# Patient Record
Sex: FEMALE | Race: OTHER | NOT HISPANIC OR LATINO
[De-identification: names, ages, dates, MRNs, and addresses within clinical notes are randomized per-mention and may not be internally consistent; named-entity substitution may affect disease eponyms.]

---

## 2017-03-06 ENCOUNTER — APPOINTMENT (OUTPATIENT)
Dept: FAMILY MEDICINE | Facility: CLINIC | Age: 57
End: 2017-03-06

## 2017-03-06 VITALS
HEIGHT: 62 IN | TEMPERATURE: 98 F | BODY MASS INDEX: 27.42 KG/M2 | RESPIRATION RATE: 17 BRPM | HEART RATE: 63 BPM | WEIGHT: 149 LBS | DIASTOLIC BLOOD PRESSURE: 70 MMHG | OXYGEN SATURATION: 99 % | SYSTOLIC BLOOD PRESSURE: 118 MMHG

## 2017-03-08 LAB
24R-OH-CALCIDIOL SERPL-MCNC: 47.9 PG/ML
ALBUMIN SERPL ELPH-MCNC: 4.4 G/DL
ALP BLD-CCNC: 84 U/L
ALT SERPL-CCNC: 18 U/L
ANION GAP SERPL CALC-SCNC: 15 MMOL/L
APPEARANCE: CLEAR
AST SERPL-CCNC: 18 U/L
BACTERIA: NEGATIVE
BASOPHILS # BLD AUTO: 0.04 K/UL
BASOPHILS NFR BLD AUTO: 0.4 %
BILIRUB SERPL-MCNC: 0.4 MG/DL
BILIRUBIN URINE: NEGATIVE
BLOOD URINE: ABNORMAL
BUN SERPL-MCNC: 13 MG/DL
CALCIUM SERPL-MCNC: 9.9 MG/DL
CHLORIDE SERPL-SCNC: 103 MMOL/L
CHOLEST SERPL-MCNC: 197 MG/DL
CHOLEST/HDLC SERPL: 3.6 RATIO
CO2 SERPL-SCNC: 22 MMOL/L
COLOR: YELLOW
CREAT SERPL-MCNC: 0.64 MG/DL
CREAT SPEC-SCNC: 67 MG/DL
EOSINOPHIL # BLD AUTO: 0.18 K/UL
EOSINOPHIL NFR BLD AUTO: 1.8 %
GLUCOSE QUALITATIVE U: NORMAL MG/DL
GLUCOSE SERPL-MCNC: 91 MG/DL
HBA1C MFR BLD HPLC: 5.4 %
HCT VFR BLD CALC: 42.8 %
HDLC SERPL-MCNC: 54 MG/DL
HGB BLD-MCNC: 14.4 G/DL
HYALINE CASTS: 2 /LPF
IMM GRANULOCYTES NFR BLD AUTO: 0.2 %
KETONES URINE: NEGATIVE
LDLC SERPL CALC-MCNC: 114 MG/DL
LEUKOCYTE ESTERASE URINE: NEGATIVE
LYMPHOCYTES # BLD AUTO: 3.88 K/UL
LYMPHOCYTES NFR BLD AUTO: 37.9 %
MAN DIFF?: NORMAL
MCHC RBC-ENTMCNC: 32.7 PG
MCHC RBC-ENTMCNC: 33.6 GM/DL
MCV RBC AUTO: 97.3 FL
MICROALBUMIN 24H UR DL<=1MG/L-MCNC: <0.3 MG/DL
MICROALBUMIN/CREAT 24H UR-RTO: NORMAL UG/MG
MICROSCOPIC-UA: NORMAL
MONOCYTES # BLD AUTO: 0.67 K/UL
MONOCYTES NFR BLD AUTO: 6.5 %
NEUTROPHILS # BLD AUTO: 5.44 K/UL
NEUTROPHILS NFR BLD AUTO: 53.2 %
NITRITE URINE: NEGATIVE
PH URINE: 6.5
PLATELET # BLD AUTO: 351 K/UL
POTASSIUM SERPL-SCNC: 4.3 MMOL/L
PROT SERPL-MCNC: 7.1 G/DL
PROTEIN URINE: NEGATIVE MG/DL
RBC # BLD: 4.4 M/UL
RBC # FLD: 13.3 %
RED BLOOD CELLS URINE: 6 /HPF
SODIUM SERPL-SCNC: 140 MMOL/L
SPECIFIC GRAVITY URINE: 1.02
SQUAMOUS EPITHELIAL CELLS: 1 /HPF
T4 FREE SERPL-MCNC: 1.2 NG/DL
TRIGL SERPL-MCNC: 144 MG/DL
TSH SERPL-ACNC: 3.43 UIU/ML
UROBILINOGEN URINE: NORMAL MG/DL
WBC # FLD AUTO: 10.23 K/UL
WHITE BLOOD CELLS URINE: 0 /HPF

## 2017-11-01 ENCOUNTER — APPOINTMENT (OUTPATIENT)
Dept: FAMILY MEDICINE | Facility: CLINIC | Age: 57
End: 2017-11-01

## 2017-11-02 ENCOUNTER — APPOINTMENT (OUTPATIENT)
Dept: FAMILY MEDICINE | Facility: CLINIC | Age: 57
End: 2017-11-02
Payer: MEDICARE

## 2017-11-02 VITALS
RESPIRATION RATE: 16 BRPM | TEMPERATURE: 98.2 F | DIASTOLIC BLOOD PRESSURE: 80 MMHG | SYSTOLIC BLOOD PRESSURE: 120 MMHG | HEART RATE: 65 BPM | OXYGEN SATURATION: 98 %

## 2017-11-02 PROCEDURE — 99214 OFFICE O/P EST MOD 30 MIN: CPT

## 2017-11-08 LAB
25(OH)D3 SERPL-MCNC: 20.4 NG/ML
ALBUMIN SERPL ELPH-MCNC: 4.4 G/DL
ALP BLD-CCNC: 93 U/L
ALT SERPL-CCNC: 15 U/L
AMYLASE/CREAT SERPL: 68 U/L
ANION GAP SERPL CALC-SCNC: 12 MMOL/L
APPEARANCE: CLEAR
AST SERPL-CCNC: 13 U/L
BASOPHILS # BLD AUTO: 0.04 K/UL
BASOPHILS NFR BLD AUTO: 0.5 %
BILIRUB DIRECT SERPL-MCNC: 0.2 MG/DL
BILIRUB INDIRECT SERPL-MCNC: 0.4 MG/DL
BILIRUB SERPL-MCNC: 0.6 MG/DL
BILIRUBIN URINE: NEGATIVE
BLOOD URINE: ABNORMAL
BUN SERPL-MCNC: 15 MG/DL
CALCIUM SERPL-MCNC: 9.4 MG/DL
CHLORIDE SERPL-SCNC: 104 MMOL/L
CHOLEST SERPL-MCNC: 185 MG/DL
CHOLEST/HDLC SERPL: 3.7 RATIO
CO2 SERPL-SCNC: 24 MMOL/L
COLOR: ABNORMAL
CREAT SERPL-MCNC: 0.72 MG/DL
CRP SERPL-MCNC: 0.4 MG/DL
EOSINOPHIL # BLD AUTO: 0.15 K/UL
EOSINOPHIL NFR BLD AUTO: 1.8 %
ERYTHROCYTE [SEDIMENTATION RATE] IN BLOOD BY WESTERGREN METHOD: 21 MM/HR
GLUCOSE QUALITATIVE U: NEGATIVE MG/DL
GLUCOSE SERPL-MCNC: 85 MG/DL
HAV IGM SER QL: NONREACTIVE
HBA1C MFR BLD HPLC: 5.5 %
HBV CORE IGM SER QL: NONREACTIVE
HBV SURFACE AG SER QL: NONREACTIVE
HCT VFR BLD CALC: 41.6 %
HCV AB SER QL: NONREACTIVE
HCV S/CO RATIO: 0.1 S/CO
HDLC SERPL-MCNC: 50 MG/DL
HGB BLD-MCNC: 13.8 G/DL
HIV1+2 AB SPEC QL IA.RAPID: NONREACTIVE
IMM GRANULOCYTES NFR BLD AUTO: 0.2 %
KETONES URINE: ABNORMAL
LDLC SERPL CALC-MCNC: 122 MG/DL
LEUKOCYTE ESTERASE URINE: NEGATIVE
LYMPHOCYTES # BLD AUTO: 3.32 K/UL
LYMPHOCYTES NFR BLD AUTO: 39.8 %
MAN DIFF?: NORMAL
MCHC RBC-ENTMCNC: 31.9 PG
MCHC RBC-ENTMCNC: 33.2 GM/DL
MCV RBC AUTO: 96.1 FL
MONOCYTES # BLD AUTO: 0.63 K/UL
MONOCYTES NFR BLD AUTO: 7.6 %
NEUTROPHILS # BLD AUTO: 4.18 K/UL
NEUTROPHILS NFR BLD AUTO: 50.1 %
NITRITE URINE: NEGATIVE
PH URINE: 5
PLATELET # BLD AUTO: 377 K/UL
POTASSIUM SERPL-SCNC: 3.9 MMOL/L
PROT SERPL-MCNC: 7.5 G/DL
PROTEIN URINE: NEGATIVE MG/DL
RBC # BLD: 4.33 M/UL
RBC # FLD: 13.1 %
SODIUM SERPL-SCNC: 140 MMOL/L
SPECIFIC GRAVITY URINE: 1.03
TRIGL SERPL-MCNC: 64 MG/DL
TSH SERPL-ACNC: 3.44 UIU/ML
UROBILINOGEN URINE: 1 MG/DL
WBC # FLD AUTO: 8.34 K/UL

## 2017-11-15 LAB — HEMOCCULT STL QL IA: NEGATIVE

## 2017-11-29 ENCOUNTER — APPOINTMENT (OUTPATIENT)
Dept: RADIOLOGY | Facility: HOSPITAL | Age: 57
End: 2017-11-29
Payer: OTHER MISCELLANEOUS

## 2017-11-29 ENCOUNTER — OUTPATIENT (OUTPATIENT)
Dept: OUTPATIENT SERVICES | Facility: HOSPITAL | Age: 57
LOS: 1 days | End: 2017-11-29
Payer: COMMERCIAL

## 2017-11-29 DIAGNOSIS — S79.911A UNSPECIFIED INJURY OF RIGHT HIP, INITIAL ENCOUNTER: ICD-10-CM

## 2017-11-29 DIAGNOSIS — Z00.8 ENCOUNTER FOR OTHER GENERAL EXAMINATION: ICD-10-CM

## 2017-11-29 PROCEDURE — 73502 X-RAY EXAM HIP UNI 2-3 VIEWS: CPT | Mod: 26,RT

## 2017-11-29 PROCEDURE — 73502 X-RAY EXAM HIP UNI 2-3 VIEWS: CPT

## 2017-12-05 ENCOUNTER — FORM ENCOUNTER (OUTPATIENT)
Age: 57
End: 2017-12-05

## 2017-12-06 ENCOUNTER — APPOINTMENT (OUTPATIENT)
Dept: ULTRASOUND IMAGING | Facility: HOSPITAL | Age: 57
End: 2017-12-06
Payer: COMMERCIAL

## 2017-12-06 ENCOUNTER — OUTPATIENT (OUTPATIENT)
Dept: OUTPATIENT SERVICES | Facility: HOSPITAL | Age: 57
LOS: 1 days | End: 2017-12-06

## 2017-12-06 ENCOUNTER — APPOINTMENT (OUTPATIENT)
Dept: MRI IMAGING | Facility: HOSPITAL | Age: 57
End: 2017-12-06
Payer: COMMERCIAL

## 2017-12-06 DIAGNOSIS — Z00.8 ENCOUNTER FOR OTHER GENERAL EXAMINATION: ICD-10-CM

## 2017-12-06 DIAGNOSIS — K76.89 OTHER SPECIFIED DISEASES OF LIVER: ICD-10-CM

## 2017-12-06 DIAGNOSIS — R51 HEADACHE: ICD-10-CM

## 2017-12-06 PROCEDURE — 76700 US EXAM ABDOM COMPLETE: CPT | Mod: 26

## 2017-12-06 PROCEDURE — 70551 MRI BRAIN STEM W/O DYE: CPT | Mod: 26

## 2017-12-06 PROCEDURE — 70551 MRI BRAIN STEM W/O DYE: CPT

## 2017-12-06 PROCEDURE — 76700 US EXAM ABDOM COMPLETE: CPT

## 2017-12-13 ENCOUNTER — APPOINTMENT (OUTPATIENT)
Dept: FAMILY MEDICINE | Facility: CLINIC | Age: 57
End: 2017-12-13
Payer: MEDICARE

## 2017-12-13 VITALS — RESPIRATION RATE: 16 BRPM

## 2017-12-13 VITALS
HEART RATE: 62 BPM | OXYGEN SATURATION: 96 % | BODY MASS INDEX: 27.6 KG/M2 | SYSTOLIC BLOOD PRESSURE: 118 MMHG | HEIGHT: 62 IN | WEIGHT: 150 LBS | DIASTOLIC BLOOD PRESSURE: 80 MMHG

## 2017-12-13 DIAGNOSIS — R31.29 OTHER MICROSCOPIC HEMATURIA: ICD-10-CM

## 2017-12-13 DIAGNOSIS — H60.502 UNSPECIFIED ACUTE NONINFECTIVE OTITIS EXTERNA, LEFT EAR: ICD-10-CM

## 2017-12-13 DIAGNOSIS — R05 COUGH: ICD-10-CM

## 2017-12-13 PROCEDURE — 99406 BEHAV CHNG SMOKING 3-10 MIN: CPT

## 2017-12-13 PROCEDURE — 99214 OFFICE O/P EST MOD 30 MIN: CPT | Mod: 25

## 2018-01-02 ENCOUNTER — OUTPATIENT (OUTPATIENT)
Dept: OUTPATIENT SERVICES | Facility: HOSPITAL | Age: 58
LOS: 1 days | End: 2018-01-02
Payer: COMMERCIAL

## 2018-01-02 ENCOUNTER — APPOINTMENT (OUTPATIENT)
Dept: CT IMAGING | Facility: HOSPITAL | Age: 58
End: 2018-01-02
Payer: MEDICARE

## 2018-01-02 DIAGNOSIS — Z00.8 ENCOUNTER FOR OTHER GENERAL EXAMINATION: ICD-10-CM

## 2018-01-02 DIAGNOSIS — R31.21 ASYMPTOMATIC MICROSCOPIC HEMATURIA: ICD-10-CM

## 2018-01-02 DIAGNOSIS — R10.9 UNSPECIFIED ABDOMINAL PAIN: ICD-10-CM

## 2018-01-02 PROCEDURE — 74178 CT ABD&PLV WO CNTR FLWD CNTR: CPT

## 2018-01-02 PROCEDURE — 74178 CT ABD&PLV WO CNTR FLWD CNTR: CPT | Mod: 26

## 2018-01-24 ENCOUNTER — RX RENEWAL (OUTPATIENT)
Age: 58
End: 2018-01-24

## 2018-07-24 ENCOUNTER — EMERGENCY (EMERGENCY)
Facility: HOSPITAL | Age: 58
LOS: 1 days | Discharge: HOME | End: 2018-07-24
Attending: EMERGENCY MEDICINE | Admitting: EMERGENCY MEDICINE
Payer: COMMERCIAL

## 2018-07-24 VITALS
RESPIRATION RATE: 18 BRPM | HEIGHT: 62 IN | DIASTOLIC BLOOD PRESSURE: 81 MMHG | TEMPERATURE: 97 F | SYSTOLIC BLOOD PRESSURE: 123 MMHG | HEART RATE: 70 BPM | OXYGEN SATURATION: 97 % | WEIGHT: 149.91 LBS

## 2018-07-24 PROCEDURE — 99283 EMERGENCY DEPT VISIT LOW MDM: CPT | Mod: 25

## 2018-07-24 PROCEDURE — 16000 INITIAL TREATMENT OF BURN(S): CPT

## 2018-07-24 PROCEDURE — 90471 IMMUNIZATION ADMIN: CPT

## 2018-07-24 PROCEDURE — 99053 MED SERV 10PM-8AM 24 HR FAC: CPT

## 2018-07-24 PROCEDURE — 90714 TD VACC NO PRESV 7 YRS+ IM: CPT

## 2018-07-24 RX ORDER — TETANUS AND DIPHTHERIA TOXOIDS ADSORBED 2; 2 [LF]/.5ML; [LF]/.5ML
0.5 INJECTION INTRAMUSCULAR ONCE
Qty: 0 | Refills: 0 | Status: COMPLETED | OUTPATIENT
Start: 2018-07-24 | End: 2018-07-24

## 2018-07-24 RX ORDER — TETANUS AND DIPHTHERIA TOXOIDS ADSORBED 2; 2 [LF]/.5ML; [LF]/.5ML
0.5 INJECTION INTRAMUSCULAR ONCE
Qty: 0 | Refills: 0 | Status: DISCONTINUED | OUTPATIENT
Start: 2018-07-24 | End: 2018-07-24

## 2018-07-24 RX ORDER — TETANUS IMMUNE GLOBULIN 250 UNIT
1 SYRINGE (EA) INTRAMUSCULAR ONCE
Qty: 0 | Refills: 0 | Status: DISCONTINUED | OUTPATIENT
Start: 2018-07-24 | End: 2018-07-24

## 2018-07-24 RX ADMIN — Medication 500 MILLIGRAM(S): at 08:47

## 2018-07-24 RX ADMIN — Medication 1 APPLICATION(S): at 08:55

## 2018-07-24 RX ADMIN — Medication 30 MILLILITER(S): at 08:47

## 2018-07-24 RX ADMIN — TETANUS AND DIPHTHERIA TOXOIDS ADSORBED 0.5 MILLILITER(S): 2; 2 INJECTION INTRAMUSCULAR at 08:54

## 2018-07-31 PROBLEM — K29.50 UNSPECIFIED CHRONIC GASTRITIS WITHOUT BLEEDING: Chronic | Status: ACTIVE | Noted: 2018-07-24

## 2018-08-01 ENCOUNTER — APPOINTMENT (OUTPATIENT)
Dept: FAMILY MEDICINE | Facility: CLINIC | Age: 58
End: 2018-08-01
Payer: OTHER MISCELLANEOUS

## 2018-08-01 VITALS
RESPIRATION RATE: 16 BRPM | TEMPERATURE: 98 F | OXYGEN SATURATION: 96 % | DIASTOLIC BLOOD PRESSURE: 70 MMHG | HEART RATE: 66 BPM | SYSTOLIC BLOOD PRESSURE: 120 MMHG

## 2018-08-01 DIAGNOSIS — Z02.6 ENCOUNTER FOR EXAMINATION FOR INSURANCE PURPOSES: ICD-10-CM

## 2018-08-01 PROCEDURE — 99213 OFFICE O/P EST LOW 20 MIN: CPT

## 2018-08-01 NOTE — HISTORY OF PRESENT ILLNESS
[FreeTextEntry8] : Patient is a 58-year-old female, who presents today for Worker's Compensation status post burn of left hand. It was an accidental burn which occurred at work when the patient attempted to make to coffee pot work again. The accident occurred one week ago at work. Patient was seen in the emergency room presently her left hand is healing well. There is no sign of infection and the skin is healing very well.

## 2018-08-01 NOTE — PHYSICAL EXAM

## 2018-08-01 NOTE — ASSESSMENT
[FreeTextEntry1] : Continue Silvadene. Patient may return to work in fact, the patient return to work 2 days after the accident.No change in present medical management

## 2018-08-27 ENCOUNTER — FORM ENCOUNTER (OUTPATIENT)
Age: 58
End: 2018-08-27

## 2018-08-28 ENCOUNTER — FORM ENCOUNTER (OUTPATIENT)
Age: 58
End: 2018-08-28

## 2018-08-28 ENCOUNTER — APPOINTMENT (OUTPATIENT)
Dept: MAMMOGRAPHY | Facility: HOSPITAL | Age: 58
End: 2018-08-28
Payer: MEDICARE

## 2018-08-28 ENCOUNTER — OUTPATIENT (OUTPATIENT)
Dept: OUTPATIENT SERVICES | Facility: HOSPITAL | Age: 58
LOS: 1 days | End: 2018-08-28
Payer: COMMERCIAL

## 2018-08-28 DIAGNOSIS — Z00.8 ENCOUNTER FOR OTHER GENERAL EXAMINATION: ICD-10-CM

## 2018-08-28 PROCEDURE — 77063 BREAST TOMOSYNTHESIS BI: CPT | Mod: 26

## 2018-08-28 PROCEDURE — 77067 SCR MAMMO BI INCL CAD: CPT | Mod: 26

## 2018-08-28 PROCEDURE — 77067 SCR MAMMO BI INCL CAD: CPT

## 2018-08-28 PROCEDURE — 77063 BREAST TOMOSYNTHESIS BI: CPT

## 2018-08-29 ENCOUNTER — OTHER (OUTPATIENT)
Age: 58
End: 2018-08-29

## 2018-08-29 ENCOUNTER — APPOINTMENT (OUTPATIENT)
Dept: ULTRASOUND IMAGING | Facility: HOSPITAL | Age: 58
End: 2018-08-29
Payer: MEDICARE

## 2018-08-29 ENCOUNTER — APPOINTMENT (OUTPATIENT)
Dept: MAMMOGRAPHY | Facility: HOSPITAL | Age: 58
End: 2018-08-29
Payer: MEDICARE

## 2018-08-29 ENCOUNTER — OUTPATIENT (OUTPATIENT)
Dept: OUTPATIENT SERVICES | Facility: HOSPITAL | Age: 58
LOS: 1 days | End: 2018-08-29
Payer: COMMERCIAL

## 2018-08-29 DIAGNOSIS — Z00.8 ENCOUNTER FOR OTHER GENERAL EXAMINATION: ICD-10-CM

## 2018-08-29 PROCEDURE — 77065 DX MAMMO INCL CAD UNI: CPT

## 2018-08-29 PROCEDURE — G0279: CPT | Mod: 26

## 2018-08-29 PROCEDURE — G0279: CPT

## 2018-08-29 PROCEDURE — 77065 DX MAMMO INCL CAD UNI: CPT | Mod: 26,RT

## 2018-08-29 PROCEDURE — 76641 ULTRASOUND BREAST COMPLETE: CPT

## 2018-08-29 PROCEDURE — 76641 ULTRASOUND BREAST COMPLETE: CPT | Mod: 26

## 2018-09-07 ENCOUNTER — APPOINTMENT (OUTPATIENT)
Dept: FAMILY MEDICINE | Facility: CLINIC | Age: 58
End: 2018-09-07
Payer: MEDICARE

## 2018-09-07 VITALS
WEIGHT: 150 LBS | HEART RATE: 60 BPM | HEIGHT: 62 IN | DIASTOLIC BLOOD PRESSURE: 82 MMHG | TEMPERATURE: 98.1 F | BODY MASS INDEX: 27.6 KG/M2 | SYSTOLIC BLOOD PRESSURE: 126 MMHG | OXYGEN SATURATION: 98 %

## 2018-09-07 PROCEDURE — 99396 PREV VISIT EST AGE 40-64: CPT | Mod: 25

## 2018-09-07 PROCEDURE — 93000 ELECTROCARDIOGRAM COMPLETE: CPT | Mod: 59

## 2018-09-07 PROCEDURE — 36415 COLL VENOUS BLD VENIPUNCTURE: CPT

## 2018-09-07 PROCEDURE — 99406 BEHAV CHNG SMOKING 3-10 MIN: CPT

## 2018-09-07 NOTE — COUNSELING
[Smoking cessation counseling provided] : smoking cessation [Behavioral health counseling provided] : behavioral health  [Quit Smoking] : Quit smoking [Sleep ___ hours/day] : Sleep [unfilled] hours/day [Engage in a relaxing activity] : Engage in a relaxing activity [Plan in advance] : Plan in advance

## 2018-09-07 NOTE — REVIEW OF SYSTEMS
[Joint Pain] : joint pain [Negative] : Heme/Lymph [Joint Stiffness] : no joint stiffness [Joint Swelling] : no joint swelling [Muscle Weakness] : no muscle weakness [Muscle Pain] : no muscle pain [Back Pain] : no back pain

## 2018-09-07 NOTE — HEALTH RISK ASSESSMENT
[Very Good] : ~his/her~  mood as very good [No falls in past year] : Patient reported no falls in the past year [0] : 2) Feeling down, depressed, or hopeless: Not at all (0) [None] : None [With Family] : lives with family [Employed] : employed [Less Than High School] : less than high school [Single] : single [Sexually Active] : sexually active [Feels Safe at Home] : Feels safe at home [Fully functional (bathing, dressing, toileting, transferring, walking, feeding)] : Fully functional (bathing, dressing, toileting, transferring, walking, feeding) [Fully functional (using the telephone, shopping, preparing meals, housekeeping, doing laundry, using] : Fully functional and needs no help or supervision to perform IADLs (using the telephone, shopping, preparing meals, housekeeping, doing laundry, using transportation, managing medications and managing finances) [Discussed at today's visit] : Advance Directives Discussed at today's visit [Patient's preferences updated] : Patient's preferences updated  [Designated Healthcare Proxy] : Designated healthcare proxy [Name: ___] : Health Care Proxy's Name: [unfilled]  [Relationship: ___] : Relationship: [unfilled] [Aggressive treatment] : aggressive treatment [] : No [[id number]] : 0 [Change in mental status noted] : No change in mental status noted [Language] : denies difficulty with language [Behavior] : denies difficulty with behavior [Learning/Retaining New Information] : denies difficulty learning/retaining new information [Handling Complex Tasks] : denies difficulty handling complex tasks [Reasoning] : denies difficulty with reasoning [Spatial Ability and Orientation] : denies difficulty with spatial ability and orientation [High Risk Behavior] : no high risk behavior

## 2018-09-07 NOTE — ASSESSMENT
[FreeTextEntry1] : Assessment and plan:\par \par Comprehensive physical exam was recently unremarkable without acute findings. Electrocardiogram shows no acute ST-T wave changes compared to previous comprehensive blood work obtained. Patient is scheduled for breast biopsy.\par \par Discussed with patient. Tobacco cessation 6 minutes spent on topic

## 2018-09-07 NOTE — PHYSICAL EXAM
[No Acute Distress] : no acute distress [Well Nourished] : well nourished [Well Developed] : well developed [Well-Appearing] : well-appearing [Normal Sclera/Conjunctiva] : normal sclera/conjunctiva [PERRL] : pupils equal round and reactive to light [EOMI] : extraocular movements intact [Normal Outer Ear/Nose] : the outer ears and nose were normal in appearance [Normal Oropharynx] : the oropharynx was normal [No JVD] : no jugular venous distention [Supple] : supple [No Lymphadenopathy] : no lymphadenopathy [Thyroid Normal, No Nodules] : the thyroid was normal and there were no nodules present [No Respiratory Distress] : no respiratory distress  [Clear to Auscultation] : lungs were clear to auscultation bilaterally [No Accessory Muscle Use] : no accessory muscle use [Normal Rate] : normal rate  [Regular Rhythm] : with a regular rhythm [Normal S1, S2] : normal S1 and S2 [No Murmur] : no murmur heard [No Carotid Bruits] : no carotid bruits [No Abdominal Bruit] : a ~M bruit was not heard ~T in the abdomen [No Varicosities] : no varicosities [Pedal Pulses Present] : the pedal pulses are present [No Edema] : there was no peripheral edema [No Extremity Clubbing/Cyanosis] : no extremity clubbing/cyanosis [No Palpable Aorta] : no palpable aorta [Soft] : abdomen soft [Non Tender] : non-tender [Non-distended] : non-distended [No Masses] : no abdominal mass palpated [No HSM] : no HSM [Normal Bowel Sounds] : normal bowel sounds [Normal Posterior Cervical Nodes] : no posterior cervical lymphadenopathy [Normal Anterior Cervical Nodes] : no anterior cervical lymphadenopathy [No CVA Tenderness] : no CVA  tenderness [No Spinal Tenderness] : no spinal tenderness [No Joint Swelling] : no joint swelling [Grossly Normal Strength/Tone] : grossly normal strength/tone [No Rash] : no rash [Normal Gait] : normal gait [Coordination Grossly Intact] : coordination grossly intact [No Focal Deficits] : no focal deficits [Deep Tendon Reflexes (DTR)] : deep tendon reflexes were 2+ and symmetric [Normal Affect] : the affect was normal [Normal Insight/Judgement] : insight and judgment were intact [No Hernias] : no hernias [Normal Supraclavicular Nodes] : no supraclavicular lymphadenopathy [Normal Axillary Nodes] : no axillary lymphadenopathy [Normal Femoral Nodes] : no femoral lymphadenopathy [Normal Inguinal Nodes] : no inguinal lymphadenopathy [Speech Grossly Normal] : speech grossly normal [Memory Grossly Normal] : memory grossly normal [Alert and Oriented x3] : oriented to person, place, and time [Normal Mood] : the mood was normal [Comprehensive Foot Exam Normal] : Right and left foot were examined and both feet are normal. No ulcers in either foot. Toes are normal and with full ROM.  Normal tactile sensation with monofilament testing throughout both feet [de-identified] : GYN [FreeTextEntry1] : GI [de-identified] : GYN [de-identified] : Burn of the dorsum, left hand is healing very well. There is no sign of infection granulation tissue is excellent. There are no blisters just some hyperpigmentation doubt that the patient will have much in scar tissue.

## 2018-09-07 NOTE — HISTORY OF PRESENT ILLNESS
[Discussed Risks and Advised to Quit Smoking] : Discussed risks and advised to quit smoking [Discussed Cessation Medication] : cessation medication was discussed [Discussed Cessation Strategies] : cessation strategies were discussed [Smoking Cessation Counseling Given (more than 3 min less than10 min) and Resources Provided] : Smoking cessation counseling given (more than 3 min less than 10 min) and resources provided [Not Ready] : Patient is not ready for cessation intervention [Pre-contemplation] : Pre-contemplation: patient is not planning to quit within the next 6 months [Withdrawal symptoms] : withdrawal symptoms [Patient refused treatment] : Patient refused treatment [FreeTextEntry1] : Please see history of present illness [de-identified] : Patient is 58-year-old female, who presents today for comprehensive health maintenance physical exam. Patient states that she is in her usual state of health recently had colonoscopy, mammogram, which showed some abnormal findings. Ultrasound was done. Patient is scheduled for biopsy. Patient is awake, alert, and oriented x3, presently in no acute distress at this visit. Comprehensive exam. Will be done. Comprehensive blood work and electrocardiogram.\par \par Patient presently denies any chest pain, shortness of breath, nausea, no vomiting. She does admit to mild to moderate joint discomfort. Which is improved with rest and worse since with physical activity.

## 2018-09-08 LAB
ALBUMIN SERPL ELPH-MCNC: 4.4 G/DL
ALP BLD-CCNC: 106 U/L
ALT SERPL-CCNC: 10 U/L
ANION GAP SERPL CALC-SCNC: 12 MMOL/L
APPEARANCE: CLEAR
AST SERPL-CCNC: 18 U/L
BACTERIA: NEGATIVE
BASOPHILS # BLD AUTO: 0.04 K/UL
BASOPHILS NFR BLD AUTO: 0.5 %
BILIRUB SERPL-MCNC: 0.2 MG/DL
BILIRUBIN URINE: NEGATIVE
BLOOD URINE: ABNORMAL
BUN SERPL-MCNC: 12 MG/DL
CALCIUM SERPL-MCNC: 9.4 MG/DL
CHLORIDE SERPL-SCNC: 104 MMOL/L
CHOLEST SERPL-MCNC: 177 MG/DL
CHOLEST/HDLC SERPL: 3.8 RATIO
CO2 SERPL-SCNC: 24 MMOL/L
COLOR: YELLOW
CREAT SERPL-MCNC: 0.78 MG/DL
EOSINOPHIL # BLD AUTO: 0.14 K/UL
EOSINOPHIL NFR BLD AUTO: 1.6 %
GLUCOSE QUALITATIVE U: NEGATIVE MG/DL
GLUCOSE SERPL-MCNC: 87 MG/DL
HBA1C MFR BLD HPLC: 5.3 %
HCT VFR BLD CALC: 39.9 %
HDLC SERPL-MCNC: 47 MG/DL
HGB BLD-MCNC: 13.4 G/DL
IMM GRANULOCYTES NFR BLD AUTO: 0.2 %
KETONES URINE: NEGATIVE
LDLC SERPL CALC-MCNC: 98 MG/DL
LEUKOCYTE ESTERASE URINE: NEGATIVE
LYMPHOCYTES # BLD AUTO: 4.04 K/UL
LYMPHOCYTES NFR BLD AUTO: 45.5 %
MAN DIFF?: NORMAL
MCHC RBC-ENTMCNC: 32.5 PG
MCHC RBC-ENTMCNC: 33.6 GM/DL
MCV RBC AUTO: 96.8 FL
MICROSCOPIC-UA: NORMAL
MONOCYTES # BLD AUTO: 0.49 K/UL
MONOCYTES NFR BLD AUTO: 5.5 %
NEUTROPHILS # BLD AUTO: 4.14 K/UL
NEUTROPHILS NFR BLD AUTO: 46.7 %
NITRITE URINE: NEGATIVE
PH URINE: 7
PLATELET # BLD AUTO: 358 K/UL
POTASSIUM SERPL-SCNC: 4.2 MMOL/L
PROT SERPL-MCNC: 7.1 G/DL
PROTEIN URINE: NEGATIVE MG/DL
RBC # BLD: 4.12 M/UL
RBC # FLD: 13.7 %
RED BLOOD CELLS URINE: 9 /HPF
SODIUM SERPL-SCNC: 140 MMOL/L
SPECIFIC GRAVITY URINE: 1.02
SQUAMOUS EPITHELIAL CELLS: 1 /HPF
T4 FREE SERPL-MCNC: 1.1 NG/DL
TRIGL SERPL-MCNC: 158 MG/DL
TSH SERPL-ACNC: 3.65 UIU/ML
UROBILINOGEN URINE: NEGATIVE MG/DL
WBC # FLD AUTO: 8.87 K/UL
WHITE BLOOD CELLS URINE: 0 /HPF

## 2018-09-11 ENCOUNTER — FORM ENCOUNTER (OUTPATIENT)
Age: 58
End: 2018-09-11

## 2018-09-12 ENCOUNTER — RESULT REVIEW (OUTPATIENT)
Age: 58
End: 2018-09-12

## 2018-09-12 ENCOUNTER — OUTPATIENT (OUTPATIENT)
Dept: OUTPATIENT SERVICES | Facility: HOSPITAL | Age: 58
LOS: 1 days | End: 2018-09-12
Payer: COMMERCIAL

## 2018-09-12 DIAGNOSIS — N63.0 UNSPECIFIED LUMP IN UNSPECIFIED BREAST: ICD-10-CM

## 2018-09-12 DIAGNOSIS — R92.8 OTHER ABNORMAL AND INCONCLUSIVE FINDINGS ON DIAGNOSTIC IMAGING OF BREAST: ICD-10-CM

## 2018-09-12 PROCEDURE — 88342 IMHCHEM/IMCYTCHM 1ST ANTB: CPT | Mod: 26,59

## 2018-09-12 PROCEDURE — 88341 IMHCHEM/IMCYTCHM EA ADD ANTB: CPT | Mod: 26,59

## 2018-09-12 PROCEDURE — 88305 TISSUE EXAM BY PATHOLOGIST: CPT

## 2018-09-12 PROCEDURE — 88341 IMHCHEM/IMCYTCHM EA ADD ANTB: CPT

## 2018-09-12 PROCEDURE — 19083 BX BREAST 1ST LESION US IMAG: CPT

## 2018-09-12 PROCEDURE — 19083 BX BREAST 1ST LESION US IMAG: CPT | Mod: RT

## 2018-09-12 PROCEDURE — 88360 TUMOR IMMUNOHISTOCHEM/MANUAL: CPT | Mod: 26,59

## 2018-09-12 PROCEDURE — 88342 IMHCHEM/IMCYTCHM 1ST ANTB: CPT

## 2018-09-12 PROCEDURE — 88360 TUMOR IMMUNOHISTOCHEM/MANUAL: CPT

## 2018-09-12 PROCEDURE — A4648: CPT

## 2018-09-12 PROCEDURE — 88305 TISSUE EXAM BY PATHOLOGIST: CPT | Mod: 26

## 2018-09-14 LAB — SURGICAL PATHOLOGY STUDY: SIGNIFICANT CHANGE UP

## 2018-10-09 ENCOUNTER — APPOINTMENT (OUTPATIENT)
Dept: SURGICAL ONCOLOGY | Facility: CLINIC | Age: 58
End: 2018-10-09
Payer: MEDICARE

## 2018-10-09 DIAGNOSIS — Z80.3 FAMILY HISTORY OF MALIGNANT NEOPLASM OF BREAST: ICD-10-CM

## 2018-10-09 PROCEDURE — 99205 OFFICE O/P NEW HI 60 MIN: CPT

## 2018-10-12 ENCOUNTER — RESULT REVIEW (OUTPATIENT)
Age: 58
End: 2018-10-12

## 2018-10-16 ENCOUNTER — APPOINTMENT (OUTPATIENT)
Dept: MRI IMAGING | Facility: IMAGING CENTER | Age: 58
End: 2018-10-16
Payer: MEDICARE

## 2018-10-16 ENCOUNTER — OUTPATIENT (OUTPATIENT)
Dept: OUTPATIENT SERVICES | Facility: HOSPITAL | Age: 58
LOS: 1 days | End: 2018-10-16
Payer: COMMERCIAL

## 2018-10-16 DIAGNOSIS — C50.911 MALIGNANT NEOPLASM OF UNSPECIFIED SITE OF RIGHT FEMALE BREAST: ICD-10-CM

## 2018-10-16 PROCEDURE — C8908: CPT

## 2018-10-16 PROCEDURE — C8937: CPT

## 2018-10-16 PROCEDURE — A9585: CPT

## 2018-10-16 PROCEDURE — 82565 ASSAY OF CREATININE: CPT

## 2018-10-16 PROCEDURE — 77059 MRI BREAST BILATERAL: CPT | Mod: 26

## 2018-10-16 PROCEDURE — 0159T: CPT | Mod: 26

## 2018-10-23 ENCOUNTER — OUTPATIENT (OUTPATIENT)
Dept: OUTPATIENT SERVICES | Facility: HOSPITAL | Age: 58
LOS: 1 days | Discharge: HOME | End: 2018-10-23

## 2018-10-23 DIAGNOSIS — C50.919 MALIGNANT NEOPLASM OF UNSPECIFIED SITE OF UNSPECIFIED FEMALE BREAST: ICD-10-CM

## 2018-10-24 ENCOUNTER — RESULT REVIEW (OUTPATIENT)
Age: 58
End: 2018-10-24

## 2018-10-24 ENCOUNTER — APPOINTMENT (OUTPATIENT)
Dept: HEMATOLOGY ONCOLOGY | Facility: CLINIC | Age: 58
End: 2018-10-24
Payer: MEDICARE

## 2018-10-24 ENCOUNTER — LABORATORY RESULT (OUTPATIENT)
Age: 58
End: 2018-10-24

## 2018-10-24 VITALS
BODY MASS INDEX: 28.32 KG/M2 | RESPIRATION RATE: 17 BRPM | DIASTOLIC BLOOD PRESSURE: 83 MMHG | WEIGHT: 153.88 LBS | OXYGEN SATURATION: 98 % | HEART RATE: 70 BPM | HEIGHT: 61.97 IN | TEMPERATURE: 98 F | SYSTOLIC BLOOD PRESSURE: 128 MMHG

## 2018-10-24 LAB
HCT VFR BLD CALC: 43.5 % — SIGNIFICANT CHANGE UP (ref 34.5–45)
HGB BLD-MCNC: 14.8 G/DL — SIGNIFICANT CHANGE UP (ref 11.5–15.5)
MCHC RBC-ENTMCNC: 32.3 PG — SIGNIFICANT CHANGE UP (ref 27–34)
MCHC RBC-ENTMCNC: 33.9 G/DL — SIGNIFICANT CHANGE UP (ref 32–36)
MCV RBC AUTO: 95.2 FL — SIGNIFICANT CHANGE UP (ref 80–100)
PLATELET # BLD AUTO: 368 K/UL — SIGNIFICANT CHANGE UP (ref 150–400)
RBC # BLD: 4.57 M/UL — SIGNIFICANT CHANGE UP (ref 3.8–5.2)
RBC # FLD: 11.5 % — SIGNIFICANT CHANGE UP (ref 10.3–14.5)
WBC # BLD: 9.4 K/UL — SIGNIFICANT CHANGE UP (ref 3.8–10.5)
WBC # FLD AUTO: 9.4 K/UL — SIGNIFICANT CHANGE UP (ref 3.8–10.5)

## 2018-10-24 PROCEDURE — 99205 OFFICE O/P NEW HI 60 MIN: CPT

## 2018-10-25 ENCOUNTER — FORM ENCOUNTER (OUTPATIENT)
Age: 58
End: 2018-10-25

## 2018-10-25 LAB
ALBUMIN SERPL ELPH-MCNC: 4.6 G/DL
ALP BLD-CCNC: 109 U/L
ALT SERPL-CCNC: 16 U/L
ANION GAP SERPL CALC-SCNC: 11 MMOL/L
APTT BLD: 33.5 SEC
AST SERPL-CCNC: 16 U/L
BILIRUB SERPL-MCNC: 0.2 MG/DL
BUN SERPL-MCNC: 14 MG/DL
CALCIUM SERPL-MCNC: 9.8 MG/DL
CHLORIDE SERPL-SCNC: 102 MMOL/L
CO2 SERPL-SCNC: 27 MMOL/L
CREAT SERPL-MCNC: 0.61 MG/DL
GLUCOSE SERPL-MCNC: 115 MG/DL
HBV CORE IGG+IGM SER QL: NONREACTIVE
HBV CORE IGM SER QL: NONREACTIVE
HBV SURFACE AB SER QL: NONREACTIVE
HBV SURFACE AG SER QL: NONREACTIVE
HCV AB SER QL: NONREACTIVE
HCV S/CO RATIO: 0.13 S/CO
HEPATITIS A IGG ANTIBODY: REACTIVE
INR PPP: 0.94 RATIO
POTASSIUM SERPL-SCNC: 4.7 MMOL/L
PROT SERPL-MCNC: 7.4 G/DL
PT BLD: 10.6 SEC
SODIUM SERPL-SCNC: 140 MMOL/L

## 2018-10-26 ENCOUNTER — APPOINTMENT (OUTPATIENT)
Dept: CT IMAGING | Facility: CLINIC | Age: 58
End: 2018-10-26

## 2018-10-26 ENCOUNTER — APPOINTMENT (OUTPATIENT)
Dept: NUCLEAR MEDICINE | Facility: CLINIC | Age: 58
End: 2018-10-26

## 2018-10-26 ENCOUNTER — OUTPATIENT (OUTPATIENT)
Dept: OUTPATIENT SERVICES | Facility: HOSPITAL | Age: 58
LOS: 1 days | End: 2018-10-26
Payer: COMMERCIAL

## 2018-10-26 DIAGNOSIS — Z00.8 ENCOUNTER FOR OTHER GENERAL EXAMINATION: ICD-10-CM

## 2018-10-26 PROCEDURE — 74177 CT ABD & PELVIS W/CONTRAST: CPT | Mod: 26

## 2018-10-26 PROCEDURE — 74177 CT ABD & PELVIS W/CONTRAST: CPT

## 2018-10-26 PROCEDURE — 78815 PET IMAGE W/CT SKULL-THIGH: CPT | Mod: 26,PI

## 2018-10-26 PROCEDURE — 71260 CT THORAX DX C+: CPT

## 2018-10-26 PROCEDURE — 71260 CT THORAX DX C+: CPT | Mod: 26

## 2018-10-26 PROCEDURE — A9552: CPT

## 2018-10-26 PROCEDURE — 78815 PET IMAGE W/CT SKULL-THIGH: CPT

## 2018-11-06 ENCOUNTER — OTHER (OUTPATIENT)
Age: 58
End: 2018-11-06

## 2018-11-15 ENCOUNTER — OUTPATIENT (OUTPATIENT)
Dept: OUTPATIENT SERVICES | Facility: HOSPITAL | Age: 58
LOS: 1 days | End: 2018-11-15
Payer: COMMERCIAL

## 2018-11-15 VITALS
TEMPERATURE: 98 F | OXYGEN SATURATION: 99 % | WEIGHT: 153 LBS | HEART RATE: 60 BPM | SYSTOLIC BLOOD PRESSURE: 134 MMHG | DIASTOLIC BLOOD PRESSURE: 80 MMHG | RESPIRATION RATE: 14 BRPM | HEIGHT: 62 IN

## 2018-11-15 DIAGNOSIS — Z78.9 OTHER SPECIFIED HEALTH STATUS: ICD-10-CM

## 2018-11-15 DIAGNOSIS — F17.200 NICOTINE DEPENDENCE, UNSPECIFIED, UNCOMPLICATED: ICD-10-CM

## 2018-11-15 DIAGNOSIS — C50.411 MALIGNANT NEOPLASM OF UPPER-OUTER QUADRANT OF RIGHT FEMALE BREAST: ICD-10-CM

## 2018-11-15 DIAGNOSIS — Z29.9 ENCOUNTER FOR PROPHYLACTIC MEASURES, UNSPECIFIED: ICD-10-CM

## 2018-11-15 DIAGNOSIS — Z01.818 ENCOUNTER FOR OTHER PREPROCEDURAL EXAMINATION: ICD-10-CM

## 2018-11-15 DIAGNOSIS — Z42.1 ENCOUNTER FOR BREAST RECONSTRUCTION FOLLOWING MASTECTOMY: ICD-10-CM

## 2018-11-15 DIAGNOSIS — K29.50 UNSPECIFIED CHRONIC GASTRITIS WITHOUT BLEEDING: ICD-10-CM

## 2018-11-15 DIAGNOSIS — Z90.13 ACQUIRED ABSENCE OF BILATERAL BREASTS AND NIPPLES: ICD-10-CM

## 2018-11-15 DIAGNOSIS — M95.4 ACQUIRED DEFORMITY OF CHEST AND RIB: ICD-10-CM

## 2018-11-15 PROCEDURE — G0463: CPT

## 2018-11-15 RX ORDER — CEFAZOLIN SODIUM 1 G
2000 VIAL (EA) INJECTION ONCE
Qty: 0 | Refills: 0 | Status: DISCONTINUED | OUTPATIENT
Start: 2018-11-21 | End: 2018-11-22

## 2018-11-15 NOTE — H&P PST ADULT - HISTORY OF PRESENT ILLNESS
Ms. Page is a 58 year old woman with PMH GERD, anxiety, lower back pain and everyday smoker (quit 9/2018) who went for her annual mammogram which revealed a 1cm mass in upper medial right breast, bx revealing invasive carcinoma with neuroendocrine features (Er-Pr-Her2) now scheduled for bilateral mastectomies, bilateral axillary sentinel lymph node biopsy, possible bilateral axillary dissection.

## 2018-11-15 NOTE — H&P PST ADULT - NEGATIVE NEUROLOGICAL SYMPTOMS
no generalized seizures/no focal seizures/no tremors/no syncope/no difficulty walking/no headache/no paresthesias/no weakness

## 2018-11-15 NOTE — H&P PST ADULT - PROBLEM SELECTOR PLAN 1
Scheduled for bilateral mastectomies, bilateral axillary sentinel lymph node biopsy, possible bilateral axillary dissection.  Preop instructions given  Medical evaluation on 11/19/18 at 230pm

## 2018-11-15 NOTE — H&P PST ADULT - PMH
Anxiety about mastectomy    Chronic bilateral low back pain, with sciatica presence unspecified    Language barrier  Thai speaking  Other chronic gastritis    Smoker

## 2018-11-15 NOTE — H&P PST ADULT - RS GEN PE MLT RESP DETAILS PC
breath sounds equal/no wheezes/airway patent/respirations non-labored/good air movement/clear to auscultation bilaterally

## 2018-11-15 NOTE — H&P PST ADULT - ASSESSMENT
CAPRINI SCORE [CLOT]    AGE RELATED RISK FACTORS                                                       MOBILITY RELATED FACTORS  [ x] Age 41-60 years                                            (1 Point)                  [ ] Bed rest                                                        (1 Point)  [ ] Age: 61-74 years                                           (2 Points)                 [ ] Plaster cast                                                   (2 Points)  [ ] Age= 75 years                                              (3 Points)                 [ ] Bed bound for more than 72 hours                 (2 Points)    DISEASE RELATED RISK FACTORS                                               GENDER SPECIFIC FACTORS  [ ] Edema in the lower extremities                       (1 Point)                  [ ] Pregnancy                                                     (1 Point)  [ ] Varicose veins                                               (1 Point)                  [ ] Post-partum < 6 weeks                                   (1 Point)             [x ] BMI > 25 Kg/m2                                            (1 Point)                  [ ] Hormonal therapy  or oral contraception          (1 Point)                 [ ] Sepsis (in the previous month)                        (1 Point)                  [ ] History of pregnancy complications                 (1 point)  [ ] Pneumonia or serious lung disease                                               [ ] Unexplained or recurrent                     (1 Point)           (in the previous month)                               (1 Point)  [ ] Abnormal pulmonary function test                     (1 Point)                 SURGERY RELATED RISK FACTORS  [ ] Acute myocardial infarction                              (1 Point)                 [ ]  Section                                             (1 Point)  [ ] Congestive heart failure (in the previous month)  (1 Point)               [ ] Minor surgery                                                  (1 Point)   [ ] Inflammatory bowel disease                             (1 Point)                 [ ] Arthroscopic surgery                                        (2 Points)  [ ] Central venous access                                      (2 Points)                [x ] General surgery lasting more than 45 minutes   (2 Points)       [ ] Stroke (in the previous month)                          (5 Points)               [ ] Elective arthroplasty                                         (5 Points)                                                                                                                                               HEMATOLOGY RELATED FACTORS                                                 TRAUMA RELATED RISK FACTORS  [ ] Prior episodes of VTE                                     (3 Points)                 [ ] Fracture of the hip, pelvis, or leg                       (5 Points)  [ ] Positive family history for VTE                         (3 Points)                 [ ] Acute spinal cord injury (in the previous month)  (5 Points)  [ ] Prothrombin 11533 A                                     (3 Points)                 [ ] Paralysis  (less than 1 month)                             (5 Points)  [ ] Factor V Leiden                                             (3 Points)                  [ ] Multiple Trauma within 1 month                        (5 Points)  [ ] Lupus anticoagulants                                     (3 Points)                                                           [ ] Anticardiolipin antibodies                               (3 Points)                                                       [ ] High homocysteine in the blood                      (3 Points)                                             [ ] Other congenital or acquired thrombophilia      (3 Points)                                                [ ] Heparin induced thrombocytopenia                  (3 Points)                                          Total Score [     4     ]

## 2018-11-16 PROBLEM — Z78.9 OTHER SPECIFIED HEALTH STATUS: Chronic | Status: ACTIVE | Noted: 2018-11-15

## 2018-11-19 ENCOUNTER — APPOINTMENT (OUTPATIENT)
Dept: FAMILY MEDICINE | Facility: CLINIC | Age: 58
End: 2018-11-19
Payer: MEDICARE

## 2018-11-19 VITALS
BODY MASS INDEX: 27.97 KG/M2 | OXYGEN SATURATION: 98 % | SYSTOLIC BLOOD PRESSURE: 126 MMHG | RESPIRATION RATE: 16 BRPM | TEMPERATURE: 98.1 F | HEART RATE: 77 BPM | WEIGHT: 152 LBS | DIASTOLIC BLOOD PRESSURE: 78 MMHG | HEIGHT: 62 IN

## 2018-11-19 DIAGNOSIS — Z80.6 FAMILY HISTORY OF LEUKEMIA: ICD-10-CM

## 2018-11-19 PROCEDURE — 99215 OFFICE O/P EST HI 40 MIN: CPT

## 2018-11-19 NOTE — HISTORY OF PRESENT ILLNESS
[No Pertinent Cardiac History] : no history of aortic stenosis, atrial fibrillation, coronary artery disease, recent myocardial infarction, or implantable device/pacemaker [No Pertinent Pulmonary History] : no history of asthma, COPD, sleep apnea, or smoking [No Adverse Anesthesia Reaction] : no adverse anesthesia reaction in self or family member [(Patient denies any chest pain, claudication, dyspnea on exertion, orthopnea, palpitations or syncope)] : Patient denies any chest pain, claudication, dyspnea on exertion, orthopnea, palpitations or syncope [Excellent (>10 METs)] : Excellent (>10 METs) [FreeTextEntry1] : Bilateral mastectomy  [FreeTextEntry2] : 11/21/18 [FreeTextEntry3] : Dr. Zackary Rollins  [FreeTextEntry4] : [place]  # [id number]  [place]  \par Patient denies CP,SOB,Abd pain, no N,V,C,D.

## 2018-11-19 NOTE — RESULTS/DATA
[] : results reviewed [de-identified] : 10/24/18 [de-identified] : 10/24/18  [de-identified] : 10/24/18 [de-identified] : 09/07/2018

## 2018-11-19 NOTE — PHYSICAL EXAM
[No Acute Distress] : no acute distress [Normal Outer Ear/Nose] : the outer ears and nose were normal in appearance [No Respiratory Distress] : no respiratory distress  [Clear to Auscultation] : lungs were clear to auscultation bilaterally [No Accessory Muscle Use] : no accessory muscle use [Normal Rate] : normal rate  [Regular Rhythm] : with a regular rhythm [Normal S1, S2] : normal S1 and S2 [No Murmur] : no murmur heard [No Varicosities] : no varicosities [No Edema] : there was no peripheral edema [Soft] : abdomen soft [Non Tender] : non-tender [Non-distended] : non-distended [No Masses] : no abdominal mass palpated [No HSM] : no HSM [Normal Bowel Sounds] : normal bowel sounds [Normal Supraclavicular Nodes] : no supraclavicular lymphadenopathy [Normal Posterior Cervical Nodes] : no posterior cervical lymphadenopathy [Normal Anterior Cervical Nodes] : no anterior cervical lymphadenopathy [No Joint Swelling] : no joint swelling [Grossly Normal Strength/Tone] : grossly normal strength/tone [No Rash] : no rash [Normal Gait] : normal gait [Deep Tendon Reflexes (DTR)] : deep tendon reflexes were 2+ and symmetric [Alert and Oriented x3] : oriented to person, place, and time

## 2018-11-19 NOTE — ASSESSMENT
[High Risk Surgery - Intraperitoneal, Intrathoracic or Supringuinal Vascular Procedures] : High Risk Surgery - Intraperitoneal, Intrathoracic or Supringuinal Vascular Procedures - No (0) [Ischemic Heart Disease] : Ischemic Heart Disease - No (0) [Congestive Heart Failure] : Congestive Heart Failure - No (0) [Prior Cerebrovascular Accident or TIA] : Prior Cerebrovascular Accident or TIA - No (0) [Creatinine >= 2mg/dL (1 Point)] : Creatinine >= 2mg/dL - No (0) [0] : 0 , RCRI Class: I, Risk of Post-Op Cardiac Complications: 0.4%, Procedure Risk: Low-Risk [Patient Optimized for Surgery] : Patient optimized for surgery [Continue medications as is] : Continue current medications [As per surgery] : as per surgery

## 2018-11-20 ENCOUNTER — TRANSCRIPTION ENCOUNTER (OUTPATIENT)
Age: 58
End: 2018-11-20

## 2018-11-21 ENCOUNTER — INPATIENT (INPATIENT)
Facility: HOSPITAL | Age: 58
LOS: 1 days | Discharge: HOME | DRG: 581 | End: 2018-11-23
Attending: SURGERY | Admitting: SURGERY
Payer: COMMERCIAL

## 2018-11-21 ENCOUNTER — RESULT REVIEW (OUTPATIENT)
Age: 58
End: 2018-11-21

## 2018-11-21 ENCOUNTER — APPOINTMENT (OUTPATIENT)
Dept: SURGICAL ONCOLOGY | Facility: HOSPITAL | Age: 58
End: 2018-11-21

## 2018-11-21 VITALS
HEART RATE: 59 BPM | OXYGEN SATURATION: 100 % | SYSTOLIC BLOOD PRESSURE: 121 MMHG | HEIGHT: 62 IN | WEIGHT: 153 LBS | TEMPERATURE: 98 F | DIASTOLIC BLOOD PRESSURE: 78 MMHG | RESPIRATION RATE: 18 BRPM

## 2018-11-21 DIAGNOSIS — M95.4 ACQUIRED DEFORMITY OF CHEST AND RIB: ICD-10-CM

## 2018-11-21 DIAGNOSIS — Z42.1 ENCOUNTER FOR BREAST RECONSTRUCTION FOLLOWING MASTECTOMY: ICD-10-CM

## 2018-11-21 PROCEDURE — 88341 IMHCHEM/IMCYTCHM EA ADD ANTB: CPT | Mod: 26,59

## 2018-11-21 PROCEDURE — 19303 MAST SIMPLE COMPLETE: CPT | Mod: 50

## 2018-11-21 PROCEDURE — 88360 TUMOR IMMUNOHISTOCHEM/MANUAL: CPT | Mod: 26

## 2018-11-21 PROCEDURE — 38740 REMOVE ARMPIT LYMPH NODES: CPT | Mod: 50,59

## 2018-11-21 PROCEDURE — 88309 TISSUE EXAM BY PATHOLOGIST: CPT | Mod: 26

## 2018-11-21 PROCEDURE — 88342 IMHCHEM/IMCYTCHM 1ST ANTB: CPT | Mod: 26,59

## 2018-11-21 PROCEDURE — 88331 PATH CONSLTJ SURG 1 BLK 1SPC: CPT | Mod: 26

## 2018-11-21 PROCEDURE — 88307 TISSUE EXAM BY PATHOLOGIST: CPT | Mod: 26

## 2018-11-21 PROCEDURE — 38790 INJECT FOR LYMPHATIC X-RAY: CPT | Mod: 50

## 2018-11-21 RX ORDER — LIDOCAINE HCL 20 MG/ML
0.2 VIAL (ML) INJECTION ONCE
Qty: 0 | Refills: 0 | Status: DISCONTINUED | OUTPATIENT
Start: 2018-11-21 | End: 2018-11-21

## 2018-11-21 RX ORDER — OXYCODONE HYDROCHLORIDE 5 MG/1
10 TABLET ORAL EVERY 4 HOURS
Qty: 0 | Refills: 0 | Status: DISCONTINUED | OUTPATIENT
Start: 2018-11-21 | End: 2018-11-23

## 2018-11-21 RX ORDER — PANTOPRAZOLE SODIUM 20 MG/1
40 TABLET, DELAYED RELEASE ORAL
Qty: 0 | Refills: 0 | Status: DISCONTINUED | OUTPATIENT
Start: 2018-11-21 | End: 2018-11-23

## 2018-11-21 RX ORDER — ACETAMINOPHEN 500 MG
325 TABLET ORAL EVERY 4 HOURS
Qty: 0 | Refills: 0 | Status: DISCONTINUED | OUTPATIENT
Start: 2018-11-21 | End: 2018-11-23

## 2018-11-21 RX ORDER — SODIUM CHLORIDE 9 MG/ML
3 INJECTION INTRAMUSCULAR; INTRAVENOUS; SUBCUTANEOUS EVERY 8 HOURS
Qty: 0 | Refills: 0 | Status: DISCONTINUED | OUTPATIENT
Start: 2018-11-21 | End: 2018-11-21

## 2018-11-21 RX ORDER — FENTANYL CITRATE 50 UG/ML
25 INJECTION INTRAVENOUS
Qty: 0 | Refills: 0 | Status: DISCONTINUED | OUTPATIENT
Start: 2018-11-21 | End: 2018-11-21

## 2018-11-21 RX ORDER — SODIUM CHLORIDE 9 MG/ML
1000 INJECTION, SOLUTION INTRAVENOUS
Qty: 0 | Refills: 0 | Status: DISCONTINUED | OUTPATIENT
Start: 2018-11-21 | End: 2018-11-21

## 2018-11-21 RX ORDER — OXYCODONE HYDROCHLORIDE 5 MG/1
5 TABLET ORAL EVERY 4 HOURS
Qty: 0 | Refills: 0 | Status: DISCONTINUED | OUTPATIENT
Start: 2018-11-21 | End: 2018-11-23

## 2018-11-21 RX ORDER — ONDANSETRON 8 MG/1
4 TABLET, FILM COATED ORAL EVERY 6 HOURS
Qty: 0 | Refills: 0 | Status: DISCONTINUED | OUTPATIENT
Start: 2018-11-21 | End: 2018-11-21

## 2018-11-21 RX ORDER — SODIUM CHLORIDE 9 MG/ML
1000 INJECTION, SOLUTION INTRAVENOUS
Qty: 0 | Refills: 0 | Status: DISCONTINUED | OUTPATIENT
Start: 2018-11-21 | End: 2018-11-22

## 2018-11-21 RX ORDER — OXYBUTYNIN CHLORIDE 5 MG
10 TABLET ORAL DAILY
Qty: 0 | Refills: 0 | Status: DISCONTINUED | OUTPATIENT
Start: 2018-11-21 | End: 2018-11-23

## 2018-11-21 RX ORDER — ENOXAPARIN SODIUM 100 MG/ML
40 INJECTION SUBCUTANEOUS DAILY
Qty: 0 | Refills: 0 | Status: DISCONTINUED | OUTPATIENT
Start: 2018-11-21 | End: 2018-11-23

## 2018-11-21 RX ADMIN — Medication 325 MILLIGRAM(S): at 19:15

## 2018-11-21 RX ADMIN — OXYCODONE HYDROCHLORIDE 10 MILLIGRAM(S): 5 TABLET ORAL at 15:18

## 2018-11-21 RX ADMIN — SODIUM CHLORIDE 30 MILLILITER(S): 9 INJECTION, SOLUTION INTRAVENOUS at 14:32

## 2018-11-21 RX ADMIN — Medication 325 MILLIGRAM(S): at 18:47

## 2018-11-21 RX ADMIN — OXYCODONE HYDROCHLORIDE 10 MILLIGRAM(S): 5 TABLET ORAL at 15:48

## 2018-11-21 NOTE — BRIEF OPERATIVE NOTE - PROCEDURE
<<-----Click on this checkbox to enter Procedure [place] lymph node biopsy  11/21/2018    Active  SP[facility]  Bilateral mastectomy  11/21/2018    [facility]

## 2018-11-21 NOTE — PRE-ANESTHESIA EVALUATION ADULT - NSANTHOSAYNRD_GEN_A_CORE
No. ELIAZBETH screening performed.  STOP BANG Legend: 0-2 = LOW Risk; 3-4 = INTERMEDIATE Risk; 5-8 = HIGH Risk

## 2018-11-21 NOTE — BRIEF OPERATIVE NOTE - PRE-OP DX
Malignant neoplasm of upper-outer quadrant of right breast in female, estrogen receptor negative  11/21/2018    Active  [facility]

## 2018-11-21 NOTE — CHART NOTE - NSCHARTNOTEFT_GEN_A_CORE
Post-operative Check    SUBJECTIVE: No acute events in the immediate post-operative period. Pain well controlled.   denies n/v, cp, sob  not ambulating yet    OBJECTIVE:  T(C): 36.8 (11-21-18 @ 21:20), Max: 37.1 (11-21-18 @ 20:10)  HR: 68 (11-21-18 @ 21:20) (59 - 88)  BP: 109/72 (11-21-18 @ 21:20) (108/73 - 173/79)  RR: 18 (11-21-18 @ 21:20) (15 - 18)  SpO2: 97% (11-21-18 @ 21:20) (96% - 100%)      11-21-18 @ 07:01  -  11-21-18 @ 23:07  --------------------------------------------------------  IN: 300 mL / OUT: 515 mL / NET: -215 mL        Physical Exam:   - Constitutional: AOx3, NAD  - HEENT: ncat, eomi  - Neck: supple  - Chest: b/l BELGICA drains w/ ss fl  - Respiratory: nonlabored  - Abdomen: soft, nontender, nondistended, c/d/i incsions  - Extremities: pacheco, wwp      ASSESSMENT:   MABLE QUICK is a 58y Female POD#0 from b/l mastectomy and b/l [place] lymph node bx    PLAN:  - Pain management  - Follow UOP  - diet as appropriate  - dvt ppx  - dispo planning. Post-operative Check    SUBJECTIVE: No acute events in the immediate post-operative period. Pain well controlled.   denies n/v, cp, sob  not ambulating yet    OBJECTIVE:  T(C): 36.8 , Max: 37.1  HR: 68  (59 - 88)  BP: 109/72 (108/73 - 173/79)  RR: 18 (15 - 18)  SpO2: 97%  (96% - 100%)      IN/OUT  --------------------------------------------------------  IN: 300 mL / OUT: 515 mL / NET: -215 mL        Physical Exam:   - Constitutional: AOx3, NAD  - HEENT: ncat, eomi  - Neck: supple  - Chest: b/l BELGICA drains w/ ss fl  - Respiratory: nonlabored  - Abdomen: soft, nontender, nondistended, c/d/i incsions  - Extremities: pacheco, garyp      ASSESSMENT:   MABLE QUICK is a 58y Female POD#0 from b/l mastectomy and b/l [place] lymph node bx    PLAN:  - Pain management  - Follow UOP  - diet as appropriate  - dvt ppx  - dispo planning. Post-operative Check    SUBJECTIVE: No acute events in the immediate post-operative period. Pain well controlled.   denies n/v, cp, sob  not ambulating yet    OBJECTIVE:  T(C): 36.8 , Max: 37.1  HR: 68  (59 - 88)  BP: 109/72 (108/73 - 173/79)  RR: 18 (15 - 18)  SpO2: 97%  (96% - 100%)      IN/OUT  --------------------------------------------------------  IN: 300 mL / OUT: 515 mL / NET: -215 mL        Physical Exam:   - Constitutional: AOx3, NAD  - HEENT: ncat, eomi  - Neck: supple  - Chest: b/l BELGICA drains w/ sanguinous appearing drainage  - Respiratory: nonlabored  - Abdomen: soft, nontender, nondistended, c/d/i incsions  - Extremities: pachecoarslan rasmussen      ASSESSMENT:   MABLE QUICK is a 58y Female POD#0 from b/l mastectomy and b/l [place] lymph node bx    PLAN:  - Pain management  - Follow UOP  - diet as appropriate  - dvt ppx  - dispo planning.

## 2018-11-22 ENCOUNTER — TRANSCRIPTION ENCOUNTER (OUTPATIENT)
Age: 58
End: 2018-11-22

## 2018-11-22 LAB
ANION GAP SERPL CALC-SCNC: 12 MMOL/L — SIGNIFICANT CHANGE UP (ref 5–17)
BUN SERPL-MCNC: 10 MG/DL — SIGNIFICANT CHANGE UP (ref 7–23)
CALCIUM SERPL-MCNC: 8.9 MG/DL — SIGNIFICANT CHANGE UP (ref 8.4–10.5)
CHLORIDE SERPL-SCNC: 105 MMOL/L — SIGNIFICANT CHANGE UP (ref 96–108)
CO2 SERPL-SCNC: 23 MMOL/L — SIGNIFICANT CHANGE UP (ref 22–31)
CREAT SERPL-MCNC: 0.58 MG/DL — SIGNIFICANT CHANGE UP (ref 0.5–1.3)
GLUCOSE SERPL-MCNC: 102 MG/DL — HIGH (ref 70–99)
HCT VFR BLD CALC: 37.3 % — SIGNIFICANT CHANGE UP (ref 34.5–45)
HGB BLD-MCNC: 12.3 G/DL — SIGNIFICANT CHANGE UP (ref 11.5–15.5)
MAGNESIUM SERPL-MCNC: 1.9 MG/DL — SIGNIFICANT CHANGE UP (ref 1.6–2.6)
MCHC RBC-ENTMCNC: 32.1 PG — SIGNIFICANT CHANGE UP (ref 27–34)
MCHC RBC-ENTMCNC: 33 GM/DL — SIGNIFICANT CHANGE UP (ref 32–36)
MCV RBC AUTO: 97.4 FL — SIGNIFICANT CHANGE UP (ref 80–100)
PHOSPHATE SERPL-MCNC: 3.1 MG/DL — SIGNIFICANT CHANGE UP (ref 2.5–4.5)
PLATELET # BLD AUTO: 345 K/UL — SIGNIFICANT CHANGE UP (ref 150–400)
POTASSIUM SERPL-MCNC: 4.2 MMOL/L — SIGNIFICANT CHANGE UP (ref 3.5–5.3)
POTASSIUM SERPL-SCNC: 4.2 MMOL/L — SIGNIFICANT CHANGE UP (ref 3.5–5.3)
RBC # BLD: 3.83 M/UL — SIGNIFICANT CHANGE UP (ref 3.8–5.2)
RBC # FLD: 13 % — SIGNIFICANT CHANGE UP (ref 10.3–14.5)
SODIUM SERPL-SCNC: 140 MMOL/L — SIGNIFICANT CHANGE UP (ref 135–145)
WBC # BLD: 13.28 K/UL — HIGH (ref 3.8–10.5)
WBC # FLD AUTO: 13.28 K/UL — HIGH (ref 3.8–10.5)

## 2018-11-22 RX ORDER — OXYCODONE HYDROCHLORIDE 5 MG/1
1 TABLET ORAL
Qty: 0 | Refills: 0 | COMMUNITY
Start: 2018-11-22

## 2018-11-22 RX ORDER — MAGNESIUM SULFATE 500 MG/ML
1 VIAL (ML) INJECTION ONCE
Qty: 0 | Refills: 0 | Status: COMPLETED | OUTPATIENT
Start: 2018-11-22 | End: 2018-11-22

## 2018-11-22 RX ADMIN — SODIUM CHLORIDE 30 MILLILITER(S): 9 INJECTION, SOLUTION INTRAVENOUS at 05:20

## 2018-11-22 RX ADMIN — Medication 100 GRAM(S): at 11:56

## 2018-11-22 RX ADMIN — Medication 10 MILLIGRAM(S): at 15:20

## 2018-11-22 RX ADMIN — Medication 325 MILLIGRAM(S): at 05:19

## 2018-11-22 RX ADMIN — PANTOPRAZOLE SODIUM 40 MILLIGRAM(S): 20 TABLET, DELAYED RELEASE ORAL at 05:20

## 2018-11-22 RX ADMIN — ENOXAPARIN SODIUM 40 MILLIGRAM(S): 100 INJECTION SUBCUTANEOUS at 11:55

## 2018-11-22 RX ADMIN — Medication 325 MILLIGRAM(S): at 05:50

## 2018-11-22 NOTE — PROGRESS NOTE ADULT - SUBJECTIVE AND OBJECTIVE BOX
[facility] Progress Note     Subjective/24hour Events: No acute events overnight. Pain controlled. Tolerating diet. No N/V. No CP or SOB.    Vital Signs:  Vital Signs Last 24 Hrs  T(C): 36.6 (22 Nov 2018 05:17), Max: 37.1 (21 Nov 2018 20:10)  T(F): 97.9 (22 Nov 2018 05:17), Max: 98.7 (21 Nov 2018 20:10)  HR: 58 (22 Nov 2018 05:17) (58 - 88)  BP: 99/64 (22 Nov 2018 05:17) (99/64 - 173/79)  BP(mean): 109 (21 Nov 2018 15:45) (106 - 109)  RR: 18 (22 Nov 2018 05:17) (15 - 18)  SpO2: 97% (22 Nov 2018 05:17) (95% - 100%)    CAPILLARY BLOOD GLUCOSE          I&O's Detail    21 Nov 2018 07:01  -  22 Nov 2018 07:00  --------------------------------------------------------  IN:    lactated ringers.: 60 mL    Oral Fluid: 240 mL  Total IN: 300 mL    OUT:    Bulb: 165 mL    Bulb: 145 mL    Voided: 1350 mL  Total OUT: 1660 mL    Total NET: -1360 mL            MEDICATIONS  (STANDING):  ceFAZolin   IVPB 2000 milliGRAM(s) IV Intermittent once  enoxaparin Injectable 40 milliGRAM(s) SubCutaneous daily  magnesium sulfate  IVPB 1 Gram(s) IV Intermittent once  oxybutynin 10 milliGRAM(s) Oral daily  pantoprazole    Tablet 40 milliGRAM(s) Oral before breakfast    MEDICATIONS  (PRN):  acetaminophen   Tablet .. 325 milliGRAM(s) Oral every 4 hours PRN Mild Pain (1 - 3)  oxyCODONE    IR 5 milliGRAM(s) Oral every 4 hours PRN Moderate Pain (4 - 6)  oxyCODONE    IR 10 milliGRAM(s) Oral every 4 hours PRN Severe Pain (7 - 10)        Physical Exam:  Gen: NAD  LS: nml respiratory effort  Card: pulse regularly present  Chest: b/l chest dressing c/d/i, b/l BELGICA drains, appropriately tender   GI: abd soft, nontender  Ext: warm      Labs:    11-22    140  |  105  |  10  ----------------------------<  102<H>  4.2   |  23  |  0.58    Ca    8.9      22 Nov 2018 07:26  Phos  3.1     11-22  Mg     1.9     11-22                              12.3   13.28 )-----------( 345      ( 22 Nov 2018 08:24 )             37.3

## 2018-11-22 NOTE — DISCHARGE NOTE ADULT - PATIENT PORTAL LINK FT
You can access the [facility], offered by [facility], by registering with the following website: [email or web address]

## 2018-11-22 NOTE — DISCHARGE NOTE ADULT - PLAN OF CARE
Recover from bilateral mastectomy.  Record BELGICA drain outputs daily.  Call to make an appointment to follow up with Dr. Raymundo in 2 weeks. Recovery from surgery

## 2018-11-22 NOTE — DISCHARGE NOTE ADULT - CARE PROVIDER_API CALL
Ayo Raymundo), Surgery  [address] [place] [id number]  Phone: [phone]  Fax: [phone]

## 2018-11-22 NOTE — DISCHARGE NOTE ADULT - HOSPITAL COURSE
11/21: Presented for and underwent planned b/l mastectomy and SLNB. Placed on clear liquid diet post operatively.    11/22: Tolerated clear liquid diet and advanced to regular diet. Passing gas and having BMs. Pain controlled.    11/23: Continued to do well. Bilateral dressings changed. Deemed stable and readied for discharge.

## 2018-11-22 NOTE — DISCHARGE NOTE ADULT - ADDITIONAL INSTRUCTIONS
Record BELGICA drain outputs daily.  Call to make an appointment to follow up with Dr. Raymundo in 2 weeks. You will be discharged with BELGICA drains. You will need to empty them and record outputs accurately. This will be taught to you by the nursing staff. Please do not remove the BELGICA drains. They will be removed in the office. Please bring to the office accurate records of output.   Call to make an appointment to follow up with Dr. Raymundo in 2 weeks.

## 2018-11-22 NOTE — PROGRESS NOTE ADULT - ASSESSMENT
57yo F w/ PMH of GERD, anxiety, lower back pain and everyday smoker (quit 9/2018). Annual mammogram showed a 1cm mass in upper medial right breast, bx revealing invasive carcinoma w/ neuroendocrine features (Er-Pr-Her2). Presented 11/21 and underwent planned b/l mastectomy and SLNB.    Plan  - Pain control: Tylenol 325mg PO q4h PRN, oxy IR 10mg q4h PRN  - Regular diet  - IV lock  - Monitor BELGICA outputs  - BELGICA teaching  - Dressing cahnge 11/23  - DVT ppx: Lovenox  - D/c planning (possibly 11/23)

## 2018-11-22 NOTE — DISCHARGE NOTE ADULT - CARE PLAN
Principal Discharge DX:	Malignant neoplasm of upper-outer quadrant of right breast in female, estrogen receptor negative  Assessment and plan of treatment:	Recover from bilateral mastectomy.  Record BELGICA drain outputs daily.  Call to make an appointment to follow up with Dr. Raymundo in 2 weeks. Principal Discharge DX:	Malignant neoplasm of upper-outer quadrant of right breast in female, estrogen receptor negative  Goal:	Recovery from surgery  Assessment and plan of treatment:	Recover from bilateral mastectomy.  Record BELGICA drain outputs daily.  Call to make an appointment to follow up with Dr. Raymundo in 2 weeks.

## 2018-11-22 NOTE — DISCHARGE NOTE ADULT - MEDICATION SUMMARY - MEDICATIONS TO TAKE
I will START or STAY ON the medications listed below when I get home from the hospital:    oxyCODONE 5 mg oral tablet  -- 1 tab(s) by mouth every 4 hours, As needed, Moderate Pain (4 - 6)  -- Indication: For For severe pain    pantoprazole 40 mg oral delayed release tablet  -- 1 tab(s) by mouth once a day  -- Indication: For for acid reflux    oxybutynin 10 mg/24 hr oral tablet, extended release  -- 1 tab(s) by mouth once a day  -- Indication: For for urinary health I will START or STAY ON the medications listed below when I get home from the hospital:    oxyCODONE 5 mg oral tablet  -- 1 tab(s) by mouth every 4 hours, As needed, Moderate Pain (4 - 6) MDD:6  -- Indication: For pain    pantoprazole 40 mg oral delayed release tablet  -- 1 tab(s) by mouth once a day  -- Indication: For for acid reflux    oxybutynin 10 mg/24 hr oral tablet, extended release  -- 1 tab(s) by mouth once a day  -- Indication: For for urinary health

## 2018-11-23 VITALS
TEMPERATURE: 98 F | OXYGEN SATURATION: 99 % | DIASTOLIC BLOOD PRESSURE: 67 MMHG | HEART RATE: 60 BPM | SYSTOLIC BLOOD PRESSURE: 100 MMHG | RESPIRATION RATE: 18 BRPM

## 2018-11-23 LAB
ANION GAP SERPL CALC-SCNC: 11 MMOL/L — SIGNIFICANT CHANGE UP (ref 5–17)
BUN SERPL-MCNC: 15 MG/DL — SIGNIFICANT CHANGE UP (ref 7–23)
CALCIUM SERPL-MCNC: 9 MG/DL — SIGNIFICANT CHANGE UP (ref 8.4–10.5)
CHLORIDE SERPL-SCNC: 104 MMOL/L — SIGNIFICANT CHANGE UP (ref 96–108)
CO2 SERPL-SCNC: 25 MMOL/L — SIGNIFICANT CHANGE UP (ref 22–31)
CREAT SERPL-MCNC: 0.62 MG/DL — SIGNIFICANT CHANGE UP (ref 0.5–1.3)
GLUCOSE SERPL-MCNC: 108 MG/DL — HIGH (ref 70–99)
HCT VFR BLD CALC: 34.4 % — LOW (ref 34.5–45)
HGB BLD-MCNC: 11.2 G/DL — LOW (ref 11.5–15.5)
MAGNESIUM SERPL-MCNC: 1.8 MG/DL — SIGNIFICANT CHANGE UP (ref 1.6–2.6)
MCHC RBC-ENTMCNC: 31.8 PG — SIGNIFICANT CHANGE UP (ref 27–34)
MCHC RBC-ENTMCNC: 32.6 GM/DL — SIGNIFICANT CHANGE UP (ref 32–36)
MCV RBC AUTO: 97.7 FL — SIGNIFICANT CHANGE UP (ref 80–100)
PHOSPHATE SERPL-MCNC: 3.1 MG/DL — SIGNIFICANT CHANGE UP (ref 2.5–4.5)
PLATELET # BLD AUTO: 299 K/UL — SIGNIFICANT CHANGE UP (ref 150–400)
POTASSIUM SERPL-MCNC: 4.4 MMOL/L — SIGNIFICANT CHANGE UP (ref 3.5–5.3)
POTASSIUM SERPL-SCNC: 4.4 MMOL/L — SIGNIFICANT CHANGE UP (ref 3.5–5.3)
RBC # BLD: 3.52 M/UL — LOW (ref 3.8–5.2)
RBC # FLD: 13.2 % — SIGNIFICANT CHANGE UP (ref 10.3–14.5)
SODIUM SERPL-SCNC: 140 MMOL/L — SIGNIFICANT CHANGE UP (ref 135–145)
WBC # BLD: 11.26 K/UL — HIGH (ref 3.8–10.5)
WBC # FLD AUTO: 11.26 K/UL — HIGH (ref 3.8–10.5)

## 2018-11-23 PROCEDURE — 88331 PATH CONSLTJ SURG 1 BLK 1SPC: CPT

## 2018-11-23 PROCEDURE — 84100 ASSAY OF PHOSPHORUS: CPT

## 2018-11-23 PROCEDURE — 80048 BASIC METABOLIC PNL TOTAL CA: CPT

## 2018-11-23 PROCEDURE — 88341 IMHCHEM/IMCYTCHM EA ADD ANTB: CPT

## 2018-11-23 PROCEDURE — 88342 IMHCHEM/IMCYTCHM 1ST ANTB: CPT

## 2018-11-23 PROCEDURE — 88309 TISSUE EXAM BY PATHOLOGIST: CPT

## 2018-11-23 PROCEDURE — 88360 TUMOR IMMUNOHISTOCHEM/MANUAL: CPT

## 2018-11-23 PROCEDURE — 83735 ASSAY OF MAGNESIUM: CPT

## 2018-11-23 PROCEDURE — 85027 COMPLETE CBC AUTOMATED: CPT

## 2018-11-23 PROCEDURE — 88307 TISSUE EXAM BY PATHOLOGIST: CPT

## 2018-11-23 RX ORDER — OXYCODONE HYDROCHLORIDE 5 MG/1
1 TABLET ORAL
Qty: 20 | Refills: 0
Start: 2018-11-23 | End: 2018-11-27

## 2018-11-23 RX ADMIN — PANTOPRAZOLE SODIUM 40 MILLIGRAM(S): 20 TABLET, DELAYED RELEASE ORAL at 05:03

## 2018-11-23 RX ADMIN — Medication 325 MILLIGRAM(S): at 01:44

## 2018-11-23 RX ADMIN — Medication 325 MILLIGRAM(S): at 01:07

## 2018-11-23 RX ADMIN — Medication 10 MILLIGRAM(S): at 11:20

## 2018-11-23 RX ADMIN — ENOXAPARIN SODIUM 40 MILLIGRAM(S): 100 INJECTION SUBCUTANEOUS at 11:19

## 2018-11-23 NOTE — PROGRESS NOTE ADULT - SUBJECTIVE AND OBJECTIVE BOX
GENERAL SURGERY DAILY PROGRESS NOTE:       Subjective:  feels ok.  pain controlled.  tolerating diet, denies N/V      Objective:  NAD  incisions C/D/I with steri-strips  no erythema or induration  BELGICA serosanginous x 2      MEDICATIONS  (STANDING):  enoxaparin Injectable 40 milliGRAM(s) SubCutaneous daily  oxybutynin 10 milliGRAM(s) Oral daily  pantoprazole    Tablet 40 milliGRAM(s) Oral before breakfast    MEDICATIONS  (PRN):  acetaminophen   Tablet .. 325 milliGRAM(s) Oral every 4 hours PRN Mild Pain (1 - 3)  oxyCODONE    IR 5 milliGRAM(s) Oral every 4 hours PRN Moderate Pain (4 - 6)  oxyCODONE    IR 10 milliGRAM(s) Oral every 4 hours PRN Severe Pain (7 - 10)      Vital Signs Last 24 Hrs  T(C): 37.1 (23 Nov 2018 05:55), Max: 37.2 (22 Nov 2018 17:12)  T(F): 98.7 (23 Nov 2018 05:55), Max: 98.9 (22 Nov 2018 17:12)  HR: 64 (23 Nov 2018 05:55) (64 - 85)  BP: 104/71 (23 Nov 2018 05:55) (98/64 - 118/63)  BP(mean): --  RR: 18 (23 Nov 2018 05:55) (18 - 18)  SpO2: 97% (23 Nov 2018 05:55) (96% - 100%)    I&O's Detail    22 Nov 2018 07:01  -  23 Nov 2018 07:00  --------------------------------------------------------  IN:    Oral Fluid: 800 mL  Total IN: 800 mL    OUT:    Bulb: 85 mL    Bulb: 90 mL    Voided: 1350 mL  Total OUT: 1525 mL    Total NET: -725 mL          Daily     Daily     LABS:                        12.3   13.28 )-----------( 345      ( 22 Nov 2018 08:24 )             37.3     11-22    140  |  105  |  10  ----------------------------<  102<H>  4.2   |  23  |  0.58    Ca    8.9      22 Nov 2018 07:26  Phos  3.1     11-22  Mg     1.9     11-22

## 2018-11-23 NOTE — PROGRESS NOTE ADULT - ASSESSMENT
A/P  58F POD #2 s/p bilateral mastectomy    -cont Reg diet  -BELGICA teaching  -d/c home today    SEJAL Collado PA-C , pager # [phone] A/P  58F POD #2 s/p bilateral mastectomy    -cont Reg diet  -BELGICA teaching  -d/c home today    SEJAL Collado PA-C , pager # [phone]        1:15 PM Addendum  Pt seen and examined with Dr. Bonilla  d/c home today

## 2018-11-27 ENCOUNTER — OTHER (OUTPATIENT)
Age: 58
End: 2018-11-27

## 2018-11-27 PROBLEM — F17.200 NICOTINE DEPENDENCE, UNSPECIFIED, UNCOMPLICATED: Chronic | Status: ACTIVE | Noted: 2018-11-15

## 2018-11-27 PROBLEM — M54.5 LOW BACK PAIN: Chronic | Status: ACTIVE | Noted: 2018-11-15

## 2018-11-28 LAB — SURGICAL PATHOLOGY STUDY: SIGNIFICANT CHANGE UP

## 2018-12-06 ENCOUNTER — OUTPATIENT (OUTPATIENT)
Dept: OUTPATIENT SERVICES | Facility: HOSPITAL | Age: 58
LOS: 1 days | Discharge: HOME | End: 2018-12-06

## 2018-12-06 DIAGNOSIS — C50.919 MALIGNANT NEOPLASM OF UNSPECIFIED SITE OF UNSPECIFIED FEMALE BREAST: ICD-10-CM

## 2018-12-11 ENCOUNTER — APPOINTMENT (OUTPATIENT)
Dept: HEMATOLOGY ONCOLOGY | Facility: CLINIC | Age: 58
End: 2018-12-11
Payer: COMMERCIAL

## 2018-12-11 ENCOUNTER — APPOINTMENT (OUTPATIENT)
Dept: SURGICAL ONCOLOGY | Facility: CLINIC | Age: 58
End: 2018-12-11
Payer: MEDICARE

## 2018-12-11 VITALS
OXYGEN SATURATION: 100 % | TEMPERATURE: 97.5 F | SYSTOLIC BLOOD PRESSURE: 107 MMHG | RESPIRATION RATE: 16 BRPM | WEIGHT: 156.97 LBS | DIASTOLIC BLOOD PRESSURE: 75 MMHG | BODY MASS INDEX: 28.71 KG/M2 | HEART RATE: 59 BPM

## 2018-12-11 VITALS
WEIGHT: 154 LBS | OXYGEN SATURATION: 99 % | BODY MASS INDEX: 28.34 KG/M2 | HEART RATE: 61 BPM | DIASTOLIC BLOOD PRESSURE: 80 MMHG | HEIGHT: 62 IN | SYSTOLIC BLOOD PRESSURE: 126 MMHG

## 2018-12-11 PROCEDURE — 99024 POSTOP FOLLOW-UP VISIT: CPT

## 2018-12-11 PROCEDURE — 99215 OFFICE O/P EST HI 40 MIN: CPT

## 2018-12-12 NOTE — PHYSICAL EXAM
[Fully active, able to carry on all pre-disease performance without restriction] : Status 0 - Fully active, able to carry on all pre-disease performance without restriction [Normal] : affect appropriate [de-identified] : b/l mastectomy with wide scabs noted, bandages over b/l drain sites from prior. no gross masses or adenopathy palpable b/l.

## 2018-12-12 NOTE — HISTORY OF PRESENT ILLNESS
[de-identified] : 58F presenting for medical oncology consultation.\par \par Last mammogram ~4 years ago.\par Screening mammo 18: new 1cm mass in the upper medial Right breast. \par 18 diagnostic mammo and US: R breast 12:00 3-4cm FN 1cm slightly bi-lobed hypoechoic lesion corresponding with mammographic findings. 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 IHC stains for synaptophysin and chromogranin demonstrate nonspecific extremely scant, barely perceptible staining. Definitive characterization of the lesion is best deferred to the resection specimen. ER 0% FL 0% HER2 IHC 0 negative\par \par 10/17/18 Addendum:\par IHC negative ALPESH-3, TTF-1, CDX-2\par Although in this limited sample the tumor demonstrated morphologic features similar to small cell neuroendocrine carcinoma, the overall findings are nonspecific regarding definitive histologic subtype and site of origin. Clinical correlation is necessary.\par \par 10/19/18 pathology review  [facility]: Poorly differentiated carcinoma, at least 6mm, LVI not seen. Immunostains done for ER/FL/HER2/GATA3/CDX2/TTF-1 are negative in tumor cells (done at [place] lab). Tumor histology shows small cell features (molding, apoptotic bodies, and extensive mitotic activity). Cannot determine histologic subtype and/or site of origin. Clinicopathologic and radiologic correlation strongly recommended.\par \par She saw Dr. Carter (plastic surgery) and then Dr. Raymundo (breast surgery) on 10/9 and noted her desire for breast conserving surgery. She was referred for breast MRI and genetic testing given strong family history. 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 At 12-1:00 axis retroareolar there is 0.4cm focus of enhancement, 12:00 axis posterior third linear non mass enhancement. L breast no suspicious enhancement. Axilla unremarkable. BIRADS4 If breast conservation is a consideration, 2-site MRI guided biopsy of non-mass enhancement within the anterior and posterior upper breast is recommended.\par \par She saw me initially 10/24 - diagnostic CT C/A/P and PETCT performed - no masses other than small breast mass with clip; PET with SUV 8 avidity in sigmoid colon (patient noted eating dairy products that week and some loose BMs in retrospect). Dr. Horn her gastroenterologist performed Colonoscopy  with random biopsies which were benign.\par \par  she underwent b/l mastectomy with Dr. Raymundo.\par  post-operative follow up with drains removed.\par Pertinent Medical History:\par She lives in [place] with her 3 children. She works as a  in a restaurant full time.\par 2018 hand burn at work\par GERD: GI Dr. Gilbert Horn. Normal C-scope with internal hemorrhoids 18. Abd US and CT 17 fatty liver, 7mm R hepatic focus calcifications, unchanged.\par Headaches: MRI without acute findings years ago\par Microscopic hematuria: Cystoscopy 17 without significant findings (Dr. Nadya Hu), takes oxybutinin for overactive bladder\par Seasonal Allergies\par Family history of breast cancer in her maternal aunts x 3 (ages unknown).\par Menarche age 11, menopause age 48. , no prior OCPs or hormonal therapies\par Active Smoker for many years, quit ~1 month ago with this diagnosis\par PMD: Dr. Dom Coello\par Dr. Cali Gynecologist [de-identified] : [employer]  #[id number] used. Patient is here with her daughter Rebecca. She notes she is healing well from mastectomy and is glad drains were removed today, minimal pain. Scabs present still at surgical sites. She has no other complaints at this time outside of anxiety regarding moving forward with treatment. Her daughter tells me she has been having some anxiety/trouble sleeping that she attributes to her quitting smoking abruptly recently with this diagnosis as well.

## 2018-12-12 NOTE — ASSESSMENT
[FreeTextEntry1] : 59yo woman with GERD, microscopic hematuria, significant smoking history (quit recently), arthritis/osteoporosis presenting for medical oncology follow up after b/l mastectomy for R breast cancer; final pathology findings with 7mm poorly differentiated carcinoma with neuroendocrine differentiation/features, ER-ME-HER2- Ki 67 >90%.\par \par We reviewed her pathology from her surgery revealed a poorly differentiated, aggressive featured carcinoma with neuroendocrine differentiation. On my review verbally with pathology and at presentation at our multidisciplinary tumor board next week, given imaging and surgical findings it appears this tumor is of breast primary. We discussed the natural history and progression of invasive breast cancer, as well as the significance of hormone receptor positivity, HER2 status, prognosis, and treatment options. When cancer is confined to the breast or axillary lymph nodes it is potentially curable.  When cancer is detected in distant organs or bone, it is treatable but not curable. I explained that even when a carcinoma has been completely removed by surgery, microscopic cells can escape into the circulation and lymphatic system, seed, and grow into clinically significant metastases that are then incurable. The role of adjuvant therapies are to reduce this risk. I reviewed that while her anatomic stage is early given small tumor size 7mm and sentinel lymph nodes negative, no gross evidence of distant metastatic disease on imaging/exam, the biology of the tumor is very aggressive and warrants adjuvant chemotherapy in order to decrease the risk of recurrence and development of distant incurable metastatic disease. I reviewed the options for chemotherapy and my current recommendation for "TC" chemotherapy based on multidisciplinary tumor board consensus and discussion with radiology and pathology presence.\par \par We discussed my current recommendation for “TC” chemotherapy (Taxotere 75mg/m2 with cytoxan 600mg/m2 every 3 weeks x 4 cycles) with Neulasta (growth factor) support. Potential side-effects including, but not limited to, alopecia - potentially permanent, myelosuppression (potentially resulting in infection/sepsis, septic shock, or even death) and need for growth factor support or transfusion, fatigue, fluid retention, anaphylaxis, body aches, nausea, vomiting, mucositis, diarrhea, and neuropathy (including potential long term effect) were reviewed in detail. We also discussed long term potential of developing leukemia or other blood cancers related to this treatment. I reviewed the use of premedications for prevention of allergic reactions and nausea, as well as antiemetics for home. Neulasta is administered either via on-body injector placed on the day of chemotherapy, or subcutaneously injection given 24 hours after each chemotherapy is dosed. We discussed the use of scalp cooling to prevent chemotherapy-induced alopecia; she will review this option further with our nurse today. We reviewed the use of steroids with dexamethasone 8mg PO BID on the day prior, day of, and day after each chemotherapy administration, in order to reduce fluid retention.  Prior to treatment on day 1 of each cycle she will have an office visit for lab work, exam, and review of toxicities/symptoms to monitor for. \par \par We reviewed the options for potential preservation of scalp hair with scalp hypothermia protocols. We have freezers available to assist with freezing the “cold caps” for Penguin and Arctic scalp cooling systems. Manual caps have the disadvantage of needing to be refrozen (as well as being stored in a refrigerator in between freezing sessions), and the need for an additional person to change the cap every 30 minutes during and after chemotherapy (treatment room nursing is unable to assist or provide this support). Occasionally patients will need to purchase a cooler and dry ice for transportation of caps before and after treatment. We have the option for the Dignitana scalp cooling system as well, though there is limited availability for this. There is additionally cost associated with this intervention and devices - although there are philanthropic organizations who may provide financial assistance. The patient was provided with written information so that she may investigate these options further. She will let us know ASAP whether she would like to proceed with this therapy during her treatment. Side effects of scalp hypothermia are generally mild and include patient discomfort from feeling cold, headache, nausea, dry skin, scalp thermal/cold injury with manual caps, and claustrophobia. There is also a theoretical but not significantly reported risk of development of scalp metastases. Success of the therapy is variable: reports ~60% success in keeping at least 50% of hair in taxane-based chemotherapy regimens (like TC) for breast cancer.\par \par All of the patient’s and her daughter's questions were answered and she expressed understanding of the risks and benefits of therapy. She elects to proceed with treatment and she has provided written consent for this today. She was given written information regarding the treatment plan and potential side effects for review.\par \par I discussed that given the relative rarity of this subtype of breast cancer, that I would like to send her pathology for expert review to Dr. Carla Napier. She is in agreement with this plan and provided written consent for release of her slides today. I reviewed that my final chemotherapy recommendations are pending this second opinion pathology review. She will follow up with me next week in clinic to review final pathology confirmation and final recommendations. She would like to begin treatment 12/28 after visiting her ailing father in [place] for the holidays, and after further post-operative healing. I also made a referral for patient to social work given her concern for delays in disability paperwork receipt/filing, as well as for information regarding potential financial assistance surrounding treatment (including potentially for scalp cooling therapies).

## 2018-12-20 ENCOUNTER — OTHER (OUTPATIENT)
Age: 58
End: 2018-12-20

## 2018-12-21 ENCOUNTER — OTHER (OUTPATIENT)
Age: 58
End: 2018-12-21

## 2018-12-21 ENCOUNTER — APPOINTMENT (OUTPATIENT)
Dept: HEMATOLOGY ONCOLOGY | Facility: CLINIC | Age: 58
End: 2018-12-21
Payer: OTHER MISCELLANEOUS

## 2018-12-21 VITALS
TEMPERATURE: 98 F | DIASTOLIC BLOOD PRESSURE: 70 MMHG | SYSTOLIC BLOOD PRESSURE: 120 MMHG | RESPIRATION RATE: 16 BRPM | WEIGHT: 154.32 LBS | OXYGEN SATURATION: 98 % | BODY MASS INDEX: 28.23 KG/M2 | HEART RATE: 66 BPM

## 2018-12-21 PROCEDURE — 99215 OFFICE O/P EST HI 40 MIN: CPT

## 2018-12-21 NOTE — HISTORY OF PRESENT ILLNESS
[de-identified] : 58F presenting for medical oncology consultation.\par \par Last mammogram ~4 years ago.\par Screening mammo 18: new 1cm mass in the upper medial Right breast. \par 18 diagnostic mammo and US: R breast 12:00 3-4cm FN 1cm slightly bi-lobed hypoechoic lesion corresponding with mammographic findings. BIRADS 4\par \par US guided biopsy 12:00 3-4FN 18: revealed a final pathology of invasive carcinoma with neuroendocrine features (Er-Pr-Her2-), [place] grade 3, 8mm in specimen.\par \par Pathology Report (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 IHC stains for synaptophysin and chromogranin demonstrate nonspecific extremely scant, barely perceptible staining. Definitive characterization of the lesion is best deferred to the resection specimen. 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 Clinical correlation is necessary.\par \par 10/19/18 pathology review  [facility]: Poorly differentiated carcinoma, at least 6mm, LVI not seen. Immunostains done for ER/IL/HER2/GATA3/CDX2/TTF-1 are negative in tumor cells (done at [place] lab). Tumor histology shows small cell features (molding, apoptotic bodies, and extensive mitotic activity). Cannot determine histologic subtype and/or site of origin. Clinicopathologic and radiologic correlation strongly recommended.\par \par She saw Dr. Carter (plastic surgery) and then Dr. Raymundo (breast surgery) on 10/9 and noted her desire for breast conserving surgery. She was referred for breast MRI and genetic testing given strong family history. 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 At 12-1:00 axis retroareolar there is 0.4cm focus of enhancement, 12:00 axis posterior third linear non mass enhancement. L breast no suspicious enhancement. Axilla unremarkable. BIRADS4 If breast conservation is a consideration, 2-site MRI guided biopsy of non-mass enhancement within the anterior and posterior upper breast is recommended.\par \par She saw me initially 10/24 - diagnostic CT C/A/P and PETCT performed - no masses other than small breast mass with clip; PET with SUV 8 avidity in sigmoid colon (patient noted eating dairy products that week and some loose BMs in retrospect). Dr. Horn her gastroenterologist performed Colonoscopy  with random biopsies which were benign.\par \par  she underwent b/l mastectomy with Dr. Raymundo. 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 She works as a  in a restaurant full time.\par 2018 hand burn at work\par GERD: GI Dr. Gilbert Horn. Normal C-scope with internal hemorrhoids 18. Abd US and CT 17 fatty liver, 7mm R hepatic focus calcifications, unchanged.\par Headaches: MRI without acute findings years ago\par Microscopic hematuria: Cystoscopy 17 without significant findings (Dr. Nadya Hu), takes oxybutinin for overactive bladder\par Seasonal Allergies\par Family history of breast cancer in her maternal aunts x 3 (ages unknown).\par Menarche age 11, menopause age 48. , no prior OCPs or hormonal therapies\par Active Smoker for many years, quit ~1 month ago with this diagnosis\par PMD: Dr. Dom Coello\par Dr. Cali Gynecologist [de-identified] : [employer]  used. Patient is here with her future daughter in law. She notes she is healing well from mastectomy - she does note asymmetry R>L where scabs are healing. She has no other complaints at this time.

## 2018-12-21 NOTE — ASSESSMENT
[FreeTextEntry1] : 59yo woman with GERD, microscopic hematuria, significant smoking history (quit recently), arthritis/osteoporosis presenting for medical oncology follow up after b/l mastectomy for R breast cancer; final pathology findings with 7mm poorly differentiated carcinoma with neuroendocrine differentiation/features, ER-CO-HER2- Ki 67 >90%.\par \par We again discussed the natural history and progression of invasive breast cancer. When cancer is confined to the breast or axillary lymph nodes it is potentially curable.  When cancer is detected in distant organs or bone, it is treatable but not curable. I explained that even when a carcinoma has been completely removed by surgery, microscopic cells can escape into the circulation and lymphatic system, seed, and grow into clinically significant metastases that are then incurable. The role of adjuvant therapies are to reduce this risk. I reviewed that while her anatomic stage is early given small tumor size 7mm and sentinel lymph nodes negative, no gross evidence of distant metastatic disease on imaging/exam, the biology of the tumor is very aggressive and warrants adjuvant chemotherapy in order to decrease the risk of recurrence and development of distant incurable metastatic disease. \par We reviewed her pathology from her surgery revealed a poorly differentiated, aggressive featured carcinoma with neuroendocrine differentiation. I discussed with her that Dr. Napier on 2nd opinion review of her pathology has described that the tumor and biopsy are consistent with a small cell carcinoma of the breast, with neuroendocrine features, and necrosis. I discussed with her that given this new information, I am recommending a chemotherapy regimen that is shown to be effective in small cell carcinomas of other parts of the body (including in small cell carcinoma of the lung) based on histology and on review of the available medical literature and cases. I reviewed again that this represents a very rare subtype of breast cancer, and there are no specific guidelines to dictate treatment in these cases. We briefly discussed platinum/etoposide based adjuvant treatment (Day 1, 2, 3 of a 21 day cycle x 4 cycles). She is in agreement with this plan. I also discussed this plan with patient's daughter by phone. We will obtain PET/CT in the coming days to confirm that there is no evidence of distant sites of disease or primary source. They will return to clinic for visit and to discuss details of treatment together, after the patient returns from her trip this week to visit her ailing father. I have tentatively scheduled cycle 1 of treatment as soon as possible thereafter. [facility] paperwork completed and faxed back to patient's daughter as requested.\par

## 2018-12-22 ENCOUNTER — FORM ENCOUNTER (OUTPATIENT)
Age: 58
End: 2018-12-22

## 2018-12-23 ENCOUNTER — APPOINTMENT (OUTPATIENT)
Dept: NUCLEAR MEDICINE | Facility: CLINIC | Age: 58
End: 2018-12-23
Payer: COMMERCIAL

## 2018-12-23 ENCOUNTER — OUTPATIENT (OUTPATIENT)
Dept: OUTPATIENT SERVICES | Facility: HOSPITAL | Age: 58
LOS: 1 days | End: 2018-12-23
Payer: COMMERCIAL

## 2018-12-23 DIAGNOSIS — C50.911 MALIGNANT NEOPLASM OF UNSPECIFIED SITE OF RIGHT FEMALE BREAST: ICD-10-CM

## 2018-12-23 PROCEDURE — 78815 PET IMAGE W/CT SKULL-THIGH: CPT | Mod: 26,PS

## 2018-12-23 PROCEDURE — 78815 PET IMAGE W/CT SKULL-THIGH: CPT

## 2018-12-23 PROCEDURE — A9552: CPT

## 2018-12-24 ENCOUNTER — MOBILE ON CALL (OUTPATIENT)
Age: 58
End: 2018-12-24

## 2018-12-26 ENCOUNTER — MOBILE ON CALL (OUTPATIENT)
Age: 58
End: 2018-12-26

## 2018-12-28 ENCOUNTER — APPOINTMENT (OUTPATIENT)
Dept: INFUSION THERAPY | Facility: HOSPITAL | Age: 58
End: 2018-12-28

## 2018-12-31 ENCOUNTER — APPOINTMENT (OUTPATIENT)
Dept: HEMATOLOGY ONCOLOGY | Facility: CLINIC | Age: 58
End: 2018-12-31
Payer: COMMERCIAL

## 2018-12-31 VITALS
HEART RATE: 76 BPM | OXYGEN SATURATION: 100 % | RESPIRATION RATE: 17 BRPM | BODY MASS INDEX: 28.43 KG/M2 | SYSTOLIC BLOOD PRESSURE: 121 MMHG | TEMPERATURE: 97.6 F | WEIGHT: 155.43 LBS | DIASTOLIC BLOOD PRESSURE: 74 MMHG

## 2018-12-31 PROCEDURE — 99215 OFFICE O/P EST HI 40 MIN: CPT

## 2018-12-31 RX ORDER — OXYCODONE 5 MG/1
5 TABLET ORAL
Qty: 20 | Refills: 0 | Status: DISCONTINUED | COMMUNITY
Start: 2018-11-23

## 2018-12-31 RX ORDER — SODIUM SULFATE, POTASSIUM SULFATE, MAGNESIUM SULFATE 17.5; 3.13; 1.6 G/ML; G/ML; G/ML
17.5-3.13-1.6 SOLUTION, CONCENTRATE ORAL
Qty: 354 | Refills: 0 | Status: DISCONTINUED | COMMUNITY
Start: 2018-10-31

## 2018-12-31 NOTE — PHYSICAL EXAM
[Fully active, able to carry on all pre-disease performance without restriction] : Status 0 - Fully active, able to carry on all pre-disease performance without restriction [Normal] : affect appropriate [de-identified] : b/l mastectomy with wide scabs noted - mostly healed, some outpouching at lateral surgical scars b/l, no gross masses or adenopathy palpable b/l. Some TTP left axilla and UOQ

## 2018-12-31 NOTE — HISTORY OF PRESENT ILLNESS
[de-identified] : 58F presenting for medical oncology consultation.\par \par Last mammogram ~4 years ago.\par Screening mammo 18: new 1cm mass in the upper medial Right breast. \par 18 diagnostic mammo and US: R breast 12:00 3-4cm FN 1cm slightly bi-lobed hypoechoic lesion corresponding with mammographic findings. 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 IHC stains for synaptophysin and chromogranin demonstrate nonspecific extremely scant, barely perceptible staining. Definitive characterization of the lesion is best deferred to the resection specimen. 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 Clinical correlation is necessary.\par \par 10/19/18 pathology review  [facility]: Poorly differentiated carcinoma, at least 6mm, LVI not seen. Immunostains done for ER/IL/HER2/GATA3/CDX2/TTF-1 are negative in tumor cells (done at [place] lab). Tumor histology shows small cell features (molding, apoptotic bodies, and extensive mitotic activity). Cannot determine histologic subtype and/or site of origin. Clinicopathologic and radiologic correlation strongly recommended.\par \par She saw Dr. Carter (plastic surgery) and then Dr. Raymundo (breast surgery) on 10/9 and noted her desire for breast conserving surgery. She was referred for breast MRI and genetic testing given strong family history. 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 At 12-1:00 axis retroareolar there is 0.4cm focus of enhancement, 12:00 axis posterior third linear non mass enhancement. L breast no suspicious enhancement. Axilla unremarkable. BIRADS4 If breast conservation is a consideration, 2-site MRI guided biopsy of non-mass enhancement within the anterior and posterior upper breast is recommended.\par \par She saw me initially 10/24 - diagnostic CT C/A/P and PETCT performed - no masses other than small breast mass with clip; PET with SUV 8 avidity in sigmoid colon (patient noted eating dairy products that week and some loose BMs in retrospect). Dr. Horn her gastroenterologist performed Colonoscopy  with random biopsies which were benign.\par \par  she underwent b/l mastectomy with Dr. Raymundo. 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 Hypermetabolic subcutaneous nodule right upper arm lateral aspect (not included on prior PET/CT) - SUV 2.5 (patient notes recent flu shot with hematoma). Interval resolution of rectosigmoid FDG avidity.\par \par Pertinent Medical History:\par She lives in [place] with her 3 children. She works as a  in a restaurant full time.\par 2018 hand burn at work\par GERD: GI Dr. Gilbert Horn. Normal C-scope with internal hemorrhoids 18. Abd US and CT 17 fatty liver, 7mm R hepatic focus calcifications, unchanged.\par Headaches: MRI without acute findings years ago\par Microscopic hematuria: Cystoscopy 17 without significant findings (Dr. Nadya Hu), takes oxybutinin for overactive bladder\par Seasonal Allergies\par Family history of breast cancer in her maternal aunts x 3 (ages unknown).\par Menarche age 11, menopause age 48. , no prior OCPs or hormonal therapies\par Active Smoker for many years, quit ~1 month ago with this diagnosis\par PMD: Dr. Dom Coello\par Dr. Cali Gynecologist [de-identified] : [employer]  used. Patient is here with her daughter. She notes asymmetry L>R with pain and "thickness" in R mastectomy bed and R axilla, particularly with moving her R arm. She feels moving her arms b/l are "tight" with lifting. She has no other complaints at this time, she has been more active and has more energy.

## 2018-12-31 NOTE — ASSESSMENT
[FreeTextEntry1] : 57yo woman with GERD, microscopic hematuria, significant smoking history (quit recently), arthritis/osteoporosis presenting for medical oncology follow up after b/l mastectomy for R breast cancer; final pathology findings on second opinion pathology review (Dr. Carla Napier at 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 Interval PET/CT with no other evidence of disease.\par \par We again discussed the natural history and progression of invasive cancer. When cancer is confined to the breast or axillary lymph nodes it is potentially curable.  When cancer is detected in distant organs or bone, it is treatable but not curable. I explained that even when a carcinoma has been completely removed by surgery, microscopic cells can escape into the circulation and lymphatic system, seed, and grow into clinically significant metastases that are then incurable. The role of adjuvant therapies are to reduce this risk. I reviewed that while her anatomic stage is early given small tumor size 7mm and sentinel lymph nodes negative, no gross evidence of distant metastatic disease on imaging/exam, the biology of the tumor is very aggressive and warrants adjuvant chemotherapy in order to decrease the risk of recurrence and development of distant incurable metastatic disease. 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 I discussed with her that given this new information, I am recommending a chemotherapy regimen that is shown to be effective in small cell carcinomas of other parts of the body (including in small cell carcinoma of the lung) based on histology and on review of the available medical literature and cases. I reviewed again that this represents a very rare subtype of breast cancer, and there are no specific guidelines to dictate treatment in these cases. Based on this, I reviewed my recommendation for adjuvant chemotherapy with cisplatin and etoposide (Day 1-3) via IV every 21 days for 4 cycles. We discussed potential side-effects including nausea, vomiting, alopecia, fatigue, body aches, myelosuppression potentially necessitating transfusion support (for which Neulasta is used to reduce the period of neutropenia and therefore the risk of infection), increased risk of infection (potentially resulting in sepsis, septic shock or even death), mucositis, anaphylaxis, electrolyte abnormalities, liver damage or failure, kidney damage or failure, and potential long term and serious effects of neuropathy, hearing loss, and leukemia/treatment related cancers, were reviewed in detail among others. We reviewed the use of anti-emetics and premedication to treat symptoms, as well as Neulasta OnPro support (on day 3). I reviewed the option for mediport placement, patient prefers trial of peripheral IV administration. Prior to treatment on day 1 of each cycle she will have an office visit for lab work, exam, and review of toxicities/symptoms to monitor for. She will also have a visit mid-cycle to review toxicities and labwork. She was encouraged to call anytime to speak with me or the physician on call 24 hours a day/7 days a week should any new or concerning side effects develop. She was encouraged to monitor her temperature and to present to the ER immediately if she develops a fever 100.4 or greater or any signs of infection. All of the patient’s and her daughter's questions were answered and she expressed understanding of the risks and benefits of therapy. She elects to proceed with treatment and she has provided written consent for this today. She was given written information regarding the treatment plan and potential side effects for review, including Slovak language documentation, and a calendar of tentative MD visits and treatments. [employer]  utilized for entirety of discussion. Plan to begin treatment in the next week - insurance authorization still pending at the time of patient visit. I advised that she follow up with Dr. Raymundo this week for exam and review of post-operative pain/concerns.\par \par \par

## 2019-01-02 ENCOUNTER — APPOINTMENT (OUTPATIENT)
Dept: INFUSION THERAPY | Facility: HOSPITAL | Age: 59
End: 2019-01-02

## 2019-01-03 ENCOUNTER — OUTPATIENT (OUTPATIENT)
Dept: OUTPATIENT SERVICES | Facility: HOSPITAL | Age: 59
LOS: 1 days | Discharge: HOME | End: 2019-01-03

## 2019-01-03 DIAGNOSIS — C50.919 MALIGNANT NEOPLASM OF UNSPECIFIED SITE OF UNSPECIFIED FEMALE BREAST: ICD-10-CM

## 2019-01-07 ENCOUNTER — APPOINTMENT (OUTPATIENT)
Dept: HEMATOLOGY ONCOLOGY | Facility: CLINIC | Age: 59
End: 2019-01-07

## 2019-01-07 ENCOUNTER — APPOINTMENT (OUTPATIENT)
Dept: SURGICAL ONCOLOGY | Facility: CLINIC | Age: 59
End: 2019-01-07
Payer: COMMERCIAL

## 2019-01-07 ENCOUNTER — RESULT REVIEW (OUTPATIENT)
Age: 59
End: 2019-01-07

## 2019-01-07 VITALS
HEIGHT: 62 IN | DIASTOLIC BLOOD PRESSURE: 88 MMHG | BODY MASS INDEX: 28.52 KG/M2 | OXYGEN SATURATION: 99 % | WEIGHT: 155 LBS | HEART RATE: 59 BPM | SYSTOLIC BLOOD PRESSURE: 129 MMHG

## 2019-01-07 LAB
BASOPHILS # BLD AUTO: 0.1 K/UL — SIGNIFICANT CHANGE UP (ref 0–0.2)
BASOPHILS NFR BLD AUTO: 1.3 % — SIGNIFICANT CHANGE UP (ref 0–2)
EOSINOPHIL # BLD AUTO: 0.2 K/UL — SIGNIFICANT CHANGE UP (ref 0–0.5)
EOSINOPHIL NFR BLD AUTO: 2 % — SIGNIFICANT CHANGE UP (ref 0–6)
HCT VFR BLD CALC: 40.8 % — SIGNIFICANT CHANGE UP (ref 34.5–45)
HGB BLD-MCNC: 13.4 G/DL — SIGNIFICANT CHANGE UP (ref 11.5–15.5)
LYMPHOCYTES # BLD AUTO: 4.7 K/UL — HIGH (ref 1–3.3)
LYMPHOCYTES # BLD AUTO: 50.1 % — HIGH (ref 13–44)
MCHC RBC-ENTMCNC: 30.7 PG — SIGNIFICANT CHANGE UP (ref 27–34)
MCHC RBC-ENTMCNC: 32.8 G/DL — SIGNIFICANT CHANGE UP (ref 32–36)
MCV RBC AUTO: 93.4 FL — SIGNIFICANT CHANGE UP (ref 80–100)
MONOCYTES # BLD AUTO: 0.5 K/UL — SIGNIFICANT CHANGE UP (ref 0–0.9)
MONOCYTES NFR BLD AUTO: 5.8 % — SIGNIFICANT CHANGE UP (ref 2–14)
NEUTROPHILS # BLD AUTO: 3.8 K/UL — SIGNIFICANT CHANGE UP (ref 1.8–7.4)
NEUTROPHILS NFR BLD AUTO: 40.7 % — LOW (ref 43–77)
PLATELET # BLD AUTO: 420 K/UL — HIGH (ref 150–400)
RBC # BLD: 4.37 M/UL — SIGNIFICANT CHANGE UP (ref 3.8–5.2)
RBC # FLD: 11.4 % — SIGNIFICANT CHANGE UP (ref 10.3–14.5)
WBC # BLD: 9.3 K/UL — SIGNIFICANT CHANGE UP (ref 3.8–10.5)
WBC # FLD AUTO: 9.3 K/UL — SIGNIFICANT CHANGE UP (ref 3.8–10.5)

## 2019-01-07 PROCEDURE — 99024 POSTOP FOLLOW-UP VISIT: CPT

## 2019-01-07 NOTE — REASON FOR VISIT
[Follow-Up Visit] : a follow-up visit for [Breast Cancer] : breast cancer [Source: ______] : History obtained from [unfilled]

## 2019-01-07 NOTE — HISTORY OF PRESENT ILLNESS
[de-identified] : 57 y/o female presents s/p bilateral mastectomy 11/21/18.\par Pathology 11/29/18: Right axillary sentinel lymph node, excision: One negative lymph node. 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 Right axillary sentinel lymph node 2, excision: One negative lymph node. Left axillary sentinel lymph node, excision: One negative lymph node. Left breast, mastectomy: Fibrocystic changes minimal.\par \par Today 1/7/19 she presents with a complaint of swelling in her Left axilla as well as pulsating pain to Left outer chest wall.  Denies  palpable chest wall nodules or skin changes.\par \par

## 2019-01-07 NOTE — ASSESSMENT
[FreeTextEntry1] : imp:\par s/p bilateral mastectomy Nov 2018.  Incisions well healed.\par Small seroma Left outer chest wall\par \par plan:\par Referral to Dr. Iris Contreras for PT\par Take hot showers and use Ibuprophen PRN\par RTO if symptoms do not improve in 2-4 week

## 2019-01-07 NOTE — PHYSICAL EXAM
[Normal] : well developed, well nourished, in no acute distress [FreeTextEntry1] : RC present for exam.  [de-identified] : Incisions healing well without signs of infection.  Small seroma noted Left outer chest wall without need of drainage.  No erythema noted.

## 2019-01-08 LAB
ALBUMIN SERPL ELPH-MCNC: 4.5 G/DL
ALP BLD-CCNC: 99 U/L
ALT SERPL-CCNC: 18 U/L
ANION GAP SERPL CALC-SCNC: 9 MMOL/L
AST SERPL-CCNC: 19 U/L
BILIRUB SERPL-MCNC: 0.3 MG/DL
BUN SERPL-MCNC: 8 MG/DL
CALCIUM SERPL-MCNC: 9.8 MG/DL
CHLORIDE SERPL-SCNC: 105 MMOL/L
CO2 SERPL-SCNC: 27 MMOL/L
CREAT SERPL-MCNC: 0.71 MG/DL
GLUCOSE SERPL-MCNC: 88 MG/DL
MAGNESIUM SERPL-MCNC: 1.9 MG/DL
PHOSPHATE SERPL-MCNC: 3.4 MG/DL
POTASSIUM SERPL-SCNC: 4 MMOL/L
PROT SERPL-MCNC: 7.2 G/DL
SODIUM SERPL-SCNC: 141 MMOL/L

## 2019-01-09 ENCOUNTER — RESULT REVIEW (OUTPATIENT)
Age: 59
End: 2019-01-09

## 2019-01-09 ENCOUNTER — APPOINTMENT (OUTPATIENT)
Dept: INFUSION THERAPY | Facility: HOSPITAL | Age: 59
End: 2019-01-09

## 2019-01-09 ENCOUNTER — APPOINTMENT (OUTPATIENT)
Dept: HEMATOLOGY ONCOLOGY | Facility: CLINIC | Age: 59
End: 2019-01-09
Payer: COMMERCIAL

## 2019-01-09 LAB
BASOPHILS # BLD AUTO: 0.1 K/UL — SIGNIFICANT CHANGE UP (ref 0–0.2)
BASOPHILS NFR BLD AUTO: 0.9 % — SIGNIFICANT CHANGE UP (ref 0–2)
EOSINOPHIL # BLD AUTO: 0.4 K/UL — SIGNIFICANT CHANGE UP (ref 0–0.5)
EOSINOPHIL NFR BLD AUTO: 4.6 % — SIGNIFICANT CHANGE UP (ref 0–6)
HCT VFR BLD CALC: 41.6 % — SIGNIFICANT CHANGE UP (ref 34.5–45)
HGB BLD-MCNC: 14 G/DL — SIGNIFICANT CHANGE UP (ref 11.5–15.5)
LYMPHOCYTES # BLD AUTO: 3.3 K/UL — SIGNIFICANT CHANGE UP (ref 1–3.3)
LYMPHOCYTES # BLD AUTO: 39.7 % — SIGNIFICANT CHANGE UP (ref 13–44)
MCHC RBC-ENTMCNC: 31.3 PG — SIGNIFICANT CHANGE UP (ref 27–34)
MCHC RBC-ENTMCNC: 33.7 G/DL — SIGNIFICANT CHANGE UP (ref 32–36)
MCV RBC AUTO: 93.1 FL — SIGNIFICANT CHANGE UP (ref 80–100)
MONOCYTES # BLD AUTO: 0.7 K/UL — SIGNIFICANT CHANGE UP (ref 0–0.9)
MONOCYTES NFR BLD AUTO: 8.7 % — SIGNIFICANT CHANGE UP (ref 2–14)
NEUTROPHILS # BLD AUTO: 3.8 K/UL — SIGNIFICANT CHANGE UP (ref 1.8–7.4)
NEUTROPHILS NFR BLD AUTO: 46.1 % — SIGNIFICANT CHANGE UP (ref 43–77)
PLATELET # BLD AUTO: 393 K/UL — SIGNIFICANT CHANGE UP (ref 150–400)
RBC # BLD: 4.47 M/UL — SIGNIFICANT CHANGE UP (ref 3.8–5.2)
RBC # FLD: 11.4 % — SIGNIFICANT CHANGE UP (ref 10.3–14.5)
WBC # BLD: 8.4 K/UL — SIGNIFICANT CHANGE UP (ref 3.8–10.5)
WBC # FLD AUTO: 8.4 K/UL — SIGNIFICANT CHANGE UP (ref 3.8–10.5)

## 2019-01-09 PROCEDURE — 99214 OFFICE O/P EST MOD 30 MIN: CPT

## 2019-01-10 ENCOUNTER — LABORATORY RESULT (OUTPATIENT)
Age: 59
End: 2019-01-10

## 2019-01-10 ENCOUNTER — APPOINTMENT (OUTPATIENT)
Dept: INFUSION THERAPY | Facility: HOSPITAL | Age: 59
End: 2019-01-10

## 2019-01-10 DIAGNOSIS — R11.2 NAUSEA WITH VOMITING, UNSPECIFIED: ICD-10-CM

## 2019-01-10 DIAGNOSIS — E86.0 DEHYDRATION: ICD-10-CM

## 2019-01-10 DIAGNOSIS — Z51.11 ENCOUNTER FOR ANTINEOPLASTIC CHEMOTHERAPY: ICD-10-CM

## 2019-01-11 ENCOUNTER — APPOINTMENT (OUTPATIENT)
Dept: INFUSION THERAPY | Facility: HOSPITAL | Age: 59
End: 2019-01-11

## 2019-01-11 NOTE — HISTORY OF PRESENT ILLNESS
[de-identified] : 58F presenting for medical oncology consultation.\par \par Last mammogram ~4 years ago.\par Screening mammo 18: new 1cm mass in the upper medial Right breast. \par 18 diagnostic mammo and US: R breast 12:00 3-4cm FN 1cm slightly bi-lobed hypoechoic lesion corresponding with mammographic findings. BIRADS 4\par \par US guided biopsy 12:00 3-4FN 18: revealed a final pathology of invasive carcinoma with neuroendocrine features (Er-Pr-Her2-), [place] grade 3, 8mm in specimen.\par \par Pathology Report (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 IHC stains for synaptophysin and chromogranin demonstrate nonspecific extremely scant, barely perceptible staining. Definitive characterization of the lesion is best deferred to the resection specimen. 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 Clinical correlation is necessary.\par \par 10/19/18 pathology review  [facility]: Poorly differentiated carcinoma, at least 6mm, LVI not seen. Immunostains done for ER/MN/HER2/GATA3/CDX2/TTF-1 are negative in tumor cells (done at [place] lab). Tumor histology shows small cell features (molding, apoptotic bodies, and extensive mitotic activity). Cannot determine histologic subtype and/or site of origin. Clinicopathologic and radiologic correlation strongly recommended.\par \par She saw Dr. Carter (plastic surgery) and then Dr. Raymundo (breast surgery) on 10/9 and noted her desire for breast conserving surgery. She was referred for breast MRI and genetic testing given strong family history. 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 At 12-1:00 axis retroareolar there is 0.4cm focus of enhancement, 12:00 axis posterior third linear non mass enhancement. L breast no suspicious enhancement. Axilla unremarkable. BIRADS4 If breast conservation is a consideration, 2-site MRI guided biopsy of non-mass enhancement within the anterior and posterior upper breast is recommended.\par \par She saw me initially 10/24 - diagnostic CT C/A/P and PETCT performed - no masses other than small breast mass with clip; PET with SUV 8 avidity in sigmoid colon (patient noted eating dairy products that week and some loose BMs in retrospect). Dr. Horn her gastroenterologist performed Colonoscopy  with random biopsies which were benign.\par \par  she underwent b/l mastectomy with Dr. Raymundo. 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 Hypermetabolic subcutaneous nodule right upper arm lateral aspect (not included on prior PET/CT) - SUV 2.5 (patient notes recent flu shot with hematoma). Interval resolution of rectosigmoid FDG avidity.\par \par Pertinent Medical History:\par She lives in [place] with her 3 children. She works as a  in a restaurant full time.\par 2018 hand burn at work\par GERD: GI Dr. Gilbert Horn. Normal C-scope with internal hemorrhoids 18. Abd US and CT 17 fatty liver, 7mm R hepatic focus calcifications, unchanged.\par Headaches: MRI without acute findings years ago\par Microscopic hematuria: Cystoscopy 17 without significant findings (Dr. Nadya Hu), takes oxybutinin for overactive bladder\par Seasonal Allergies\par Family history of breast cancer in her maternal aunts x 3 (ages unknown).\par Menarche age 11, menopause age 48. , no prior OCPs or hormonal therapies\par Active Smoker for many years, quit with this diagnosis\par PMD: Dr. Dom Coello\par Dr. Cali Gynecologist [FreeTextEntry1] : Cisplatin 75mg/m2 on day 1, Etoposide 100mg/m2 on day 1-3 of a 21 day cycle. Neulasta OnPro day 3\par 500cc normal saline over 1 hour before cisplatin. 1L 1/2NS (with magnesium sulfate 1g, potassium chloride 20meq) over 2 hours after cisplatin. UOP monitoring 100cc/h while receiving cisplatin\par \par Antiemetics: \par Emend 80mg PO on day 2, day 3\par Dexamethasone 4mg PO BID day 2-5\par Reglan 10mg PO q6-8h PRN for nausea [de-identified] : Patient is here with her daughter to begin adjuvant chemotherapy. She notes pain at mastectomy site L>R improved with PRN ibuprofen daily as recommended by Dr. Raymundo last week at follow up visit. She has no other complaints at this time, she has been active with good energy, eating well.

## 2019-01-11 NOTE — PHYSICAL EXAM
[Fully active, able to carry on all pre-disease performance without restriction] : Status 0 - Fully active, able to carry on all pre-disease performance without restriction [Normal] : affect appropriate [de-identified] : b/l mastectomy well healed, some outpouching at lateral surgical scars b/l L>R, no gross masses or adenopathy palpable b/l, nontender exam

## 2019-01-11 NOTE — ASSESSMENT
[FreeTextEntry1] : 57yo woman with GERD, microscopic hematuria, significant smoking history (quit recently), arthritis/osteoporosis presenting for medical oncology follow up after b/l mastectomy for R breast cancer; final pathology findings on second opinion pathology review (Dr. 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 Interval PET/CT with no other evidence of disease. She presents today for initial cycle of adjuvant chemotherapy, planned for 4 cycles:\par \par Cisplatin 75mg/m2 on day 1, Etoposide 100mg/m2 on day 1-3 of a 21 day cycle. Neulasta OnPro day 3\par 500cc normal saline over 1 hour before cisplatin. 1L 1/2NS (with magnesium sulfate 1g, potassium chloride 20meq) over 2 hours after cisplatin. UOP monitoring 100cc/h while receiving cisplatin\par \par Antiemetics: \par Fosaprepitant 150mg IV Day 1, Emend 80mg PO on day 2, day 3\par Dexamethasone 12mg IV Day 1, Dexamethasone 4mg PO BID day 2-5\par Aloxi 0.25mg IV Day 1\par Reglan 10mg PO q6-8h PRN for nausea\par \par -Proceed with treatment today\par -UOP monitoring 100cc/h while receiving cisplatin planned\par -Advised patient discontinued ibuprofen/NSAIDs for now given renal, GI concerns, possible thrombocytopenia and bleeding risks with therapy, she agreed\par -Sparing use of Tylenol no more than 3 grams total daily PRN discussed\par -Lab checks, visit next week for follow up\par -Bowel movements monitoring goal 1-2 normal formed bowel movements q1-2 days reviewed\par -Advised to drink 2L of water/fluids daily; given water bottle for monitoring of volume of fluid intake today\par -patient again provided with written information regarding therapy, SW support, calendar with treatment visits and plan, antiemetic instructions

## 2019-01-14 DIAGNOSIS — Z51.89 ENCOUNTER FOR OTHER SPECIFIED AFTERCARE: ICD-10-CM

## 2019-01-16 ENCOUNTER — RESULT REVIEW (OUTPATIENT)
Age: 59
End: 2019-01-16

## 2019-01-16 ENCOUNTER — APPOINTMENT (OUTPATIENT)
Dept: HEMATOLOGY ONCOLOGY | Facility: CLINIC | Age: 59
End: 2019-01-16
Payer: COMMERCIAL

## 2019-01-16 VITALS
BODY MASS INDEX: 27.94 KG/M2 | TEMPERATURE: 98.1 F | RESPIRATION RATE: 16 BRPM | DIASTOLIC BLOOD PRESSURE: 75 MMHG | SYSTOLIC BLOOD PRESSURE: 117 MMHG | WEIGHT: 152.78 LBS | OXYGEN SATURATION: 100 % | HEART RATE: 67 BPM

## 2019-01-16 LAB
BASOPHILS # BLD AUTO: 0.1 K/UL — SIGNIFICANT CHANGE UP (ref 0–0.2)
BASOPHILS NFR BLD AUTO: 0.8 % — SIGNIFICANT CHANGE UP (ref 0–2)
EOSINOPHIL # BLD AUTO: 0.1 K/UL — SIGNIFICANT CHANGE UP (ref 0–0.5)
EOSINOPHIL NFR BLD AUTO: 0.7 % — SIGNIFICANT CHANGE UP (ref 0–6)
HCT VFR BLD CALC: 41.2 % — SIGNIFICANT CHANGE UP (ref 34.5–45)
HGB BLD-MCNC: 13.8 G/DL — SIGNIFICANT CHANGE UP (ref 11.5–15.5)
LYMPHOCYTES # BLD AUTO: 25.5 % — SIGNIFICANT CHANGE UP (ref 13–44)
LYMPHOCYTES # BLD AUTO: 4.5 K/UL — HIGH (ref 1–3.3)
MCHC RBC-ENTMCNC: 31.3 PG — SIGNIFICANT CHANGE UP (ref 27–34)
MCHC RBC-ENTMCNC: 33.4 G/DL — SIGNIFICANT CHANGE UP (ref 32–36)
MCV RBC AUTO: 93.6 FL — SIGNIFICANT CHANGE UP (ref 80–100)
MONOCYTES # BLD AUTO: 0.7 K/UL — SIGNIFICANT CHANGE UP (ref 0–0.9)
MONOCYTES NFR BLD AUTO: 3.8 % — SIGNIFICANT CHANGE UP (ref 2–14)
NEUTROPHILS # BLD AUTO: 12.2 K/UL — HIGH (ref 1.8–7.4)
NEUTROPHILS NFR BLD AUTO: 69.1 % — SIGNIFICANT CHANGE UP (ref 43–77)
PLATELET # BLD AUTO: 235 K/UL — SIGNIFICANT CHANGE UP (ref 150–400)
RBC # BLD: 4.4 M/UL — SIGNIFICANT CHANGE UP (ref 3.8–5.2)
RBC # FLD: 11.2 % — SIGNIFICANT CHANGE UP (ref 10.3–14.5)
WBC # BLD: 17.7 K/UL — HIGH (ref 3.8–10.5)
WBC # FLD AUTO: 17.7 K/UL — HIGH (ref 3.8–10.5)

## 2019-01-16 PROCEDURE — 99214 OFFICE O/P EST MOD 30 MIN: CPT

## 2019-01-16 NOTE — HISTORY OF PRESENT ILLNESS
[de-identified] : 58F presenting for medical oncology consultation.\par \par Last mammogram ~4 years ago.\par Screening mammo 18: new 1cm mass in the upper medial Right breast. \par 18 diagnostic mammo and US: R breast 12:00 3-4cm FN 1cm slightly bi-lobed hypoechoic lesion corresponding with mammographic findings. 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 IHC stains for synaptophysin and chromogranin demonstrate nonspecific extremely scant, barely perceptible staining. Definitive characterization of the lesion is best deferred to the resection specimen. 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 Clinical correlation is necessary.\par \par 10/19/18 pathology review  [facility]: Poorly differentiated carcinoma, at least 6mm, LVI not seen. Immunostains done for ER/LA/HER2/GATA3/CDX2/TTF-1 are negative in tumor cells (done at [place] lab). Tumor histology shows small cell features (molding, apoptotic bodies, and extensive mitotic activity). Cannot determine histologic subtype and/or site of origin. Clinicopathologic and radiologic correlation strongly recommended.\par \par She saw Dr. Carter (plastic surgery) and then Dr. Raymundo (breast surgery) on 10/9 and noted her desire for breast conserving surgery. She was referred for breast MRI and genetic testing given strong family history. 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 At 12-1:00 axis retroareolar there is 0.4cm focus of enhancement, 12:00 axis posterior third linear non mass enhancement. L breast no suspicious enhancement. Axilla unremarkable. BIRADS4 If breast conservation is a consideration, 2-site MRI guided biopsy of non-mass enhancement within the anterior and posterior upper breast is recommended.\par \par She saw me initially 10/24 - diagnostic CT C/A/P and PETCT performed - no masses other than small breast mass with clip; PET with SUV 8 avidity in sigmoid colon (patient noted eating dairy products that week and some loose BMs in retrospect). Dr. Horn her gastroenterologist performed Colonoscopy  with random biopsies which were benign.\par \par  she underwent b/l mastectomy with Dr. Raymundo. 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 Hypermetabolic subcutaneous nodule right upper arm lateral aspect (not included on prior PET/CT) - SUV 2.5 (patient notes recent flu shot with hematoma). Interval resolution of rectosigmoid FDG avidity.\par \par She started adjuvant chemotherapy (cisplatin/etoposide x 4 cyles) on 19.\par \par Pertinent Medical History:\par She lives in [place] with her 3 children. She works as a  in a restaurant full time.\par 2018 hand burn at work\par GERD: GI Dr. Gilbert Horn. Normal C-scope with internal hemorrhoids 18. Abd US and CT 17 fatty liver, 7mm R hepatic focus calcifications, unchanged.\par Headaches: MRI without acute findings years ago\par Microscopic hematuria: Cystoscopy 17 without significant findings (Dr. Nadya Hu), takes oxybutinin for overactive bladder\par Seasonal Allergies\par Family history of breast cancer in her maternal aunts x 3 (ages unknown).\par Menarche age 11, menopause age 48. , no prior OCPs or hormonal therapies\par Active Smoker for many years, quit with this diagnosis\par PMD: Dr. Dom Coello\par Dr. Cali Gynecologist [FreeTextEntry1] : Cisplatin 75mg/m2 on day 1, Etoposide 100mg/m2 on day 1-3 of a 21 day cycle. Neulasta OnPro day 3\par 500cc normal saline over 1 hour before cisplatin. 1L 1/2NS (with magnesium sulfate 1g, potassium chloride 20meq) over 2 hours after cisplatin. UOP monitoring 100cc/h while receiving cisplatin\par \par Antiemetics: \par Emend 80mg PO on day 2, day 3\par Dexamethasone 4mg PO BID day 2-5\par Reglan 10mg PO q6-8h PRN for nausea [de-identified] : Patient is here today for follow up after first cycle of adjuvant chemotherapy last week. She is here with her son's [facility] - [employer] used for duration of visit. She has some fatigue, and had a headache with treatment last week relieved with Tylenol PRN. Energy overall is good and she notes she has been able to take walks daily, help with household chores like laundry. She took her antiemetics as instructed and has not required any PRN use. She notes feeling poor appetite but trying to eat toast, apple juice, etc. - angelic tea helps. She has had some difficulty sleeping through the night in the past week, and she notes L flank pain over her ribs that is dull and intermittent, worse with movement, that she attributes potentially to this poor sleep.  She has had loose but somewhat formed/soft BMs ~3x per day for the last few days. She has no other complaints at this time, she has been hydrating adequately instructed. She denies f/c/s, HA, dizziness, hearing or vision issues, n/v, CP, SOB, abd pain, diarrhea, edema, rashes, new neuro symptoms, weight loss or gain, urinary issues, bleeding or bruising.

## 2019-01-16 NOTE — ASSESSMENT
[FreeTextEntry1] : 57yo woman with GERD, microscopic hematuria, significant smoking history (quit recently), arthritis/osteoporosis presenting for medical oncology follow up after b/l mastectomy for R breast cancer; final pathology findings on second opinion pathology review (Dr. 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 Interval PET/CT with no other evidence of disease. She presents today after initial cycle of adjuvant chemotherapy on 1/9, planned for 4 cycles. She is doing well after treatment with minimal complaints.\par \par Cisplatin 75mg/m2 on day 1, Etoposide 100mg/m2 on day 1-3 of a 21 day cycle. Neulasta OnPro day 3\par 500cc normal saline over 1 hour before cisplatin. 1L 1/2NS (with magnesium sulfate 1g, potassium chloride 20meq) over 2 hours after cisplatin. UOP monitoring 100cc/h while receiving cisplatin\par *UOP monitoring 100cc/h while receiving cisplatin\par \par Antiemetics: \par Fosaprepitant 150mg IV Day 1, Emend 80mg PO on day 2, day 3\par Dexamethasone 12mg IV Day 1, Dexamethasone 4mg PO BID day 2-5\par Aloxi 0.25mg IV Day 1\par Reglan 10mg PO q6-8h PRN for nausea\par \par Breast Cancer, small cell carcinoma: tolerating treatment reasonably well. some insomnia likely related to steroid use for antiemesis.\par -Lab check today\par -Bowel movements monitoring, monitor for diarrhea\par -Continue hydration, small frequent meals\par -angelic and lemon tea OK, advised to check label and avoid any herbal teas that can interfere with medications\par -discussed potential neutropenic/myelosuppresed period, hand hygiene, avoiding sick contacts, present to ER ASAP if any signs of infection temp 100.4 or greater\par -follow up 2 weeks with next treatment 1/30, labs 1/28 prior, sooner visit if issues arise, reviewed with patient today\par \par Rib Pain: suspect muscle strain, mild TTP, occasional, no concerning s/s currently\par -Advised patient to continue to hold ibuprofen/NSAIDs for now given renal, GI concerns, possible thrombocytopenia and bleeding risks with therapy\par -Sparing use of Tylenol no more than 3 grams total daily PRN discussed, including for MSK pains\par -continue to monitor\par

## 2019-01-16 NOTE — PHYSICAL EXAM
[Fully active, able to carry on all pre-disease performance without restriction] : Status 0 - Fully active, able to carry on all pre-disease performance without restriction [Normal] : affect appropriate [de-identified] : b/l mastectomy well healed, some outpouching at lateral surgical scars b/l L>R, no gross masses or adenopathy palpable b/l, nontender exam [de-identified] : mild TTP over the L ribs laterally, no deformity palpated or point TTP

## 2019-01-17 LAB
ALBUMIN SERPL ELPH-MCNC: 4.4 G/DL
ALP BLD-CCNC: 148 U/L
ALT SERPL-CCNC: 12 U/L
ANION GAP SERPL CALC-SCNC: 13 MMOL/L
APTT BLD: 28.7 SEC
AST SERPL-CCNC: 12 U/L
BILIRUB SERPL-MCNC: 0.3 MG/DL
BUN SERPL-MCNC: 13 MG/DL
CALCIUM SERPL-MCNC: 9 MG/DL
CHLORIDE SERPL-SCNC: 96 MMOL/L
CO2 SERPL-SCNC: 26 MMOL/L
CREAT SERPL-MCNC: 0.62 MG/DL
GLUCOSE SERPL-MCNC: 87 MG/DL
MAGNESIUM SERPL-MCNC: 2 MG/DL
PHOSPHATE SERPL-MCNC: 3.9 MG/DL
POTASSIUM SERPL-SCNC: 4.7 MMOL/L
PROT SERPL-MCNC: 7 G/DL
SODIUM SERPL-SCNC: 135 MMOL/L

## 2019-01-18 ENCOUNTER — APPOINTMENT (OUTPATIENT)
Dept: INFUSION THERAPY | Facility: HOSPITAL | Age: 59
End: 2019-01-18

## 2019-01-24 ENCOUNTER — TRANSCRIPTION ENCOUNTER (OUTPATIENT)
Age: 59
End: 2019-01-24

## 2019-01-28 ENCOUNTER — APPOINTMENT (OUTPATIENT)
Dept: HEMATOLOGY ONCOLOGY | Facility: CLINIC | Age: 59
End: 2019-01-28

## 2019-01-28 ENCOUNTER — RESULT REVIEW (OUTPATIENT)
Age: 59
End: 2019-01-28

## 2019-01-28 LAB
ALBUMIN SERPL ELPH-MCNC: 4.2 G/DL
ALP BLD-CCNC: 100 U/L
ALT SERPL-CCNC: 13 U/L
ANION GAP SERPL CALC-SCNC: 9 MMOL/L
AST SERPL-CCNC: 17 U/L
BASOPHILS # BLD AUTO: 0 K/UL — SIGNIFICANT CHANGE UP (ref 0–0.2)
BASOPHILS NFR BLD AUTO: 0.6 % — SIGNIFICANT CHANGE UP (ref 0–2)
BILIRUB SERPL-MCNC: <0.2 MG/DL
BUN SERPL-MCNC: 11 MG/DL
CALCIUM SERPL-MCNC: 9.8 MG/DL
CHLORIDE SERPL-SCNC: 103 MMOL/L
CO2 SERPL-SCNC: 29 MMOL/L
CREAT SERPL-MCNC: 0.7 MG/DL
EOSINOPHIL # BLD AUTO: 0 K/UL — SIGNIFICANT CHANGE UP (ref 0–0.5)
EOSINOPHIL NFR BLD AUTO: 0.6 % — SIGNIFICANT CHANGE UP (ref 0–6)
GLUCOSE SERPL-MCNC: 79 MG/DL
HCT VFR BLD CALC: 39.5 % — SIGNIFICANT CHANGE UP (ref 34.5–45)
HGB BLD-MCNC: 13.4 G/DL — SIGNIFICANT CHANGE UP (ref 11.5–15.5)
LYMPHOCYTES # BLD AUTO: 2.8 K/UL — SIGNIFICANT CHANGE UP (ref 1–3.3)
LYMPHOCYTES # BLD AUTO: 42.9 % — SIGNIFICANT CHANGE UP (ref 13–44)
MAGNESIUM SERPL-MCNC: 1.9 MG/DL
MCHC RBC-ENTMCNC: 31.9 PG — SIGNIFICANT CHANGE UP (ref 27–34)
MCHC RBC-ENTMCNC: 34 G/DL — SIGNIFICANT CHANGE UP (ref 32–36)
MCV RBC AUTO: 93.9 FL — SIGNIFICANT CHANGE UP (ref 80–100)
MONOCYTES # BLD AUTO: 0.7 K/UL — SIGNIFICANT CHANGE UP (ref 0–0.9)
MONOCYTES NFR BLD AUTO: 11.5 % — SIGNIFICANT CHANGE UP (ref 2–14)
NEUTROPHILS # BLD AUTO: 2.8 K/UL — SIGNIFICANT CHANGE UP (ref 1.8–7.4)
NEUTROPHILS NFR BLD AUTO: 44.3 % — SIGNIFICANT CHANGE UP (ref 43–77)
PHOSPHATE SERPL-MCNC: 3.8 MG/DL
PLATELET # BLD AUTO: 523 K/UL — HIGH (ref 150–400)
POTASSIUM SERPL-SCNC: 5.5 MMOL/L
PROT SERPL-MCNC: 6.4 G/DL
RBC # BLD: 4.2 M/UL — SIGNIFICANT CHANGE UP (ref 3.8–5.2)
RBC # FLD: 12 % — SIGNIFICANT CHANGE UP (ref 10.3–14.5)
SODIUM SERPL-SCNC: 141 MMOL/L
WBC # BLD: 6.4 K/UL — SIGNIFICANT CHANGE UP (ref 3.8–10.5)
WBC # FLD AUTO: 6.4 K/UL — SIGNIFICANT CHANGE UP (ref 3.8–10.5)

## 2019-01-30 ENCOUNTER — APPOINTMENT (OUTPATIENT)
Dept: HEMATOLOGY ONCOLOGY | Facility: CLINIC | Age: 59
End: 2019-01-30
Payer: COMMERCIAL

## 2019-01-30 ENCOUNTER — RESULT REVIEW (OUTPATIENT)
Age: 59
End: 2019-01-30

## 2019-01-30 ENCOUNTER — APPOINTMENT (OUTPATIENT)
Dept: INFUSION THERAPY | Facility: HOSPITAL | Age: 59
End: 2019-01-30

## 2019-01-30 VITALS
RESPIRATION RATE: 16 BRPM | SYSTOLIC BLOOD PRESSURE: 135 MMHG | WEIGHT: 152.12 LBS | BODY MASS INDEX: 27.82 KG/M2 | DIASTOLIC BLOOD PRESSURE: 76 MMHG | OXYGEN SATURATION: 99 % | TEMPERATURE: 97.9 F | HEART RATE: 56 BPM

## 2019-01-30 LAB
BASOPHILS # BLD AUTO: 0.1 K/UL — SIGNIFICANT CHANGE UP (ref 0–0.2)
BASOPHILS NFR BLD AUTO: 0.8 % — SIGNIFICANT CHANGE UP (ref 0–2)
EOSINOPHIL # BLD AUTO: 0.1 K/UL — SIGNIFICANT CHANGE UP (ref 0–0.5)
EOSINOPHIL NFR BLD AUTO: 1 % — SIGNIFICANT CHANGE UP (ref 0–6)
HCT VFR BLD CALC: 37.4 % — SIGNIFICANT CHANGE UP (ref 34.5–45)
HGB BLD-MCNC: 12.8 G/DL — SIGNIFICANT CHANGE UP (ref 11.5–15.5)
LYMPHOCYTES # BLD AUTO: 3.8 K/UL — HIGH (ref 1–3.3)
LYMPHOCYTES # BLD AUTO: 43.6 % — SIGNIFICANT CHANGE UP (ref 13–44)
MCHC RBC-ENTMCNC: 32 PG — SIGNIFICANT CHANGE UP (ref 27–34)
MCHC RBC-ENTMCNC: 34.3 G/DL — SIGNIFICANT CHANGE UP (ref 32–36)
MCV RBC AUTO: 93.5 FL — SIGNIFICANT CHANGE UP (ref 80–100)
MONOCYTES # BLD AUTO: 0.8 K/UL — SIGNIFICANT CHANGE UP (ref 0–0.9)
MONOCYTES NFR BLD AUTO: 8.7 % — SIGNIFICANT CHANGE UP (ref 2–14)
NEUTROPHILS # BLD AUTO: 4 K/UL — SIGNIFICANT CHANGE UP (ref 1.8–7.4)
NEUTROPHILS NFR BLD AUTO: 45.8 % — SIGNIFICANT CHANGE UP (ref 43–77)
PLATELET # BLD AUTO: 567 K/UL — HIGH (ref 150–400)
RBC # BLD: 4.01 M/UL — SIGNIFICANT CHANGE UP (ref 3.8–5.2)
RBC # FLD: 12.3 % — SIGNIFICANT CHANGE UP (ref 10.3–14.5)
WBC # BLD: 8.7 K/UL — SIGNIFICANT CHANGE UP (ref 3.8–10.5)
WBC # FLD AUTO: 8.7 K/UL — SIGNIFICANT CHANGE UP (ref 3.8–10.5)

## 2019-01-30 PROCEDURE — 99214 OFFICE O/P EST MOD 30 MIN: CPT

## 2019-01-31 ENCOUNTER — APPOINTMENT (OUTPATIENT)
Dept: INFUSION THERAPY | Facility: HOSPITAL | Age: 59
End: 2019-01-31

## 2019-01-31 NOTE — ASSESSMENT
[FreeTextEntry1] : 59yo woman with GERD, microscopic hematuria, significant smoking history (quit recently), arthritis/osteoporosis presenting for medical oncology follow up after b/l mastectomy for R breast cancer; final pathology findings on second opinion pathology review (Dr. 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 Interval PET/CT with no other evidence of disease. She presents today after initial cycle of adjuvant chemotherapy on 1/9, planned for 4 cycles. She is doing well after C1 of treatment with minimal complaints.\par \par Cisplatin 75mg/m2 on day 1, Etoposide 100mg/m2 on day 1-3 of a 21 day cycle. Neulasta OnPro day 3\par 500cc normal saline over 1 hour before cisplatin. 1L 1/2NS (with magnesium sulfate 1g, potassium chloride 20meq) over 2 hours after cisplatin. UOP monitoring 100cc/h while receiving cisplatin\par *UOP monitoring 100cc/h while receiving cisplatin\par \par Antiemetics: \par Fosaprepitant 150mg IV Day 1, Emend 80mg PO on day 2, day 3\par Dexamethasone 12mg IV Day 1, Dexamethasone 4mg PO BID day 2-5\par Aloxi 0.25mg IV Day 1\par Reglan 10mg PO q6-8h PRN for nausea\par \par Breast Cancer, small cell carcinoma: tolerating treatment reasonably well. some insomnia likely related to steroid use for antiemesis, resolved after ~1 week. Nausea for a few days after cisplatin, headache day 2\par -Labs reviewed; proceed with C2 today\par -Bowel movements monitoring, monitor for diarrhea/constipation - discussed with patient today\par -Continue hydration, small frequent meals\par -again discussed potential neutropenic/myelosuppresed period, hand hygiene, avoiding sick contacts, present to ER ASAP if any signs of infection temp 100.4 or greater\par -follow up 2 weeks (prior to next cycle - labs and tox check), sooner visit if issues arise\par \par Rib Pain: suspect muscle strain, mild TTP, occasional, no concerning s/s currently, overall improving\par -Advised patient to continue to hold ibuprofen/NSAIDs for now given renal, GI concerns, possible thrombocytopenia and bleeding risks with therapy\par -Sparing use of Tylenol no more than 3 grams total daily PRN discussed, including for MSK pains\par -continue to monitor, call if issues or worsening symptoms\par

## 2019-01-31 NOTE — HISTORY OF PRESENT ILLNESS
[de-identified] : 58F presenting for medical oncology consultation.\par \par Last mammogram ~4 years ago.\par Screening mammo 18: new 1cm mass in the upper medial Right breast. \par 18 diagnostic mammo and US: R breast 12:00 3-4cm FN 1cm slightly bi-lobed hypoechoic lesion corresponding with mammographic findings. BIRADS 4\par \par US guided biopsy 12:00 3-4FN 18: revealed a final pathology of invasive carcinoma with neuroendocrine features (Er-Pr-Her2-), [place] grade 3, 8mm in specimen.\par \par Pathology Report ([facility]):\par Invasive carcinoma with neuroendocrine features, G3, 0.8cm in sample\par Addendum: the tumor is triple negative with histologic morphology similar to small cell neuroendocrine carcinoma. IHC stains for synaptophysin and chromogranin demonstrate nonspecific extremely scant, barely perceptible staining. Definitive characterization of the lesion is best deferred to the resection specimen. ER 0% [place] 0% HER2 IHC 0 negative\par \par 10/17/18 Addendum:\par IHC negative ALPESH-3, TTF-1, CDX-2\par Although in this limited sample the tumor demonstrated morphologic features similar to small cell neuroendocrine carcinoma, the overall findings are nonspecific regarding definitive histologic subtype and site of origin. Clinical correlation is necessary.\par \par 10/19/18 pathology review  [facility]: Poorly differentiated carcinoma, at least 6mm, LVI not seen. Immunostains done for ER/[place]/HER2/GATA3/CDX2/TTF-1 are negative in tumor cells (done at [place] lab). Tumor histology shows small cell features (molding, apoptotic bodies, and extensive mitotic activity). Cannot determine histologic subtype and/or site of origin. Clinicopathologic and radiologic correlation strongly recommended.\par \par She saw Dr. Carter (plastic surgery) and then Dr. Raymundo (breast surgery) on 10/9 and noted her desire for breast conserving surgery. She was referred for breast MRI and genetic testing given strong family history. 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 At 12-1:00 axis retroareolar there is 0.4cm focus of enhancement, 12:00 axis posterior third linear non mass enhancement. L breast no suspicious enhancement. Axilla unremarkable. BIRADS4 If breast conservation is a consideration, 2-site MRI guided biopsy of non-mass enhancement within the anterior and posterior upper breast is recommended.\par \par She saw me initially 10/24 - diagnostic CT C/A/P and PETCT performed - no masses other than small breast mass with clip; PET with SUV 8 avidity in sigmoid colon (patient noted eating dairy products that week and some loose BMs in retrospect). Dr. Horn her gastroenterologist performed Colonoscopy  with random biopsies which were benign.\par \par  she underwent b/l mastectomy with Dr. Raymundo. 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 Hypermetabolic subcutaneous nodule right upper arm lateral aspect (not included on prior PET/CT) - SUV 2.5 (patient notes recent flu shot with hematoma). Interval resolution of rectosigmoid FDG avidity.\par \par She started adjuvant chemotherapy (cisplatin/etoposide x 4 cyles) on 19.\par \par Pertinent Medical History:\par She lives in [place] with her 3 children. She works as a  in a restaurant full time.\par 2018 hand burn at work\par GERD: GI Dr. Gilbert Horn. Normal C-scope with internal hemorrhoids 18. Abd US and CT 17 fatty liver, 7mm R hepatic focus calcifications, unchanged.\par Headaches: MRI without acute findings years ago\par Microscopic hematuria: Cystoscopy 17 without significant findings (Dr. Nadya Hu), takes oxybutinin for overactive bladder\par Seasonal Allergies\par Family history of breast cancer in her maternal aunts x 3 (ages unknown).\par Menarche age 11, menopause age 48. , no prior OCPs or hormonal therapies\par Active Smoker for many years, quit with this diagnosis\par PMD: Dr. Dom Coello\par Dr. Cali Gynecologist [FreeTextEntry1] : Cisplatin 75mg/m2 on day 1, Etoposide 100mg/m2 on day 1-3 of a 21 day cycle. Neulasta OnPro day 3\par 500cc normal saline over 1 hour before cisplatin. 1L 1/2NS (with magnesium sulfate 1g, potassium chloride 20meq) over 2 hours after cisplatin. UOP monitoring 100cc/h while receiving cisplatin\par \par Antiemetics: \par Emend 80mg PO on day 2, day 3\par Dexamethasone 4mg PO BID day 2-5\par Reglan 10mg PO q6-8h PRN for nausea [de-identified] : Patient is here today for follow up after first cycle of adjuvant chemotherapy last week. She is here with her daughter. Since last visit ~1 week after first cycle of treatment she has been doing well. Takes tylenol rarely for occasional aches at mastectomy sites pains L>R. She has no other complaints at this time, she has been hydrating adequately instructed and active. She denies f/c/s, HA, dizziness, hearing or vision issues, n/v, CP, SOB, abd pain, diarrhea, edema, rashes, new neuro symptoms, weight loss or gain, urinary issues, bleeding or bruising.

## 2019-02-01 ENCOUNTER — APPOINTMENT (OUTPATIENT)
Dept: INFUSION THERAPY | Facility: HOSPITAL | Age: 59
End: 2019-02-01

## 2019-02-01 ENCOUNTER — LABORATORY RESULT (OUTPATIENT)
Age: 59
End: 2019-02-01

## 2019-02-04 ENCOUNTER — OUTPATIENT (OUTPATIENT)
Dept: OUTPATIENT SERVICES | Facility: HOSPITAL | Age: 59
LOS: 1 days | Discharge: HOME | End: 2019-02-04

## 2019-02-04 DIAGNOSIS — C50.919 MALIGNANT NEOPLASM OF UNSPECIFIED SITE OF UNSPECIFIED FEMALE BREAST: ICD-10-CM

## 2019-02-08 ENCOUNTER — APPOINTMENT (OUTPATIENT)
Dept: INFUSION THERAPY | Facility: HOSPITAL | Age: 59
End: 2019-02-08

## 2019-02-13 ENCOUNTER — APPOINTMENT (OUTPATIENT)
Dept: HEMATOLOGY ONCOLOGY | Facility: CLINIC | Age: 59
End: 2019-02-13
Payer: COMMERCIAL

## 2019-02-13 VITALS
DIASTOLIC BLOOD PRESSURE: 87 MMHG | RESPIRATION RATE: 16 BRPM | OXYGEN SATURATION: 98 % | WEIGHT: 152.12 LBS | TEMPERATURE: 98 F | HEART RATE: 62 BPM | SYSTOLIC BLOOD PRESSURE: 148 MMHG | BODY MASS INDEX: 27.82 KG/M2

## 2019-02-13 PROCEDURE — 99214 OFFICE O/P EST MOD 30 MIN: CPT

## 2019-02-13 NOTE — HISTORY OF PRESENT ILLNESS
[de-identified] : 58F presenting for medical oncology consultation.\par \par Last mammogram ~4 years ago.\par Screening mammo 18: new 1cm mass in the upper medial Right breast. \par 18 diagnostic mammo and US: R breast 12:00 3-4cm FN 1cm slightly bi-lobed hypoechoic lesion corresponding with mammographic findings. BIRADS 4\par \par US guided biopsy 12:00 3-4FN 18: revealed a final pathology of invasive carcinoma with neuroendocrine features (Er-Pr-Her2-), [place] grade 3, 8mm in specimen.\par \par Pathology Report ([facility]):\par Invasive carcinoma with neuroendocrine features, G3, 0.8cm in sample\par Addendum: the tumor is triple negative with histologic morphology similar to small cell neuroendocrine carcinoma. IHC stains for synaptophysin and chromogranin demonstrate nonspecific extremely scant, barely perceptible staining. Definitive characterization of the lesion is best deferred to the resection specimen. 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 Clinical correlation is necessary.\par \par 10/19/18 pathology review  [facility]: Poorly differentiated carcinoma, at least 6mm, LVI not seen. Immunostains done for ER/[place]/HER2/GATA3/CDX2/TTF-1 are negative in tumor cells (done at [place] lab). Tumor histology shows small cell features (molding, apoptotic bodies, and extensive mitotic activity). Cannot determine histologic subtype and/or site of origin. Clinicopathologic and radiologic correlation strongly recommended.\par \par She saw Dr. Carter (plastic surgery) and then Dr. Raymundo (breast surgery) on 10/9 and noted her desire for breast conserving surgery. She was referred for breast MRI and genetic testing given strong family history. 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 At 12-1:00 axis retroareolar there is 0.4cm focus of enhancement, 12:00 axis posterior third linear non mass enhancement. L breast no suspicious enhancement. Axilla unremarkable. BIRADS4 If breast conservation is a consideration, 2-site MRI guided biopsy of non-mass enhancement within the anterior and posterior upper breast is recommended.\par \par She saw me initially 10/24 - diagnostic CT C/A/P and PETCT performed - no masses other than small breast mass with clip; PET with SUV 8 avidity in sigmoid colon (patient noted eating dairy products that week and some loose BMs in retrospect). Dr. Horn her gastroenterologist performed Colonoscopy  with random biopsies which were benign.\par \par  she underwent b/l mastectomy with Dr. Raymundo. 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 Hypermetabolic subcutaneous nodule right upper arm lateral aspect (not included on prior PET/CT) - SUV 2.5 (patient notes recent flu shot with hematoma). Interval resolution of rectosigmoid FDG avidity.\par \par She started adjuvant chemotherapy (cisplatin/etoposide x 4 cyles) on 19.\par \par Pertinent Medical History:\par She lives in [place] with her 3 children. She works as a  in a restaurant full time.\par 2018 hand burn at work\par GERD: GI Dr. Gilbert Horn. Normal C-scope with internal hemorrhoids 18. Abd US and CT 17 fatty liver, 7mm R hepatic focus calcifications, unchanged.\par Headaches: MRI without acute findings years ago\par Microscopic hematuria: Cystoscopy 17 without significant findings (Dr. Nadya Hu), takes oxybutinin for overactive bladder\par Seasonal Allergies\par Family history of breast cancer in her maternal aunts x 3 (ages unknown).\par Menarche age 11, menopause age 48. , no prior OCPs or hormonal therapies\par Active Smoker for many years, quit with this diagnosis\par PMD: Dr. Dom Coello\par Dr. Cali Gynecologist [FreeTextEntry1] : Cisplatin 75mg/m2 on day 1, Etoposide 100mg/m2 on day 1-3 of a 21 day cycle. Neulasta OnPro day 3\par 500cc normal saline over 1 hour before cisplatin. 1L 1/2NS (with magnesium sulfate 1g, potassium chloride 20meq) over 2 hours after cisplatin. UOP monitoring 100cc/h while receiving cisplatin\par \par Antiemetics: \par Emend 80mg PO on day 2, day 3\par Dexamethasone 4mg PO BID day 2-5\par Reglan 10mg PO q6-8h PRN for nausea [de-identified] : Patient is here today for follow up after 2nd cycle of adjuvant chemotherapy. [employer] utilized for duration of visit. She had some nausea and headache on day 2 of chemotherapy, improved after taking dexamethasone/emend which she had forgotten to take on time. She had some mild nausea in the week after chemotherapy at home, relieved completely with PRN Reglan on a few occasions. She started PT yesterday for post-mastectomy pain and notes some improvement in ROM already today. She has no other complaints at this time, she has been hydrating adequately instructed and active, eating well, doing some household chores. She denies f/c/s, HA, dizziness, hearing or vision issues, n/v, CP, SOB, abd pain, diarrhea, edema, rashes, new neuro symptoms, weight loss or gain, urinary issues, bleeding or bruising.

## 2019-02-13 NOTE — ASSESSMENT
[FreeTextEntry1] : 57yo woman with GERD, microscopic hematuria, significant smoking history (quit recently), arthritis/osteoporosis presenting for medical oncology follow up after b/l mastectomy for R breast cancer; final pathology findings on second opinion pathology review (Dr. 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 Interval PET/CT with no other evidence of disease. She presents today after initial cycle of adjuvant chemotherapy on 1/9, planned for 4 cycles. She is doing well after C2 of treatment with minimal complaints.\par \par Cisplatin 75mg/m2 on day 1, Etoposide 100mg/m2 on day 1-3 of a 21 day cycle. Neulasta OnPro day 3\par 500cc normal saline over 1 hour before cisplatin. 1L 1/2NS (with magnesium sulfate 1g, potassium chloride 20meq) over 2 hours after cisplatin. UOP monitoring 100cc/h while receiving cisplatin\par *UOP monitoring 100cc/h while receiving cisplatin\par \par Antiemetics: \par *Aprepitant 125mg PO Day 1, Emend 80mg PO on day 2, day 3\par Dexamethasone 12mg IV Day 1, Dexamethasone 4mg PO BID day 2-5\par Aloxi 0.25mg IV Day 1\par Reglan 10mg PO q6-8h PRN for nausea\par \par Breast Cancer, small cell carcinoma: tolerating treatment reasonably well. Nausea for a few days after cisplatin, headache day 2 again\par -Labs today; proceed with C3 next week if ok (patient instructed to call or come in with any new or concerning signs or symptoms)\par -Continue hydration, small frequent meals\par -Reglan PRN at home; discussed prophylactice Zyprexa use for nausea, patient would like to defer for now as she thinks increased symptoms last cycle day 2 due to missed/delayed doses dexamethasone/emend\par -again discussed potential neutropenic/myelosuppresed period, hand hygiene, avoiding sick contacts, present to ER ASAP if any signs of infection temp 100.4 or greater\par -additional disability paperwork completed for patient today and returned to her\par -follow up prior to next cycle - labs and tox check, sooner visit if issues arise\par \par Rib Pain: suspect muscle strain, mild TTP, occasional, no concerning s/s currently, overall improving\par -Advised patient to continue to hold ibuprofen/NSAIDs for now given renal, GI concerns, possible thrombocytopenia and bleeding risks with therapy\par -Sparing use of Tylenol no more than 3 grams total daily PRN discussed, including for MSK pains\par -continue PT/Rehab\par -continue to monitor, call if issues or worsening symptoms\par

## 2019-02-13 NOTE — PHYSICAL EXAM
[Fully active, able to carry on all pre-disease performance without restriction] : Status 0 - Fully active, able to carry on all pre-disease performance without restriction [Normal] : affect appropriate [de-identified] : b/l mastectomy with some lateral outpouching of fatty tissue b/l. L mastectomy with some palpable scar tissues at middle of scar and medial margin, mild TTP. no masses or adenopathy

## 2019-02-19 ENCOUNTER — RESULT REVIEW (OUTPATIENT)
Age: 59
End: 2019-02-19

## 2019-02-19 ENCOUNTER — APPOINTMENT (OUTPATIENT)
Dept: HEMATOLOGY ONCOLOGY | Facility: CLINIC | Age: 59
End: 2019-02-19

## 2019-02-19 LAB
ALBUMIN SERPL ELPH-MCNC: 4 G/DL
ALP BLD-CCNC: 113 U/L
ALT SERPL-CCNC: 12 U/L
ANION GAP SERPL CALC-SCNC: 11 MMOL/L
AST SERPL-CCNC: 13 U/L
BASOPHILS # BLD AUTO: 0.1 K/UL — SIGNIFICANT CHANGE UP (ref 0–0.2)
BASOPHILS NFR BLD AUTO: 1.2 % — SIGNIFICANT CHANGE UP (ref 0–2)
BILIRUB SERPL-MCNC: <0.2 MG/DL
BUN SERPL-MCNC: 13 MG/DL
CALCIUM SERPL-MCNC: 9.3 MG/DL
CHLORIDE SERPL-SCNC: 105 MMOL/L
CO2 SERPL-SCNC: 27 MMOL/L
CREAT SERPL-MCNC: 0.67 MG/DL
EOSINOPHIL # BLD AUTO: 0 K/UL — SIGNIFICANT CHANGE UP (ref 0–0.5)
EOSINOPHIL NFR BLD AUTO: 0.6 % — SIGNIFICANT CHANGE UP (ref 0–6)
GLUCOSE SERPL-MCNC: 98 MG/DL
HCT VFR BLD CALC: 35.8 % — SIGNIFICANT CHANGE UP (ref 34.5–45)
HGB BLD-MCNC: 12.4 G/DL — SIGNIFICANT CHANGE UP (ref 11.5–15.5)
LYMPHOCYTES # BLD AUTO: 2.7 K/UL — SIGNIFICANT CHANGE UP (ref 1–3.3)
LYMPHOCYTES # BLD AUTO: 34.5 % — SIGNIFICANT CHANGE UP (ref 13–44)
MAGNESIUM SERPL-MCNC: 1.7 MG/DL
MCHC RBC-ENTMCNC: 32.6 PG — SIGNIFICANT CHANGE UP (ref 27–34)
MCHC RBC-ENTMCNC: 34.6 G/DL — SIGNIFICANT CHANGE UP (ref 32–36)
MCV RBC AUTO: 94.4 FL — SIGNIFICANT CHANGE UP (ref 80–100)
MONOCYTES # BLD AUTO: 0.8 K/UL — SIGNIFICANT CHANGE UP (ref 0–0.9)
MONOCYTES NFR BLD AUTO: 9.9 % — SIGNIFICANT CHANGE UP (ref 2–14)
NEUTROPHILS # BLD AUTO: 4.3 K/UL — SIGNIFICANT CHANGE UP (ref 1.8–7.4)
NEUTROPHILS NFR BLD AUTO: 53.8 % — SIGNIFICANT CHANGE UP (ref 43–77)
PHOSPHATE SERPL-MCNC: 3.4 MG/DL
PLATELET # BLD AUTO: 419 K/UL — HIGH (ref 150–400)
POTASSIUM SERPL-SCNC: 4 MMOL/L
PROT SERPL-MCNC: 6.3 G/DL
RBC # BLD: 3.79 M/UL — LOW (ref 3.8–5.2)
RBC # FLD: 13.4 % — SIGNIFICANT CHANGE UP (ref 10.3–14.5)
SODIUM SERPL-SCNC: 143 MMOL/L
WBC # BLD: 7.9 K/UL — SIGNIFICANT CHANGE UP (ref 3.8–10.5)
WBC # FLD AUTO: 7.9 K/UL — SIGNIFICANT CHANGE UP (ref 3.8–10.5)

## 2019-02-20 ENCOUNTER — RESULT REVIEW (OUTPATIENT)
Age: 59
End: 2019-02-20

## 2019-02-20 ENCOUNTER — APPOINTMENT (OUTPATIENT)
Dept: INFUSION THERAPY | Facility: HOSPITAL | Age: 59
End: 2019-02-20

## 2019-02-20 LAB
BASOPHILS # BLD AUTO: 0.1 K/UL — SIGNIFICANT CHANGE UP (ref 0–0.2)
BASOPHILS NFR BLD AUTO: 1.1 % — SIGNIFICANT CHANGE UP (ref 0–2)
EOSINOPHIL # BLD AUTO: 0.1 K/UL — SIGNIFICANT CHANGE UP (ref 0–0.5)
EOSINOPHIL NFR BLD AUTO: 0.6 % — SIGNIFICANT CHANGE UP (ref 0–6)
HCT VFR BLD CALC: 40.3 % — SIGNIFICANT CHANGE UP (ref 34.5–45)
HGB BLD-MCNC: 12.8 G/DL — SIGNIFICANT CHANGE UP (ref 11.5–15.5)
LYMPHOCYTES # BLD AUTO: 2.8 K/UL — SIGNIFICANT CHANGE UP (ref 1–3.3)
LYMPHOCYTES # BLD AUTO: 31.4 % — SIGNIFICANT CHANGE UP (ref 13–44)
MCHC RBC-ENTMCNC: 29.6 PG — SIGNIFICANT CHANGE UP (ref 27–34)
MCHC RBC-ENTMCNC: 31.7 G/DL — LOW (ref 32–36)
MCV RBC AUTO: 93.6 FL — SIGNIFICANT CHANGE UP (ref 80–100)
MONOCYTES # BLD AUTO: 0.9 K/UL — SIGNIFICANT CHANGE UP (ref 0–0.9)
MONOCYTES NFR BLD AUTO: 10 % — SIGNIFICANT CHANGE UP (ref 2–14)
NEUTROPHILS # BLD AUTO: 5.1 K/UL — SIGNIFICANT CHANGE UP (ref 1.8–7.4)
NEUTROPHILS NFR BLD AUTO: 56.9 % — SIGNIFICANT CHANGE UP (ref 43–77)
PLATELET # BLD AUTO: 469 K/UL — HIGH (ref 150–400)
RBC # BLD: 4.31 M/UL — SIGNIFICANT CHANGE UP (ref 3.8–5.2)
RBC # FLD: 13.6 % — SIGNIFICANT CHANGE UP (ref 10.3–14.5)
WBC # BLD: 9 K/UL — SIGNIFICANT CHANGE UP (ref 3.8–10.5)
WBC # FLD AUTO: 9 K/UL — SIGNIFICANT CHANGE UP (ref 3.8–10.5)

## 2019-02-21 ENCOUNTER — APPOINTMENT (OUTPATIENT)
Dept: INFUSION THERAPY | Facility: HOSPITAL | Age: 59
End: 2019-02-21

## 2019-02-21 DIAGNOSIS — E86.0 DEHYDRATION: ICD-10-CM

## 2019-02-21 DIAGNOSIS — R11.2 NAUSEA WITH VOMITING, UNSPECIFIED: ICD-10-CM

## 2019-02-21 DIAGNOSIS — Z51.11 ENCOUNTER FOR ANTINEOPLASTIC CHEMOTHERAPY: ICD-10-CM

## 2019-02-22 ENCOUNTER — APPOINTMENT (OUTPATIENT)
Dept: INFUSION THERAPY | Facility: HOSPITAL | Age: 59
End: 2019-02-22

## 2019-02-25 DIAGNOSIS — Z51.89 ENCOUNTER FOR OTHER SPECIFIED AFTERCARE: ICD-10-CM

## 2019-03-01 ENCOUNTER — APPOINTMENT (OUTPATIENT)
Dept: INFUSION THERAPY | Facility: HOSPITAL | Age: 59
End: 2019-03-01

## 2019-03-06 ENCOUNTER — LABORATORY RESULT (OUTPATIENT)
Age: 59
End: 2019-03-06

## 2019-03-06 ENCOUNTER — RESULT REVIEW (OUTPATIENT)
Age: 59
End: 2019-03-06

## 2019-03-06 ENCOUNTER — APPOINTMENT (OUTPATIENT)
Dept: HEMATOLOGY ONCOLOGY | Facility: CLINIC | Age: 59
End: 2019-03-06
Payer: COMMERCIAL

## 2019-03-06 VITALS
WEIGHT: 156.53 LBS | TEMPERATURE: 97.7 F | OXYGEN SATURATION: 100 % | SYSTOLIC BLOOD PRESSURE: 152 MMHG | BODY MASS INDEX: 28.63 KG/M2 | HEART RATE: 62 BPM | RESPIRATION RATE: 18 BRPM | DIASTOLIC BLOOD PRESSURE: 89 MMHG

## 2019-03-06 LAB
ALBUMIN SERPL ELPH-MCNC: 4.1 G/DL
ALP BLD-CCNC: 134 U/L
ALT SERPL-CCNC: 12 U/L
ANION GAP SERPL CALC-SCNC: 12 MMOL/L
AST SERPL-CCNC: 14 U/L
BASOPHILS # BLD AUTO: 0.1 K/UL — SIGNIFICANT CHANGE UP (ref 0–0.2)
BASOPHILS NFR BLD AUTO: 0.7 % — SIGNIFICANT CHANGE UP (ref 0–2)
BILIRUB SERPL-MCNC: <0.2 MG/DL
BUN SERPL-MCNC: 11 MG/DL
CALCIUM SERPL-MCNC: 9.1 MG/DL
CHLORIDE SERPL-SCNC: 101 MMOL/L
CO2 SERPL-SCNC: 24 MMOL/L
CREAT SERPL-MCNC: 0.64 MG/DL
EOSINOPHIL # BLD AUTO: 0.1 K/UL — SIGNIFICANT CHANGE UP (ref 0–0.5)
EOSINOPHIL NFR BLD AUTO: 0.6 % — SIGNIFICANT CHANGE UP (ref 0–6)
GLUCOSE SERPL-MCNC: 87 MG/DL
HCT VFR BLD CALC: 33.1 % — LOW (ref 34.5–45)
HGB BLD-MCNC: 11.1 G/DL — LOW (ref 11.5–15.5)
LYMPHOCYTES # BLD AUTO: 24.8 % — SIGNIFICANT CHANGE UP (ref 13–44)
LYMPHOCYTES # BLD AUTO: 4.5 K/UL — HIGH (ref 1–3.3)
MAGNESIUM SERPL-MCNC: 1.8 MG/DL
MCHC RBC-ENTMCNC: 31.8 PG — SIGNIFICANT CHANGE UP (ref 27–34)
MCHC RBC-ENTMCNC: 33.5 G/DL — SIGNIFICANT CHANGE UP (ref 32–36)
MCV RBC AUTO: 94.9 FL — SIGNIFICANT CHANGE UP (ref 80–100)
MONOCYTES # BLD AUTO: 1.1 K/UL — HIGH (ref 0–0.9)
MONOCYTES NFR BLD AUTO: 6.2 % — SIGNIFICANT CHANGE UP (ref 2–14)
NEUTROPHILS # BLD AUTO: 12.3 K/UL — HIGH (ref 1.8–7.4)
NEUTROPHILS NFR BLD AUTO: 67.7 % — SIGNIFICANT CHANGE UP (ref 43–77)
PHOSPHATE SERPL-MCNC: 3.8 MG/DL
PLATELET # BLD AUTO: 161 K/UL — SIGNIFICANT CHANGE UP (ref 150–400)
POTASSIUM SERPL-SCNC: 4.5 MMOL/L
PROT SERPL-MCNC: 6.5 G/DL
RBC # BLD: 3.49 M/UL — LOW (ref 3.8–5.2)
RBC # FLD: 14.5 % — SIGNIFICANT CHANGE UP (ref 10.3–14.5)
SODIUM SERPL-SCNC: 137 MMOL/L
WBC # BLD: 18.1 K/UL — HIGH (ref 3.8–10.5)
WBC # FLD AUTO: 18.1 K/UL — HIGH (ref 3.8–10.5)

## 2019-03-06 PROCEDURE — 99214 OFFICE O/P EST MOD 30 MIN: CPT

## 2019-03-07 ENCOUNTER — OUTPATIENT (OUTPATIENT)
Dept: OUTPATIENT SERVICES | Facility: HOSPITAL | Age: 59
LOS: 1 days | Discharge: HOME | End: 2019-03-07

## 2019-03-07 DIAGNOSIS — C50.919 MALIGNANT NEOPLASM OF UNSPECIFIED SITE OF UNSPECIFIED FEMALE BREAST: ICD-10-CM

## 2019-03-07 LAB
BASOPHILS # BLD AUTO: 0.16 K/UL
BASOPHILS NFR BLD AUTO: 0.9 %
EOSINOPHIL # BLD AUTO: 0.33 K/UL
EOSINOPHIL NFR BLD AUTO: 1.8 %
HCT VFR BLD CALC: 33.5 %
HGB BLD-MCNC: 10.8 G/DL
LYMPHOCYTES # BLD AUTO: 4.32 K/UL
LYMPHOCYTES NFR BLD AUTO: 23.9 %
MAN DIFF?: NORMAL
MCHC RBC-ENTMCNC: 31.1 PG
MCHC RBC-ENTMCNC: 32.2 GM/DL
MCV RBC AUTO: 96.5 FL
MONOCYTES # BLD AUTO: 1.75 K/UL
MONOCYTES NFR BLD AUTO: 9.7 %
NEUTROPHILS # BLD AUTO: 10.39 K/UL
NEUTROPHILS NFR BLD AUTO: 55.7 %
PLATELET # BLD AUTO: 150 K/UL
RBC # BLD: 3.47 M/UL
RBC # FLD: 16 %
WBC # FLD AUTO: 18.07 K/UL

## 2019-03-07 NOTE — HISTORY OF PRESENT ILLNESS
[de-identified] : 58F presenting for medical oncology consultation.\par \par Last mammogram ~4 years ago.\par Screening mammo 18: new 1cm mass in the upper medial Right breast. \par 18 diagnostic mammo and US: R breast 12:00 3-4cm FN 1cm slightly bi-lobed hypoechoic lesion corresponding with mammographic findings. BIRADS 4\par \par US guided biopsy 12:00 3-4FN 18: revealed a final pathology of invasive carcinoma with neuroendocrine features (Er-Pr-Her2-), [place] grade 3, 8mm in specimen.\par \par Pathology Report (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 IHC stains for synaptophysin and chromogranin demonstrate nonspecific extremely scant, barely perceptible staining. Definitive characterization of the lesion is best deferred to the resection specimen. 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 Clinical correlation is necessary.\par \par 10/19/18 pathology review  [facility]: Poorly differentiated carcinoma, at least 6mm, LVI not seen. Immunostains done for ER/[place]/HER2/GATA3/CDX2/TTF-1 are negative in tumor cells (done at [place] lab). Tumor histology shows small cell features (molding, apoptotic bodies, and extensive mitotic activity). Cannot determine histologic subtype and/or site of origin. Clinicopathologic and radiologic correlation strongly recommended.\par \par She saw Dr. Carter (plastic surgery) and then Dr. Raymundo (breast surgery) on 10/9 and noted her desire for breast conserving surgery. She was referred for breast MRI and genetic testing given strong family history. 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 At 12-1:00 axis retroareolar there is 0.4cm focus of enhancement, 12:00 axis posterior third linear non mass enhancement. L breast no suspicious enhancement. Axilla unremarkable. BIRADS4 If breast conservation is a consideration, 2-site MRI guided biopsy of non-mass enhancement within the anterior and posterior upper breast is recommended.\par \par She saw me initially 10/24 - diagnostic CT C/A/P and PETCT performed - no masses other than small breast mass with clip; PET with SUV 8 avidity in sigmoid colon (patient noted eating dairy products that week and some loose BMs in retrospect). Dr. Horn her gastroenterologist performed Colonoscopy  with random biopsies which were benign.\par \par  she underwent b/l mastectomy with Dr. Raymundo. 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 Hypermetabolic subcutaneous nodule right upper arm lateral aspect (not included on prior PET/CT) - SUV 2.5 (patient notes recent flu shot with hematoma). Interval resolution of rectosigmoid FDG avidity.\par \par She started adjuvant chemotherapy (cisplatin/etoposide x 4 cyles) on 19.\par \par Pertinent Medical History:\par She lives in [place] with her 3 children. She works as a  in a restaurant full time.\par 2018 hand burn at work\par GERD: GI Dr. Gilbert Horn. Normal C-scope with internal hemorrhoids 18. Abd US and CT 17 fatty liver, 7mm R hepatic focus calcifications, unchanged.\par Headaches: MRI without acute findings years ago\par Microscopic hematuria: Cystoscopy 17 without significant findings (Dr. Nadya Hu), takes oxybutinin for overactive bladder\par Seasonal Allergies\par Family history of breast cancer in her maternal aunts x 3 (ages unknown).\par Menarche age 11, menopause age 48. , no prior OCPs or hormonal therapies\par Active Smoker for many years, quit with this diagnosis\par PMD: Dr. Dom Coello\par Dr. Cali Gynecologist [FreeTextEntry1] : Cisplatin 75mg/m2 on day 1, Etoposide 100mg/m2 on day 1-3 of a 21 day cycle. Neulasta OnPro day 3\par 500cc normal saline over 1 hour before cisplatin. 1L 1/2NS (with magnesium sulfate 1g, potassium chloride 20meq) over 2 hours after cisplatin. UOP monitoring 100cc/h while receiving cisplatin\par \par Antiemetics: \par Emend 80mg PO on day 2, day 3\par Dexamethasone 4mg PO BID day 2-5\par Reglan 10mg PO q6-8h PRN for nausea [de-identified] : Patient is here today for follow up just prior to 4th/final cycle of adjuvant chemotherapy. [employer] utilized for duration of visit. She had some nausea and headache on day 2 of chemotherapy again, improved with IV Zofran. Post-mastectomy pain is improving with PT exercises. She has no other complaints at this time, she has been hydrating adequately instructed and active, eating well, doing some household chores. She denies f/c/s, HA, dizziness, hearing or vision issues, n/v, CP, SOB, abd pain, diarrhea, edema, rashes, new neuro symptoms, weight loss or gain, urinary issues, bleeding or bruising.

## 2019-03-07 NOTE — ASSESSMENT
[FreeTextEntry1] : 57yo woman with GERD, microscopic hematuria, significant smoking history (quit recently), arthritis/osteoporosis presenting for medical oncology follow up after b/l mastectomy for R breast cancer; final pathology findings on second opinion pathology review (Dr. 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 Interval PET/CT with no other evidence of disease. She presents today after initial cycle of adjuvant chemotherapy on 1/9, planned for 4 cycles. She is doing well after C2 of treatment with minimal complaints.\par \par Cisplatin 75mg/m2 on day 1, Etoposide 100mg/m2 on day 1-3 of a 21 day cycle. Neulasta OnPro day 3\par 500cc normal saline over 1 hour before cisplatin. 1L 1/2NS (with magnesium sulfate 1g, potassium chloride 20meq) over 2 hours after cisplatin. UOP monitoring 100cc/h while receiving cisplatin\par *UOP monitoring 100cc/h while receiving cisplatin\par \par Antiemetics: \par *Aprepitant 125mg PO Day 1, Emend 80mg PO on day 2, day 3\par Dexamethasone 12mg IV Day 1, Dexamethasone 4mg PO BID day 2-5\par Aloxi 0.25mg IV Day 1\par Reglan 10mg PO q6-8h PRN for nausea\par \par Breast Cancer, small cell carcinoma: tolerating treatment reasonably well. Nausea for a few days after cisplatin, headache day 2 with vomiting\par -Labs today; proceed with C4 next week if ok (patient instructed to call or come in with any new or concerning signs or symptoms)\par -Continue hydration, small frequent meals\par -Reglan PRN at home; discussed prophylactic Zyprexa use for nausea, patient would like to defer for now but she has this at home to use pending symptoms day 2 on\par -will switch emend to Fosaprepitant for this final cycle (she vomited her emend last cycle)\par -again discussed potential neutropenic/myelosuppresed period, hand hygiene, avoiding sick contacts, present to ER ASAP if any signs of infection temp 100.4 or greater\par -follow up 3 weeks\par \par Rib Pain: suspect muscle strain, mild TTP, occasional, no concerning s/s currently, overall improving and not requiring any pain medication\par -Advised patient to continue to hold ibuprofen/NSAIDs for now given renal, GI concerns, possible thrombocytopenia and bleeding risks with therapy\par -Sparing use of Tylenol no more than 3 grams total daily PRN discussed, including for MSK pains\par -continue PT/Rehab\par -continue to monitor, call if issues or worsening symptoms\par

## 2019-03-07 NOTE — PHYSICAL EXAM
[Fully active, able to carry on all pre-disease performance without restriction] : Status 0 - Fully active, able to carry on all pre-disease performance without restriction [Normal] : affect appropriate [de-identified] : b/l mastectomy with some lateral outpouching of fatty tissue b/l. L mastectomy with some palpable scar tissues at middle of scar and medial margin, mild TTP. no masses or adenopathy

## 2019-03-13 ENCOUNTER — RESULT REVIEW (OUTPATIENT)
Age: 59
End: 2019-03-13

## 2019-03-13 ENCOUNTER — APPOINTMENT (OUTPATIENT)
Dept: INFUSION THERAPY | Facility: HOSPITAL | Age: 59
End: 2019-03-13

## 2019-03-13 LAB
BASOPHILS # BLD AUTO: 0.1 K/UL — SIGNIFICANT CHANGE UP (ref 0–0.2)
BASOPHILS NFR BLD AUTO: 1.6 % — SIGNIFICANT CHANGE UP (ref 0–2)
EOSINOPHIL # BLD AUTO: 0 K/UL — SIGNIFICANT CHANGE UP (ref 0–0.5)
EOSINOPHIL NFR BLD AUTO: 0 % — SIGNIFICANT CHANGE UP (ref 0–6)
HCT VFR BLD CALC: 35.2 % — SIGNIFICANT CHANGE UP (ref 34.5–45)
HGB BLD-MCNC: 11.9 G/DL — SIGNIFICANT CHANGE UP (ref 11.5–15.5)
LYMPHOCYTES # BLD AUTO: 2.5 K/UL — SIGNIFICANT CHANGE UP (ref 1–3.3)
LYMPHOCYTES # BLD AUTO: 41.6 % — SIGNIFICANT CHANGE UP (ref 13–44)
MCHC RBC-ENTMCNC: 32 PG — SIGNIFICANT CHANGE UP (ref 27–34)
MCHC RBC-ENTMCNC: 33.8 G/DL — SIGNIFICANT CHANGE UP (ref 32–36)
MCV RBC AUTO: 94.8 FL — SIGNIFICANT CHANGE UP (ref 80–100)
MONOCYTES # BLD AUTO: 0.8 K/UL — SIGNIFICANT CHANGE UP (ref 0–0.9)
MONOCYTES NFR BLD AUTO: 13.5 % — SIGNIFICANT CHANGE UP (ref 2–14)
NEUTROPHILS # BLD AUTO: 2.6 K/UL — SIGNIFICANT CHANGE UP (ref 1.8–7.4)
NEUTROPHILS NFR BLD AUTO: 43.2 % — SIGNIFICANT CHANGE UP (ref 43–77)
PLATELET # BLD AUTO: 508 K/UL — HIGH (ref 150–400)
RBC # BLD: 3.71 M/UL — LOW (ref 3.8–5.2)
RBC # FLD: 15.4 % — HIGH (ref 10.3–14.5)
WBC # BLD: 6.1 K/UL — SIGNIFICANT CHANGE UP (ref 3.8–10.5)
WBC # FLD AUTO: 6.1 K/UL — SIGNIFICANT CHANGE UP (ref 3.8–10.5)

## 2019-03-14 ENCOUNTER — APPOINTMENT (OUTPATIENT)
Dept: INFUSION THERAPY | Facility: HOSPITAL | Age: 59
End: 2019-03-14

## 2019-03-14 DIAGNOSIS — Z51.11 ENCOUNTER FOR ANTINEOPLASTIC CHEMOTHERAPY: ICD-10-CM

## 2019-03-14 DIAGNOSIS — E86.0 DEHYDRATION: ICD-10-CM

## 2019-03-14 DIAGNOSIS — R11.2 NAUSEA WITH VOMITING, UNSPECIFIED: ICD-10-CM

## 2019-03-15 ENCOUNTER — APPOINTMENT (OUTPATIENT)
Dept: INFUSION THERAPY | Facility: HOSPITAL | Age: 59
End: 2019-03-15

## 2019-03-15 ENCOUNTER — LABORATORY RESULT (OUTPATIENT)
Age: 59
End: 2019-03-15

## 2019-03-16 ENCOUNTER — MOBILE ON CALL (OUTPATIENT)
Age: 59
End: 2019-03-16

## 2019-03-18 DIAGNOSIS — Z51.89 ENCOUNTER FOR OTHER SPECIFIED AFTERCARE: ICD-10-CM

## 2019-03-27 ENCOUNTER — RESULT REVIEW (OUTPATIENT)
Age: 59
End: 2019-03-27

## 2019-03-27 ENCOUNTER — APPOINTMENT (OUTPATIENT)
Dept: HEMATOLOGY ONCOLOGY | Facility: CLINIC | Age: 59
End: 2019-03-27
Payer: MEDICAID

## 2019-03-27 VITALS
WEIGHT: 158.73 LBS | OXYGEN SATURATION: 96 % | DIASTOLIC BLOOD PRESSURE: 70 MMHG | BODY MASS INDEX: 29.03 KG/M2 | SYSTOLIC BLOOD PRESSURE: 130 MMHG | TEMPERATURE: 98.2 F | HEART RATE: 70 BPM | RESPIRATION RATE: 16 BRPM

## 2019-03-27 LAB
BASOPHILS # BLD AUTO: 0.1 K/UL — SIGNIFICANT CHANGE UP (ref 0–0.2)
BASOPHILS NFR BLD AUTO: 0.7 % — SIGNIFICANT CHANGE UP (ref 0–2)
EOSINOPHIL # BLD AUTO: 0.1 K/UL — SIGNIFICANT CHANGE UP (ref 0–0.5)
EOSINOPHIL NFR BLD AUTO: 0.6 % — SIGNIFICANT CHANGE UP (ref 0–6)
HCT VFR BLD CALC: 29.1 % — LOW (ref 34.5–45)
HGB BLD-MCNC: 10.2 G/DL — LOW (ref 11.5–15.5)
LYMPHOCYTES # BLD AUTO: 3.9 K/UL — HIGH (ref 1–3.3)
LYMPHOCYTES # BLD AUTO: 33.7 % — SIGNIFICANT CHANGE UP (ref 13–44)
MCHC RBC-ENTMCNC: 34.3 PG — HIGH (ref 27–34)
MCHC RBC-ENTMCNC: 35.1 G/DL — SIGNIFICANT CHANGE UP (ref 32–36)
MCV RBC AUTO: 97.6 FL — SIGNIFICANT CHANGE UP (ref 80–100)
MONOCYTES # BLD AUTO: 1 K/UL — HIGH (ref 0–0.9)
MONOCYTES NFR BLD AUTO: 8.5 % — SIGNIFICANT CHANGE UP (ref 2–14)
NEUTROPHILS # BLD AUTO: 6.5 K/UL — SIGNIFICANT CHANGE UP (ref 1.8–7.4)
NEUTROPHILS NFR BLD AUTO: 56.3 % — SIGNIFICANT CHANGE UP (ref 43–77)
PLATELET # BLD AUTO: 171 K/UL — SIGNIFICANT CHANGE UP (ref 150–400)
RBC # BLD: 2.98 M/UL — LOW (ref 3.8–5.2)
RBC # FLD: 16.6 % — HIGH (ref 10.3–14.5)
WBC # BLD: 11.5 K/UL — HIGH (ref 3.8–10.5)
WBC # FLD AUTO: 11.5 K/UL — HIGH (ref 3.8–10.5)

## 2019-03-27 PROCEDURE — 99214 OFFICE O/P EST MOD 30 MIN: CPT

## 2019-03-27 RX ORDER — METOCLOPRAMIDE 10 MG/1
10 TABLET ORAL EVERY 8 HOURS
Qty: 30 | Refills: 0 | Status: DISCONTINUED | COMMUNITY
Start: 2018-12-31 | End: 2019-03-27

## 2019-03-27 RX ORDER — DEXAMETHASONE 4 MG/1
4 TABLET ORAL TWICE DAILY
Qty: 40 | Refills: 0 | Status: DISCONTINUED | COMMUNITY
Start: 2018-12-31 | End: 2019-03-27

## 2019-03-27 RX ORDER — OLANZAPINE 10 MG/1
10 TABLET, FILM COATED ORAL DAILY
Qty: 6 | Refills: 0 | Status: DISCONTINUED | COMMUNITY
Start: 2019-02-21 | End: 2019-03-27

## 2019-03-27 RX ORDER — APREPITANT 80 MG/1
80 CAPSULE ORAL DAILY
Qty: 4 | Refills: 0 | Status: DISCONTINUED | COMMUNITY
Start: 2018-12-31 | End: 2019-03-27

## 2019-03-27 RX ORDER — APREPITANT 80 MG/1
80 CAPSULE ORAL DAILY
Qty: 1 | Refills: 0 | Status: DISCONTINUED | COMMUNITY
Start: 2019-02-21 | End: 2019-03-27

## 2019-03-27 NOTE — PHYSICAL EXAM
[Fully active, able to carry on all pre-disease performance without restriction] : Status 0 - Fully active, able to carry on all pre-disease performance without restriction [Normal] : affect appropriate [de-identified] : +alopecia [de-identified] : b/l mastectomy with some lateral outpouching of fatty tissue b/l. no masses or adenopathy

## 2019-03-27 NOTE — HISTORY OF PRESENT ILLNESS
[de-identified] : 58F presenting for medical oncology consultation.\par \par Last mammogram ~4 years ago.\par Screening mammo 18: new 1cm mass in the upper medial Right breast. \par 18 diagnostic mammo and US: R breast 12:00 3-4cm FN 1cm slightly bi-lobed hypoechoic lesion corresponding with mammographic findings. 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 IHC stains for synaptophysin and chromogranin demonstrate nonspecific extremely scant, barely perceptible staining. Definitive characterization of the lesion is best deferred to the resection specimen. 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 Clinical correlation is necessary.\par \par 10/19/18 pathology review  [facility]: Poorly differentiated carcinoma, at least 6mm, LVI not seen. Immunostains done for ER/[place]/HER2/GATA3/CDX2/TTF-1 are negative in tumor cells (done at [place] lab). Tumor histology shows small cell features (molding, apoptotic bodies, and extensive mitotic activity). Cannot determine histologic subtype and/or site of origin. Clinicopathologic and radiologic correlation strongly recommended.\par \par She saw Dr. Carter (plastic surgery) and then Dr. Raymundo (breast surgery) on 10/9 and noted her desire for breast conserving surgery. She was referred for breast MRI and genetic testing given strong family history. 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 At 12-1:00 axis retroareolar there is 0.4cm focus of enhancement, 12:00 axis posterior third linear non mass enhancement. L breast no suspicious enhancement. Axilla unremarkable. BIRADS4 If breast conservation is a consideration, 2-site MRI guided biopsy of non-mass enhancement within the anterior and posterior upper breast is recommended.\par \par She saw me initially 10/24 - diagnostic CT C/A/P and PETCT performed - no masses other than small breast mass with clip; PET with SUV 8 avidity in sigmoid colon (patient noted eating dairy products that week and some loose BMs in retrospect). Dr. Horn her gastroenterologist performed Colonoscopy  with random biopsies which were benign.\par \par  she underwent b/l mastectomy with Dr. Raymundo. 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 Hypermetabolic subcutaneous nodule right upper arm lateral aspect (not included on prior PET/CT) - SUV 2.5 (patient notes recent flu shot with hematoma). Interval resolution of rectosigmoid FDG avidity.\par \par She started adjuvant chemotherapy (cisplatin/etoposide x 4 cyles) on 19.\par \par Pertinent Medical History:\par She lives in [place] with her 3 children. She works as a  in a restaurant full time.\par 2018 hand burn at work\par GERD: GI Dr. Gilbert Horn. Normal C-scope with internal hemorrhoids 18. Abd US and CT 17 fatty liver, 7mm R hepatic focus calcifications, unchanged.\par Headaches: MRI without acute findings years ago\par Microscopic hematuria: Cystoscopy 17 without significant findings (Dr. Nadya Hu), takes oxybutinin for overactive bladder\par Seasonal Allergies\par Family history of breast cancer in her maternal aunts x 3 (ages unknown).\par Menarche age 11, menopause age 48. , no prior OCPs or hormonal therapies\par Active Smoker for many years, quit with this diagnosis\par PMD: Dr. Dom Coello\par Dr. Cali Gynecologist [FreeTextEntry1] : Cisplatin 75mg/m2 on day 1, Etoposide 100mg/m2 on day 1-3 of a 21 day cycle. Neulasta OnPro day 3\par 500cc normal saline over 1 hour before cisplatin. 1L 1/2NS (with magnesium sulfate 1g, potassium chloride 20meq) over 2 hours after cisplatin. UOP monitoring 100cc/h while receiving cisplatin\par \par Antiemetics: \par Emend 80mg PO on day 2, day 3\par Dexamethasone 4mg PO BID day 2-5\par Reglan 10mg PO q6-8h PRN for nausea [de-identified] : Patient is here today for follow up after completing adjuvant chemotherapy. [employer] utilized for duration of visit. She had some nausea and headache on day 2 of chemotherapy again, improved with taking dexamethasone (which she had forgotten to take). Some abnormal taste sensation. She has no other complaints at this time, she has been hydrating adequately instructed and active, eating well, doing some household chores. She denies f/c/s, HA, dizziness, hearing or vision issues, n/v, CP, SOB, abd pain, diarrhea, edema, rashes, new neuro symptoms, weight loss or gain, urinary issues, bleeding or bruising.

## 2019-03-27 NOTE — ASSESSMENT
[FreeTextEntry1] : 57yo woman with GERD, microscopic hematuria, significant smoking history (quit recently), arthritis/osteoporosis presenting for medical oncology follow up after b/l mastectomy for R breast cancer; final pathology findings on second opinion pathology review (Dr. 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 Interval PET/CT with no other evidence of disease. She presents today after completing adjuvant chemotherapy with cisplatin/etoposide x 4 cycles.\par \par Breast Cancer, small cell carcinoma: \par -Labs today\par -discussed monitoring for any new signs or symptoms, follow up with me\par -paperwork signed for patient to return to work as requested\par -continue close monitoring, follow up with her surgeon\par -follow up PMD, resume routine health care and routine health maintenance\par -follow up 3 months

## 2019-03-28 LAB
ALBUMIN SERPL ELPH-MCNC: 4 G/DL
ALP BLD-CCNC: 121 U/L
ALT SERPL-CCNC: 9 U/L
ANION GAP SERPL CALC-SCNC: 10 MMOL/L
AST SERPL-CCNC: 13 U/L
BILIRUB SERPL-MCNC: <0.2 MG/DL
BUN SERPL-MCNC: 10 MG/DL
CALCIUM SERPL-MCNC: 9.2 MG/DL
CHLORIDE SERPL-SCNC: 101 MMOL/L
CO2 SERPL-SCNC: 27 MMOL/L
CREAT SERPL-MCNC: 0.68 MG/DL
GLUCOSE SERPL-MCNC: 84 MG/DL
MAGNESIUM SERPL-MCNC: 1.9 MG/DL
PHOSPHATE SERPL-MCNC: 4.1 MG/DL
POTASSIUM SERPL-SCNC: 4.4 MMOL/L
PROT SERPL-MCNC: 6.1 G/DL
SODIUM SERPL-SCNC: 138 MMOL/L

## 2019-05-22 ENCOUNTER — APPOINTMENT (OUTPATIENT)
Dept: FAMILY MEDICINE | Facility: CLINIC | Age: 59
End: 2019-05-22
Payer: MEDICAID

## 2019-05-22 VITALS
OXYGEN SATURATION: 97 % | TEMPERATURE: 98.7 F | HEART RATE: 74 BPM | WEIGHT: 160 LBS | DIASTOLIC BLOOD PRESSURE: 80 MMHG | SYSTOLIC BLOOD PRESSURE: 122 MMHG | HEIGHT: 62 IN | BODY MASS INDEX: 29.44 KG/M2

## 2019-05-22 PROCEDURE — 99214 OFFICE O/P EST MOD 30 MIN: CPT

## 2019-05-22 RX ORDER — OXYBUTYNIN CHLORIDE 10 MG/1
10 TABLET, EXTENDED RELEASE ORAL
Refills: 0 | Status: DISCONTINUED | COMMUNITY
End: 2019-05-22

## 2019-05-22 NOTE — HEALTH RISK ASSESSMENT
[No falls in past year] : Patient reported no falls in the past year [0] : 2) Feeling down, depressed, or hopeless: Not at all (0) [] : No [de-identified] : former [[id number]] : 0

## 2019-05-22 NOTE — PHYSICAL EXAM
[No Acute Distress] : no acute distress [PERRL] : pupils equal round and reactive to light [EOMI] : extraocular movements intact [Normal Oropharynx] : the oropharynx was normal [Supple] : supple [Clear to Auscultation] : lungs were clear to auscultation bilaterally [Normal S1, S2] : normal S1 and S2 [No Edema] : there was no peripheral edema [Soft] : abdomen soft [Non Tender] : non-tender [Normal Anterior Cervical Nodes] : no anterior cervical lymphadenopathy [No Joint Swelling] : no joint swelling [Normal Gait] : normal gait [Normal Affect] : the affect was normal [de-identified] : erythematous macular rash with irregular border approximately, 3 X 2inches on left medial shin and 1 inch diameter on right medial shin

## 2019-05-22 NOTE — HISTORY OF PRESENT ILLNESS
[FreeTextEntry8] : Ms Uma Page is a 58 yo presents today a rash she noticed on inner left shin, and smaller one on right inner shin about a week ago. Pt notes its itchy and she has been applying otc cortisone cream which has been helping slightly. Pt denies any fever, chills, nausea, vomiting, diarrhea. Pt is breast cancer survivor, s/p mastectomy and chemo completed in March 16th, 2019. Pt states she regularly follows up with oncologists. Pt also denies any trauma the legs, no new laundry detergents, new soaps.

## 2019-05-29 ENCOUNTER — APPOINTMENT (OUTPATIENT)
Dept: FAMILY MEDICINE | Facility: CLINIC | Age: 59
End: 2019-05-29
Payer: MEDICAID

## 2019-05-29 VITALS
TEMPERATURE: 98.4 F | DIASTOLIC BLOOD PRESSURE: 82 MMHG | OXYGEN SATURATION: 99 % | HEIGHT: 62 IN | BODY MASS INDEX: 29.44 KG/M2 | WEIGHT: 160 LBS | HEART RATE: 64 BPM | SYSTOLIC BLOOD PRESSURE: 124 MMHG

## 2019-05-29 PROCEDURE — 99212 OFFICE O/P EST SF 10 MIN: CPT

## 2019-05-29 RX ORDER — TRIAMCINOLONE ACETONIDE 1 MG/G
0.1 CREAM TOPICAL 3 TIMES DAILY
Qty: 80 | Refills: 2 | Status: DISCONTINUED | COMMUNITY
Start: 2019-05-22 | End: 2019-05-29

## 2019-05-29 RX ORDER — METHYLPREDNISOLONE 4 MG/1
4 TABLET ORAL
Qty: 1 | Refills: 0 | Status: DISCONTINUED | COMMUNITY
Start: 2019-05-22 | End: 2019-05-29

## 2019-05-29 NOTE — HISTORY OF PRESENT ILLNESS
[FreeTextEntry1] : follow up rash [de-identified] : Ms Uma Page is a 60 yo female presents today for follow up for a rash. Pt has completed the course of Medrol dose pack, and also applied triamcinolone cream to affected area. Pt notes today that rash has completely resolved and has no other health concerns. Pt advised to return for a complete annual physical exam and blood work.

## 2019-06-11 ENCOUNTER — APPOINTMENT (OUTPATIENT)
Dept: SURGICAL ONCOLOGY | Facility: CLINIC | Age: 59
End: 2019-06-11
Payer: MEDICAID

## 2019-06-11 VITALS
SYSTOLIC BLOOD PRESSURE: 140 MMHG | OXYGEN SATURATION: 97 % | HEART RATE: 64 BPM | WEIGHT: 160 LBS | TEMPERATURE: 98.2 F | BODY MASS INDEX: 29.44 KG/M2 | DIASTOLIC BLOOD PRESSURE: 84 MMHG | HEIGHT: 62 IN

## 2019-06-11 PROCEDURE — 99214 OFFICE O/P EST MOD 30 MIN: CPT

## 2019-06-11 NOTE — ASSESSMENT
[FreeTextEntry1] : Imp:\par No evidence of new or recurrent lesions.  No suspicious lesions on exam\par \par Plan:\par Continue yearly surveillance\par \par \par \par \par \par

## 2019-06-11 NOTE — HISTORY OF PRESENT ILLNESS
[de-identified] : 60 y/o female presents s/p bilateral mastectomy 11/21/18.\par Pathology 11/29/18: Right axillary sentinel lymph node, excision: One negative lymph node. 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 Right axillary sentinel lymph node 2, excision: One negative lymph node. Left axillary sentinel lymph node, excision: One negative lymph node. Left breast, mastectomy: Fibrocystic changes minimal.\par \par Today 6/11/19 she presents with a complaint of swelling in her Left axilla as well as pulsating pain to Left outer chest wall. Denies palpable chest wall nodules or skin changes.

## 2019-06-11 NOTE — PHYSICAL EXAM
[Normal] : supple, no neck mass and thyroid not enlarged [Normal Neck Lymph Nodes] : normal neck lymph nodes  [Normal Groin Lymph Nodes] : normal groin lymph nodes [Normal] : oriented to person, place and time, with appropriate affect [FreeTextEntry1] : I, Ulises Blanco was present for the physical exam\par \par \par \par  [de-identified] : No suspicious lesions found on chest wall. No enlarge axillary lymph nodes, or supraclavicular lymph nodes [de-identified] : Normal S1, S2. Regular rate and rhythm\par \par  [de-identified] : Clear breath sounds bilaterally, normal respiratory effort\par \par

## 2019-06-13 ENCOUNTER — NON-APPOINTMENT (OUTPATIENT)
Age: 59
End: 2019-06-13

## 2019-06-13 ENCOUNTER — APPOINTMENT (OUTPATIENT)
Dept: FAMILY MEDICINE | Facility: CLINIC | Age: 59
End: 2019-06-13
Payer: MEDICAID

## 2019-06-13 VITALS
BODY MASS INDEX: 29.44 KG/M2 | HEIGHT: 62 IN | OXYGEN SATURATION: 97 % | TEMPERATURE: 97.5 F | HEART RATE: 58 BPM | WEIGHT: 160 LBS | SYSTOLIC BLOOD PRESSURE: 122 MMHG | DIASTOLIC BLOOD PRESSURE: 84 MMHG

## 2019-06-13 VITALS — RESPIRATION RATE: 14 BRPM

## 2019-06-13 PROCEDURE — 90732 PPSV23 VACC 2 YRS+ SUBQ/IM: CPT

## 2019-06-13 PROCEDURE — 99396 PREV VISIT EST AGE 40-64: CPT | Mod: 25

## 2019-06-13 PROCEDURE — 36415 COLL VENOUS BLD VENIPUNCTURE: CPT

## 2019-06-13 PROCEDURE — G0009: CPT

## 2019-06-13 NOTE — ASSESSMENT
[FreeTextEntry1] : Assessment and plan:\par \par Comprehensive health maintenance exam. No acute findings.\par \par Status post bilateral mastectomy. Patient doing well recently seen by surgery.\par \par History of tobacco use. Patient stopped smoking over year. Recently had PET CT before there should be no need for low dose CAT scan.\par \par Electrocardiogram shows no acute ST-T wave changes.\par \par Comprehensive blood work obtained

## 2019-06-13 NOTE — HEALTH RISK ASSESSMENT
[Very Good] : ~his/her~  mood as very good [No falls in past year] : Patient reported no falls in the past year [0] : 2) Feeling down, depressed, or hopeless: Not at all (0) [HIV test declined] : HIV test declined [With Family] : lives with family [# of Members in Household ___] :  household currently consist of [unfilled] member(s) [Employed] : employed [Less Than High School] : less than high school [Feels Safe at Home] : Feels safe at home [Fully functional (bathing, dressing, toileting, transferring, walking, feeding)] : Fully functional (bathing, dressing, toileting, transferring, walking, feeding) [Fully functional (using the telephone, shopping, preparing meals, housekeeping, doing laundry, using] : Fully functional and needs no help or supervision to perform IADLs (using the telephone, shopping, preparing meals, housekeeping, doing laundry, using transportation, managing medications and managing finances) [Reports normal functional visual acuity (ie: able to read med bottle)] : Reports normal functional visual acuity [Safety elements used in home] : safety elements used in home [Seat Belt] :  uses seat belt [Sunscreen] : uses sunscreen [Reviewed no changes] : Reviewed no changes [Name: ___] : Health Care Proxy's Name: [unfilled]  [Designated Healthcare Proxy] : Designated healthcare proxy [Relationship: ___] : Relationship: [unfilled] [Aggressive treatment] : aggressive treatment [I will adhere to the patient's wishes as expressed in the advance directive except as noted below.] : I will adhere to the patient's wishes as expressed in the advance directive except as noted below [] : No [YearQuit] : 2018 [[id number]] : 0 [Change in mental status noted] : No change in mental status noted [Language] : denies difficulty with language [Behavior] : denies difficulty with behavior [Learning/Retaining New Information] : denies difficulty learning/retaining new information [Handling Complex Tasks] : denies difficulty handling complex tasks [Reasoning] : denies difficulty with reasoning [Spatial Ability and Orientation] : denies difficulty with spatial ability and orientation [Sexually Active] : not sexually active [High Risk Behavior] : no high risk behavior [Reports changes in vision] : Reports no changes in vision [Reports changes in hearing] : Reports no changes in hearing [Reports changes in dental health] : Reports no changes in dental health [Smoke Detector] : no smoke detector [Carbon Monoxide Detector] : no carbon monoxide detector [Travel to Developing Areas] : does not  travel to developing areas [TB Exposure] : is not being exposed to tuberculosis [Caregiver Concerns] : does not have caregiver concerns [AdvancecareDate] : 06/19

## 2019-06-13 NOTE — PHYSICAL EXAM
[No Acute Distress] : no acute distress [Well Nourished] : well nourished [Well Developed] : well developed [Well-Appearing] : well-appearing [Normal Sclera/Conjunctiva] : normal sclera/conjunctiva [Normal Voice/Communication] : normal voice/communication [PERRL] : pupils equal round and reactive to light [EOMI] : extraocular movements intact [Normal Outer Ear/Nose] : the outer ears and nose were normal in appearance [Normal TMs] : both tympanic membranes were normal [Normal Oropharynx] : the oropharynx was normal [Normal Nasal Mucosa] : the nasal mucosa was normal [No JVD] : no jugular venous distention [No Lymphadenopathy] : no lymphadenopathy [Supple] : supple [Thyroid Normal, No Nodules] : the thyroid was normal and there were no nodules present [No Respiratory Distress] : no respiratory distress  [Clear to Auscultation] : lungs were clear to auscultation bilaterally [No Accessory Muscle Use] : no accessory muscle use [Regular Rhythm] : with a regular rhythm [Normal Rate] : normal rate  [No Murmur] : no murmur heard [Normal S1, S2] : normal S1 and S2 [No Carotid Bruits] : no carotid bruits [No Varicosities] : no varicosities [No Abdominal Bruit] : a ~M bruit was not heard ~T in the abdomen [No Edema] : there was no peripheral edema [Pedal Pulses Present] : the pedal pulses are present [Soft] : abdomen soft [No Palpable Aorta] : no palpable aorta [No Extremity Clubbing/Cyanosis] : no extremity clubbing/cyanosis [Non Tender] : non-tender [No Masses] : no abdominal mass palpated [Non-distended] : non-distended [Normal Bowel Sounds] : normal bowel sounds [No HSM] : no HSM [No Hernias] : no hernias [Normal Supraclavicular Nodes] : no supraclavicular lymphadenopathy [Normal Axillary Nodes] : no axillary lymphadenopathy [Normal Posterior Cervical Nodes] : no posterior cervical lymphadenopathy [Normal Anterior Cervical Nodes] : no anterior cervical lymphadenopathy [Normal Femoral Nodes] : no femoral lymphadenopathy [No CVA Tenderness] : no CVA  tenderness [Normal Inguinal Nodes] : no inguinal lymphadenopathy [No Joint Swelling] : no joint swelling [No Spinal Tenderness] : no spinal tenderness [Grossly Normal Strength/Tone] : grossly normal strength/tone [No Rash] : no rash [Normal Gait] : normal gait [No Focal Deficits] : no focal deficits [Coordination Grossly Intact] : coordination grossly intact [Deep Tendon Reflexes (DTR)] : deep tendon reflexes were 2+ and symmetric [Speech Grossly Normal] : speech grossly normal [Normal Affect] : the affect was normal [Memory Grossly Normal] : memory grossly normal [Alert and Oriented x3] : oriented to person, place, and time [Normal Mood] : the mood was normal [Normal Insight/Judgement] : insight and judgment were intact [Comprehensive Foot Exam Normal] : Right and left foot were examined and both feet are normal. No ulcers in either foot. Toes are normal and with full ROM.  Normal tactile sensation with monofilament testing throughout both feet [de-identified] : GYN,bilateral mastectomy [FreeTextEntry1] : GI [de-identified] : GYN [de-identified] : Burn of the dorsum, left hand is healing very well. There is no sign of infection granulation tissue is excellent. There are no blisters just some hyperpigmentation doubt that the patient will have much in scar tissue.

## 2019-06-13 NOTE — HISTORY OF PRESENT ILLNESS
[FreeTextEntry1] : Comprehensive health maintenance, physical exam, history of breast cancer. Patient presently in remission [de-identified] : This is a 59-year-old female, who presents today for comprehensive health maintenance, physical exam patient states, that she's in a usual state of health recently seen by surgery and oncology. History of right breast cancer. Patient doing well. Patient states that she is feeling well. She denies any chest pain, shortness of breath, nausea, or vomiting, and she also stopped smoking approximately one year ago.

## 2019-06-14 LAB
ALBUMIN SERPL ELPH-MCNC: 4.5 G/DL
ALP BLD-CCNC: 88 U/L
ALT SERPL-CCNC: 13 U/L
ANION GAP SERPL CALC-SCNC: 11 MMOL/L
APPEARANCE: CLEAR
AST SERPL-CCNC: 16 U/L
BACTERIA: NEGATIVE
BASOPHILS # BLD AUTO: 0.03 K/UL
BASOPHILS NFR BLD AUTO: 0.4 %
BILIRUB SERPL-MCNC: 0.2 MG/DL
BILIRUBIN URINE: NEGATIVE
BLOOD URINE: NEGATIVE
BUN SERPL-MCNC: 13 MG/DL
CALCIUM SERPL-MCNC: 9.7 MG/DL
CHLORIDE SERPL-SCNC: 104 MMOL/L
CHOLEST SERPL-MCNC: 175 MG/DL
CHOLEST/HDLC SERPL: 3.6 RATIO
CO2 SERPL-SCNC: 24 MMOL/L
COLOR: NORMAL
CREAT SERPL-MCNC: 0.72 MG/DL
EOSINOPHIL # BLD AUTO: 0.07 K/UL
EOSINOPHIL NFR BLD AUTO: 1 %
ESTIMATED AVERAGE GLUCOSE: 100 MG/DL
GLUCOSE QUALITATIVE U: NEGATIVE
GLUCOSE SERPL-MCNC: 76 MG/DL
HBA1C MFR BLD HPLC: 5.1 %
HCT VFR BLD CALC: 39.8 %
HDLC SERPL-MCNC: 49 MG/DL
HGB BLD-MCNC: 12.9 G/DL
HYALINE CASTS: 0 /LPF
IMM GRANULOCYTES NFR BLD AUTO: 0.1 %
KETONES URINE: NEGATIVE
LDLC SERPL CALC-MCNC: 106 MG/DL
LEUKOCYTE ESTERASE URINE: NEGATIVE
LYMPHOCYTES # BLD AUTO: 2.6 K/UL
LYMPHOCYTES NFR BLD AUTO: 38.6 %
MAN DIFF?: NORMAL
MCHC RBC-ENTMCNC: 31.9 PG
MCHC RBC-ENTMCNC: 32.4 GM/DL
MCV RBC AUTO: 98.5 FL
MICROSCOPIC-UA: NORMAL
MONOCYTES # BLD AUTO: 0.58 K/UL
MONOCYTES NFR BLD AUTO: 8.6 %
NEUTROPHILS # BLD AUTO: 3.45 K/UL
NEUTROPHILS NFR BLD AUTO: 51.3 %
NITRITE URINE: NEGATIVE
PH URINE: 5
PLATELET # BLD AUTO: 286 K/UL
POTASSIUM SERPL-SCNC: 4.2 MMOL/L
PROT SERPL-MCNC: 6.9 G/DL
PROTEIN URINE: NEGATIVE
RBC # BLD: 4.04 M/UL
RBC # FLD: 12.3 %
RED BLOOD CELLS URINE: 1 /HPF
SODIUM SERPL-SCNC: 139 MMOL/L
SPECIFIC GRAVITY URINE: 1.01
SQUAMOUS EPITHELIAL CELLS: 1 /HPF
T4 FREE SERPL-MCNC: 1 NG/DL
TRIGL SERPL-MCNC: 100 MG/DL
TSH SERPL-ACNC: 4.39 UIU/ML
UROBILINOGEN URINE: NORMAL
WBC # FLD AUTO: 6.74 K/UL
WHITE BLOOD CELLS URINE: 1 /HPF

## 2019-06-21 ENCOUNTER — OUTPATIENT (OUTPATIENT)
Dept: OUTPATIENT SERVICES | Facility: HOSPITAL | Age: 59
LOS: 1 days | Discharge: HOME | End: 2019-06-21

## 2019-06-21 DIAGNOSIS — C50.919 MALIGNANT NEOPLASM OF UNSPECIFIED SITE OF UNSPECIFIED FEMALE BREAST: ICD-10-CM

## 2019-06-26 ENCOUNTER — APPOINTMENT (OUTPATIENT)
Dept: HEMATOLOGY ONCOLOGY | Facility: CLINIC | Age: 59
End: 2019-06-26
Payer: MEDICAID

## 2019-06-26 ENCOUNTER — RESULT REVIEW (OUTPATIENT)
Age: 59
End: 2019-06-26

## 2019-06-26 VITALS
HEART RATE: 77 BPM | DIASTOLIC BLOOD PRESSURE: 83 MMHG | TEMPERATURE: 98 F | RESPIRATION RATE: 14 BRPM | SYSTOLIC BLOOD PRESSURE: 122 MMHG | WEIGHT: 161.82 LBS | OXYGEN SATURATION: 99 % | BODY MASS INDEX: 29.6 KG/M2

## 2019-06-26 LAB
BASOPHILS # BLD AUTO: 0.1 K/UL — SIGNIFICANT CHANGE UP (ref 0–0.2)
BASOPHILS NFR BLD AUTO: 0.9 % — SIGNIFICANT CHANGE UP (ref 0–2)
EOSINOPHIL # BLD AUTO: 0.1 K/UL — SIGNIFICANT CHANGE UP (ref 0–0.5)
EOSINOPHIL NFR BLD AUTO: 1.1 % — SIGNIFICANT CHANGE UP (ref 0–6)
HCT VFR BLD CALC: 41.2 % — SIGNIFICANT CHANGE UP (ref 34.5–45)
HGB BLD-MCNC: 14 G/DL — SIGNIFICANT CHANGE UP (ref 11.5–15.5)
LYMPHOCYTES # BLD AUTO: 3.5 K/UL — HIGH (ref 1–3.3)
LYMPHOCYTES # BLD AUTO: 48 % — HIGH (ref 13–44)
MCHC RBC-ENTMCNC: 32.5 PG — SIGNIFICANT CHANGE UP (ref 27–34)
MCHC RBC-ENTMCNC: 34 G/DL — SIGNIFICANT CHANGE UP (ref 32–36)
MCV RBC AUTO: 95.6 FL — SIGNIFICANT CHANGE UP (ref 80–100)
MONOCYTES # BLD AUTO: 0.6 K/UL — SIGNIFICANT CHANGE UP (ref 0–0.9)
MONOCYTES NFR BLD AUTO: 8.7 % — SIGNIFICANT CHANGE UP (ref 2–14)
NEUTROPHILS # BLD AUTO: 3 K/UL — SIGNIFICANT CHANGE UP (ref 1.8–7.4)
NEUTROPHILS NFR BLD AUTO: 41.3 % — LOW (ref 43–77)
PLATELET # BLD AUTO: 352 K/UL — SIGNIFICANT CHANGE UP (ref 150–400)
RBC # BLD: 4.31 M/UL — SIGNIFICANT CHANGE UP (ref 3.8–5.2)
RBC # FLD: 12.4 % — SIGNIFICANT CHANGE UP (ref 10.3–14.5)
WBC # BLD: 7.4 K/UL — SIGNIFICANT CHANGE UP (ref 3.8–10.5)
WBC # FLD AUTO: 7.4 K/UL — SIGNIFICANT CHANGE UP (ref 3.8–10.5)

## 2019-06-26 PROCEDURE — 99215 OFFICE O/P EST HI 40 MIN: CPT

## 2019-06-26 NOTE — ED PROVIDER NOTE - SKIN TURGOR
PRIMARY CARE VISIT        Patient name : Bo Browning   MRN number: [id number]   YOB: 1978       Reason for visit:   Chief Complaint   Patient presents with   • Annual Exam     Heartburn, left arm muscle pain          History of Present Illness      41 yr old male patient present to clinic for annual physical with c/o heart burn and left arm pain  Yet to have dental and  vision test  Patient has a new baby at home; states not sleeping well  Patient report not drinking enough water    Heartburn   This is a recurrent problem. Episode onset: Early this year. The onset quality is gradual. The problem occurs intermittently. Duration: 2-3 times per week. The problem has been waxing and waning. Pain location: throat. The pain is at a severity of 4/10. The pain is moderate. The quality of the pain is burning. The abdominal pain does not radiate. Associated symptoms include constipation and myalgias. Pertinent negatives include no belching, diarrhea, flatus, nausea or vomiting. The pain is aggravated by eating. The pain is relieved by nothing. He has tried nothing for the symptoms. There is no history of gallstones, GERD or irritable bowel syndrome.   Arm Pain    Incident onset: 2 months. The incident occurred at home. There was no injury mechanism. The pain is present in the upper left arm. The quality of the pain is described as aching. The pain does not radiate. The pain is at a severity of 4/10. The pain is moderate. The pain has been intermittent since the incident. Associated symptoms include muscle weakness. Pertinent negatives include no chest pain, numbness or tingling. Nothing aggravates the symptoms. He has tried acetaminophen (atletes alcohol) for the symptoms. The treatment provided mild relief.     Review of Systems       Review of Systems   Constitutional: Negative.    HENT: Negative.    Eyes: Negative.    Respiratory: Negative for cough, choking, shortness of breath and wheezing.     Cardiovascular: Negative for chest pain, palpitations and leg swelling.   Gastrointestinal: Positive for constipation and heartburn. Negative for abdominal distention, abdominal pain, anal bleeding, blood in stool, diarrhea, flatus, nausea, rectal pain and vomiting.   Endocrine: Negative.    Genitourinary: Negative.    Musculoskeletal: Positive for myalgias.        Per HPI   Skin: Negative.    Allergic/Immunologic: Negative.    Neurological: Negative.  Negative for tingling and numbness.   Hematological: Negative.    Psychiatric/Behavioral: Negative.         Allergies  ALLERGIES:  No Known Allergies    Current Meds    Current Outpatient Medications   Medication Sig Dispense Refill   • ergocalciferol (DRISDOL) 20783 units capsule Take 1 capsule by mouth 1 day a week. 8 capsule 2   • pantoprazole (PROTONIX) 20 MG tablet Take 1 tablet by mouth daily. 30 tablet 1   • baclofen (LIORESAL) 10 MG tablet Take 1 tablet by mouth 2 times daily. 60 tablet 0     No current facility-administered medications for this visit.      .       Past Medical History  History reviewed. No pertinent past medical history.    Surgical History  History reviewed. No pertinent surgical history.    Family History  Family History   Problem Relation Age of Onset   • Cancer Mother    • Hypertension Father        Social History  Social History     Tobacco Use   • Smoking status: Former Smoker   • Smokeless tobacco: Never Used   Substance Use Topics   • Alcohol use: Not Currently   • Drug use: Never               Vitals    Visit Vitals  /70 (BP Location: [facility] [place], Patient Position: Sitting, Cuff Size: Regular)   Pulse 66   Temp 98 °F (36.7 °C) (Tympanic)   Resp 18   Ht 5' 9\" (1.753 m)   Wt 74.6 kg (164 lb 7.4 oz)   SpO2 99%   BMI 24.29 kg/m²              Physical Exam   Constitutional: He is oriented to person, place, and time. He appears well-developed and well-nourished. No distress.   HENT:   Head: Normocephalic and atraumatic.   Right Ear:  External ear normal.   Left Ear: External ear normal.   Nose: Nose normal.   Mouth/Throat: Oropharynx is clear and moist. No oropharyngeal exudate.   Eyes: Pupils are equal, round, and reactive to light. Conjunctivae and EOM are normal. Right eye exhibits no discharge. Left eye exhibits no discharge. No scleral icterus.   Neck: Normal range of motion. Neck supple. No JVD present. No tracheal deviation present. No thyromegaly present.   Cardiovascular: Normal rate, regular rhythm and intact distal pulses. Exam reveals no gallop and no friction rub.   No murmur heard.  Pulmonary/Chest: Effort normal and breath sounds normal. No stridor. No respiratory distress. He has no wheezes. He has no rales. He exhibits no tenderness.   Abdominal: Soft. Bowel sounds are normal. He exhibits no distension and no mass. There is no tenderness. There is no rebound and no guarding.   Musculoskeletal: Normal range of motion. He exhibits tenderness. He exhibits no edema or deformity.        Left upper arm: He exhibits tenderness.   Lymphadenopathy:     He has no cervical adenopathy.   Neurological: He is alert and oriented to person, place, and time. He has normal reflexes. No cranial nerve deficit. He exhibits normal muscle tone. Coordination normal.   Skin: Skin is dry. No rash noted. He is not diaphoretic. No erythema. No pallor.   Psychiatric: He has a normal mood and affect. His behavior is normal. Judgment and thought content normal.   Vitals reviewed.         Results/Data  Lab Services on 06/26/2019   Component Date Value Ref Range Status   • PSA, Total 06/26/2019 0.45  <4.01 ng/mL Final    Comment:    SUGGESTED AGE SPECIFIC REFERENCE RANGES     AGE                NG/ML  40-49              <=2.50  50-59              <=3.50  60-69              <=4.50  70-79              <=6.50     REFERENCE: JOURNAL OF UROLOGY 155, [id number]     [employer] Chemiluminescence     • FASTING STATUS 06/26/2019 UNKNOWN  hrs Final   • CHOLESTEROL  06/26/2019 220* <200 mg/dL Final    Comment:    Desirable            <200  Borderline High      200 to 239  High                 >=240        • CALCULATED LDL 06/26/2019 148* <130 mg/dL Final    Comment: OPTIMAL               <100  NEAR OPTIMAL          100-129  BORDERLINE HIGH       130-159  HIGH                  160-189  VERY HIGH             >=190     • HDL 06/26/2019 52  >39 mg/dL Final    Comment: Low            <40  Borderline Low 40 to 49  Near Optimal   50 to 59  Optimal        >=60     • TRIGLYCERIDE 06/26/2019 100  <150 mg/dL Final    Comment: Normal                   <150  Borderline High          150 to 199  High                     200 to 499  Very High                >=500     • CALCULATED NON HDL 06/26/2019 168  mg/dL Final    Comment:    Therapeutic Target:  CHD and risk equivalents <130  Multiple risk factors    <160  0 to 1 risk factors      <190        • CHOL/HDL 06/26/2019 4.2  <4.5 Final   • TSH 06/26/2019 1.983  0.350 - 5.000 mcUnits/mL Final   • Fasting Status 06/26/2019 UNKNOWN  hrs Final   • Sodium 06/26/2019 141  135 - 145 mmol/L Final   • Potassium 06/26/2019 4.0  3.4 - 5.1 mmol/L Final   • Chloride 06/26/2019 106  98 - 107 mmol/L Final   • Carbon Dioxide 06/26/2019 30  21 - 32 mmol/L Final   • Anion Gap 06/26/2019 9* 10 - 20 mmol/L Final   • Glucose 06/26/2019 81  65 - 99 mg/dL Final   • BUN 06/26/2019 15  6 - 20 mg/dL Final   • Creatinine 06/26/2019 0.92  0.67 - 1.17 mg/dL Final   • GFR Estimate,  06/26/2019 >90   Final    eGFR results = or >90 mL/min/1.73m2 = Normal kidney function.   • GFR Estimate, Non  06/26/2019 >90   Final    eGFR results = or >90 mL/min/1.73m2 = Normal kidney function.   • BUN/Creatinine Ratio 06/26/2019 16  7 - 25 Final   • CALCIUM 06/26/2019 8.9  8.4 - 10.2 mg/dL Final   • TOTAL BILIRUBIN 06/26/2019 1.0  0.2 - 1.0 mg/dL Final   • AST/SGOT 06/26/2019 41* <38 Units/L Final   • ALT/SGPT 06/26/2019 95* <64 Units/L Final   • ALK  PHOSPHATASE 06/26/2019 54  45 - 117 Units/L Final   • TOTAL PROTEIN 06/26/2019 7.4  6.4 - 8.2 g/dL Final   • Albumin 06/26/2019 4.4  3.6 - 5.1 g/dL Final   • GLOBULIN 06/26/2019 3.0  2.0 - 4.0 g/dL Final   • A/G Ratio, Serum 06/26/2019 1.5  1.0 - 2.4 Final   • WBC 06/26/2019 5.5  4.2 - 11.0 K/mcL Final   • RBC 06/26/2019 4.89  4.50 - 5.90 mil/mcL Final   • HGB 06/26/2019 14.5  13.0 - 17.0 g/dL Final   • HCT 06/26/2019 45.1  39.0 - 51.0 % Final   • MCV 06/26/2019 92.2  78.0 - 100.0 fl Final   • MCH 06/26/2019 29.7  26.0 - 34.0 pg Final   • MCHC 06/26/2019 32.2  32.0 - 36.5 g/dL Final   • RDW-CV 06/26/2019 12.3  11.0 - 15.0 % Final   • PLT 06/26/2019 171  140 - 450 K/mcL Final   • NRBC 06/26/2019 0  0 /100 WBC Final   • DIFF TYPE 06/26/2019 AUTOMATED DIFFERENTIAL   Final   • Neutrophil 06/26/2019 46  % Final   • LYMPH 06/26/2019 40  % Final   • MONO 06/26/2019 9  % Final   • EOSIN 06/26/2019 3  % Final   • BASO 06/26/2019 1  % Final   • Percent Immature Granuloctyes 06/26/2019 1  % Final   • Absolute Neutrophil 06/26/2019 2.6  1.8 - 7.7 K/mcL Final   • Absolute Lymph 06/26/2019 2.2  1.0 - 4.8 K/mcL Final   • Absolute Mono 06/26/2019 0.5  0.3 - 0.9 K/mcL Final   • Absolute Eos 06/26/2019 0.1  0.1 - 0.5 K/mcL Final   • Absolute Baso 06/26/2019 0.0  0.0 - 0.3 K/mcL Final   • Absolute Immature Granulocytes 06/26/2019 0.0  0 - 0.2 K/mcl Final   • VITAMIND, 25 HYDROXY 06/26/2019 11.8* 30.0 - 100.0 ng/mL Final    Comment: <20  ng/mL=Vitamin D deficiency  20-29  ng/mL=Vitamin D insufficiency   ng/mL=Optimal Vitamin D  >150 ng/mL=Possible toxicity     • Hemoglobin A1C 06/26/2019 5.8* 4.5 - 5.6 % Final    Comment:    ----DIABETIC SCREENING---  NON DIABETIC                 <5.7%  INCREASED RISK                5.7-6.4%  DIAGNOSTIC FOR DIABETES      >6.4%     ----DIABETIC CONTROL---     A1C%           eAG mg/dL  6.0            126  6.5            140  7.0            154  7.5            169  8.0            183  8.5             197  9.0            212  9.5            226  10.0           240     Lab Services on 05/10/2019   Component Date Value Ref Range Status   • COLOR 05/10/2019 YELLOW  YELLOW Final   • APPEARANCE 05/10/2019 CLEAR   Final   • GLUCOSE(URINE) 05/10/2019 NEGATIVE  NEGATIVE mg/dL Final   • BILIRUBIN 05/10/2019 NEGATIVE  NEGATIVE Final   • KETONES 05/10/2019 NEGATIVE  NEGATIVE mg/dL Final   • SPECIFIC GRAVITY 05/10/2019 1.025  1.005 - 1.030 Final   • BLOOD 05/10/2019 NEGATIVE  NEGATIVE Final   • pH 05/10/2019 5.0  5.0 - 7.0 Units Final   • PROTEIN(URINE) 05/10/2019 NEGATIVE  NEGATIVE mg/dL Final   • UROBILINOGEN 05/10/2019 0.2  0.0 - 1.0 mg/dL Final   • NITRITE 05/10/2019 NEGATIVE  NEGATIVE Final   • LEUKOCYTE ESTERASE 05/10/2019 NEGATIVE  NEGATIVE Final   • Squamous EPI'S 05/10/2019 NONE SEEN  0 - 5 /hpf Final   • RBC 05/10/2019 1 to 2  0 - 2 /hpf Final   • WBC 05/10/2019 1 to 5  0 - 5 /hpf Final   • BACTERIA 05/10/2019 FEW* NONE SEEN /hpf Final   • Hyaline Casts 05/10/2019 NONE SEEN  0 - 5 /lpf Final   • SPECIMEN TYPE 05/10/2019 URINE, CLEAN CATCH/MIDSTREAM   Final   • MUCOUS 05/10/2019 PRESENT   Final           Assessment/ PLAN  Problem List Items Addressed This Visit        Nervous    Left arm pain     See order           Relevant Medications    baclofen (LIORESAL) 10 MG tablet       Digestive    Gastroesophageal reflux disease without esophagitis     Discussed the etiology of GERD with patient.  Encouraged lifestyle changes and the need for a lifelong regimen of medications to help reduce symptoms.  Discussed the potential for serious complications if symptoms are not resolved.    Discussed raising the head of the bed 4 to 6 inches; avoiding chocolate, coffee, peppermint, fruit juices, tomatoes, greasy and spicy foods.  Encouraged moderation of alcoholic beverages, and avoidance of smoking.           Relevant Medications    pantoprazole (PROTONIX) 20 MG tablet       Other    Encounter for health maintenance examination -  Primary     Refer to Orders  Immunizations recommendations reviewed  Preventive Diet and exercise related life-style changes discussed and recommended  Weight goals reviewed and discussed  Counseled on safe sex practices  Multivitamin recommended  Return to the clinic as clinically indicated as discussed with patient who verbalized understanding of and agreement with the plan.           Relevant Orders    GLYCOHEMOGLOBIN (Completed)    VITAMIN D -25 HYDROXY (Completed)    CBC & AUTO DIFFERENTIAL (Completed)    COMPREHENSIVE METABOLIC PANEL (Completed)    THYROID STIMULATING HORMONE (Completed)    LIPID PANEL WITH REFLEX (Completed)    PROSTATE SPECIFIC ANTIGEN (Completed)      Other Visit Diagnoses     Vitamin D deficiency        Relevant Medications    ergocalciferol (DRISDOL) 44021 units capsule            FOLLOW UP  Return in about 3 months (around 9/26/2019), or if symptoms worsen or fail to improve.                 Tong Schultz, CNP   resilient/elastic

## 2019-06-27 ENCOUNTER — FORM ENCOUNTER (OUTPATIENT)
Age: 59
End: 2019-06-27

## 2019-06-27 LAB
ALBUMIN SERPL ELPH-MCNC: 4.5 G/DL
ALP BLD-CCNC: 95 U/L
ALT SERPL-CCNC: 20 U/L
ANION GAP SERPL CALC-SCNC: 13 MMOL/L
AST SERPL-CCNC: 23 U/L
BILIRUB SERPL-MCNC: 0.2 MG/DL
BUN SERPL-MCNC: 13 MG/DL
CALCIUM SERPL-MCNC: 9.5 MG/DL
CHLORIDE SERPL-SCNC: 103 MMOL/L
CK SERPL-CCNC: 202 U/L
CO2 SERPL-SCNC: 25 MMOL/L
CREAT SERPL-MCNC: 0.86 MG/DL
FOLATE SERPL-MCNC: 14 NG/ML
GLUCOSE SERPL-MCNC: 62 MG/DL
MAGNESIUM SERPL-MCNC: 2 MG/DL
POTASSIUM SERPL-SCNC: 4.7 MMOL/L
PROT SERPL-MCNC: 7.1 G/DL
SODIUM SERPL-SCNC: 141 MMOL/L
VIT B12 SERPL-MCNC: 394 PG/ML

## 2019-06-28 ENCOUNTER — APPOINTMENT (OUTPATIENT)
Dept: NUCLEAR MEDICINE | Facility: IMAGING CENTER | Age: 59
End: 2019-06-28
Payer: MEDICAID

## 2019-06-28 ENCOUNTER — OUTPATIENT (OUTPATIENT)
Dept: OUTPATIENT SERVICES | Facility: HOSPITAL | Age: 59
LOS: 1 days | End: 2019-06-28
Payer: MEDICAID

## 2019-06-28 DIAGNOSIS — C50.911 MALIGNANT NEOPLASM OF UNSPECIFIED SITE OF RIGHT FEMALE BREAST: ICD-10-CM

## 2019-06-28 PROCEDURE — 78815 PET IMAGE W/CT SKULL-THIGH: CPT

## 2019-06-28 PROCEDURE — 78815 PET IMAGE W/CT SKULL-THIGH: CPT | Mod: 26,PS

## 2019-06-28 PROCEDURE — A9552: CPT

## 2019-07-01 NOTE — ASSESSMENT
[FreeTextEntry1] : 59yo woman with GERD, microscopic hematuria, significant smoking history (quit recently), arthritis/osteoporosis presenting for medical oncology follow up after b/l mastectomy for R breast cancer; final pathology findings on second opinion pathology review (Dr. 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 Interval PET/CT with no other evidence of disease. She presents today after completing adjuvant chemotherapy with cisplatin/etoposide x 4 cycles.\par \par Breast Cancer, small cell carcinoma poorly differentiated Ki67 99%: \par -Labs today\par -will obtain PET/CT now for restaging evaluation - will follow NCCN guidelines for surveillance of poorly differentiated small cell neuroendocrine carcinoma (pt also with significant prior smoking history qualifying for CT chest monitoring. nodularity at L mastectomy site likely in the setting of prior sutures, edema improved with healing but will evaluate)\par -discussed monitoring for any new signs or symptoms, follow up with me\par -continue close monitoring, follow up with her surgeon Dr. 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

## 2019-07-01 NOTE — HISTORY OF PRESENT ILLNESS
[de-identified] : 58F presenting for medical oncology consultation.\par \par Last mammogram ~4 years ago.\par Screening mammo 18: new 1cm mass in the upper medial Right breast. \par 18 diagnostic mammo and US: R breast 12:00 3-4cm FN 1cm slightly bi-lobed hypoechoic lesion corresponding with mammographic findings. 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 IHC stains for synaptophysin and chromogranin demonstrate nonspecific extremely scant, barely perceptible staining. Definitive characterization of the lesion is best deferred to the resection specimen. 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 Clinical correlation is necessary.\par \par 10/19/18 pathology review  [facility]: Poorly differentiated carcinoma, at least 6mm, LVI not seen. Immunostains done for ER/NM/HER2/GATA3/CDX2/TTF-1 are negative in tumor cells (done at [place] lab). Tumor histology shows small cell features (molding, apoptotic bodies, and extensive mitotic activity). Cannot determine histologic subtype and/or site of origin. Clinicopathologic and radiologic correlation strongly recommended.\par \par She saw Dr. Carter (plastic surgery) and then Dr. Raymundo (breast surgery) on 10/9 and noted her desire for breast conserving surgery. She was referred for breast MRI and genetic testing given strong family history. 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 At 12-1:00 axis retroareolar there is 0.4cm focus of enhancement, 12:00 axis posterior third linear non mass enhancement. L breast no suspicious enhancement. Axilla unremarkable. BIRADS4 If breast conservation is a consideration, 2-site MRI guided biopsy of non-mass enhancement within the anterior and posterior upper breast is recommended.\par \par She saw me initially 10/24 - diagnostic CT C/A/P and PETCT performed - no masses other than small breast mass with clip; PET with SUV 8 avidity in sigmoid colon (patient noted eating dairy products that week and some loose BMs in retrospect). Dr. Horn her gastroenterologist performed Colonoscopy  with random biopsies which were benign.\par \par  she underwent b/l mastectomy with Dr. Raymundo. 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 Hypermetabolic subcutaneous nodule right upper arm lateral aspect (not included on prior PET/CT) - SUV 2.5 (patient notes recent flu shot with hematoma). Interval resolution of rectosigmoid FDG avidity.\par \par She started adjuvant chemotherapy (cisplatin/etoposide x 4 cyles) on 19, completed 2019. Grade 1 neuropathy, otherwise tolerated treatment well.\par \par Pertinent Medical History:\par She lives in [place] with her 3 children. She works as a  in a restaurant full time.\par 2018 hand burn at work\par GERD: GI Dr. Gilbert Horn. Normal C-scope with internal hemorrhoids 18. Abd US and CT 17 fatty liver, 7mm R hepatic focus calcifications, unchanged.\par Headaches: MRI without acute findings years ago\par Microscopic hematuria: Cystoscopy 17 without significant findings (Dr. Nadya Hu), takes oxybutinin for overactive bladder\par Seasonal Allergies\par Family history of breast cancer in her maternal aunts x 3 (ages unknown). Genetic testing negative with Dr. Raymundo\par Menarche age 11, menopause age 48. , no prior OCPs or hormonal therapies\par Active Smoker for many years, quit with this diagnosis\par PMD: Dr. Dom Coello\par Dr. Cali Gynecologist [FreeTextEntry1] : Cisplatin 75mg/m2 on day 1, Etoposide 100mg/m2 on day 1-3 of a 21 day cycle. Neulasta OnPro day 3\par 500cc normal saline over 1 hour before cisplatin. 1L 1/2NS (with magnesium sulfate 1g, potassium chloride 20meq) over 2 hours after cisplatin. UOP monitoring 100cc/h while receiving cisplatin\par \par Antiemetics: \par Emend 80mg PO on day 2, day 3\par Dexamethasone 4mg PO BID day 2-5\par Reglan 10mg PO q6-8h PRN for nausea [de-identified] : Patient is here today for follow up after completing adjuvant chemotherapy. [employer] utilized for duration of visit. She is back to work and doing well with good energy and appetite.  She notes aches in mastectomy site have improved but she plans to reinitiate PT soon - saw Dr. Raymundo this month. She saw her PMD for a new rash on her leg of unclear etiology - no new triggers, exposures, not painful, unchanged. She also notes since last visit she has developed some bothersome tingling without pain in the tips of her toes - no deficits or gait instability. She is traveling to [facility] ([place], [place]) for vacation for 1 month next week. She denies f/c/s, HA, dizziness, hearing or vision issues, n/v, CP, SOB, abd pain, diarrhea, edema, rashes, other new neuro symptoms, weight loss or gain, urinary issues, bleeding or bruising.

## 2019-07-01 NOTE — PHYSICAL EXAM
[Fully active, able to carry on all pre-disease performance without restriction] : Status 0 - Fully active, able to carry on all pre-disease performance without restriction [Normal] : affect appropriate [de-identified] : hair growing back [de-identified] : b/l mastectomy with some lateral outpouching of fatty tissue b/l. some nodularity noted in L lumpectomy scar, nontender. no masses or adenopathy

## 2019-07-02 ENCOUNTER — APPOINTMENT (OUTPATIENT)
Dept: DERMATOLOGY | Facility: CLINIC | Age: 59
End: 2019-07-02

## 2019-08-07 ENCOUNTER — APPOINTMENT (OUTPATIENT)
Dept: MRI IMAGING | Facility: HOSPITAL | Age: 59
End: 2019-08-07

## 2019-08-09 ENCOUNTER — APPOINTMENT (OUTPATIENT)
Dept: MRI IMAGING | Facility: HOSPITAL | Age: 59
End: 2019-08-09

## 2019-08-09 ENCOUNTER — OUTPATIENT (OUTPATIENT)
Dept: OUTPATIENT SERVICES | Facility: HOSPITAL | Age: 59
LOS: 1 days | End: 2019-08-09
Payer: MEDICAID

## 2019-08-09 DIAGNOSIS — M23.91 UNSPECIFIED INTERNAL DERANGEMENT OF RIGHT KNEE: ICD-10-CM

## 2019-08-09 PROCEDURE — 73721 MRI JNT OF LWR EXTRE W/O DYE: CPT

## 2019-08-12 ENCOUNTER — OUTPATIENT (OUTPATIENT)
Dept: OUTPATIENT SERVICES | Facility: HOSPITAL | Age: 59
LOS: 1 days | Discharge: HOME | End: 2019-08-12

## 2019-08-12 DIAGNOSIS — C50.919 MALIGNANT NEOPLASM OF UNSPECIFIED SITE OF UNSPECIFIED FEMALE BREAST: ICD-10-CM

## 2019-08-13 ENCOUNTER — RESULT REVIEW (OUTPATIENT)
Age: 59
End: 2019-08-13

## 2019-08-13 ENCOUNTER — APPOINTMENT (OUTPATIENT)
Dept: HEMATOLOGY ONCOLOGY | Facility: CLINIC | Age: 59
End: 2019-08-13
Payer: MEDICAID

## 2019-08-13 VITALS
OXYGEN SATURATION: 100 % | TEMPERATURE: 97.5 F | DIASTOLIC BLOOD PRESSURE: 80 MMHG | HEART RATE: 70 BPM | RESPIRATION RATE: 14 BRPM | BODY MASS INDEX: 29.64 KG/M2 | SYSTOLIC BLOOD PRESSURE: 139 MMHG | WEIGHT: 162.04 LBS

## 2019-08-13 LAB
ALBUMIN SERPL ELPH-MCNC: 4.5 G/DL
ALP BLD-CCNC: 77 U/L
ALT SERPL-CCNC: 20 U/L
ANION GAP SERPL CALC-SCNC: 12 MMOL/L
AST SERPL-CCNC: 21 U/L
BASOPHILS # BLD AUTO: 0.1 K/UL — SIGNIFICANT CHANGE UP (ref 0–0.2)
BASOPHILS NFR BLD AUTO: 1.1 % — SIGNIFICANT CHANGE UP (ref 0–2)
BILIRUB SERPL-MCNC: 0.4 MG/DL
BUN SERPL-MCNC: 15 MG/DL
CALCIUM SERPL-MCNC: 9.6 MG/DL
CHLORIDE SERPL-SCNC: 102 MMOL/L
CO2 SERPL-SCNC: 24 MMOL/L
CREAT SERPL-MCNC: 0.79 MG/DL
EOSINOPHIL # BLD AUTO: 0.1 K/UL — SIGNIFICANT CHANGE UP (ref 0–0.5)
EOSINOPHIL NFR BLD AUTO: 1.6 % — SIGNIFICANT CHANGE UP (ref 0–6)
GLUCOSE SERPL-MCNC: 76 MG/DL
HCT VFR BLD CALC: 41 % — SIGNIFICANT CHANGE UP (ref 34.5–45)
HGB BLD-MCNC: 13.6 G/DL — SIGNIFICANT CHANGE UP (ref 11.5–15.5)
LYMPHOCYTES # BLD AUTO: 3.3 K/UL — SIGNIFICANT CHANGE UP (ref 1–3.3)
LYMPHOCYTES # BLD AUTO: 46.2 % — HIGH (ref 13–44)
MCHC RBC-ENTMCNC: 31.7 PG — SIGNIFICANT CHANGE UP (ref 27–34)
MCHC RBC-ENTMCNC: 33.3 G/DL — SIGNIFICANT CHANGE UP (ref 32–36)
MCV RBC AUTO: 95.2 FL — SIGNIFICANT CHANGE UP (ref 80–100)
MONOCYTES # BLD AUTO: 0.6 K/UL — SIGNIFICANT CHANGE UP (ref 0–0.9)
MONOCYTES NFR BLD AUTO: 9.1 % — SIGNIFICANT CHANGE UP (ref 2–14)
NEUTROPHILS # BLD AUTO: 3 K/UL — SIGNIFICANT CHANGE UP (ref 1.8–7.4)
NEUTROPHILS NFR BLD AUTO: 42 % — LOW (ref 43–77)
PLATELET # BLD AUTO: 321 K/UL — SIGNIFICANT CHANGE UP (ref 150–400)
POTASSIUM SERPL-SCNC: 4.4 MMOL/L
PROT SERPL-MCNC: 7.2 G/DL
RBC # BLD: 4.3 M/UL — SIGNIFICANT CHANGE UP (ref 3.8–5.2)
RBC # FLD: 12.3 % — SIGNIFICANT CHANGE UP (ref 10.3–14.5)
SODIUM SERPL-SCNC: 138 MMOL/L
WBC # BLD: 7.1 K/UL — SIGNIFICANT CHANGE UP (ref 3.8–10.5)
WBC # FLD AUTO: 7.1 K/UL — SIGNIFICANT CHANGE UP (ref 3.8–10.5)

## 2019-08-13 PROCEDURE — 99214 OFFICE O/P EST MOD 30 MIN: CPT

## 2019-08-13 NOTE — PHYSICAL EXAM
[Fully active, able to carry on all pre-disease performance without restriction] : Status 0 - Fully active, able to carry on all pre-disease performance without restriction [Normal] : affect appropriate [de-identified] : hair growing back [de-identified] : b/l mastectomy with some lateral outpouching of fatty tissue b/l. some nodularity noted in L lumpectomy scar, nontender. no masses or adenopathy (unchanged)

## 2019-08-13 NOTE — ASSESSMENT
[FreeTextEntry1] : 58yo woman with GERD, microscopic hematuria, significant smoking history (quit recently), arthritis/osteoporosis presenting for medical oncology follow up after b/l mastectomy for R breast cancer; final pathology findings on second opinion pathology review (Dr. 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 Interval PET/CT with no other evidence of disease. S/p adjuvant chemotherapy with cisplatin/etoposide x 4 cycles in early 2019, doing well now.\par \par Breast Cancer, small cell carcinoma poorly differentiated Ki67 99%, DAVIAN\par -Labs today\par -PET/CT for restaging evaluation end of Sept 2019 - will follow NCCN guidelines for surveillance of poorly differentiated small cell neuroendocrine carcinoma (pt also with significant prior smoking history qualifying for CT chest monitoring)\par -follow up GI Dr. Horn for recurrent nonspecific sigmoid avidity on PET/CT- patient will call to schedule appointment now, discussed\par -discussed monitoring for any new signs or symptoms, follow up with me\par -continue close monitoring, follow up with her surgeon Dr. Raymundo, PT\par -follow up PMD, resume routine health care and routine health maintenance, discussed today\par -follow up 2 months end of Sept with next surveillance scan ( x 1 year, then every 6 months), sooner if issues arise\par \par Neuropathy: grade 1, mild without deficits, somewhat bothersome/stable, likely cisplatin related\par -trial of cymbalta ER again discussed with patient, she is amenable, discussed proper use, monitor for signs of drowsiness, call if any new or concerning symptoms develop - she will try taking it at night for 1 week\par \par Rash: unclear etiology, months since chemotherapy doubt related, resolved now\par -patient elects to schedule dermatology follow up closer to home, monitor for recurrence

## 2019-08-13 NOTE — HISTORY OF PRESENT ILLNESS
[de-identified] : Last mammogram ~4 years ago.\par Screening mammo 18: new 1cm mass in the upper medial Right breast. \par 18 diagnostic mammo and US: R breast 12:00 3-4cm FN 1cm slightly bi-lobed hypoechoic lesion corresponding with mammographic findings. 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 IHC stains for synaptophysin and chromogranin demonstrate nonspecific extremely scant, barely perceptible staining. Definitive characterization of the lesion is best deferred to the resection specimen. 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 Clinical correlation is necessary.\par \par 10/19/18 pathology review  [facility]: Poorly differentiated carcinoma, at least 6mm, LVI not seen. Immunostains done for ER/[place]/HER2/GATA3/CDX2/TTF-1 are negative in tumor cells (done at [place] lab). Tumor histology shows small cell features (molding, apoptotic bodies, and extensive mitotic activity). Cannot determine histologic subtype and/or site of origin. Clinicopathologic and radiologic correlation strongly recommended.\par \par She saw Dr. Carter (plastic surgery) and then Dr. Raymundo (breast surgery) on 10/9 and noted her desire for breast conserving surgery. She was referred for breast MRI and genetic testing given strong family history. 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 At 12-1:00 axis retroareolar there is 0.4cm focus of enhancement, 12:00 axis posterior third linear non mass enhancement. L breast no suspicious enhancement. Axilla unremarkable. BIRADS4 If breast conservation is a consideration, 2-site MRI guided biopsy of non-mass enhancement within the anterior and posterior upper breast is recommended.\par \par She saw me initially 10/24 - diagnostic CT C/A/P and PETCT performed - no masses other than small breast mass with clip; PET with SUV 8 avidity in sigmoid colon (patient noted eating dairy products that week and some loose BMs in retrospect). Dr. Horn her gastroenterologist performed Colonoscopy  with random biopsies which were benign.\par \par  she underwent b/l mastectomy with Dr. Raymundo. 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 Hypermetabolic subcutaneous nodule right upper arm lateral aspect (not included on prior PET/CT) - SUV 2.5 (patient notes recent flu shot with hematoma). Interval resolution of rectosigmoid FDG avidity.\par \par She started adjuvant chemotherapy (cisplatin/etoposide x 4 cyles) on 19, completed 2019. Grade 1 neuropathy, otherwise tolerated treatment well.\par \par Pertinent Medical History:\par She lives in [place] with her 3 children. She works as a  in a restaurant full time.\par 2018 hand burn at work\par GERD: GI Dr. Gilbert Horn. Normal C-scope with internal hemorrhoids 18. Abd US and CT 17 fatty liver, 7mm R hepatic focus calcifications, unchanged.\par Headaches: MRI without acute findings years ago\par Microscopic hematuria: Cystoscopy 17 without significant findings (Dr. Nadya Hu), takes oxybutinin for overactive bladder\par Seasonal Allergies\par Family history of breast cancer in her maternal aunts x 3 (ages unknown). Genetic testing negative with Dr. Raymundo\par Menarche age 11, menopause age 48. , no prior OCPs or hormonal therapies\par Active Smoker for many years, quit with this diagnosis\par PMD: Dr. Dom Coello\par Dr. Cali Gynecologist [de-identified] : Patient is here today for follow up. [employer] utilized for duration of visit. She is back to work and doing well with good energy and appetite - she [place] and ate a lot. Tingling without pain in the tips of her toes - no deficits or gait instability - this is stable and she not taking cymbalta as she does not feel it is very bothersome and Cymbalta made her feel dizzy while driving to work a few times. She denies f/c/s, HA, dizziness, hearing or vision issues, n/v, CP, SOB, abd pain, diarrhea, edema, rashes, other new neuro symptoms, weight loss or gain, urinary issues, bleeding or bruising. [FreeTextEntry1] : Cisplatin 75mg/m2 on day 1, Etoposide 100mg/m2 on day 1-3 of a 21 day cycle. Neulasta OnPro day 3\par 500cc normal saline over 1 hour before cisplatin. 1L 1/2NS (with magnesium sulfate 1g, potassium chloride 20meq) over 2 hours after cisplatin. UOP monitoring 100cc/h while receiving cisplatin\par \par Antiemetics: \par Emend 80mg PO on day 2, day 3\par Dexamethasone 4mg PO BID day 2-5\par Reglan 10mg PO q6-8h PRN for nausea

## 2019-08-14 ENCOUNTER — APPOINTMENT (OUTPATIENT)
Dept: DERMATOLOGY | Facility: CLINIC | Age: 59
End: 2019-08-14

## 2019-08-20 ENCOUNTER — APPOINTMENT (OUTPATIENT)
Dept: FAMILY MEDICINE | Facility: CLINIC | Age: 59
End: 2019-08-20
Payer: MEDICAID

## 2019-08-20 VITALS
HEART RATE: 69 BPM | BODY MASS INDEX: 30.18 KG/M2 | OXYGEN SATURATION: 98 % | SYSTOLIC BLOOD PRESSURE: 118 MMHG | HEIGHT: 62 IN | TEMPERATURE: 98.7 F | WEIGHT: 164 LBS | DIASTOLIC BLOOD PRESSURE: 72 MMHG

## 2019-08-20 PROCEDURE — 99213 OFFICE O/P EST LOW 20 MIN: CPT

## 2019-08-20 NOTE — PHYSICAL EXAM
[No Acute Distress] : no acute distress [Well Nourished] : well nourished [Well Developed] : well developed [Well-Appearing] : well-appearing [Normal Sclera/Conjunctiva] : normal sclera/conjunctiva [EOMI] : extraocular movements intact [PERRL] : pupils equal round and reactive to light [Normal Outer Ear/Nose] : the outer ears and nose were normal in appearance [Normal Oropharynx] : the oropharynx was normal [No JVD] : no jugular venous distention [No Lymphadenopathy] : no lymphadenopathy [Thyroid Normal, No Nodules] : the thyroid was normal and there were no nodules present [Supple] : supple [No Respiratory Distress] : no respiratory distress  [No Accessory Muscle Use] : no accessory muscle use [Clear to Auscultation] : lungs were clear to auscultation bilaterally [Normal Rate] : normal rate  [Regular Rhythm] : with a regular rhythm [No Murmur] : no murmur heard [Normal S1, S2] : normal S1 and S2 [No Carotid Bruits] : no carotid bruits [No Abdominal Bruit] : a ~M bruit was not heard ~T in the abdomen [No Varicosities] : no varicosities [Pedal Pulses Present] : the pedal pulses are present [No Palpable Aorta] : no palpable aorta [No Edema] : there was no peripheral edema [No Extremity Clubbing/Cyanosis] : no extremity clubbing/cyanosis [Soft] : abdomen soft [Non Tender] : non-tender [Non-distended] : non-distended [No HSM] : no HSM [No Masses] : no abdominal mass palpated [Normal Bowel Sounds] : normal bowel sounds [Normal Posterior Cervical Nodes] : no posterior cervical lymphadenopathy [Normal Anterior Cervical Nodes] : no anterior cervical lymphadenopathy [No CVA Tenderness] : no CVA  tenderness [No Spinal Tenderness] : no spinal tenderness [No Joint Swelling] : no joint swelling [Grossly Normal Strength/Tone] : grossly normal strength/tone [Coordination Grossly Intact] : coordination grossly intact [No Rash] : no rash [No Focal Deficits] : no focal deficits [Normal Gait] : normal gait [Deep Tendon Reflexes (DTR)] : deep tendon reflexes were 2+ and symmetric [Normal Affect] : the affect was normal [Normal Insight/Judgement] : insight and judgment were intact

## 2019-08-20 NOTE — HISTORY OF PRESENT ILLNESS
[FreeTextEntry8] :  [employer] was utilized to speak with patient in American. \par \par Patient presents for referral for colonoscopy. She was informed by Dr. Qureshi that patient's PET scan showed questionable tissue in the sigmoid colon that should be re assessed via colonoscopy or sigmoidoscopy. Patient denies abdominal pain, dark stool, diarrhea or constipation. She reports having a colonoscopy in 10/2018 by Dr. Horn. She reports that Dr Horn does not accept her insurance anymore. Patient has history of breast cancer with bilateral mastectomy. Patient reports completing chemo in march\par \par Patient is also requesting dermatology referral. Patient notes that she has had a reoccurrence of the rash that was treated by Dr. Snell. Both times rash resolved with triamcinolone cream.

## 2019-09-25 ENCOUNTER — OUTPATIENT (OUTPATIENT)
Dept: OUTPATIENT SERVICES | Facility: HOSPITAL | Age: 59
LOS: 1 days | Discharge: HOME | End: 2019-09-25

## 2019-09-25 DIAGNOSIS — C50.919 MALIGNANT NEOPLASM OF UNSPECIFIED SITE OF UNSPECIFIED FEMALE BREAST: ICD-10-CM

## 2019-09-26 ENCOUNTER — APPOINTMENT (OUTPATIENT)
Dept: HEMATOLOGY ONCOLOGY | Facility: CLINIC | Age: 59
End: 2019-09-26
Payer: MEDICAID

## 2019-09-26 ENCOUNTER — RESULT REVIEW (OUTPATIENT)
Age: 59
End: 2019-09-26

## 2019-09-26 VITALS
HEART RATE: 68 BPM | WEIGHT: 166.23 LBS | TEMPERATURE: 97.6 F | RESPIRATION RATE: 14 BRPM | OXYGEN SATURATION: 99 % | DIASTOLIC BLOOD PRESSURE: 82 MMHG | BODY MASS INDEX: 30.4 KG/M2 | SYSTOLIC BLOOD PRESSURE: 143 MMHG

## 2019-09-26 LAB
BASOPHILS # BLD AUTO: 0.1 K/UL — SIGNIFICANT CHANGE UP (ref 0–0.2)
BASOPHILS NFR BLD AUTO: 1 % — SIGNIFICANT CHANGE UP (ref 0–2)
EOSINOPHIL # BLD AUTO: 0.1 K/UL — SIGNIFICANT CHANGE UP (ref 0–0.5)
EOSINOPHIL NFR BLD AUTO: 1.9 % — SIGNIFICANT CHANGE UP (ref 0–6)
HCT VFR BLD CALC: 44.9 % — SIGNIFICANT CHANGE UP (ref 34.5–45)
HGB BLD-MCNC: 15.1 G/DL — SIGNIFICANT CHANGE UP (ref 11.5–15.5)
LYMPHOCYTES # BLD AUTO: 3.4 K/UL — HIGH (ref 1–3.3)
LYMPHOCYTES # BLD AUTO: 43.6 % — SIGNIFICANT CHANGE UP (ref 13–44)
MCHC RBC-ENTMCNC: 32.6 PG — SIGNIFICANT CHANGE UP (ref 27–34)
MCHC RBC-ENTMCNC: 33.8 G/DL — SIGNIFICANT CHANGE UP (ref 32–36)
MCV RBC AUTO: 96.7 FL — SIGNIFICANT CHANGE UP (ref 80–100)
MONOCYTES # BLD AUTO: 0.6 K/UL — SIGNIFICANT CHANGE UP (ref 0–0.9)
MONOCYTES NFR BLD AUTO: 7.7 % — SIGNIFICANT CHANGE UP (ref 2–14)
NEUTROPHILS # BLD AUTO: 3.6 K/UL — SIGNIFICANT CHANGE UP (ref 1.8–7.4)
NEUTROPHILS NFR BLD AUTO: 45.8 % — SIGNIFICANT CHANGE UP (ref 43–77)
PLATELET # BLD AUTO: 356 K/UL — SIGNIFICANT CHANGE UP (ref 150–400)
RBC # BLD: 4.64 M/UL — SIGNIFICANT CHANGE UP (ref 3.8–5.2)
RBC # FLD: 12.1 % — SIGNIFICANT CHANGE UP (ref 10.3–14.5)
WBC # BLD: 7.9 K/UL — SIGNIFICANT CHANGE UP (ref 3.8–10.5)
WBC # FLD AUTO: 7.9 K/UL — SIGNIFICANT CHANGE UP (ref 3.8–10.5)

## 2019-09-26 PROCEDURE — 99214 OFFICE O/P EST MOD 30 MIN: CPT

## 2019-09-26 RX ORDER — DULOXETINE HYDROCHLORIDE 20 MG/1
20 CAPSULE, DELAYED RELEASE PELLETS ORAL DAILY
Qty: 30 | Refills: 1 | Status: DISCONTINUED | COMMUNITY
Start: 2019-06-26 | End: 2019-09-26

## 2019-09-27 LAB
ALBUMIN SERPL ELPH-MCNC: 4.7 G/DL
ALP BLD-CCNC: 107 U/L
ALT SERPL-CCNC: 17 U/L
ANION GAP SERPL CALC-SCNC: 11 MMOL/L
AST SERPL-CCNC: 18 U/L
BILIRUB SERPL-MCNC: 0.3 MG/DL
BUN SERPL-MCNC: 13 MG/DL
CALCIUM SERPL-MCNC: 9.3 MG/DL
CHLORIDE SERPL-SCNC: 102 MMOL/L
CO2 SERPL-SCNC: 27 MMOL/L
CREAT SERPL-MCNC: 0.81 MG/DL
GLUCOSE SERPL-MCNC: 100 MG/DL
MAGNESIUM SERPL-MCNC: 2 MG/DL
POTASSIUM SERPL-SCNC: 4.5 MMOL/L
PROT SERPL-MCNC: 7.6 G/DL
SODIUM SERPL-SCNC: 140 MMOL/L

## 2019-10-02 NOTE — HISTORY OF PRESENT ILLNESS
[de-identified] : Last mammogram ~4 years ago.\par Screening mammo 18: new 1cm mass in the upper medial Right breast. \par 18 diagnostic mammo and US: R breast 12:00 3-4cm FN 1cm slightly bi-lobed hypoechoic lesion corresponding with mammographic findings. 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 IHC stains for synaptophysin and chromogranin demonstrate nonspecific extremely scant, barely perceptible staining. Definitive characterization of the lesion is best deferred to the resection specimen. 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 Clinical correlation is necessary.\par \par 10/19/18 pathology review  [facility]: Poorly differentiated carcinoma, at least 6mm, LVI not seen. Immunostains done for ER/[place]/HER2/GATA3/CDX2/TTF-1 are negative in tumor cells (done at [place] lab). Tumor histology shows small cell features (molding, apoptotic bodies, and extensive mitotic activity). Cannot determine histologic subtype and/or site of origin. Clinicopathologic and radiologic correlation strongly recommended.\par \par She saw Dr. Carter (plastic surgery) and then Dr. Raymundo (breast surgery) on 10/9 and noted her desire for breast conserving surgery. She was referred for breast MRI and genetic testing given strong family history. 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 At 12-1:00 axis retroareolar there is 0.4cm focus of enhancement, 12:00 axis posterior third linear non mass enhancement. L breast no suspicious enhancement. Axilla unremarkable. BIRADS4 If breast conservation is a consideration, 2-site MRI guided biopsy of non-mass enhancement within the anterior and posterior upper breast is recommended.\par \par She saw me initially 10/24 - diagnostic CT C/A/P and PETCT performed - no masses other than small breast mass with clip; PET with SUV 8 avidity in sigmoid colon (patient noted eating dairy products that week and some loose BMs in retrospect). Dr. Horn her gastroenterologist performed Colonoscopy  with random biopsies which were benign.\par \par  she underwent b/l mastectomy with Dr. Raymundo. 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 Hypermetabolic subcutaneous nodule right upper arm lateral aspect (not included on prior PET/CT) - SUV 2.5 (patient notes recent flu shot with hematoma). Interval resolution of rectosigmoid FDG avidity.\par \par She started adjuvant chemotherapy (cisplatin/etoposide x 4 cyles) on 19, completed 2019. Grade 1 neuropathy, otherwise tolerated treatment well.\par \par Pertinent Medical History:\par She lives in [place] with her 3 children. She works as a  in a restaurant full time.\par 2018 hand burn at work\par GERD: GI Dr. Gilbert Horn. Normal C-scope with internal hemorrhoids 18. Abd US and CT 17 fatty liver, 7mm R hepatic focus calcifications, unchanged.\par Headaches: MRI without acute findings years ago\par Microscopic hematuria: Cystoscopy 17 without significant findings (Dr. Nadya Hu), takes oxybutinin for overactive bladder\par Seasonal Allergies\par Family history of breast cancer in her maternal aunts x 3 (ages unknown). Genetic testing negative with Dr. Raymundo\par Menarche age 11, menopause age 48. , no prior OCPs or hormonal therapies\par Active Smoker for many years, quit with this diagnosis\par PMD: Dr. Dom Coello\par Dr. Cali Gynecologist [FreeTextEntry1] : Cisplatin 75mg/m2 on day 1, Etoposide 100mg/m2 on day 1-3 of a 21 day cycle. Neulasta OnPro day 3\par 500cc normal saline over 1 hour before cisplatin. 1L 1/2NS (with magnesium sulfate 1g, potassium chloride 20meq) over 2 hours after cisplatin. UOP monitoring 100cc/h while receiving cisplatin\par \par Antiemetics: \par Emend 80mg PO on day 2, day 3\par Dexamethasone 4mg PO BID day 2-5\par Reglan 10mg PO q6-8h PRN for nausea [de-identified] : Patient is here today for follow up. [employer] utilized for duration of visit. She is back to work and doing well with good energy and appetite. Tingling without pain in the tips of her toes - no deficits or gait instability - but she does note heaviness and sometimes numbness when she stands for long periods of time at work. She has GI follow up next week for PET findings. She denies f/c/s, HA, dizziness, hearing or vision issues, n/v, CP, SOB, abd pain, diarrhea, edema, rashes, other new neuro symptoms, weight loss or gain, urinary issues, bleeding or bruising.

## 2019-10-02 NOTE — ASSESSMENT
[FreeTextEntry1] : 60yo woman with GERD, microscopic hematuria, significant smoking history (quit recently), arthritis/osteoporosis presenting for medical oncology follow up after b/l mastectomy for R breast cancer; final pathology findings on second opinion pathology review (Dr. 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 Interval PET/CT with no other evidence of disease. S/p adjuvant chemotherapy with cisplatin/etoposide x 4 cycles in early 2019, doing well now with some neuropathy mild.\par \par Breast Cancer, small cell carcinoma poorly differentiated Ki67 99%, DAVIAN\par -Labs today\par -PET/CT for restaging evaluation due now Sept 2019 - will follow NCCN guidelines for surveillance of poorly differentiated small cell neuroendocrine carcinoma (pt also with significant prior smoking history qualifying for CT chest monitoring) - patient did not schedule, discussed today and she will schedule ASAP\par -follow up GI - John White for recurrent nonspecific sigmoid avidity on PET/CT- patient scheduled next week, follow up\par -discussed monitoring for any new signs or symptoms, follow up with me\par -continue close monitoring, follow up with her surgeon Dr. 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

## 2019-10-02 NOTE — PHYSICAL EXAM
[Fully active, able to carry on all pre-disease performance without restriction] : Status 0 - Fully active, able to carry on all pre-disease performance without restriction [Normal] : affect appropriate [de-identified] : b/l mastectomy with some lateral outpouching of fatty tissue b/l. some nodularity noted in L lumpectomy scar, nontender. no masses or adenopathy (unchanged) [de-identified] : hair growing back

## 2019-10-03 ENCOUNTER — APPOINTMENT (OUTPATIENT)
Dept: GASTROENTEROLOGY | Facility: CLINIC | Age: 59
End: 2019-10-03
Payer: MEDICAID

## 2019-10-03 VITALS
DIASTOLIC BLOOD PRESSURE: 84 MMHG | HEIGHT: 62 IN | WEIGHT: 166 LBS | BODY MASS INDEX: 30.55 KG/M2 | OXYGEN SATURATION: 99 % | SYSTOLIC BLOOD PRESSURE: 145 MMHG | HEART RATE: 94 BPM

## 2019-10-03 DIAGNOSIS — Z87.19 PERSONAL HISTORY OF OTHER DISEASES OF THE DIGESTIVE SYSTEM: ICD-10-CM

## 2019-10-03 DIAGNOSIS — R94.8 ABNORMAL RESULTS OF FUNCTION STUDIES OF OTHER ORGANS AND SYSTEMS: ICD-10-CM

## 2019-10-03 PROCEDURE — 99204 OFFICE O/P NEW MOD 45 MIN: CPT

## 2019-10-03 NOTE — PHYSICAL EXAM
[General Appearance - Alert] : alert [General Appearance - In No Acute Distress] : in no acute distress [PERRL With Normal Accommodation] : pupils were equal in size, round, and reactive to light [Sclera] : the sclera and conjunctiva were normal [Extraocular Movements] : extraocular movements were intact [Outer Ear] : the ears and nose were normal in appearance [Oropharynx] : the oropharynx was normal [Neck Appearance] : the appearance of the neck was normal [Neck Cervical Mass (___cm)] : no neck mass was observed [Jugular Venous Distention Increased] : there was no jugular-venous distention [Thyroid Diffuse Enlargement] : the thyroid was not enlarged [Thyroid Nodule] : there were no palpable thyroid nodules [Auscultation Breath Sounds / Voice Sounds] : lungs were clear to auscultation bilaterally [Heart Sounds] : normal S1 and S2 [Heart Rate And Rhythm] : heart rate was normal and rhythm regular [Heart Sounds Gallop] : no gallops [Murmurs] : no murmurs [Heart Sounds Pericardial Friction Rub] : no pericardial rub [Edema] : there was no peripheral edema [Bowel Sounds] : normal bowel sounds [Abdomen Soft] : soft [Abdomen Tenderness] : non-tender [Cervical Lymph Nodes Enlarged Posterior Bilaterally] : posterior cervical [Abdomen Mass (___ Cm)] : no abdominal mass palpated [Cervical Lymph Nodes Enlarged Anterior Bilaterally] : anterior cervical [No CVA Tenderness] : no ~M costovertebral angle tenderness [No Spinal Tenderness] : no spinal tenderness [Nail Clubbing] : no clubbing  or cyanosis of the fingernails [Abnormal Walk] : normal gait [Musculoskeletal - Swelling] : no joint swelling seen [Motor Tone] : muscle strength and tone were normal [Skin Turgor] : normal skin turgor [Skin Color & Pigmentation] : normal skin color and pigmentation [] : no rash [No Focal Deficits] : no focal deficits [Oriented To Time, Place, And Person] : oriented to person, place, and time [Impaired Insight] : insight and judgment were intact [Affect] : the affect was normal

## 2019-10-03 NOTE — HISTORY OF PRESENT ILLNESS
[de-identified] : That the patient is here to discuss the need for further endoscopic evaluation of her GI tract.\par \par Per the patient and records, she had an upper endoscopy and colonoscopy in 2018. The colonoscopy records are available and it was normal.  However, recent CT/PET questioned increased metabolic activity in rectosigmoid. She states that she had mild gastritis and was placed on a proton pump inhibitor. She misses her doses intermittently without any breakthrough symptoms. She denies heartburn, odynophagia, dysphagia or satiety. Her weight is stable. She moves her bowels on a daily basis without rectal bleeding.\par \par Included demonstration of benign liver and a benign hepatic lesion that was demonstrated to be stable.

## 2019-10-03 NOTE — ASSESSMENT
[FreeTextEntry1] : Impression is that the patient is up-to-date with her GI surveillance.  Given abnormal CT/PET will schedule colonoscopy.\par \par  I have counseled her to attempt to stop taking the proton pump inhibitor and to switch to an H2 antagonist which I prescribed.  The patient is to contact me if she has breakthrough symptoms.

## 2019-10-10 ENCOUNTER — FORM ENCOUNTER (OUTPATIENT)
Age: 59
End: 2019-10-10

## 2019-10-11 ENCOUNTER — APPOINTMENT (OUTPATIENT)
Dept: NUCLEAR MEDICINE | Facility: IMAGING CENTER | Age: 59
End: 2019-10-11
Payer: MEDICAID

## 2019-10-11 ENCOUNTER — OUTPATIENT (OUTPATIENT)
Dept: OUTPATIENT SERVICES | Facility: HOSPITAL | Age: 59
LOS: 1 days | End: 2019-10-11
Payer: MEDICAID

## 2019-10-11 DIAGNOSIS — C80.1 MALIGNANT (PRIMARY) NEOPLASM, UNSPECIFIED: ICD-10-CM

## 2019-10-11 PROCEDURE — 78815 PET IMAGE W/CT SKULL-THIGH: CPT | Mod: 26,PS

## 2019-10-11 PROCEDURE — A9552: CPT

## 2019-10-11 PROCEDURE — 78815 PET IMAGE W/CT SKULL-THIGH: CPT

## 2019-10-25 ENCOUNTER — APPOINTMENT (OUTPATIENT)
Dept: FAMILY MEDICINE | Facility: CLINIC | Age: 59
End: 2019-10-25
Payer: MEDICAID

## 2019-10-25 VITALS
OXYGEN SATURATION: 99 % | SYSTOLIC BLOOD PRESSURE: 124 MMHG | HEIGHT: 62 IN | HEART RATE: 66 BPM | DIASTOLIC BLOOD PRESSURE: 82 MMHG | BODY MASS INDEX: 30.55 KG/M2 | TEMPERATURE: 97.4 F | WEIGHT: 166 LBS

## 2019-10-25 DIAGNOSIS — S83.206A UNSPECIFIED TEAR OF UNSPECIFIED MENISCUS, CURRENT INJURY, RIGHT KNEE, INITIAL ENCOUNTER: ICD-10-CM

## 2019-10-25 PROCEDURE — 99214 OFFICE O/P EST MOD 30 MIN: CPT

## 2019-10-25 NOTE — HISTORY OF PRESENT ILLNESS
[Musculoskeletal Symptoms Knees] : knee [Constant] : constant [Severe] : severe [OTC Remedies] : OTC remedies [Activity] : with activity [At Night] : at night [In the Morning] : in the morning [Worsening] : worsening [FreeTextEntry3] : 10/10 [FreeTextEntry1] : 1 year [FreeTextEntry2] : right medial knee swelling , radiating right hip pain [FreeTextEntry5] : Tylenol  [FreeTextEntry8] : She works as .

## 2019-10-25 NOTE — COUNSELING
[Behavioral health counseling provided] : Behavioral health counseling provided [Engage in a relaxing activity] : Engage in a relaxing activity [Needs reinforcement, provided] : Patient needs reinforcement on understanding of lifestyle changes and steps needed to achieve self management goal; reinforcement was provided [None] : None

## 2019-10-25 NOTE — HEALTH RISK ASSESSMENT
[No] : No [No falls in past year] : Patient reported no falls in the past year [0] : 1) Little interest or pleasure doing things: Not at all (0) [de-identified] : quit 1 year ago [] : No [[id number]] : 0

## 2019-10-25 NOTE — PLAN
[FreeTextEntry1] : pt. will f/u GYN for pap smear, f/u GI  for colonoscopy in 11/2019. declined CT lung, quit smoking a year ago(smoked 1 PPD for 30 yrs).  Declined Shingrix. \par  ICE application right knee, rest, antiinflammatory. f/u orthopedic

## 2019-10-25 NOTE — PHYSICAL EXAM
[Well Nourished] : well nourished [No Acute Distress] : no acute distress [Well-Appearing] : well-appearing [Well Developed] : well developed [Normal Sclera/Conjunctiva] : normal sclera/conjunctiva [EOMI] : extraocular movements intact [PERRL] : pupils equal round and reactive to light [No JVD] : no jugular venous distention [Supple] : supple [No Lymphadenopathy] : no lymphadenopathy [Thyroid Normal, No Nodules] : the thyroid was normal and there were no nodules present [Clear to Auscultation] : lungs were clear to auscultation bilaterally [No Respiratory Distress] : no respiratory distress  [No Accessory Muscle Use] : no accessory muscle use [Normal S1, S2] : normal S1 and S2 [Regular Rhythm] : with a regular rhythm [Normal Rate] : normal rate  [No Murmur] : no murmur heard [No Carotid Bruits] : no carotid bruits [No Abdominal Bruit] : a ~M bruit was not heard ~T in the abdomen [Pedal Pulses Present] : the pedal pulses are present [No Edema] : there was no peripheral edema [No Varicosities] : no varicosities [No Palpable Aorta] : no palpable aorta [No Extremity Clubbing/Cyanosis] : no extremity clubbing/cyanosis [Non Tender] : non-tender [Non-distended] : non-distended [Soft] : abdomen soft [No Masses] : no abdominal mass palpated [No HSM] : no HSM [Normal Bowel Sounds] : normal bowel sounds [Normal Anterior Cervical Nodes] : no anterior cervical lymphadenopathy [Normal Posterior Cervical Nodes] : no posterior cervical lymphadenopathy [No CVA Tenderness] : no CVA  tenderness [No Spinal Tenderness] : no spinal tenderness [No Joint Swelling] : no joint swelling [Grossly Normal Strength/Tone] : grossly normal strength/tone [No Rash] : no rash [Coordination Grossly Intact] : coordination grossly intact [No Focal Deficits] : no focal deficits [Deep Tendon Reflexes (DTR)] : deep tendon reflexes were 2+ and symmetric [Normal Gait] : normal gait [Normal Affect] : the affect was normal [Normal Insight/Judgement] : insight and judgment were intact [de-identified] : right knee medial area swelling and discomfort.

## 2019-11-05 ENCOUNTER — APPOINTMENT (OUTPATIENT)
Dept: ORTHOPEDIC SURGERY | Facility: CLINIC | Age: 59
End: 2019-11-05
Payer: MEDICAID

## 2019-11-05 VITALS
WEIGHT: 160 LBS | HEART RATE: 67 BPM | BODY MASS INDEX: 29.44 KG/M2 | HEIGHT: 62 IN | DIASTOLIC BLOOD PRESSURE: 84 MMHG | SYSTOLIC BLOOD PRESSURE: 146 MMHG

## 2019-11-05 DIAGNOSIS — S83.241A OTHER TEAR OF MEDIAL MENISCUS, CURRENT INJURY, RIGHT KNEE, INITIAL ENCOUNTER: ICD-10-CM

## 2019-11-05 PROCEDURE — 20610 DRAIN/INJ JOINT/BURSA W/O US: CPT | Mod: RT

## 2019-11-05 PROCEDURE — 99204 OFFICE O/P NEW MOD 45 MIN: CPT | Mod: 25

## 2019-11-05 PROCEDURE — 73562 X-RAY EXAM OF KNEE 3: CPT | Mod: RT

## 2019-11-08 ENCOUNTER — APPOINTMENT (OUTPATIENT)
Dept: GASTROENTEROLOGY | Facility: HOSPITAL | Age: 59
End: 2019-11-08

## 2019-12-19 ENCOUNTER — OUTPATIENT (OUTPATIENT)
Dept: OUTPATIENT SERVICES | Facility: HOSPITAL | Age: 59
LOS: 1 days | Discharge: HOME | End: 2019-12-19

## 2019-12-19 DIAGNOSIS — C50.919 MALIGNANT NEOPLASM OF UNSPECIFIED SITE OF UNSPECIFIED FEMALE BREAST: ICD-10-CM

## 2019-12-26 ENCOUNTER — RESULT REVIEW (OUTPATIENT)
Age: 59
End: 2019-12-26

## 2019-12-26 ENCOUNTER — APPOINTMENT (OUTPATIENT)
Dept: HEMATOLOGY ONCOLOGY | Facility: CLINIC | Age: 59
End: 2019-12-26
Payer: MEDICAID

## 2019-12-26 VITALS
TEMPERATURE: 98 F | DIASTOLIC BLOOD PRESSURE: 74 MMHG | RESPIRATION RATE: 14 BRPM | SYSTOLIC BLOOD PRESSURE: 120 MMHG | WEIGHT: 165.35 LBS | OXYGEN SATURATION: 99 % | BODY MASS INDEX: 30.24 KG/M2 | HEART RATE: 71 BPM

## 2019-12-26 LAB
BASOPHILS # BLD AUTO: 0.1 K/UL — SIGNIFICANT CHANGE UP (ref 0–0.2)
BASOPHILS NFR BLD AUTO: 0.8 % — SIGNIFICANT CHANGE UP (ref 0–2)
EOSINOPHIL # BLD AUTO: 0.1 K/UL — SIGNIFICANT CHANGE UP (ref 0–0.5)
EOSINOPHIL NFR BLD AUTO: 0.9 % — SIGNIFICANT CHANGE UP (ref 0–6)
HCT VFR BLD CALC: 43.5 % — SIGNIFICANT CHANGE UP (ref 34.5–45)
HGB BLD-MCNC: 14.4 G/DL — SIGNIFICANT CHANGE UP (ref 11.5–15.5)
LYMPHOCYTES # BLD AUTO: 4.2 K/UL — HIGH (ref 1–3.3)
LYMPHOCYTES # BLD AUTO: 41.3 % — SIGNIFICANT CHANGE UP (ref 13–44)
MCHC RBC-ENTMCNC: 32.9 PG — SIGNIFICANT CHANGE UP (ref 27–34)
MCHC RBC-ENTMCNC: 33.2 G/DL — SIGNIFICANT CHANGE UP (ref 32–36)
MCV RBC AUTO: 99 FL — SIGNIFICANT CHANGE UP (ref 80–100)
MONOCYTES # BLD AUTO: 0.6 K/UL — SIGNIFICANT CHANGE UP (ref 0–0.9)
MONOCYTES NFR BLD AUTO: 6.2 % — SIGNIFICANT CHANGE UP (ref 2–14)
NEUTROPHILS # BLD AUTO: 5.1 K/UL — SIGNIFICANT CHANGE UP (ref 1.8–7.4)
NEUTROPHILS NFR BLD AUTO: 50.8 % — SIGNIFICANT CHANGE UP (ref 43–77)
PLATELET # BLD AUTO: 318 K/UL — SIGNIFICANT CHANGE UP (ref 150–400)
RBC # BLD: 4.39 M/UL — SIGNIFICANT CHANGE UP (ref 3.8–5.2)
RBC # FLD: 12.2 % — SIGNIFICANT CHANGE UP (ref 10.3–14.5)
WBC # BLD: 10.1 K/UL — SIGNIFICANT CHANGE UP (ref 3.8–10.5)
WBC # FLD AUTO: 10.1 K/UL — SIGNIFICANT CHANGE UP (ref 3.8–10.5)

## 2019-12-26 PROCEDURE — 99214 OFFICE O/P EST MOD 30 MIN: CPT

## 2019-12-26 NOTE — ASSESSMENT
[FreeTextEntry1] : 58yo woman with GERD, microscopic hematuria, significant smoking history (quit recently), arthritis/osteoporosis presenting for medical oncology follow up after b/l mastectomy for R breast cancer; final pathology findings on second opinion pathology review (Dr. 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 Interval PET/CT with no other evidence of disease. S/p adjuvant chemotherapy with cisplatin/etoposide x 4 cycles in early 2019, doing well now with some neuropathy mild.\par \par Breast Cancer, small cell carcinoma poorly differentiated Ki67 99%, DAVIAN\par -Labs today\par -PET/CT for restaging evaluation due Jan 2020 - will follow NCCN guidelines for surveillance of poorly differentiated small cell neuroendocrine carcinoma (pt also with significant prior smoking history qualifying for CT chest monitoring) - with ativan because of brown adipose noted on prior PET - eprescribed, counseled patient on use before scan\par -follow up GI - John White for recurrent nonspecific sigmoid avidity on PET/CT (likely physiologic commented), resolved last scan, pending scan above, asymptomatic\par -discussed monitoring for any new signs or symptoms, follow up with me\par -continue close monitoring, follow up with her surgeon Dr. Raymundo, PT\par -follow up PMD, resume routine health care and routine health maintenance, discussed all again today\par -follow up 3 months, sooner if issues arise\par \par Neuropathy: grade 1, mild without deficits, somewhat bothersome/stable, likely cisplatin related - numbness no pain, gait stable, worse with standing long periods now resolved nearly completely, self discontinued cymbalta\par -continue to monitor\par \par Rash: unclear etiology, months since chemotherapy doubt related, resolved now, intermittent, eczematous description and treated with steroids by dermatologist, not present\par -advised continued follow up with dermatology, recommendations per them\par \par -RHM all discussed extensively: PMD at least annually with lipid checks/bloodwork, gyn at least annually and PAP test screening per their recommendations, colon cancer screening/colonoscopy at least every 10 years as recommended by PMD/GI, dermatology for annual skin checks, ophthalmology for annual eye exams\par -genetics reviewed

## 2019-12-26 NOTE — RESULTS/DATA
[FreeTextEntry1] : PETCT 10/11/19: FDG-avid low atenuation densities L mastectomy bed, decreased in size, likely representing evolving post surgical change. Extensive physiologic brown adipose tissue activity in neck and thorax. Minimally FDG avid L internal mammary lymph node, unchanged. Abdomen soft, nontender, nondistended, bowel sounds present in all 4 quadrants.

## 2019-12-26 NOTE — PHYSICAL EXAM
[Fully active, able to carry on all pre-disease performance without restriction] : Status 0 - Fully active, able to carry on all pre-disease performance without restriction [Normal] : affect appropriate [de-identified] : b/l mastectomy with some lateral outpouching of fatty tissue b/l. some nodularity noted in L lumpectomy scar, nontender. no masses or adenopathy (unchanged)

## 2019-12-26 NOTE — HISTORY OF PRESENT ILLNESS
[de-identified] : Last mammogram ~4 years ago.\par Screening mammo 18: new 1cm mass in the upper medial Right breast. \par 18 diagnostic mammo and US: R breast 12:00 3-4cm FN 1cm slightly bi-lobed hypoechoic lesion corresponding with mammographic findings. BIRADS 4\par \par US guided biopsy 12:00 3-4FN 18: revealed a final pathology of invasive carcinoma with neuroendocrine features (Er-Pr-Her2-), Sarah grade 3, 8mm in specimen.\par \par Pathology Report ([facility]):\par Invasive carcinoma with neuroendocrine features, G3, 0.8cm in sample\par Addendum: the tumor is triple negative with histologic morphology similar to small cell neuroendocrine carcinoma. IHC stains for synaptophysin and chromogranin demonstrate nonspecific extremely scant, barely perceptible staining. Definitive characterization of the lesion is best deferred to the resection specimen. ER 0% MD 0% HER2 IHC 0 negative\par \par 10/17/18 Addendum:\par IHC negative ALPESH-3, TTF-1, CDX-2\par Although in this limited sample the tumor demonstrated morphologic features similar to small cell neuroendocrine carcinoma, the overall findings are nonspecific regarding definitive histologic subtype and site of origin. Clinical correlation is necessary.\par \par 10/19/18 pathology review  [facility]: Poorly differentiated carcinoma, at least 6mm, LVI not seen. Immunostains done for ER/MD/HER2/GATA3/CDX2/TTF-1 are negative in tumor cells (done at [place] lab). Tumor histology shows small cell features (molding, apoptotic bodies, and extensive mitotic activity). Cannot determine histologic subtype and/or site of origin. Clinicopathologic and radiologic correlation strongly recommended.\par \par She saw Dr. Carter (plastic surgery) and then Dr. Raymundo (breast surgery) on 10/9 and noted her desire for breast conserving surgery. She was referred for breast MRI and genetic testing given strong family history. 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 At 12-1:00 axis retroareolar there is 0.4cm focus of enhancement, 12:00 axis posterior third linear non mass enhancement. L breast no suspicious enhancement. Axilla unremarkable. BIRADS4 If breast conservation is a consideration, 2-site MRI guided biopsy of non-mass enhancement within the anterior and posterior upper breast is recommended.\par \par She saw me initially 10/24 - diagnostic CT C/A/P and PETCT performed - no masses other than small breast mass with clip; PET with SUV 8 avidity in sigmoid colon (patient noted eating dairy products that week and some loose BMs in retrospect). Dr. Horn her gastroenterologist performed Colonoscopy  with random biopsies which were benign.\par \par  she underwent b/l mastectomy with Dr. Raymundo. 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 Hypermetabolic subcutaneous nodule right upper arm lateral aspect (not included on prior PET/CT) - SUV 2.5 (patient notes recent flu shot with hematoma). Interval resolution of rectosigmoid FDG avidity.\par \par She started adjuvant chemotherapy (cisplatin/etoposide x 4 cyles) on 19, completed 2019. Grade 1 neuropathy, otherwise tolerated treatment well.\par \par Pertinent Medical History:\par She lives in [place] with her 3 children. She works as a  in a restaurant full time.\par 2018 hand burn at work\par GERD: GI Dr. Gilbert Horn. Normal C-scope with internal hemorrhoids 18. Abd US and CT 17 fatty liver, 7mm R hepatic focus calcifications, unchanged.\par Headaches: MRI without acute findings years ago\par Microscopic hematuria: Cystoscopy 17 without significant findings (Dr. Nadya Hu), takes oxybutinin for overactive bladder\par Seasonal Allergies\par Family history of breast cancer in her maternal aunts x 3 (ages unknown). Genetic testing negative with Dr. Raymundo\par Menarche age 11, menopause age 48. , no prior OCPs or hormonal therapies\par Active Smoker for many years, quit with this diagnosis\par PMD: Dr. Dom Coello\par Dr. Cali Gynecologist [de-identified] : Patient is here today for follow up wit her daughter and grandson. She is back to work and doing well with good energy and appetite - she notes she has gained a bit of weight due to eating more. Neuropathy symptoms in feet much improved - rare tingling, has stopped using Cymbalta as she feels like she does not need it. She saw ortho for ongoing R knee pain - had XR, cortisone injection with some relief, uses meloxicam PRN. She is looking forward to going to her home country in [place] for a month. She denies f/c/s, HA, dizziness, hearing or vision issues, n/v, CP, SOB, abd pain, diarrhea, edema, rashes, other new neuro symptoms, weight loss or gain, urinary issues, bleeding or bruising. She has not smoked cigarettes since starting chemotherapy. [FreeTextEntry1] : Cisplatin 75mg/m2 on day 1, Etoposide 100mg/m2 on day 1-3 of a 21 day cycle. Neulasta OnPro day 3\par 500cc normal saline over 1 hour before cisplatin. 1L 1/2NS (with magnesium sulfate 1g, potassium chloride 20meq) over 2 hours after cisplatin. UOP monitoring 100cc/h while receiving cisplatin\par \par Antiemetics: \par Emend 80mg PO on day 2, day 3\par Dexamethasone 4mg PO BID day 2-5\par Reglan 10mg PO q6-8h PRN for nausea

## 2019-12-27 LAB
ALBUMIN SERPL ELPH-MCNC: 4.5 G/DL
ALP BLD-CCNC: 94 U/L
ALT SERPL-CCNC: 20 U/L
ANION GAP SERPL CALC-SCNC: 12 MMOL/L
AST SERPL-CCNC: 18 U/L
BILIRUB SERPL-MCNC: 0.2 MG/DL
BUN SERPL-MCNC: 14 MG/DL
CALCIUM SERPL-MCNC: 10 MG/DL
CHLORIDE SERPL-SCNC: 100 MMOL/L
CO2 SERPL-SCNC: 27 MMOL/L
CREAT SERPL-MCNC: 0.83 MG/DL
GLUCOSE SERPL-MCNC: 87 MG/DL
POTASSIUM SERPL-SCNC: 4.8 MMOL/L
PROT SERPL-MCNC: 7.1 G/DL
SODIUM SERPL-SCNC: 139 MMOL/L

## 2020-01-07 ENCOUNTER — TRANSCRIPTION ENCOUNTER (OUTPATIENT)
Age: 60
End: 2020-01-07

## 2020-01-10 ENCOUNTER — RX RENEWAL (OUTPATIENT)
Age: 60
End: 2020-01-10

## 2020-01-12 ENCOUNTER — FORM ENCOUNTER (OUTPATIENT)
Age: 60
End: 2020-01-12

## 2020-01-13 ENCOUNTER — APPOINTMENT (OUTPATIENT)
Dept: NUCLEAR MEDICINE | Facility: IMAGING CENTER | Age: 60
End: 2020-01-13
Payer: MEDICAID

## 2020-01-13 ENCOUNTER — APPOINTMENT (OUTPATIENT)
Dept: PHYSICAL MEDICINE AND REHAB | Facility: CLINIC | Age: 60
End: 2020-01-13

## 2020-01-13 ENCOUNTER — OUTPATIENT (OUTPATIENT)
Dept: OUTPATIENT SERVICES | Facility: HOSPITAL | Age: 60
LOS: 1 days | End: 2020-01-13
Payer: MEDICAID

## 2020-01-13 DIAGNOSIS — C50.911 MALIGNANT NEOPLASM OF UNSPECIFIED SITE OF RIGHT FEMALE BREAST: ICD-10-CM

## 2020-01-13 PROCEDURE — 78815 PET IMAGE W/CT SKULL-THIGH: CPT | Mod: 26,PS

## 2020-01-13 PROCEDURE — A9552: CPT

## 2020-01-15 ENCOUNTER — APPOINTMENT (OUTPATIENT)
Dept: FAMILY MEDICINE | Facility: CLINIC | Age: 60
End: 2020-01-15
Payer: MEDICAID

## 2020-01-15 VITALS
HEART RATE: 68 BPM | RESPIRATION RATE: 12 BRPM | OXYGEN SATURATION: 98 % | SYSTOLIC BLOOD PRESSURE: 120 MMHG | TEMPERATURE: 98 F | DIASTOLIC BLOOD PRESSURE: 80 MMHG

## 2020-01-15 DIAGNOSIS — Z87.09 PERSONAL HISTORY OF OTHER DISEASES OF THE RESPIRATORY SYSTEM: ICD-10-CM

## 2020-01-15 PROCEDURE — 99213 OFFICE O/P EST LOW 20 MIN: CPT

## 2020-01-15 NOTE — REVIEW OF SYSTEMS
[Nasal Discharge] : nasal discharge [Negative] : Heme/Lymph [Postnasal Drip] : postnasal drip [Headache] : headache

## 2020-01-15 NOTE — HISTORY OF PRESENT ILLNESS
[FreeTextEntry8] : Notes persistent sinus pressure, runny nose, frontal headaches that have been worsening for 2 weeks. She was seen in [facility] care and she notes she took " a pill for three days." Denies fever, chills, sob, or cough. \par \par Significant history of breast ca. Regularly follows up with oncology. recent pet scan per patient was negative but she was told their was likely inflammation of the sinuses.

## 2020-01-15 NOTE — PHYSICAL EXAM
[Well Nourished] : well nourished [No Acute Distress] : no acute distress [Normal Sclera/Conjunctiva] : normal sclera/conjunctiva [Well Developed] : well developed [Well-Appearing] : well-appearing [PERRL] : pupils equal round and reactive to light [Normal Outer Ear/Nose] : the outer ears and nose were normal in appearance [EOMI] : extraocular movements intact [No JVD] : no jugular venous distention [Normal Oropharynx] : the oropharynx was normal [Thyroid Normal, No Nodules] : the thyroid was normal and there were no nodules present [Supple] : supple [No Lymphadenopathy] : no lymphadenopathy [No Accessory Muscle Use] : no accessory muscle use [No Respiratory Distress] : no respiratory distress  [Clear to Auscultation] : lungs were clear to auscultation bilaterally [Normal S1, S2] : normal S1 and S2 [Normal Rate] : normal rate  [Regular Rhythm] : with a regular rhythm [No Abdominal Bruit] : a ~M bruit was not heard ~T in the abdomen [No Carotid Bruits] : no carotid bruits [No Murmur] : no murmur heard [Pedal Pulses Present] : the pedal pulses are present [No Varicosities] : no varicosities [No Palpable Aorta] : no palpable aorta [No Edema] : there was no peripheral edema [No Extremity Clubbing/Cyanosis] : no extremity clubbing/cyanosis [Normal Posterior Cervical Nodes] : no posterior cervical lymphadenopathy [Normal Anterior Cervical Nodes] : no anterior cervical lymphadenopathy [No Joint Swelling] : no joint swelling [No CVA Tenderness] : no CVA  tenderness [No Spinal Tenderness] : no spinal tenderness [No Rash] : no rash [Grossly Normal Strength/Tone] : grossly normal strength/tone [Coordination Grossly Intact] : coordination grossly intact [Normal Gait] : normal gait [No Focal Deficits] : no focal deficits [Deep Tendon Reflexes (DTR)] : deep tendon reflexes were 2+ and symmetric [Normal Insight/Judgement] : insight and judgment were intact [Normal Affect] : the affect was normal [Normal TMs] : both tympanic membranes were normal [Normal Nasal Mucosa] : the nasal mucosa was normal [de-identified] : Edematous nasal turbinates

## 2020-01-21 ENCOUNTER — LABORATORY RESULT (OUTPATIENT)
Age: 60
End: 2020-01-21

## 2020-01-22 ENCOUNTER — OUTPATIENT (OUTPATIENT)
Dept: OUTPATIENT SERVICES | Facility: HOSPITAL | Age: 60
LOS: 1 days | End: 2020-01-22
Payer: MEDICAID

## 2020-01-22 ENCOUNTER — APPOINTMENT (OUTPATIENT)
Dept: OBGYN | Facility: CLINIC | Age: 60
End: 2020-01-22
Payer: MEDICAID

## 2020-01-22 VITALS
BODY MASS INDEX: 31.16 KG/M2 | DIASTOLIC BLOOD PRESSURE: 68 MMHG | WEIGHT: 170.38 LBS | SYSTOLIC BLOOD PRESSURE: 100 MMHG

## 2020-01-22 DIAGNOSIS — Z01.419 ENCOUNTER FOR GYNECOLOGICAL EXAMINATION (GENERAL) (ROUTINE) W/OUT ABNORMAL FINDINGS: ICD-10-CM

## 2020-01-22 DIAGNOSIS — N76.0 ACUTE VAGINITIS: ICD-10-CM

## 2020-01-22 PROCEDURE — 87624 HPV HI-RISK TYP POOLED RSLT: CPT

## 2020-01-22 PROCEDURE — G0463: CPT

## 2020-01-22 PROCEDURE — 88175 CYTOPATH C/V AUTO FLUID REDO: CPT

## 2020-01-22 PROCEDURE — 99386 PREV VISIT NEW AGE 40-64: CPT | Mod: NC

## 2020-01-22 NOTE — HISTORY OF PRESENT ILLNESS
[Last Mammogram ___] : Last Mammogram was [unfilled] [Last Pap ___] : Last cervical pap smear was [unfilled] [Last Colonoscopy ___] : Last colonoscopy [unfilled] [Postmenopausal] : is postmenopausal [Pregnancy History] : pregnancy history: [de-identified] : not indicated, pt is not sexually active [Breast Surgery] : breast surgery [Prev Breast Cancer] : previous breast cancer [Sexually Active] : is not sexually active [Post-Menopause, No Sxs] : post-menopausal, currently without symptoms

## 2020-01-22 NOTE — PHYSICAL EXAM
[Alert] : alert [Awake] : awake [Mass] : no breast mass [Acute Distress] : no acute distress [Nipple Discharge] : no nipple discharge [Axillary LAD] : no axillary lymphadenopathy [Breast Reconstruction Right] : no breast reconstruction [Breast Reconstruction Left] : no breast reconstruction [___] : a [unfilled] ~Ucm mastectomy scar [Soft] : soft [Oriented x3] : oriented to person, place, and time [Vulvar Atrophy] : vulvar atrophy [Normal] : cervix [Tender] : non-tender [Warm] : not warm [Dry Mucosa] : dry mucosa [Uterine Adnexae] : were not tender and not enlarged [No Bleeding] : there was no active vaginal bleeding

## 2020-01-23 LAB — HPV HIGH+LOW RISK DNA PNL CVX: SIGNIFICANT CHANGE UP

## 2020-01-26 LAB — CYTOLOGY SPEC DOC CYTO: SIGNIFICANT CHANGE UP

## 2020-01-27 ENCOUNTER — APPOINTMENT (OUTPATIENT)
Dept: PHYSICAL MEDICINE AND REHAB | Facility: CLINIC | Age: 60
End: 2020-01-27
Payer: MEDICAID

## 2020-01-27 VITALS
DIASTOLIC BLOOD PRESSURE: 81 MMHG | SYSTOLIC BLOOD PRESSURE: 129 MMHG | HEART RATE: 66 BPM | HEIGHT: 63 IN | WEIGHT: 165 LBS | BODY MASS INDEX: 29.23 KG/M2 | OXYGEN SATURATION: 98 %

## 2020-01-27 PROCEDURE — 99203 OFFICE O/P NEW LOW 30 MIN: CPT

## 2020-01-29 NOTE — PHYSICAL EXAM
[FreeTextEntry1] : General Appearance: Well developed, well nourished, in no acute distress.\par Skin: Inspection of the skin reveals no redness. \par HEENT: The sclerae were anicteric and conjunctivae were pink and moist. \par Chest: Normal chest expansion. Breast incisions are clean, dry and intact. No seromas or neuromas are noted. \par Axilla: No masses are noted. No axillary cording is noted. \par Abdomen: Soft and non-tender with normal bowel sounds.\par Musculoskeletal: Normal range of motion of bilateral shoulders. Negative impingement tests.\par Extremities: No edema is noted in bilateral upper extremities. \par Neurologic: The patient has normal muscle strength in bilateral upper and lower extremities. \par

## 2020-01-29 NOTE — HISTORY OF PRESENT ILLNESS
[FreeTextEntry1] : 60 yo female with h/o breast ca\par She underwent b/l mastectomy with Dr. Raymundo. Pathology with 7mm R small cell carcinoma of the breast, 0/1 sentinel nodes involved.\par She started adjuvant chemotherapy (cisplatin/etoposide x 4 cyles) on 1/9/19, completed 4/2019. Grade 1 neuropathy, otherwise tolerated treatment well.\par She was previously on Duloxetine but stopped as symptoms were better. \par She continues to have some tightness in her axilla/breast incisions.

## 2020-01-30 ENCOUNTER — APPOINTMENT (OUTPATIENT)
Dept: FAMILY MEDICINE | Facility: CLINIC | Age: 60
End: 2020-01-30
Payer: MEDICAID

## 2020-01-30 ENCOUNTER — NON-APPOINTMENT (OUTPATIENT)
Age: 60
End: 2020-01-30

## 2020-01-30 VITALS
SYSTOLIC BLOOD PRESSURE: 122 MMHG | WEIGHT: 167 LBS | TEMPERATURE: 97.5 F | OXYGEN SATURATION: 96 % | DIASTOLIC BLOOD PRESSURE: 78 MMHG | BODY MASS INDEX: 29.59 KG/M2 | HEART RATE: 71 BPM | HEIGHT: 63 IN

## 2020-01-30 DIAGNOSIS — Z00.00 ENCOUNTER FOR GENERAL ADULT MEDICAL EXAMINATION W/OUT ABNORMAL FINDINGS: ICD-10-CM

## 2020-01-30 PROCEDURE — 99396 PREV VISIT EST AGE 40-64: CPT | Mod: 25

## 2020-01-30 PROCEDURE — 93000 ELECTROCARDIOGRAM COMPLETE: CPT

## 2020-01-30 PROCEDURE — 36415 COLL VENOUS BLD VENIPUNCTURE: CPT

## 2020-01-30 NOTE — HISTORY OF PRESENT ILLNESS
[de-identified] : He is a 60-year-old female, who presents today for annual wellness exam. Patient states, that she's been in usual state of health. She is followed very closely by hematology, oncology, with history of breast cancer, right breast and recently had Pap and pelvic. [FreeTextEntry1] : Annual wellness

## 2020-01-30 NOTE — PHYSICAL EXAM
[No Acute Distress] : no acute distress [Well Nourished] : well nourished [Well-Appearing] : well-appearing [Well Developed] : well developed [Normal Voice/Communication] : normal voice/communication [Normal Sclera/Conjunctiva] : normal sclera/conjunctiva [PERRL] : pupils equal round and reactive to light [EOMI] : extraocular movements intact [Normal Outer Ear/Nose] : the outer ears and nose were normal in appearance [Normal Oropharynx] : the oropharynx was normal [No Lymphadenopathy] : no lymphadenopathy [No JVD] : no jugular venous distention [Supple] : supple [Thyroid Normal, No Nodules] : the thyroid was normal and there were no nodules present [No Respiratory Distress] : no respiratory distress  [No Accessory Muscle Use] : no accessory muscle use [Normal Rate] : normal rate  [Clear to Auscultation] : lungs were clear to auscultation bilaterally [Regular Rhythm] : with a regular rhythm [Normal S1, S2] : normal S1 and S2 [No Murmur] : no murmur heard [No Carotid Bruits] : no carotid bruits [No Abdominal Bruit] : a ~M bruit was not heard ~T in the abdomen [Pedal Pulses Present] : the pedal pulses are present [No Varicosities] : no varicosities [No Edema] : there was no peripheral edema [No Palpable Aorta] : no palpable aorta [Soft] : abdomen soft [No Extremity Clubbing/Cyanosis] : no extremity clubbing/cyanosis [No Masses] : no abdominal mass palpated [Non-distended] : non-distended [Non Tender] : non-tender [No HSM] : no HSM [Normal Bowel Sounds] : normal bowel sounds [Normal Axillary Nodes] : no axillary lymphadenopathy [Normal Supraclavicular Nodes] : no supraclavicular lymphadenopathy [Normal Posterior Cervical Nodes] : no posterior cervical lymphadenopathy [Normal Anterior Cervical Nodes] : no anterior cervical lymphadenopathy [No CVA Tenderness] : no CVA  tenderness [No Joint Swelling] : no joint swelling [No Spinal Tenderness] : no spinal tenderness [Grossly Normal Strength/Tone] : grossly normal strength/tone [No Skin Lesions] : no skin lesions [No Rash] : no rash [Coordination Grossly Intact] : coordination grossly intact [No Focal Deficits] : no focal deficits [Normal Gait] : normal gait [Deep Tendon Reflexes (DTR)] : deep tendon reflexes were 2+ and symmetric [Speech Grossly Normal] : speech grossly normal [Memory Grossly Normal] : memory grossly normal [Normal Affect] : the affect was normal [Alert and Oriented x3] : oriented to person, place, and time [Normal Mood] : the mood was normal [Normal Insight/Judgement] : insight and judgment were intact [FreeTextEntry1] : GYN [de-identified] : GYN [de-identified] : Bilateral mastectomy

## 2020-01-30 NOTE — HEALTH RISK ASSESSMENT
[Very Good] : ~his/her~  mood as very good [No] : In the past 12 months have you used drugs other than those required for medical reasons? No [No falls in past year] : Patient reported no falls in the past year [0] : 2) Feeling down, depressed, or hopeless: Not at all (0) [] : No [YearQuit] : 2017 [Audit-CScore] : 0 [[id number]] : 0 [Change in mental status noted] : No change in mental status noted [Behavior] : denies difficulty with behavior [Language] : denies difficulty with language [Learning/Retaining New Information] : denies difficulty learning/retaining new information [Handling Complex Tasks] : denies difficulty handling complex tasks [Reasoning] : denies difficulty with reasoning [Spatial Ability and Orientation] : difficulty with spatial ability and orientation [None] : None [With Family] : lives with family [# of Members in Household ___] :  household currently consist of [unfilled] member(s) [Employed] : employed [Less Than High School] : less than high school [] :  [Sexually Active] : not sexually active [High Risk Behavior] : no high risk behavior [Feels Safe at Home] : Feels safe at home [Fully functional (bathing, dressing, toileting, transferring, walking, feeding)] : Fully functional (bathing, dressing, toileting, transferring, walking, feeding) [Fully functional (using the telephone, shopping, preparing meals, housekeeping, doing laundry, using] : Fully functional and needs no help or supervision to perform IADLs (using the telephone, shopping, preparing meals, housekeeping, doing laundry, using transportation, managing medications and managing finances) [Reports changes in hearing] : Reports no changes in hearing [Reports normal functional visual acuity (ie: able to read med bottle)] : Reports normal functional visual acuity [Reports changes in vision] : Reports no changes in vision [Reports changes in dental health] : Reports no changes in dental health [Smoke Detector] : smoke detector [Carbon Monoxide Detector] : carbon monoxide detector [Safety elements used in home] : safety elements used in home [Seat Belt] :  uses seat belt [Travel to Developing Areas] : does not  travel to developing areas [Sunscreen] : uses sunscreen [TB Exposure] : is not being exposed to tuberculosis [Caregiver Concerns] : does not have caregiver concerns [MammogramComments] : Bilateral mastectomy [Reviewed no changes] : Reviewed no changes [Designated Healthcare Proxy] : Designated healthcare proxy [Relationship: ___] : Relationship: [unfilled] [Aggressive treatment] : aggressive treatment [AdvancecareDate] : 01/20

## 2020-01-30 NOTE — PHYSICAL EXAM
[No Acute Distress] : no acute distress [Well Nourished] : well nourished [Well-Appearing] : well-appearing [Well Developed] : well developed [Normal Voice/Communication] : normal voice/communication [Normal Sclera/Conjunctiva] : normal sclera/conjunctiva [PERRL] : pupils equal round and reactive to light [EOMI] : extraocular movements intact [Normal Outer Ear/Nose] : the outer ears and nose were normal in appearance [Normal Oropharynx] : the oropharynx was normal [No Lymphadenopathy] : no lymphadenopathy [No JVD] : no jugular venous distention [Thyroid Normal, No Nodules] : the thyroid was normal and there were no nodules present [Supple] : supple [No Respiratory Distress] : no respiratory distress  [No Accessory Muscle Use] : no accessory muscle use [Normal Rate] : normal rate  [Clear to Auscultation] : lungs were clear to auscultation bilaterally [Regular Rhythm] : with a regular rhythm [Normal S1, S2] : normal S1 and S2 [No Murmur] : no murmur heard [No Carotid Bruits] : no carotid bruits [No Abdominal Bruit] : a ~M bruit was not heard ~T in the abdomen [Pedal Pulses Present] : the pedal pulses are present [No Edema] : there was no peripheral edema [No Varicosities] : no varicosities [No Palpable Aorta] : no palpable aorta [No Extremity Clubbing/Cyanosis] : no extremity clubbing/cyanosis [Soft] : abdomen soft [No Masses] : no abdominal mass palpated [Non Tender] : non-tender [Non-distended] : non-distended [No HSM] : no HSM [Normal Bowel Sounds] : normal bowel sounds [Normal Supraclavicular Nodes] : no supraclavicular lymphadenopathy [Normal Axillary Nodes] : no axillary lymphadenopathy [Normal Posterior Cervical Nodes] : no posterior cervical lymphadenopathy [Normal Anterior Cervical Nodes] : no anterior cervical lymphadenopathy [No CVA Tenderness] : no CVA  tenderness [No Spinal Tenderness] : no spinal tenderness [No Joint Swelling] : no joint swelling [Grossly Normal Strength/Tone] : grossly normal strength/tone [No Rash] : no rash [No Skin Lesions] : no skin lesions [Coordination Grossly Intact] : coordination grossly intact [No Focal Deficits] : no focal deficits [Normal Gait] : normal gait [Deep Tendon Reflexes (DTR)] : deep tendon reflexes were 2+ and symmetric [Speech Grossly Normal] : speech grossly normal [Memory Grossly Normal] : memory grossly normal [Normal Affect] : the affect was normal [Alert and Oriented x3] : oriented to person, place, and time [Normal Mood] : the mood was normal [Normal Insight/Judgement] : insight and judgment were intact [de-identified] : GYN [FreeTextEntry1] : GYN [de-identified] : Bilateral mastectomy

## 2020-01-30 NOTE — HISTORY OF PRESENT ILLNESS
[de-identified] : He is a 60-year-old female, who presents today for annual wellness exam. Patient states, that she's been in usual state of health. She is followed very closely by hematology, oncology, with history of breast cancer, right breast and recently had Pap and pelvic. [FreeTextEntry1] : Annual wellness

## 2020-01-30 NOTE — ASSESSMENT
[FreeTextEntry1] : Assessment and plan:\par \par 1. Comprehensive health maintenance physical exam shows no acute findings. Stable for patient.\par \par 2. Electrocardiogram shows no acute ST-T wave changes.\par \par 3. Comprehensive blood work drawn in the office.\par \par 4. No change in present medical management. Continue all medications as prescribed.

## 2020-01-30 NOTE — COUNSELING
[Sleep ___ hours/day] : Sleep [unfilled] hours/day [Behavioral health counseling provided] : Behavioral health counseling provided [Engage in a relaxing activity] : Engage in a relaxing activity [Plan in advance] : Plan in advance [AUDIT-C Screening administered and reviewed] : AUDIT-C Screening administered and reviewed [None] : None [Good understanding] : Patient has a good understanding of lifestyle changes and steps needed to achieve self management goal

## 2020-01-31 LAB
ALBUMIN SERPL ELPH-MCNC: 4.4 G/DL
ALP BLD-CCNC: 87 U/L
ALT SERPL-CCNC: 15 U/L
ANION GAP SERPL CALC-SCNC: 12 MMOL/L
APPEARANCE: CLEAR
AST SERPL-CCNC: 15 U/L
BACTERIA: NEGATIVE
BASOPHILS # BLD AUTO: 0.05 K/UL
BASOPHILS NFR BLD AUTO: 0.6 %
BILIRUB SERPL-MCNC: 0.3 MG/DL
BILIRUBIN URINE: NEGATIVE
BLOOD URINE: NEGATIVE
BUN SERPL-MCNC: 15 MG/DL
CALCIUM SERPL-MCNC: 9.7 MG/DL
CHLORIDE SERPL-SCNC: 101 MMOL/L
CHOLEST SERPL-MCNC: 175 MG/DL
CHOLEST/HDLC SERPL: 3.3 RATIO
CO2 SERPL-SCNC: 27 MMOL/L
COLOR: NORMAL
CREAT SERPL-MCNC: 0.7 MG/DL
EOSINOPHIL # BLD AUTO: 0.19 K/UL
EOSINOPHIL NFR BLD AUTO: 2.1 %
ESTIMATED AVERAGE GLUCOSE: 108 MG/DL
GLUCOSE QUALITATIVE U: NEGATIVE
GLUCOSE SERPL-MCNC: 100 MG/DL
HBA1C MFR BLD HPLC: 5.4 %
HCT VFR BLD CALC: 41.9 %
HDLC SERPL-MCNC: 54 MG/DL
HGB BLD-MCNC: 13.6 G/DL
HYALINE CASTS: 0 /LPF
IMM GRANULOCYTES NFR BLD AUTO: 0.2 %
KETONES URINE: NEGATIVE
LDLC SERPL CALC-MCNC: 86 MG/DL
LEUKOCYTE ESTERASE URINE: NEGATIVE
LYMPHOCYTES # BLD AUTO: 3.45 K/UL
LYMPHOCYTES NFR BLD AUTO: 39 %
MAN DIFF?: NORMAL
MCHC RBC-ENTMCNC: 32.4 PG
MCHC RBC-ENTMCNC: 32.5 GM/DL
MCV RBC AUTO: 99.8 FL
MICROSCOPIC-UA: NORMAL
MONOCYTES # BLD AUTO: 0.5 K/UL
MONOCYTES NFR BLD AUTO: 5.6 %
NEUTROPHILS # BLD AUTO: 4.64 K/UL
NEUTROPHILS NFR BLD AUTO: 52.5 %
NITRITE URINE: NEGATIVE
PH URINE: 6.5
PLATELET # BLD AUTO: 332 K/UL
POTASSIUM SERPL-SCNC: 4.3 MMOL/L
PROT SERPL-MCNC: 7 G/DL
PROTEIN URINE: NEGATIVE
RBC # BLD: 4.2 M/UL
RBC # FLD: 13.3 %
RED BLOOD CELLS URINE: 0 /HPF
SODIUM SERPL-SCNC: 141 MMOL/L
SPECIFIC GRAVITY URINE: 1.01
SQUAMOUS EPITHELIAL CELLS: 0 /HPF
T4 FREE SERPL-MCNC: 1.1 NG/DL
TRIGL SERPL-MCNC: 180 MG/DL
TSH SERPL-ACNC: 5.48 UIU/ML
UROBILINOGEN URINE: NORMAL
WBC # FLD AUTO: 8.85 K/UL
WHITE BLOOD CELLS URINE: 0 /HPF

## 2020-02-03 DIAGNOSIS — N89.8 OTHER SPECIFIED NONINFLAMMATORY DISORDERS OF VAGINA: ICD-10-CM

## 2020-02-03 DIAGNOSIS — Z01.419 ENCOUNTER FOR GYNECOLOGICAL EXAMINATION (GENERAL) (ROUTINE) WITHOUT ABNORMAL FINDINGS: ICD-10-CM

## 2020-02-27 LAB
T3FREE SERPL-MCNC: 2.83 PG/ML
T4 FREE SERPL-MCNC: 1 NG/DL
TSH SERPL-ACNC: 4.45 UIU/ML

## 2020-03-27 ENCOUNTER — RX RENEWAL (OUTPATIENT)
Age: 60
End: 2020-03-27

## 2020-04-07 ENCOUNTER — APPOINTMENT (OUTPATIENT)
Dept: HEMATOLOGY ONCOLOGY | Facility: CLINIC | Age: 60
End: 2020-04-07

## 2020-04-30 ENCOUNTER — OUTPATIENT (OUTPATIENT)
Dept: OUTPATIENT SERVICES | Facility: HOSPITAL | Age: 60
LOS: 1 days | Discharge: HOME | End: 2020-04-30

## 2020-04-30 DIAGNOSIS — C50.919 MALIGNANT NEOPLASM OF UNSPECIFIED SITE OF UNSPECIFIED FEMALE BREAST: ICD-10-CM

## 2020-05-05 ENCOUNTER — APPOINTMENT (OUTPATIENT)
Dept: HEMATOLOGY ONCOLOGY | Facility: CLINIC | Age: 60
End: 2020-05-05
Payer: MEDICAID

## 2020-05-05 PROCEDURE — 99213 OFFICE O/P EST LOW 20 MIN: CPT | Mod: 95

## 2020-05-05 RX ORDER — AMOXICILLIN AND CLAVULANATE POTASSIUM 875; 125 MG/1; MG/1
875-125 TABLET, COATED ORAL
Qty: 20 | Refills: 0 | Status: DISCONTINUED | COMMUNITY
Start: 2020-01-15 | End: 2020-05-05

## 2020-05-06 NOTE — HISTORY OF PRESENT ILLNESS
[Home] : at home, [unfilled] , at the time of the visit. [Medical Office: ([facility])___] : at the medical office located in  [Other:____] : [unfilled] [Patient] : the patient [Self] : self [FreeTextEntry2] : Uma Page [de-identified] : Last mammogram ~\par Screening mammo 18: new 1cm mass in the upper medial Right breast. \par 18 diagnostic mammo and US: R breast 12:00 3-4cm FN 1cm slightly bi-lobed hypoechoic lesion corresponding with mammographic findings. 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 IHC stains for synaptophysin and chromogranin demonstrate nonspecific extremely scant, barely perceptible staining. Definitive characterization of the lesion is best deferred to the resection specimen. ER 0% AK 0% HER2 IHC 0 negative\par \par 10/17/18 Addendum:\par IHC negative ALPESH-3, TTF-1, CDX-2\par Although in this limited sample the tumor demonstrated morphologic features similar to small cell neuroendocrine carcinoma, the overall findings are nonspecific regarding definitive histologic subtype and site of origin. Clinical correlation is necessary.\par \par 10/19/18 pathology review  [facility]: Poorly differentiated carcinoma, at least 6mm, LVI not seen. Immunostains done for ER/AK/HER2/GATA3/CDX2/TTF-1 are negative in tumor cells (done at [place] lab). Tumor histology shows small cell features (molding, apoptotic bodies, and extensive mitotic activity). Cannot determine histologic subtype and/or site of origin. Clinicopathologic and radiologic correlation strongly recommended.\par \par She saw Dr. Carter (plastic surgery) and then Dr. Raymundo (breast surgery) on 10/9 and noted her desire for breast conserving surgery. She was referred for breast MRI and genetic testing given strong family history. 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 At 12-1:00 axis retroareolar there is 0.4cm focus of enhancement, 12:00 axis posterior third linear non mass enhancement. L breast no suspicious enhancement. Axilla unremarkable. BIRADS4 If breast conservation is a consideration, 2-site MRI guided biopsy of non-mass enhancement within the anterior and posterior upper breast is recommended.\par \par She saw me initially 10/24 - diagnostic CT C/A/P and PETCT performed - no masses other than small breast mass with clip; PET with SUV 8 avidity in sigmoid colon (patient noted eating dairy products that week and some loose BMs in retrospect). Dr. Horn her gastroenterologist performed Colonoscopy  with random biopsies which were benign.\par \par  she underwent b/l mastectomy with Dr. Raymundo. 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 Hypermetabolic subcutaneous nodule right upper arm lateral aspect (not included on prior PET/CT) - SUV 2.5 (patient notes recent flu shot with hematoma). Interval resolution of rectosigmoid FDG avidity.\par \par She started adjuvant chemotherapy (cisplatin/etoposide x 4 cyles) on 19, completed 2019. Grade 1 neuropathy, otherwise tolerated treatment well. Surveillance imaging per NCCN guidelines for high grade SCCs without evidence of recurrence since.\par \par Pertinent Medical History:\par She lives in [place] with her 3 children. She works as a  in a restaurant full time.\par 2018 hand burn at work\par GERD: GI Dr. Gilbert Horn. Normal C-scope with internal hemorrhoids 18. Abd US and CT 17 fatty liver, 7mm R hepatic focus calcifications, unchanged.\par Headaches: MRI without acute findings years ago\par Microscopic hematuria: Cystoscopy 17 without significant findings (Dr. Nadya Hu), takes oxybutinin for overactive bladder\par Seasonal Allergies\par Family history of breast cancer in her maternal aunts x 3 (ages unknown). Genetic testing negative with Dr. Raymundo\par Menarche age 11, menopause age 48. , no prior OCPs or hormonal therapies\par Active Smoker for many years, quit with this diagnosis\par PMD: Dr. Dom Coello\par Dr. Cali Gynecologist [FreeTextEntry1] : Cisplatin 75mg/m2 on day 1, Etoposide 100mg/m2 on day 1-3 of a 21 day cycle. Neulasta OnPro day 3\par 500cc normal saline over 1 hour before cisplatin. 1L 1/2NS (with magnesium sulfate 1g, potassium chloride 20meq) over 2 hours after cisplatin. UOP monitoring 100cc/h while receiving cisplatin\par \par Antiemetics: \par Emend 80mg PO on day 2, day 3\par Dexamethasone 4mg PO BID day 2-5\par Reglan 10mg PO q6-8h PRN for nausea [de-identified] : Patient is here today for follow up wit her daughter as telemedicine visit in light of COVID19 pandemic. She has officially retired working and is happy about this - plans to travel this summer for a few months if she is able to. She notes neuropathy has improved since work finished, she is walking frequently for exercise. She plans to resume follow up with Dr. Contreras for lymphedema therapy/preventative exercises when she is able to. She saw her PMD who checked routine labs, TSH slightly elevated, she plans 3 month follow up with him. She denies f/c/s, HA, dizziness, hearing or vision issues, n/v, CP, SOB, abd pain, diarrhea, edema, rashes, other new neuro symptoms, weight loss or gain, urinary issues, bleeding or bruising.

## 2020-05-06 NOTE — ASSESSMENT
[FreeTextEntry1] : 61yo woman with GERD, microscopic hematuria, significant smoking history (quit recently), arthritis/osteoporosis presenting for medical oncology follow up after b/l mastectomy for R breast cancer; final pathology findings on second opinion pathology review (Dr. 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 Interval PET/CT with no other evidence of disease. S/p adjuvant chemotherapy with cisplatin/etoposide x 4 cycles in early 2019, doing well now with some neuropathy mild.\par \par Breast Cancer, small cell carcinoma poorly differentiated Ki67 99%, DAVIAN\par -Labs at next visit - will plan visit next month with patient for labs, review of PETCT, records to take for her travels if safe prior to leaving, patient amenable\par -PET/CT for restaging evaluation due June 2020 - will follow NCCN guidelines for surveillance of poorly differentiated small cell neuroendocrine carcinoma (pt also with significant prior smoking history qualifying for CT chest monitoring) - with ativan because of brown adipose noted on prior PET - eprescribed, counseled patient on use before scan \par -follow up GI - [facility] for recurrent nonspecific sigmoid avidity on PET/CT (likely physiologic commented), resolved last scan, pending scan above, asymptomatic\par -discussed monitoring for any new signs or symptoms, follow up with me\par -continue close monitoring, follow up with her surgeon Dr. Raymundo, PT/PM&R\par -follow up PMD, resume routine health care and routine health maintenance, discussed all again today, TFT monitoring per him\par \par Neuropathy: grade 1, mild without deficits, somewhat bothersome/stable, likely cisplatin related - numbness no pain, gait stable, worse with standing long periods now resolved nearly completely, self discontinued cymbalta\par -continue to monitor\par \par -RHM all discussed extensively: PMD at least annually with lipid checks/bloodwork, gyn at least annually and PAP test screening per their recommendations, colon cancer screening/colonoscopy at least every 10 years as recommended by PMD/GI, dermatology for annual skin checks, ophthalmology for annual eye exams\par -genetics reviewed

## 2020-05-06 NOTE — PHYSICAL EXAM
[Fully active, able to carry on all pre-disease performance without restriction] : Status 0 - Fully active, able to carry on all pre-disease performance without restriction [Normal] : affect appropriate [de-identified] : appears well [de-identified] : no icterus or injection

## 2020-05-06 NOTE — PHYSICAL EXAM
[Fully active, able to carry on all pre-disease performance without restriction] : Status 0 - Fully active, able to carry on all pre-disease performance without restriction [Normal] : affect appropriate [de-identified] : appears well [de-identified] : no icterus or injection

## 2020-05-06 NOTE — ASSESSMENT
[FreeTextEntry1] : 59yo woman with GERD, microscopic hematuria, significant smoking history (quit recently), arthritis/osteoporosis presenting for medical oncology follow up after b/l mastectomy for R breast cancer; final pathology findings on second opinion pathology review (Dr. 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 Interval PET/CT with no other evidence of disease. S/p adjuvant chemotherapy with cisplatin/etoposide x 4 cycles in early 2019, doing well now with some neuropathy mild.\par \par Breast Cancer, small cell carcinoma poorly differentiated Ki67 99%, DAVIAN\par -Labs at next visit - will plan visit next month with patient for labs, review of PETCT, records to take for her travels if safe prior to leaving, patient amenable\par -PET/CT for restaging evaluation due June 2020 - will follow NCCN guidelines for surveillance of poorly differentiated small cell neuroendocrine carcinoma (pt also with significant prior smoking history qualifying for CT chest monitoring) - with ativan because of brown adipose noted on prior PET - eprescribed, counseled patient on use before scan \par -follow up GI - [facility] for recurrent nonspecific sigmoid avidity on PET/CT (likely physiologic commented), resolved last scan, pending scan above, asymptomatic\par -discussed monitoring for any new signs or symptoms, follow up with me\par -continue close monitoring, follow up with her surgeon Dr. Raymundo, PT/PM&R\par -follow up PMD, resume routine health care and routine health maintenance, discussed all again today, TFT monitoring per him\par \par Neuropathy: grade 1, mild without deficits, somewhat bothersome/stable, likely cisplatin related - numbness no pain, gait stable, worse with standing long periods now resolved nearly completely, self discontinued cymbalta\par -continue to monitor\par \par -RHM all discussed extensively: PMD at least annually with lipid checks/bloodwork, gyn at least annually and PAP test screening per their recommendations, colon cancer screening/colonoscopy at least every 10 years as recommended by PMD/GI, dermatology for annual skin checks, ophthalmology for annual eye exams\par -genetics reviewed

## 2020-05-06 NOTE — RESULTS/DATA
[FreeTextEntry1] : PETCT 10/11/19: FDG-avid low atenuation densities L mastectomy bed, decreased in size, likely representing evolving post surgical change. Extensive physiologic brown adipose tissue activity in neck and thorax. Minimally FDG avid L internal mammary lymph node, unchanged.\par PET 1/13/20: no evidence of metastatic disease.

## 2020-05-06 NOTE — HISTORY OF PRESENT ILLNESS
[Home] : at home, [unfilled] , at the time of the visit. [Medical Office: ([facility])___] : at the medical office located in  [Other:____] : [unfilled] [Patient] : the patient [Self] : self [FreeTextEntry2] : Uma Page [de-identified] : Last mammogram ~\par Screening mammo 18: new 1cm mass in the upper medial Right breast. \par 18 diagnostic mammo and US: R breast 12:00 3-4cm FN 1cm slightly bi-lobed hypoechoic lesion corresponding with mammographic findings. 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 IHC stains for synaptophysin and chromogranin demonstrate nonspecific extremely scant, barely perceptible staining. Definitive characterization of the lesion is best deferred to the resection specimen. 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 Clinical correlation is necessary.\par \par 10/19/18 pathology review  [facility]: Poorly differentiated carcinoma, at least 6mm, LVI not seen. Immunostains done for ER/AR/HER2/GATA3/CDX2/TTF-1 are negative in tumor cells (done at [place] lab). Tumor histology shows small cell features (molding, apoptotic bodies, and extensive mitotic activity). Cannot determine histologic subtype and/or site of origin. Clinicopathologic and radiologic correlation strongly recommended.\par \par She saw Dr. Carter (plastic surgery) and then Dr. Raymundo (breast surgery) on 10/9 and noted her desire for breast conserving surgery. She was referred for breast MRI and genetic testing given strong family history. 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 At 12-1:00 axis retroareolar there is 0.4cm focus of enhancement, 12:00 axis posterior third linear non mass enhancement. L breast no suspicious enhancement. Axilla unremarkable. BIRADS4 If breast conservation is a consideration, 2-site MRI guided biopsy of non-mass enhancement within the anterior and posterior upper breast is recommended.\par \par She saw me initially 10/24 - diagnostic CT C/A/P and PETCT performed - no masses other than small breast mass with clip; PET with SUV 8 avidity in sigmoid colon (patient noted eating dairy products that week and some loose BMs in retrospect). Dr. Horn her gastroenterologist performed Colonoscopy  with random biopsies which were benign.\par \par  she underwent b/l mastectomy with Dr. Raymundo. 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 Hypermetabolic subcutaneous nodule right upper arm lateral aspect (not included on prior PET/CT) - SUV 2.5 (patient notes recent flu shot with hematoma). Interval resolution of rectosigmoid FDG avidity.\par \par She started adjuvant chemotherapy (cisplatin/etoposide x 4 cyles) on 19, completed 2019. Grade 1 neuropathy, otherwise tolerated treatment well. Surveillance imaging per NCCN guidelines for high grade SCCs without evidence of recurrence since.\par \par Pertinent Medical History:\par She lives in [place] with her 3 children. She works as a  in a restaurant full time.\par 2018 hand burn at work\par GERD: GI Dr. Gilbert Horn. Normal C-scope with internal hemorrhoids 18. Abd US and CT 17 fatty liver, 7mm R hepatic focus calcifications, unchanged.\par Headaches: MRI without acute findings years ago\par Microscopic hematuria: Cystoscopy 17 without significant findings (Dr. Nadya Hu), takes oxybutinin for overactive bladder\par Seasonal Allergies\par Family history of breast cancer in her maternal aunts x 3 (ages unknown). Genetic testing negative with Dr. Raymundo\par Menarche age 11, menopause age 48. , no prior OCPs or hormonal therapies\par Active Smoker for many years, quit with this diagnosis\par PMD: Dr. Dom Coello\par Dr. Cali Gynecologist [FreeTextEntry1] : Cisplatin 75mg/m2 on day 1, Etoposide 100mg/m2 on day 1-3 of a 21 day cycle. Neulasta OnPro day 3\par 500cc normal saline over 1 hour before cisplatin. 1L 1/2NS (with magnesium sulfate 1g, potassium chloride 20meq) over 2 hours after cisplatin. UOP monitoring 100cc/h while receiving cisplatin\par \par Antiemetics: \par Emend 80mg PO on day 2, day 3\par Dexamethasone 4mg PO BID day 2-5\par Reglan 10mg PO q6-8h PRN for nausea [de-identified] : Patient is here today for follow up wit her daughter as telemedicine visit in light of COVID19 pandemic. She has officially retired working and is happy about this - plans to travel this summer for a few months if she is able to. She notes neuropathy has improved since work finished, she is walking frequently for exercise. She plans to resume follow up with Dr. Contreras for lymphedema therapy/preventative exercises when she is able to. She saw her PMD who checked routine labs, TSH slightly elevated, she plans 3 month follow up with him. She denies f/c/s, HA, dizziness, hearing or vision issues, n/v, CP, SOB, abd pain, diarrhea, edema, rashes, other new neuro symptoms, weight loss or gain, urinary issues, bleeding or bruising.

## 2020-05-08 NOTE — DISCUSSION/SUMMARY
[Cancer Type / Location / Histology Subtype: ________] : Cancer Type / Location / Histology Subtype: [unfilled] [Diagnosis Date (year): ____] : Diagnosis Date (year): [unfilled] [Surgery Date(s) (year): ____] : Surgery Date(s) (year): [unfilled] [Surgery] : Surgery: Yes [Systemic Therapy (chemotherapy, hormonal therapy, other)] : Systemic Therapy (chemotherapy, hormonal therapy, other): Yes [Surgical Procedure / Location / Findings: _________] : Surgical Procedure / Location / Findings: [unfilled] [Genetic / hereditary risk factor(s) or predisposing conditions: __________] : Genetic / hereditary risk factor(s) or predisposing conditions: [unfilled] [Primary care physician] : primary care physician [Colonoscopy] : colonoscopy [Skin Checks] : skin checks [PAP Test] : PAP test [Cholesterol Test] : cholesterol test [Bone Density Test] : bone density test [Annual Flu Shot] : annual flu shot [Follow up with Surgeon in _____] : Follow up with Surgeon in [unfilled] [Follow up with Oncologist in _____] : Follow up with Oncologist in [unfilled] [Memory or Concentration Loss] : Memory or concentration loss [Emotional and mental health] : Emotional and mental health [Physical Functioning] : Physical functioning [[facility] Breast Cancer Hotline] : [facility] Breast Cancer Hotline [Bridge to Survivorship Breast Cancer] : Bridge to Survivorship Breast Cancer [Path to Wellness Survivorship Program] : Path to Wellness Survivorship Program [American Cancer Society] : the American Cancer Society [Cancer Care] : Cancer Care [Need for onging (adjuvant) treatment for cancer?] : Need for onging (adjuvant) treatment for cancer? No [Bloodwork: ______] : Bloodwork: [unfilled] [Scans: ______] : Scans: [unfilled] [Lung screening] : lung screening [Anxiety and Depression] : anxiety and depression [Cognitive Function] : cognitive function [Fatigue] : fatigue [FreeTextEntry8] : Keri NEWELL / Nora Qureshi MD [FreeTextEntry1] : Cisplatin/Etoposide x 4 cycles 1/2019-4/2019

## 2020-05-08 NOTE — DISCUSSION/SUMMARY
[Cancer Type / Location / Histology Subtype: ________] : Cancer Type / Location / Histology Subtype: [unfilled] [Diagnosis Date (year): ____] : Diagnosis Date (year): [unfilled] [Surgery] : Surgery: Yes [Surgery Date(s) (year): ____] : Surgery Date(s) (year): [unfilled] [Surgical Procedure / Location / Findings: _________] : Surgical Procedure / Location / Findings: [unfilled] [Systemic Therapy (chemotherapy, hormonal therapy, other)] : Systemic Therapy (chemotherapy, hormonal therapy, other): Yes [Genetic / hereditary risk factor(s) or predisposing conditions: __________] : Genetic / hereditary risk factor(s) or predisposing conditions: [unfilled] [Primary care physician] : primary care physician [Colonoscopy] : colonoscopy [Skin Checks] : skin checks [PAP Test] : PAP test [Cholesterol Test] : cholesterol test [Bone Density Test] : bone density test [Annual Flu Shot] : annual flu shot [Follow up with Oncologist in _____] : Follow up with Oncologist in [unfilled] [Follow up with Surgeon in _____] : Follow up with Surgeon in [unfilled] [Memory or Concentration Loss] : Memory or concentration loss [Emotional and mental health] : Emotional and mental health [Physical Functioning] : Physical functioning [Path to Wellness Survivorship Program] : Path to Wellness Survivorship Program [[facility] Breast Cancer Hotline] : [facility] Breast Cancer Hotline [Bridge to Survivorship Breast Cancer] : Bridge to Survivorship Breast Cancer [Cancer Care] : Cancer Care [American Cancer Society] : the American Cancer Society [Need for onging (adjuvant) treatment for cancer?] : Need for onging (adjuvant) treatment for cancer? No [Bloodwork: ______] : Bloodwork: [unfilled] [Scans: ______] : Scans: [unfilled] [Lung screening] : lung screening [Anxiety and Depression] : anxiety and depression [Cognitive Function] : cognitive function [Fatigue] : fatigue [FreeTextEntry8] : Keri NEWELL / Nora Qureshi MD [FreeTextEntry1] : Cisplatin/Etoposide x 4 cycles 1/2019-4/2019

## 2020-05-20 ENCOUNTER — LABORATORY RESULT (OUTPATIENT)
Age: 60
End: 2020-05-20

## 2020-05-20 LAB — TSH SERPL-ACNC: 6.78 UIU/ML

## 2020-05-21 DIAGNOSIS — E03.8 OTHER SPECIFIED HYPOTHYROIDISM: ICD-10-CM

## 2020-06-01 ENCOUNTER — APPOINTMENT (OUTPATIENT)
Dept: PHYSICAL MEDICINE AND REHAB | Facility: CLINIC | Age: 60
End: 2020-06-01
Payer: MEDICAID

## 2020-06-01 VITALS
SYSTOLIC BLOOD PRESSURE: 124 MMHG | HEART RATE: 74 BPM | TEMPERATURE: 97.9 F | DIASTOLIC BLOOD PRESSURE: 78 MMHG | BODY MASS INDEX: 30.47 KG/M2 | WEIGHT: 172 LBS | OXYGEN SATURATION: 97 %

## 2020-06-01 PROCEDURE — 99212 OFFICE O/P EST SF 10 MIN: CPT

## 2020-06-04 NOTE — HISTORY OF PRESENT ILLNESS
[FreeTextEntry1] : 61 yo female with h/o breast ca\par She underwent b/l mastectomy with Dr. Raymundo. 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 Grade 1 neuropathy, otherwise tolerated treatment well.\par She was previously on Duloxetine but stopped as symptoms were better. \par She continues to have some tightness in her axilla/breast incisions though significantly better with PT. She is doing her HEP

## 2020-06-04 NOTE — PHYSICAL EXAM
[FreeTextEntry1] : General Appearance: Well developed, well nourished, in no acute distress.\par Chest: Normal chest expansion. Breast incisions are clean, dry and intact. No seromas or neuromas are noted. \par Axilla: No masses are noted. No axillary cording is noted. \par Musculoskeletal: Normal range of motion of bilateral shoulders. Negative impingement tests.\par Extremities: No edema is noted in bilateral upper extremities. \par Neurologic: The patient has normal muscle strength in bilateral upper and lower extremities. \par

## 2020-06-08 ENCOUNTER — APPOINTMENT (OUTPATIENT)
Dept: FAMILY MEDICINE | Facility: CLINIC | Age: 60
End: 2020-06-08
Payer: MEDICAID

## 2020-06-08 VITALS
WEIGHT: 169 LBS | SYSTOLIC BLOOD PRESSURE: 124 MMHG | HEART RATE: 64 BPM | OXYGEN SATURATION: 98 % | HEIGHT: 63 IN | DIASTOLIC BLOOD PRESSURE: 84 MMHG | BODY MASS INDEX: 29.95 KG/M2 | TEMPERATURE: 98.3 F

## 2020-06-08 DIAGNOSIS — K64.4 RESIDUAL HEMORRHOIDAL SKIN TAGS: ICD-10-CM

## 2020-06-08 PROCEDURE — 99214 OFFICE O/P EST MOD 30 MIN: CPT

## 2020-06-08 NOTE — PHYSICAL EXAM
[No Acute Distress] : no acute distress [Well Nourished] : well nourished [PERRL] : pupils equal round and reactive to light [EOMI] : extraocular movements intact [Normal Oropharynx] : the oropharynx was normal [Supple] : supple [Normal S1, S2] : normal S1 and S2 [Clear to Auscultation] : lungs were clear to auscultation bilaterally [No Edema] : there was no peripheral edema [Non Tender] : non-tender [Normal rectal exam] : was normal [None] : there were no anal fissures seen [External Hemorrhoid] : an external hemorrhoid was present [No Joint Swelling] : no joint swelling [Normal] : was normal [FreeTextEntry1] : post rupture external hemorrhoid, with healing scab, slight surrounding erythema. no bleeding, no discharge [No Rash] : no rash

## 2020-06-08 NOTE — HISTORY OF PRESENT ILLNESS
[FreeTextEntry8] : cc: bump on anus, bleeding\par Ms Uma Page is a 61 yo  female presents for a bump on anus approx a week ago. Initially lots of pain, bleeding, eventually burst open and now no more pain, no bleeding, still itching and burning sensation. Here for evaluation. Pt advised about external hemorrhoids, need to increase dietary fiber, and avoid excessive straining during BM. Advised to watch for recurrence or worsening, and return accordingly. In the mean warm sitz bath and can apply Anusol-HC as needed.

## 2020-06-11 ENCOUNTER — OUTPATIENT (OUTPATIENT)
Dept: OUTPATIENT SERVICES | Facility: HOSPITAL | Age: 60
LOS: 1 days | End: 2020-06-11
Payer: MEDICAID

## 2020-06-11 ENCOUNTER — OUTPATIENT (OUTPATIENT)
Dept: OUTPATIENT SERVICES | Facility: HOSPITAL | Age: 60
LOS: 1 days | Discharge: HOME | End: 2020-06-11

## 2020-06-11 ENCOUNTER — APPOINTMENT (OUTPATIENT)
Dept: NUCLEAR MEDICINE | Facility: IMAGING CENTER | Age: 60
End: 2020-06-11
Payer: MEDICAID

## 2020-06-11 DIAGNOSIS — C80.1 MALIGNANT (PRIMARY) NEOPLASM, UNSPECIFIED: ICD-10-CM

## 2020-06-11 DIAGNOSIS — C50.919 MALIGNANT NEOPLASM OF UNSPECIFIED SITE OF UNSPECIFIED FEMALE BREAST: ICD-10-CM

## 2020-06-11 PROCEDURE — 78815 PET IMAGE W/CT SKULL-THIGH: CPT | Mod: 26,PS

## 2020-06-11 PROCEDURE — A9552: CPT

## 2020-06-11 PROCEDURE — 78815 PET IMAGE W/CT SKULL-THIGH: CPT

## 2020-06-16 ENCOUNTER — RESULT REVIEW (OUTPATIENT)
Age: 60
End: 2020-06-16

## 2020-06-16 ENCOUNTER — APPOINTMENT (OUTPATIENT)
Dept: HEMATOLOGY ONCOLOGY | Facility: CLINIC | Age: 60
End: 2020-06-16
Payer: MEDICAID

## 2020-06-16 ENCOUNTER — LABORATORY RESULT (OUTPATIENT)
Age: 60
End: 2020-06-16

## 2020-06-16 VITALS
DIASTOLIC BLOOD PRESSURE: 75 MMHG | SYSTOLIC BLOOD PRESSURE: 134 MMHG | BODY MASS INDEX: 29.72 KG/M2 | RESPIRATION RATE: 16 BRPM | TEMPERATURE: 98 F | WEIGHT: 167.77 LBS | OXYGEN SATURATION: 100 % | HEART RATE: 56 BPM

## 2020-06-16 LAB
BASOPHILS # BLD AUTO: 0.05 K/UL — SIGNIFICANT CHANGE UP (ref 0–0.2)
BASOPHILS NFR BLD AUTO: 0.6 % — SIGNIFICANT CHANGE UP (ref 0–2)
EOSINOPHIL # BLD AUTO: 0.24 K/UL — SIGNIFICANT CHANGE UP (ref 0–0.5)
EOSINOPHIL NFR BLD AUTO: 3 % — SIGNIFICANT CHANGE UP (ref 0–6)
HCT VFR BLD CALC: 41.8 % — SIGNIFICANT CHANGE UP (ref 34.5–45)
HGB BLD-MCNC: 13.6 G/DL — SIGNIFICANT CHANGE UP (ref 11.5–15.5)
IMM GRANULOCYTES NFR BLD AUTO: 0.2 % — SIGNIFICANT CHANGE UP (ref 0–1.5)
LYMPHOCYTES # BLD AUTO: 3.26 K/UL — SIGNIFICANT CHANGE UP (ref 1–3.3)
LYMPHOCYTES # BLD AUTO: 40.5 % — SIGNIFICANT CHANGE UP (ref 13–44)
MCHC RBC-ENTMCNC: 31.9 PG — SIGNIFICANT CHANGE UP (ref 27–34)
MCHC RBC-ENTMCNC: 32.5 GM/DL — SIGNIFICANT CHANGE UP (ref 32–36)
MCV RBC AUTO: 97.9 FL — SIGNIFICANT CHANGE UP (ref 80–100)
MONOCYTES # BLD AUTO: 0.62 K/UL — SIGNIFICANT CHANGE UP (ref 0–0.9)
MONOCYTES NFR BLD AUTO: 7.7 % — SIGNIFICANT CHANGE UP (ref 2–14)
NEUTROPHILS # BLD AUTO: 3.85 K/UL — SIGNIFICANT CHANGE UP (ref 1.8–7.4)
NEUTROPHILS NFR BLD AUTO: 48 % — SIGNIFICANT CHANGE UP (ref 43–77)
NRBC # BLD: 0 /100 WBCS — SIGNIFICANT CHANGE UP (ref 0–0)
PLATELET # BLD AUTO: 301 K/UL — SIGNIFICANT CHANGE UP (ref 150–400)
RBC # BLD: 4.27 M/UL — SIGNIFICANT CHANGE UP (ref 3.8–5.2)
RBC # FLD: 13 % — SIGNIFICANT CHANGE UP (ref 10.3–14.5)
WBC # BLD: 8.04 K/UL — SIGNIFICANT CHANGE UP (ref 3.8–10.5)
WBC # FLD AUTO: 8.04 K/UL — SIGNIFICANT CHANGE UP (ref 3.8–10.5)

## 2020-06-16 PROCEDURE — 99214 OFFICE O/P EST MOD 30 MIN: CPT

## 2020-06-17 LAB
ALBUMIN SERPL ELPH-MCNC: 4.8 G/DL
ALP BLD-CCNC: 96 U/L
ALT SERPL-CCNC: 23 U/L
ANION GAP SERPL CALC-SCNC: 15 MMOL/L
AST SERPL-CCNC: 26 U/L
BILIRUB SERPL-MCNC: 0.4 MG/DL
BUN SERPL-MCNC: 16 MG/DL
CALCIUM SERPL-MCNC: 10 MG/DL
CHLORIDE SERPL-SCNC: 103 MMOL/L
CO2 SERPL-SCNC: 22 MMOL/L
CREAT SERPL-MCNC: 0.77 MG/DL
GLUCOSE SERPL-MCNC: 84 MG/DL
POTASSIUM SERPL-SCNC: 4.7 MMOL/L
PROT SERPL-MCNC: 7.3 G/DL
SODIUM SERPL-SCNC: 140 MMOL/L
TSH SERPL-ACNC: 5.08 UIU/ML

## 2020-06-17 RX ORDER — SODIUM SULFATE, POTASSIUM SULFATE, MAGNESIUM SULFATE 17.5; 3.13; 1.6 G/ML; G/ML; G/ML
17.5-3.13-1.6 SOLUTION, CONCENTRATE ORAL
Qty: 1 | Refills: 0 | Status: DISCONTINUED | COMMUNITY
Start: 2019-10-03 | End: 2020-06-17

## 2020-06-17 RX ORDER — DULOXETINE HYDROCHLORIDE 40 MG/1
40 CAPSULE, DELAYED RELEASE PELLETS ORAL DAILY
Qty: 30 | Refills: 3 | Status: DISCONTINUED | COMMUNITY
Start: 2019-09-26 | End: 2020-06-17

## 2020-06-17 NOTE — REASON FOR VISIT
[Follow-Up Visit] : a follow-up [[employer] ] : provided by [employer]   [FreeTextEntry2] : Breast Cancer [FreeTextEntry1] : [id number] [TWNoteComboBox1] : Citizen of [place]

## 2020-06-17 NOTE — PHYSICAL EXAM
[Fully active, able to carry on all pre-disease performance without restriction] : Status 0 - Fully active, able to carry on all pre-disease performance without restriction [Normal] : grossly intact [de-identified] : appears well [de-identified] : b/l mastectomy well healed, no palpable masses or adenopathy [de-identified] : no icterus or injection

## 2020-06-17 NOTE — HISTORY OF PRESENT ILLNESS
[FreeTextEntry1] : Cisplatin 75mg/m2 on day 1, Etoposide 100mg/m2 on day 1-3 of a 21 day cycle. Neulasta OnPro day 3\par 500cc normal saline over 1 hour before cisplatin. 1L 1/2NS (with magnesium sulfate 1g, potassium chloride 20meq) over 2 hours after cisplatin. UOP monitoring 100cc/h while receiving cisplatin\par \par Antiemetics: \par Emend 80mg PO on day 2, day 3\par Dexamethasone 4mg PO BID day 2-5\par Reglan 10mg PO q6-8h PRN for nausea [de-identified] : 61 y/o female who present for breast medical oncology follow up.  \par \par Last mammogram ~\par \par Screening mammo 18: new 1cm mass in the upper medial Right breast. \par 18 diagnostic mammo and US: R breast 12:00 3-4cm FN 1cm slightly bi-lobed hypoechoic lesion corresponding with mammographic findings. BIRADS 4\par \par US guided biopsy 12:00 3-4FN 18: revealed a final pathology of invasive carcinoma with neuroendocrine features (Er-Pr-Her2-), [place] grade 3, 8mm in specimen.\par \par Pathology Report (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 IHC stains for synaptophysin and chromogranin demonstrate nonspecific extremely scant, barely perceptible staining. Definitive characterization of the lesion is best deferred to the resection specimen. 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 Clinical correlation is necessary.\par \par 10/19/18 pathology review  [facility]: Poorly differentiated carcinoma, at least 6mm, LVI not seen. Immunostains done for ER/ID/HER2/GATA3/CDX2/TTF-1 are negative in tumor cells (done at [facility]). Tumor histology shows small cell features (molding, apoptotic bodies, and extensive mitotic activity). Cannot determine histologic subtype and/or site of origin. Clinicopathologic and radiologic correlation strongly recommended.\par \par She saw Dr. Carter (plastic surgery) and then Dr. Raymundo (breast surgery) on 10/9 and noted her desire for breast conserving surgery. She was referred for breast MRI and genetic testing given strong family history. 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 At 12-1:00 axis retroareolar there is 0.4cm focus of enhancement, 12:00 axis posterior third linear non mass enhancement. L breast no suspicious enhancement. Axilla unremarkable. BIRADS4 If breast conservation is a consideration, 2-site MRI guided biopsy of non-mass enhancement within the anterior and posterior upper breast is recommended.\par \par She saw me initially 10/24 - diagnostic CT C/A/P and PETCT performed - no masses other than small breast mass with clip; PET with SUV 8 avidity in sigmoid colon (patient noted eating dairy products that week and some loose BMs in retrospect). Dr. Horn her gastroenterologist performed Colonoscopy  with random biopsies which were benign.\par \par  she underwent b/l mastectomy with Dr. Raymundo. 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 Hypermetabolic subcutaneous nodule right upper arm lateral aspect (not included on prior PET/CT) - SUV 2.5 (patient notes recent flu shot with hematoma). Interval resolution of rectosigmoid FDG avidity.\par \par She started adjuvant chemotherapy (cisplatin/etoposide x 4 cyles) on 19, completed 2019. Grade 1 neuropathy, otherwise tolerated treatment well. Surveillance imaging per NCCN guidelines for high grade SCCs without evidence of recurrence since.\par \par PET/CT, 20- 1. Further decreased extent and FDG avidity of nodular thickening left mastectomy bed. 2. Resolved left internal mammary lymph node. 3. Resolved hypermetabolic brown adipose tissue in the cervical region and thorax. 4. No new FDG avid disease.\par \par PET/CT 2020 showed 3.1cm R adnexal mass non avid, possible Calcified leiomyomata\par \par Pertinent Medical History:\par She lives in [place] with her 3 children. She works as a  in a restaurant full time.\par 2018 hand burn at work\par GERD: GI Dr. Gilbert Horn. Normal C-scope with internal hemorrhoids 18. Abd US and CT 17 fatty liver, 7mm R hepatic focus calcifications, unchanged.\par Headaches: MRI without acute findings years ago\par Microscopic hematuria: Cystoscopy 17 without significant findings (Dr. Nadya Hu), takes oxybutinin for overactive bladder\par Seasonal Allergies\par Family history of breast cancer in her maternal aunts x 3 (ages unknown). Genetic testing negative with Dr. Raymundo\par Menarche age 11, menopause age 48. , no prior OCPs or hormonal therapies\par Active Smoker for many years, quit with this diagnosis\par PMD: Dr. Dom Coello\par GYN: Dr. Ibarra\par Colonoscopy, 2018\par  [de-identified] : Patient is here today for follow up.  She saw her PMD who checked routine labs, TSH slightly elevated, the patient was started on Synthroid. She states she is feeling well with no complaints.  She notes good energy and appetite, planning to travel to [facility] next month.

## 2020-06-17 NOTE — ASSESSMENT
[FreeTextEntry1] : 61yo woman with GERD, microscopic hematuria, significant smoking history (quit recently), arthritis/osteoporosis presenting for medical oncology follow up after b/l mastectomy for R breast cancer; final pathology findings on second opinion pathology review (Dr. 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 Interval PET/CT with no other evidence of disease. S/p adjuvant chemotherapy with cisplatin/etoposide x 4 cycles in early 2019, doing well now with some neuropathy mild.\par \par Breast Cancer, small cell carcinoma poorly differentiated Ki67 99%, DAVIAN\par -Check labs today\par -PET/CT 6/11/2020 showed 3.1cm R adnexal mass non avid, possible Calcified leiomyomata, follow up with pelvic sonogram advised at this time prior to leaving for [facility] ([place], [place]) - she will schedule now\par -discussed monitoring for any new signs or symptoms, follow up with me in 3-4 months, sooner if issues arise\par -continue close monitoring, follow up with her surgeon Dr. Raymundo, PT/PM&R\par -follow up PMD, newly diagnosed hypothyroidism.  Resume routine health care and routine health maintenance, discussed all again today, TFT monitoring per him\par -A copy of last progress note, survivorship plan, and PET/CT scan was given to patient to take with her when traveling - reviewed today\par \par Neuropathy: grade 1, mild without deficits, somewhat bothersome/stable, likely cisplatin related - numbness no pain, gait stable, worse with standing long periods now resolved nearly completely, self discontinued cymbalta\par -continue to monitor\par -continue follow up PM&R\par \par -RHM all discussed extensively: PMD at least annually with lipid checks/bloodwork, gyn at least annually and PAP  test screening per their recommendations, colon cancer screening/colonoscopy at least every 10 years as recommended by PMD/GI, dermatology for annual skin checks, ophthalmology for annual eye exams\par -genetics reviewed

## 2020-06-18 ENCOUNTER — OUTPATIENT (OUTPATIENT)
Dept: OUTPATIENT SERVICES | Facility: HOSPITAL | Age: 60
LOS: 1 days | End: 2020-06-18
Payer: MEDICAID

## 2020-06-18 ENCOUNTER — APPOINTMENT (OUTPATIENT)
Dept: ULTRASOUND IMAGING | Facility: HOSPITAL | Age: 60
End: 2020-06-18
Payer: MEDICAID

## 2020-06-18 DIAGNOSIS — C50.911 MALIGNANT NEOPLASM OF UNSPECIFIED SITE OF RIGHT FEMALE BREAST: ICD-10-CM

## 2020-06-18 PROCEDURE — 76830 TRANSVAGINAL US NON-OB: CPT | Mod: 26

## 2020-06-18 PROCEDURE — 76830 TRANSVAGINAL US NON-OB: CPT

## 2020-06-18 PROCEDURE — 76856 US EXAM PELVIC COMPLETE: CPT | Mod: 26

## 2020-06-18 PROCEDURE — 76856 US EXAM PELVIC COMPLETE: CPT

## 2020-06-25 ENCOUNTER — APPOINTMENT (OUTPATIENT)
Dept: HEMATOLOGY ONCOLOGY | Facility: CLINIC | Age: 60
End: 2020-06-25

## 2020-07-02 ENCOUNTER — APPOINTMENT (OUTPATIENT)
Dept: OBGYN | Facility: CLINIC | Age: 60
End: 2020-07-02
Payer: MEDICAID

## 2020-07-02 ENCOUNTER — OUTPATIENT (OUTPATIENT)
Dept: OUTPATIENT SERVICES | Facility: HOSPITAL | Age: 60
LOS: 1 days | End: 2020-07-02
Payer: MEDICAID

## 2020-07-02 VITALS — BODY MASS INDEX: 28.96 KG/M2 | WEIGHT: 163.5 LBS | DIASTOLIC BLOOD PRESSURE: 90 MMHG | SYSTOLIC BLOOD PRESSURE: 130 MMHG

## 2020-07-02 DIAGNOSIS — N76.0 ACUTE VAGINITIS: ICD-10-CM

## 2020-07-02 DIAGNOSIS — N83.209 UNSPECIFIED OVARIAN CYST, UNSPECIFIED SIDE: ICD-10-CM

## 2020-07-02 PROCEDURE — 99213 OFFICE O/P EST LOW 20 MIN: CPT | Mod: GE

## 2020-07-02 PROCEDURE — G0463: CPT

## 2020-07-02 NOTE — END OF VISIT
[] : Resident [FreeTextEntry3] : Patient was seen for continued evaluation of stable pelvic mass. Given this mass is unchanged except for additional calcifications and decreased size, we do not think she warrants acute surgical intervention at this time. Will have repeat ultrasound in 6 months.

## 2020-07-12 ENCOUNTER — RX RENEWAL (OUTPATIENT)
Age: 60
End: 2020-07-12

## 2020-07-27 NOTE — PRE-OP CHECKLIST - TEMPERATURE IN FAHRENHEIT (DEGREES F)
97.5 Follow-up with your primary care provider in 2-3 days  Return to the emergency department immediately if symptoms worsen or any additional concerns  Follow-up for repeat CT scan of the chest in 6 months time to evaluate any change in size of the 7 mm nodule in your right middle lung

## 2020-07-30 ENCOUNTER — APPOINTMENT (OUTPATIENT)
Dept: ORTHOPEDIC SURGERY | Facility: CLINIC | Age: 60
End: 2020-07-30
Payer: MEDICAID

## 2020-07-30 PROCEDURE — 20610 DRAIN/INJ JOINT/BURSA W/O US: CPT | Mod: RT

## 2020-07-30 PROCEDURE — 99214 OFFICE O/P EST MOD 30 MIN: CPT | Mod: 25

## 2020-08-03 NOTE — ED PROVIDER NOTE - SKIN LOCATION #1
Routing refill request to provider for review/approval because:  Labs out of range:  bp  BP Readings from Last 3 Encounters:   06/19/20 (!) 145/77   06/11/20 (!) 156/74   11/20/09 178/99     Sammy Adams RN, BSN          
hand

## 2020-08-10 ENCOUNTER — RX RENEWAL (OUTPATIENT)
Age: 60
End: 2020-08-10

## 2020-08-11 ENCOUNTER — RX RENEWAL (OUTPATIENT)
Age: 60
End: 2020-08-11

## 2020-09-18 ENCOUNTER — APPOINTMENT (OUTPATIENT)
Dept: FAMILY MEDICINE | Facility: CLINIC | Age: 60
End: 2020-09-18
Payer: MEDICAID

## 2020-09-18 VITALS
OXYGEN SATURATION: 99 % | WEIGHT: 165 LBS | HEIGHT: 63 IN | RESPIRATION RATE: 14 BRPM | BODY MASS INDEX: 29.23 KG/M2 | SYSTOLIC BLOOD PRESSURE: 117 MMHG | HEART RATE: 60 BPM | DIASTOLIC BLOOD PRESSURE: 81 MMHG | TEMPERATURE: 98.2 F

## 2020-09-18 PROCEDURE — 90686 IIV4 VACC NO PRSV 0.5 ML IM: CPT

## 2020-09-18 PROCEDURE — 99214 OFFICE O/P EST MOD 30 MIN: CPT | Mod: 25

## 2020-09-18 PROCEDURE — G0008: CPT

## 2020-09-18 PROCEDURE — 36415 COLL VENOUS BLD VENIPUNCTURE: CPT

## 2020-09-18 NOTE — HEALTH RISK ASSESSMENT
[No] : In the past 12 months have you used drugs other than those required for medical reasons? No [No falls in past year] : Patient reported no falls in the past year [0] : 2) Feeling down, depressed, or hopeless: Not at all (0) [] : No [Audit-CScore] : 0 [[id number]] : 0

## 2020-09-18 NOTE — HISTORY OF PRESENT ILLNESS
[FreeTextEntry1] : See HPI [de-identified] : Is a 60-year-old female who presents today for follow-up and disease management patient states that she is in her usual state of health she denies any chest pain or shortness of breath she is awake alert and oriented x3 in no acute distress.\par \par At today's visit we will be addressing hypothyroidism I will obtain comprehensive blood work, gastroesophageal reflux disease, history of breast cancer status post mastectomy, and recurrent headaches according to the patient the headaches have been responding to Tylenol.\par \par

## 2020-09-18 NOTE — ASSESSMENT
[FreeTextEntry1] : Assessment and plan:\par \par 1.  Hypothyroidism comprehensive blood work drawn in office check TSH and free T4 continue levothyroxine 25 mcg p.o. daily.\par \par 2.  Gastroesophageal reflux disease patient states that she is feeling absolutely fine with proton pump inhibitor she will continue pantoprazole.\par \par 3.  Headaches appear to be tension headaches respond well to Tylenol I prefer conservative management and so does the patient.\par \par 4.  History of breast cancer followed very closely by oncology patient had mastectomy chest wall pain has resolved.\par \par 5.  Comprehensive blood work drawn in office by examiner\par \par 6.  Influenza vaccine administered at this visit by examiner.

## 2020-09-21 LAB
ALBUMIN SERPL ELPH-MCNC: 4.5 G/DL
ALP BLD-CCNC: 96 U/L
ALT SERPL-CCNC: 16 U/L
ANION GAP SERPL CALC-SCNC: 14 MMOL/L
AST SERPL-CCNC: 16 U/L
BASOPHILS # BLD AUTO: 0.05 K/UL
BASOPHILS NFR BLD AUTO: 0.5 %
BILIRUB SERPL-MCNC: 0.3 MG/DL
BUN SERPL-MCNC: 19 MG/DL
CALCIUM SERPL-MCNC: 9.8 MG/DL
CHLORIDE SERPL-SCNC: 103 MMOL/L
CO2 SERPL-SCNC: 24 MMOL/L
CREAT SERPL-MCNC: 0.73 MG/DL
EOSINOPHIL # BLD AUTO: 0.14 K/UL
EOSINOPHIL NFR BLD AUTO: 1.4 %
GLUCOSE SERPL-MCNC: 84 MG/DL
HCT VFR BLD CALC: 42.6 %
HGB BLD-MCNC: 13.5 G/DL
IMM GRANULOCYTES NFR BLD AUTO: 0.2 %
LYMPHOCYTES # BLD AUTO: 3.03 K/UL
LYMPHOCYTES NFR BLD AUTO: 30.7 %
MAN DIFF?: NORMAL
MCHC RBC-ENTMCNC: 31.7 GM/DL
MCHC RBC-ENTMCNC: 32 PG
MCV RBC AUTO: 100.9 FL
MONOCYTES # BLD AUTO: 0.71 K/UL
MONOCYTES NFR BLD AUTO: 7.2 %
NEUTROPHILS # BLD AUTO: 5.93 K/UL
NEUTROPHILS NFR BLD AUTO: 60 %
PLATELET # BLD AUTO: 325 K/UL
POTASSIUM SERPL-SCNC: 4.4 MMOL/L
PROT SERPL-MCNC: 6.7 G/DL
RBC # BLD: 4.22 M/UL
RBC # FLD: 13 %
SODIUM SERPL-SCNC: 142 MMOL/L
T4 FREE SERPL-MCNC: 1.2 NG/DL
TSH SERPL-ACNC: 3.65 UIU/ML
WBC # FLD AUTO: 9.88 K/UL

## 2020-11-19 ENCOUNTER — APPOINTMENT (OUTPATIENT)
Dept: ULTRASOUND IMAGING | Facility: HOSPITAL | Age: 60
End: 2020-11-19
Payer: MEDICAID

## 2020-11-19 ENCOUNTER — OUTPATIENT (OUTPATIENT)
Dept: OUTPATIENT SERVICES | Facility: HOSPITAL | Age: 60
LOS: 1 days | End: 2020-11-19
Payer: MEDICAID

## 2020-11-19 DIAGNOSIS — Z00.8 ENCOUNTER FOR OTHER GENERAL EXAMINATION: ICD-10-CM

## 2020-11-19 PROCEDURE — 76830 TRANSVAGINAL US NON-OB: CPT | Mod: 26

## 2020-11-19 PROCEDURE — 76830 TRANSVAGINAL US NON-OB: CPT

## 2020-12-01 ENCOUNTER — OUTPATIENT (OUTPATIENT)
Dept: OUTPATIENT SERVICES | Facility: HOSPITAL | Age: 60
LOS: 1 days | End: 2020-12-01
Payer: MEDICAID

## 2020-12-01 ENCOUNTER — APPOINTMENT (OUTPATIENT)
Dept: ORTHOPEDIC SURGERY | Facility: CLINIC | Age: 60
End: 2020-12-01
Payer: MEDICAID

## 2020-12-01 DIAGNOSIS — M54.12 RADICULOPATHY, CERVICAL REGION: ICD-10-CM

## 2020-12-01 PROCEDURE — 99072 ADDL SUPL MATRL&STAF TM PHE: CPT

## 2020-12-01 PROCEDURE — 99214 OFFICE O/P EST MOD 30 MIN: CPT

## 2020-12-01 PROCEDURE — 72040 X-RAY EXAM NECK SPINE 2-3 VW: CPT

## 2020-12-01 PROCEDURE — 73030 X-RAY EXAM OF SHOULDER: CPT | Mod: LT

## 2020-12-07 ENCOUNTER — APPOINTMENT (OUTPATIENT)
Dept: PHYSICAL MEDICINE AND REHAB | Facility: CLINIC | Age: 60
End: 2020-12-07
Payer: MEDICAID

## 2020-12-07 VITALS
DIASTOLIC BLOOD PRESSURE: 82 MMHG | HEART RATE: 65 BPM | TEMPERATURE: 97.3 F | OXYGEN SATURATION: 96 % | SYSTOLIC BLOOD PRESSURE: 120 MMHG

## 2020-12-07 PROCEDURE — 99213 OFFICE O/P EST LOW 20 MIN: CPT

## 2020-12-07 PROCEDURE — 99072 ADDL SUPL MATRL&STAF TM PHE: CPT

## 2020-12-07 NOTE — ASSESSMENT
[FreeTextEntry1] : Continue HEP \par lidocaine cream to incision\par refer to plastic surgery for surgery eval\par F/u in 6 m

## 2020-12-07 NOTE — PHYSICAL EXAM
[FreeTextEntry1] : General Appearance: Well developed, well nourished, in no acute distress.\par Chest: Normal chest expansion. Breast incisions are clean, dry and intact. No seromas or neuromas are noted. \par Axilla: No masses are noted. No axillary cording is noted. \par Musculoskeletal: Normal range of motion of bilateral shoulders. Negative impingement tests.\par Extremities: No edema is noted in bilateral upper extremities. \par Neurologic: The patient has normal muscle strength in bilateral upper and lower extremities. \par Chest: "dog ears" around incisions\par

## 2020-12-07 NOTE — HISTORY OF PRESENT ILLNESS
[FreeTextEntry1] : 61 yo female with h/o breast ca\par She underwent b/l mastectomy with Dr. Raymundo. 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 Grade 1 neuropathy, otherwise tolerated treatment well.\par She was previously on Duloxetine but stopped as symptoms were better. \par \par Interval: She continues to have some tightness in her axilla/breast incisions despite doing HEP  \par pain around lateral chest wall

## 2020-12-09 ENCOUNTER — INPATIENT (INPATIENT)
Facility: HOSPITAL | Age: 60
LOS: 1 days | Discharge: OTHER INSTITUTION | DRG: 282 | End: 2020-12-11
Attending: STUDENT IN AN ORGANIZED HEALTH CARE EDUCATION/TRAINING PROGRAM | Admitting: STUDENT IN AN ORGANIZED HEALTH CARE EDUCATION/TRAINING PROGRAM
Payer: MEDICAID

## 2020-12-09 VITALS
SYSTOLIC BLOOD PRESSURE: 153 MMHG | RESPIRATION RATE: 18 BRPM | OXYGEN SATURATION: 97 % | WEIGHT: 151.9 LBS | DIASTOLIC BLOOD PRESSURE: 94 MMHG | TEMPERATURE: 98 F | HEIGHT: 62 IN | HEART RATE: 73 BPM

## 2020-12-09 DIAGNOSIS — R06.02 SHORTNESS OF BREATH: ICD-10-CM

## 2020-12-09 DIAGNOSIS — R94.31 ABNORMAL ELECTROCARDIOGRAM [ECG] [EKG]: ICD-10-CM

## 2020-12-09 DIAGNOSIS — E03.9 HYPOTHYROIDISM, UNSPECIFIED: ICD-10-CM

## 2020-12-09 DIAGNOSIS — R07.9 CHEST PAIN, UNSPECIFIED: ICD-10-CM

## 2020-12-09 LAB
ALBUMIN SERPL ELPH-MCNC: 3.6 G/DL — SIGNIFICANT CHANGE UP (ref 3.3–5)
ALP SERPL-CCNC: 114 U/L — SIGNIFICANT CHANGE UP (ref 40–120)
ALT FLD-CCNC: 23 U/L — SIGNIFICANT CHANGE UP (ref 10–45)
ANION GAP SERPL CALC-SCNC: 9 MMOL/L — SIGNIFICANT CHANGE UP (ref 5–17)
APTT BLD: 31.8 SEC — SIGNIFICANT CHANGE UP (ref 27.5–35.5)
AST SERPL-CCNC: 19 U/L — SIGNIFICANT CHANGE UP (ref 10–40)
BASOPHILS # BLD AUTO: 0.07 K/UL — SIGNIFICANT CHANGE UP (ref 0–0.2)
BASOPHILS NFR BLD AUTO: 0.7 % — SIGNIFICANT CHANGE UP (ref 0–2)
BILIRUB SERPL-MCNC: 0.2 MG/DL — SIGNIFICANT CHANGE UP (ref 0.2–1.2)
BUN SERPL-MCNC: 18 MG/DL — SIGNIFICANT CHANGE UP (ref 7–23)
CALCIUM SERPL-MCNC: 9.2 MG/DL — SIGNIFICANT CHANGE UP (ref 8.4–10.5)
CHLORIDE SERPL-SCNC: 106 MMOL/L — SIGNIFICANT CHANGE UP (ref 96–108)
CO2 SERPL-SCNC: 25 MMOL/L — SIGNIFICANT CHANGE UP (ref 22–31)
CREAT SERPL-MCNC: 0.81 MG/DL — SIGNIFICANT CHANGE UP (ref 0.5–1.3)
D DIMER BLD IA.RAPID-MCNC: <150 NG/ML DDU — SIGNIFICANT CHANGE UP
EOSINOPHIL # BLD AUTO: 0.16 K/UL — SIGNIFICANT CHANGE UP (ref 0–0.5)
EOSINOPHIL NFR BLD AUTO: 1.6 % — SIGNIFICANT CHANGE UP (ref 0–6)
GLUCOSE SERPL-MCNC: 112 MG/DL — HIGH (ref 70–99)
HCT VFR BLD CALC: 43.8 % — SIGNIFICANT CHANGE UP (ref 34.5–45)
HGB BLD-MCNC: 14.9 G/DL — SIGNIFICANT CHANGE UP (ref 11.5–15.5)
IMM GRANULOCYTES NFR BLD AUTO: 0.5 % — SIGNIFICANT CHANGE UP (ref 0–1.5)
INR BLD: 0.96 RATIO — SIGNIFICANT CHANGE UP (ref 0.88–1.16)
LACTATE SERPL-SCNC: 1.4 MMOL/L — SIGNIFICANT CHANGE UP (ref 0.7–2)
LYMPHOCYTES # BLD AUTO: 3.17 K/UL — SIGNIFICANT CHANGE UP (ref 1–3.3)
LYMPHOCYTES # BLD AUTO: 32.2 % — SIGNIFICANT CHANGE UP (ref 13–44)
MCHC RBC-ENTMCNC: 31.9 PG — SIGNIFICANT CHANGE UP (ref 27–34)
MCHC RBC-ENTMCNC: 34 GM/DL — SIGNIFICANT CHANGE UP (ref 32–36)
MCV RBC AUTO: 93.8 FL — SIGNIFICANT CHANGE UP (ref 80–100)
MONOCYTES # BLD AUTO: 0.76 K/UL — SIGNIFICANT CHANGE UP (ref 0–0.9)
MONOCYTES NFR BLD AUTO: 7.7 % — SIGNIFICANT CHANGE UP (ref 2–14)
NEUTROPHILS # BLD AUTO: 5.65 K/UL — SIGNIFICANT CHANGE UP (ref 1.8–7.4)
NEUTROPHILS NFR BLD AUTO: 57.3 % — SIGNIFICANT CHANGE UP (ref 43–77)
NRBC # BLD: 0 /100 WBCS — SIGNIFICANT CHANGE UP (ref 0–0)
NT-PROBNP SERPL-SCNC: 124 PG/ML — SIGNIFICANT CHANGE UP (ref 0–300)
PLATELET # BLD AUTO: 315 K/UL — SIGNIFICANT CHANGE UP (ref 150–400)
POTASSIUM SERPL-MCNC: 3.6 MMOL/L — SIGNIFICANT CHANGE UP (ref 3.5–5.3)
POTASSIUM SERPL-SCNC: 3.6 MMOL/L — SIGNIFICANT CHANGE UP (ref 3.5–5.3)
PROCALCITONIN SERPL-MCNC: 0.03 NG/ML — SIGNIFICANT CHANGE UP
PROT SERPL-MCNC: 7.6 G/DL — SIGNIFICANT CHANGE UP (ref 6–8.3)
PROTHROM AB SERPL-ACNC: 11.6 SEC — SIGNIFICANT CHANGE UP (ref 10.6–13.6)
RBC # BLD: 4.67 M/UL — SIGNIFICANT CHANGE UP (ref 3.8–5.2)
RBC # FLD: 12.1 % — SIGNIFICANT CHANGE UP (ref 10.3–14.5)
SARS-COV-2 RNA SPEC QL NAA+PROBE: SIGNIFICANT CHANGE UP
SODIUM SERPL-SCNC: 140 MMOL/L — SIGNIFICANT CHANGE UP (ref 135–145)
TROPONIN I SERPL-MCNC: 0.17 NG/ML — HIGH (ref 0.02–0.06)
TROPONIN I SERPL-MCNC: 0.19 NG/ML — HIGH (ref 0.02–0.06)
WBC # BLD: 9.86 K/UL — SIGNIFICANT CHANGE UP (ref 3.8–10.5)
WBC # FLD AUTO: 9.86 K/UL — SIGNIFICANT CHANGE UP (ref 3.8–10.5)

## 2020-12-09 PROCEDURE — 71045 X-RAY EXAM CHEST 1 VIEW: CPT | Mod: 26

## 2020-12-09 PROCEDURE — 99222 1ST HOSP IP/OBS MODERATE 55: CPT

## 2020-12-09 PROCEDURE — 99285 EMERGENCY DEPT VISIT HI MDM: CPT

## 2020-12-09 PROCEDURE — 93010 ELECTROCARDIOGRAM REPORT: CPT

## 2020-12-09 PROCEDURE — 71275 CT ANGIOGRAPHY CHEST: CPT | Mod: 26

## 2020-12-09 RX ORDER — PANTOPRAZOLE SODIUM 20 MG/1
1 TABLET, DELAYED RELEASE ORAL
Qty: 0 | Refills: 0 | DISCHARGE

## 2020-12-09 RX ORDER — CEFTRIAXONE 500 MG/1
1000 INJECTION, POWDER, FOR SOLUTION INTRAMUSCULAR; INTRAVENOUS ONCE
Refills: 0 | Status: COMPLETED | OUTPATIENT
Start: 2020-12-09 | End: 2020-12-09

## 2020-12-09 RX ORDER — SODIUM CHLORIDE 9 MG/ML
2100 INJECTION INTRAMUSCULAR; INTRAVENOUS; SUBCUTANEOUS ONCE
Refills: 0 | Status: COMPLETED | OUTPATIENT
Start: 2020-12-09 | End: 2020-12-09

## 2020-12-09 RX ORDER — AZITHROMYCIN 500 MG/1
500 TABLET, FILM COATED ORAL ONCE
Refills: 0 | Status: COMPLETED | OUTPATIENT
Start: 2020-12-09 | End: 2020-12-09

## 2020-12-09 RX ORDER — ENOXAPARIN SODIUM 100 MG/ML
40 INJECTION SUBCUTANEOUS DAILY
Refills: 0 | Status: DISCONTINUED | OUTPATIENT
Start: 2020-12-09 | End: 2020-12-11

## 2020-12-09 RX ORDER — ASPIRIN/CALCIUM CARB/MAGNESIUM 324 MG
324 TABLET ORAL ONCE
Refills: 0 | Status: COMPLETED | OUTPATIENT
Start: 2020-12-09 | End: 2020-12-09

## 2020-12-09 RX ORDER — REGADENOSON 0.08 MG/ML
0.4 INJECTION, SOLUTION INTRAVENOUS ONCE
Refills: 0 | Status: COMPLETED | OUTPATIENT
Start: 2020-12-09 | End: 2020-12-10

## 2020-12-09 RX ADMIN — AZITHROMYCIN 500 MILLIGRAM(S): 500 TABLET, FILM COATED ORAL at 15:31

## 2020-12-09 RX ADMIN — AZITHROMYCIN 255 MILLIGRAM(S): 500 TABLET, FILM COATED ORAL at 14:54

## 2020-12-09 RX ADMIN — CEFTRIAXONE 100 MILLIGRAM(S): 500 INJECTION, POWDER, FOR SOLUTION INTRAMUSCULAR; INTRAVENOUS at 14:00

## 2020-12-09 RX ADMIN — CEFTRIAXONE 1000 MILLIGRAM(S): 500 INJECTION, POWDER, FOR SOLUTION INTRAMUSCULAR; INTRAVENOUS at 14:00

## 2020-12-09 RX ADMIN — SODIUM CHLORIDE 2100 MILLILITER(S): 9 INJECTION INTRAMUSCULAR; INTRAVENOUS; SUBCUTANEOUS at 15:27

## 2020-12-09 RX ADMIN — Medication 324 MILLIGRAM(S): at 13:34

## 2020-12-09 RX ADMIN — SODIUM CHLORIDE 2100 MILLILITER(S): 9 INJECTION INTRAMUSCULAR; INTRAVENOUS; SUBCUTANEOUS at 14:00

## 2020-12-09 NOTE — ED ADULT NURSE NOTE - OBJECTIVE STATEMENT
Patient presents to ED complaining of dyspnea on exertion worsening over 3 weeks. Patient denies cough, denies fevers. Patient presents to ED complaining of dyspnea on exertion worsening over 3 weeks. Patient denies cough, denies fevers. Patient also c/o middle/right chest pain 2/10.

## 2020-12-09 NOTE — H&P ADULT - PROBLEM SELECTOR PLAN 1
COVID negative with GGO on CT  COVID antibodies are pending  Will COVID negative with GGO on CT  COVID antibodies are pending  Will hold off on antibiotics, procalcitonin is trending  Pulm consulted

## 2020-12-09 NOTE — ED PROVIDER NOTE - CLINICAL SUMMARY MEDICAL DECISION MAKING FREE TEXT BOX
Pt p/w pleuritic cp and CANTRELL, abnormal EKG, +PE risk factors, will r/o ACS vs PE, if CTA neg, will need obs vs admit for abnormal ekg

## 2020-12-09 NOTE — H&P ADULT - HISTORY OF PRESENT ILLNESS
60 year old with a history of breast cancer comes with 3 weeks of 60 year old with a history of breast cancer comes with 3 weeks of progressive shortness of breath and more recently has developed midsternal chest pain.  Shortness of breath is brought on by ambulation and resolves with resting.  The chest pain is dull in nature, does not radiate, and is not illicited by palpation. 60 year old with a history of breast cancer comes with 3 weeks of progressive shortness of breath and more recently has developed midsternal chest pain.  Shortness of breath is brought on by ambulation and resolves with resting.  The chest pain is dull in nature, does not radiate, and is not illicited by palpation. She describes not finding anything that made the chest pain better or worse.

## 2020-12-09 NOTE — H&P ADULT - ASSESSMENT
60 year old with a history of breast cancer comes with 3 weeks of progressive shortness of breath and more recently has developed midsternal chest pain.  Shortness of breath is brought on by ambulation and resolves with resting.  The chest pain is dull in nature, does not radiate, and is not illicited by palpation. She describes not finding anything that made the chest pain better or worse.   IMPROVE VTE Individual Risk Assessment    RISK                                                                Points    [  ] Previous VTE                                                  3    [  ] Thrombophilia                                               2    [  ] Lower limb paralysis                                      2        (unable to hold up >15 seconds)      [  ] Current Cancer                                              2         (within 6 months)    [ x ] Immobilization > 24 hrs                                1    [  ] ICU/CCU stay > 24 hours                              1    [x  ] Age > 60                                                      1    IMPROVE VTE Score _______2-lovenox __    IMPROVE Score 0-1: Low Risk, No VTE prophylaxis required for most patients, encourage ambulation.   IMPROVE Score 2-3: At risk, pharmacologic VTE prophylaxis is indicated for most patients (in the absence of a contraindication)  IMPROVE Score > or = 4: High Risk, pharmacologic VTE prophylaxis is indicated for most patients (in the absence of a contraindication)

## 2020-12-09 NOTE — ED ADULT NURSE NOTE - NSTOBACCO TYPE_GEN_A_CORE_RD
Patient Visit Summary    Patient Name: Nabil Cheung  MRN: [id number]    Date of Visit: 9/28/2017    Physician's Recommendations/Instructions: We refilled your lisinopril, metformin, glipizide. Please continue taking prescribed medications as instructed. If possible, try to start exercising as time and circumstances allow.    We also discussed potentially starting aspirin and simvastatin in the future once circumstances stabilize for you.    Follow Up/Results: Your A1C taken on 9/25/17 was 9.6. We have given you a copy of the lab results from 9/25/17 for your records.    Referrals and Instructions: Please come back in a month for medication refill. If you have other concerns before then, please come in whenever they occur.    Ophthomology night is on Monday, November 20th.    Physician: Dr. Moreau   Cigarettes

## 2020-12-09 NOTE — ED PROVIDER NOTE - PMH
Anxiety about mastectomy    Chronic bilateral low back pain, with sciatica presence unspecified    Language barrier  Serbian speaking  Other chronic gastritis    Smoker

## 2020-12-09 NOTE — ED ADULT NURSE NOTE - PMH
Anxiety about mastectomy    Chronic bilateral low back pain, with sciatica presence unspecified    Language barrier  Korean speaking  Other chronic gastritis    Smoker

## 2020-12-09 NOTE — H&P ADULT - NSICDXPASTMEDICALHX_GEN_ALL_CORE_FT
PAST MEDICAL HISTORY:  Anxiety about mastectomy     Chronic bilateral low back pain, with sciatica presence unspecified     Language barrier Ukrainian speaking    Other chronic gastritis     Smoker      Negative/Unknown

## 2020-12-09 NOTE — H&P ADULT - NSHPSOCIALHISTORY_GEN_ALL_CORE
Lives with daughter and grandchild  Past smoker  No tobacco     Mother  at age 61 of heart disease  Father  at age 81 of leukemia

## 2020-12-09 NOTE — ED ADULT TRIAGE NOTE - BSA (M2)
1.7
Elke Green), ColonRectal Surgery; Surgery  [address] [place] [address]  [address] [place] [address]  Phone: [phone]  Fax: [phone]

## 2020-12-09 NOTE — ED PROVIDER NOTE - ATTENDING CONTRIBUTION TO CARE
Dr. Reid: I performed a face to face bedside interview with patient regarding history of present illness, review of symptoms and past medical history. I completed an independent physical exam.  I have discussed patient's plan of care with PA.   I agree with note as stated above, having amended the EMR as needed to reflect my findings.   This includes HISTORY OF PRESENT ILLNESS, HIV, PAST MEDICAL/SURGICAL/FAMILY/SOCIAL HISTORY, ALLERGIES AND HOME MEDICATIONS, REVIEW OF SYSTEMS, PHYSICAL EXAM, and any PROGRESS NOTES during the time I functioned as the attending physician for this patient.    Dr. Reid: This H&P has been written by myself in its entirety

## 2020-12-09 NOTE — H&P ADULT - NSHPPHYSICALEXAM_GEN_ALL_CORE
Vital Signs Last 24 Hrs  T(F): 98 (09 Dec 2020 16:22), Max: 98.1 (09 Dec 2020 15:34)  HR: 72 (09 Dec 2020 16:22) (65 - 73)  BP: 155/81 (09 Dec 2020 16:22) (140/64 - 155/81)  RR: 12 (09 Dec 2020 16:22) (12 - 18)  SpO2: 99% (09 Dec 2020 16:22) (97% - 99%)    PHYSICAL EXAM:  GENERAL: NAD, well-groomed, well-developed  HEAD:  Atraumatic, Normocephalic  EYES: EOMI, conjunctiva and sclera clear  ENMT: Moist mucous membranes, Good dentition, no thrush  NECK: Supple, No JVD  CHEST/LUNG: Clear to auscultation bilaterally, good air entry, non-labored breathing  HEART: RRR; S1/S2, No murmur  ABDOMEN: Soft, Nontender, Nondistended; Bowel sounds present  VASCULAR: Normal pulses, Normal capillary refill  EXTREMITIES: No calf tenderness, No cyanosis, No edema  LYMPH: Normal; No lymphadenopathy noted  SKIN: Warm, Intact  PSYCH: Normal mood, Normal affect  NERVOUS SYSTEM:  A/O x3, Good concentration; CN 2-12 intact, No focal deficits

## 2020-12-09 NOTE — ED ADULT NURSE REASSESSMENT NOTE - NS ED NURSE REASSESS COMMENT FT1
attempted to call and give report 3x. as per supervisor Sarah, bedside report is to be given and pt. is to go up immediately

## 2020-12-09 NOTE — ED PROVIDER NOTE - SECONDARY DIAGNOSIS.
CANTRELL (dyspnea on exertion) NSTEMI (non-ST elevated myocardial infarction) CAP (community acquired pneumonia) Smoker COVID-19

## 2020-12-09 NOTE — H&P ADULT - BIRTH SEX
Female PRINCIPAL DISCHARGE DIAGNOSIS  Diagnosis: Intractable cyclical vomiting with nausea  Assessment and Plan of Treatment: You presented with repeated vomitings and nausea. CT abdomen was done which showed no abdominal pathology. You were given clear liquid diet and IV medications for symptomatic benefit. Diet was advanced to solids ,you tolerated well. Continue taking medications as prescribed, follow up your primary medical doctor and gastroentrologist as out-patient PRINCIPAL DISCHARGE DIAGNOSIS  Diagnosis: Intractable cyclical vomiting with nausea  Assessment and Plan of Treatment: You presented with repeated vomitings and nausea. CT abdomen was done which showed no abdominal pathology. You were given clear liquid diet and IV medications for symptomatic benefit. Diet was advanced to solids ,you tolerated well. Continue taking medications as prescribed, follow up your primary medical doctor and gastroentrologist as out-patient      SECONDARY DISCHARGE DIAGNOSES  Diagnosis: Renal lesion  Assessment and Plan of Treatment: CT abdomen showed an incidental finding of -Slowly enlarging 7 mm left renal hypodensity, too small to characterize with certainty. You are advised to follow up with urologist as out-patient.    Diagnosis: Atrial fibrillation  Assessment and Plan of Treatment: You have history of atrial fibrillation, which is rate controlled,  continue taking  home medications. PRINCIPAL DISCHARGE DIAGNOSIS  Diagnosis: Intractable cyclical vomiting with nausea  Assessment and Plan of Treatment: You presented with repeated vomitings and nausea. CT abdomen was done which showed no abdominal pathology. You were given clear liquid diet and IV medications for symptomatic benefit. Diet was advanced to solids ,you tolerated well. Continue taking medications as prescribed, follow up your primary medical doctor and gastroentrologist as out-patient      SECONDARY DISCHARGE DIAGNOSES  Diagnosis: Renal lesion  Assessment and Plan of Treatment: CT abdomen showed an incidental finding of -Slowly enlarging 7 mm left renal hypodensity, too small to characterize with certainty. You are advised to follow up with urologist as out-patient.    Diagnosis: Atrial fibrillation  Assessment and Plan of Treatment: You have history of atrial fibrillation, which is rate controlled,  continue taking  home medications.    Diagnosis: CKD (chronic kidney disease) stage 3, GFR 30-59 ml/min  Assessment and Plan of Treatment: You have history of chronic kidney disease, currently at beseline function. Continue your home medications and follow up with out-patient nephrologist.    Diagnosis: SLE (systemic lupus erythematosus)  Assessment and Plan of Treatment: You have history of SLE, you are currently on no treatment. Please follow up with primary medical doctor.

## 2020-12-09 NOTE — ED ADULT TRIAGE NOTE - NSWEIGHTCALCTOOLDRUG_GEN_A_CORE
[facility]  Radiology Department  [phone]      Radiologist: Dr. Juliana Rea    Date: 5/28/2020      Venogram Discharge Instructions     Watch for bleeding from the puncture site. If bleeding should occur apply pressure for at least 15 minutes. If you are unable to stop the bleeding have someone drive you to the nearest Emergency Room. Resume your previous diet and resume your prescribed medications. You may take Tylenol if allowed, as directed on the label, for pain. Avoid ibuprofen (Advil, Motrin) and aspirin as they may cause you to bleed. If you have any questions or concerns please call and ask to speak with a radiology nurse. Patient Education        Learning About a Varicocele  What is a varicocele? A varicocele is an enlarged vein in the scrotum. The scrotum contains the testicles. A varicocele can occur in either testicle. It may look like a \"bag of worms\" or feel like one to the touch. It can be related to poor sperm quality, which can make it hard to father a child. What causes it? A varicocele may form in one or both testicles when there is increased pressure in the veins of the scrotum. This pressure is more likely to build up in the left testicle. That's because of how the veins on the left side are connected to the rest of the body's blood vessels. What are the symptoms? A varicocele may not cause any symptoms. But you may feel an ache in your scrotum. You may notice that your scrotum is larger or hangs lower. Or you may see or feel enlarged veins in it. Some men with a varicocele have a smaller testicle or are infertile. How is it diagnosed? Your doctor may feel the varicocele when checking your scrotum. You may get an ultrasound or CT scan of the scrotum to look at the size and shape of the testicles. These scans can also make sure that the larger scrotum is a varicocele and not something else.  If you are infertile, your doctor may check to see if a varicocele could be the cause. How is it treated? Most varicoceles don't need treatment. And most don't cause problems. But if your testicle is too small, the varicocele is large, your sperm count is too low, or you have pain, your doctor may recommend a procedure or surgery to close or tie off the vein in the scrotum so pressure doesn't build up. Follow-up care is a key part of your treatment and safety. Be sure to make and go to all appointments, and call your doctor if you are having problems. It's also a good idea to know your test results and keep a list of the medicines you take. Where can you learn more? Go to http://topher-eren.info/  Enter V175 in the search box to learn more about \"Learning About a Varicocele. \"  Current as of: August 22, 2019               Content Version: 12.5  © 1849-0576 [employer]. Care instructions adapted under license by [employer] (which disclaims liability or warranty for this information). If you have questions about a medical condition or this instruction, always ask your healthcare professional. [place] [address] any warranty or liability for your use of this information.  used

## 2020-12-09 NOTE — H&P ADULT - NSHPLABSRESULTS_GEN_ALL_CORE
LABS:                        14.9   9.86  )-----------( 315      ( 09 Dec 2020 13:06 )             43.8     12-09    140  |  106  |  18  ----------------------------<  112<H>  3.6   |  25  |  0.81    Ca    9.2      09 Dec 2020 13:06    TPro  7.6  /  Alb  3.6  /  TBili  0.2  /  DBili  x   /  AST  19  /  ALT  23  /  AlkPhos  114  12-09    PT/INR - ( 09 Dec 2020 13:06 )   PT: 11.6 sec;   INR: 0.96 ratio         PTT - ( 09 Dec 2020 13:06 )  PTT:31.8 sec     CAPILLARY BLOOD GLUCOSE                RADIOLOGY & ADDITIONAL TESTS:  < from: CT Angio Chest w/ IV Cont (12.09.20 @ 14:23) >    IMPRESSION:  No evidence of pulmonary embolism.    Bilateral ground glass infiltrates concerning for Covid 19 pneumonia. Other atypical pneumonia not excluded.        < end of copied text >      Consultant(s) Notes Reviewed:  [x ] YES  [ ] NO  Care Discussed with Consultants/Other Providers [ x] YES  [ ] NO  Imaging Personally Reviewed:  [ ] YES  [ ] NO

## 2020-12-09 NOTE — H&P ADULT - NSHPREVIEWOFSYSTEMS_GEN_ALL_CORE
REVIEW OF SYSTEMS:  CONSTITUTIONAL: No fever, weight loss, +fatigue  EYES: No eye pain, visual disturbances, or discharge  ENMT:  No difficulty hearing, tinnitus, vertigo; No sinus or throat pain  NECK: No pain or stiffness  BREASTS: No pain, masses, or nipple discharge  RESPIRATORY: No cough, wheezing, chills or hemoptysis; + shortness of breath  CARDIOVASCULAR: No chest pain, palpitations, dizziness, or leg swelling  GASTROINTESTINAL: No abdominal or epigastric pain. No nausea, vomiting, or hematemesis; No diarrhea or constipation. No melena or hematochezia.  GENITOURINARY: No dysuria, frequency, hematuria, or incontinence  NEUROLOGICAL: No headaches, memory loss, loss of strength, numbness, or tremors  SKIN: No itching, burning, rashes, or lesions   LYMPH NODES: No enlarged glands  ENDOCRINE: No heat or cold intolerance; No hair loss  MUSCULOSKELETAL: No joint pain or swelling; No muscle, back, or extremity pain  PSYCHIATRIC: No depression, anxiety, mood swings, or difficulty sleeping  HEME/LYMPH: No easy bruising, or bleeding gums  ALLERY AND IMMUNOLOGIC: No hives or eczema    ALL ROS REVIEWED AND NORMAL EXCEPT AS STATED ABOVE REVIEW OF SYSTEMS:  CONSTITUTIONAL: No fever, weight loss, +fatigue  EYES: No eye pain, visual disturbances, or discharge  ENMT:  No difficulty hearing, tinnitus, vertigo; No sinus or throat pain  NECK: No pain or stiffness  BREASTS: No pain, masses, or nipple discharge  RESPIRATORY: No cough, wheezing, chills or hemoptysis; + shortness of breath  CARDIOVASCULAR: +chest pain, denies palpitations, dizziness, or leg swelling  GASTROINTESTINAL: No abdominal or epigastric pain. No nausea, vomiting, or hematemesis; No diarrhea or constipation. No melena or hematochezia.  GENITOURINARY: No dysuria, frequency, hematuria, or incontinence  NEUROLOGICAL: No headaches, memory loss, loss of strength, numbness, or tremors  SKIN: No itching, burning, rashes, or lesions   LYMPH NODES: No enlarged glands  ENDOCRINE: No heat or cold intolerance; No hair loss  MUSCULOSKELETAL: No joint pain or swelling; No muscle, back, or extremity pain  PSYCHIATRIC: No depression, anxiety, mood swings, or difficulty sleeping  HEME/LYMPH: No easy bruising, or bleeding gums  ALLERY AND IMMUNOLOGIC: No hives or eczema    ALL ROS REVIEWED AND NORMAL EXCEPT AS STATED ABOVE

## 2020-12-09 NOTE — ED ADULT NURSE NOTE - NSIMPLEMENTINTERV_GEN_ALL_ED
Implemented All Universal Safety Interventions:  [place] to call system. Call bell, personal items and telephone within reach. Instruct patient to call for assistance. Room bathroom lighting operational. Non-slip footwear when patient is off stretcher. Physically safe environment: no spills, clutter or unnecessary equipment. Stretcher in lowest position, wheels locked, appropriate side rails in place.

## 2020-12-09 NOTE — ED PROVIDER NOTE - OBJECTIVE STATEMENT
Dr. Reid: 60F, declined translational services, h/o breast ca s/p mastectomy, fam h/o CAD (mother  of MI) p/w 3 weeks or CANTRELL and right sided pleuritic chest pain. No fevers or chills, no cough, no nausea or vomiting, no abdo pain, no calf pain, no travel, no covid contacts.

## 2020-12-09 NOTE — ED PROVIDER NOTE - CARE PLAN
Principal Discharge DX:	Abnormal EKG  Secondary Diagnosis:	CANTRELL (dyspnea on exertion)   Principal Discharge DX:	Abnormal EKG  Secondary Diagnosis:	CANTRELL (dyspnea on exertion)  Secondary Diagnosis:	NSTEMI (non-ST elevated myocardial infarction)  Secondary Diagnosis:	CAP (community acquired pneumonia)   Principal Discharge DX:	Abnormal EKG  Secondary Diagnosis:	NSTEMI (non-ST elevated myocardial infarction)  Secondary Diagnosis:	CAP (community acquired pneumonia)  Secondary Diagnosis:	Smoker  Secondary Diagnosis:	COVID-19

## 2020-12-10 ENCOUNTER — APPOINTMENT (OUTPATIENT)
Dept: OBGYN | Facility: CLINIC | Age: 60
End: 2020-12-10

## 2020-12-10 LAB
ALBUMIN SERPL ELPH-MCNC: 3.2 G/DL — LOW (ref 3.3–5)
ALP SERPL-CCNC: 91 U/L — SIGNIFICANT CHANGE UP (ref 40–120)
ALT FLD-CCNC: 21 U/L — SIGNIFICANT CHANGE UP (ref 10–45)
ANION GAP SERPL CALC-SCNC: 9 MMOL/L — SIGNIFICANT CHANGE UP (ref 5–17)
AST SERPL-CCNC: 19 U/L — SIGNIFICANT CHANGE UP (ref 10–40)
BASOPHILS # BLD AUTO: 0.04 K/UL — SIGNIFICANT CHANGE UP (ref 0–0.2)
BASOPHILS NFR BLD AUTO: 0.6 % — SIGNIFICANT CHANGE UP (ref 0–2)
BILIRUB SERPL-MCNC: 0.6 MG/DL — SIGNIFICANT CHANGE UP (ref 0.2–1.2)
BUN SERPL-MCNC: 11 MG/DL — SIGNIFICANT CHANGE UP (ref 7–23)
CALCIUM SERPL-MCNC: 8.9 MG/DL — SIGNIFICANT CHANGE UP (ref 8.4–10.5)
CHLORIDE SERPL-SCNC: 106 MMOL/L — SIGNIFICANT CHANGE UP (ref 96–108)
CO2 SERPL-SCNC: 26 MMOL/L — SIGNIFICANT CHANGE UP (ref 22–31)
CREAT SERPL-MCNC: 0.8 MG/DL — SIGNIFICANT CHANGE UP (ref 0.5–1.3)
EOSINOPHIL # BLD AUTO: 0.17 K/UL — SIGNIFICANT CHANGE UP (ref 0–0.5)
EOSINOPHIL NFR BLD AUTO: 2.6 % — SIGNIFICANT CHANGE UP (ref 0–6)
GLUCOSE SERPL-MCNC: 82 MG/DL — SIGNIFICANT CHANGE UP (ref 70–99)
HCT VFR BLD CALC: 42.8 % — SIGNIFICANT CHANGE UP (ref 34.5–45)
HCV AB S/CO SERPL IA: 0.05 S/CO — SIGNIFICANT CHANGE UP (ref 0–0.99)
HCV AB SERPL-IMP: SIGNIFICANT CHANGE UP
HGB BLD-MCNC: 13.9 G/DL — SIGNIFICANT CHANGE UP (ref 11.5–15.5)
IMM GRANULOCYTES NFR BLD AUTO: 0.2 % — SIGNIFICANT CHANGE UP (ref 0–1.5)
LYMPHOCYTES # BLD AUTO: 2.34 K/UL — SIGNIFICANT CHANGE UP (ref 1–3.3)
LYMPHOCYTES # BLD AUTO: 35.6 % — SIGNIFICANT CHANGE UP (ref 13–44)
MCHC RBC-ENTMCNC: 31.2 PG — SIGNIFICANT CHANGE UP (ref 27–34)
MCHC RBC-ENTMCNC: 32.5 GM/DL — SIGNIFICANT CHANGE UP (ref 32–36)
MCV RBC AUTO: 96.2 FL — SIGNIFICANT CHANGE UP (ref 80–100)
MONOCYTES # BLD AUTO: 0.5 K/UL — SIGNIFICANT CHANGE UP (ref 0–0.9)
MONOCYTES NFR BLD AUTO: 7.6 % — SIGNIFICANT CHANGE UP (ref 2–14)
NEUTROPHILS # BLD AUTO: 3.51 K/UL — SIGNIFICANT CHANGE UP (ref 1.8–7.4)
NEUTROPHILS NFR BLD AUTO: 53.4 % — SIGNIFICANT CHANGE UP (ref 43–77)
NRBC # BLD: 0 /100 WBCS — SIGNIFICANT CHANGE UP (ref 0–0)
PLATELET # BLD AUTO: 301 K/UL — SIGNIFICANT CHANGE UP (ref 150–400)
POTASSIUM SERPL-MCNC: 4.8 MMOL/L — SIGNIFICANT CHANGE UP (ref 3.5–5.3)
POTASSIUM SERPL-SCNC: 4.8 MMOL/L — SIGNIFICANT CHANGE UP (ref 3.5–5.3)
PROCALCITONIN SERPL-MCNC: 0.03 NG/ML — SIGNIFICANT CHANGE UP
PROT SERPL-MCNC: 7 G/DL — SIGNIFICANT CHANGE UP (ref 6–8.3)
RBC # BLD: 4.45 M/UL — SIGNIFICANT CHANGE UP (ref 3.8–5.2)
RBC # FLD: 12.4 % — SIGNIFICANT CHANGE UP (ref 10.3–14.5)
SARS-COV-2 RNA SPEC QL NAA+PROBE: SIGNIFICANT CHANGE UP
SODIUM SERPL-SCNC: 141 MMOL/L — SIGNIFICANT CHANGE UP (ref 135–145)
T3 SERPL-MCNC: 87 NG/DL — SIGNIFICANT CHANGE UP (ref 80–200)
T4 AB SER-ACNC: 7.1 UG/DL — SIGNIFICANT CHANGE UP (ref 4.6–12)
TROPONIN I SERPL-MCNC: 0.18 NG/ML — HIGH (ref 0.02–0.06)
TROPONIN I SERPL-MCNC: 0.19 NG/ML — HIGH (ref 0.02–0.06)
TROPONIN I SERPL-MCNC: 0.2 NG/ML — HIGH (ref 0.02–0.06)
TROPONIN I SERPL-MCNC: 0.22 NG/ML — HIGH (ref 0.02–0.06)
TSH SERPL-MCNC: 8.73 UIU/ML — HIGH (ref 0.27–4.2)
WBC # BLD: 6.57 K/UL — SIGNIFICANT CHANGE UP (ref 3.8–10.5)
WBC # FLD AUTO: 6.57 K/UL — SIGNIFICANT CHANGE UP (ref 3.8–10.5)

## 2020-12-10 PROCEDURE — 93016 CV STRESS TEST SUPVJ ONLY: CPT

## 2020-12-10 PROCEDURE — 93010 ELECTROCARDIOGRAM REPORT: CPT

## 2020-12-10 PROCEDURE — 78452 HT MUSCLE IMAGE SPECT MULT: CPT | Mod: 26

## 2020-12-10 PROCEDURE — 93018 CV STRESS TEST I&R ONLY: CPT

## 2020-12-10 PROCEDURE — 99233 SBSQ HOSP IP/OBS HIGH 50: CPT

## 2020-12-10 PROCEDURE — 93306 TTE W/DOPPLER COMPLETE: CPT | Mod: 26

## 2020-12-10 PROCEDURE — 99223 1ST HOSP IP/OBS HIGH 75: CPT

## 2020-12-10 PROCEDURE — 99223 1ST HOSP IP/OBS HIGH 75: CPT | Mod: 25

## 2020-12-10 RX ORDER — PANTOPRAZOLE SODIUM 20 MG/1
40 TABLET, DELAYED RELEASE ORAL
Refills: 0 | Status: DISCONTINUED | OUTPATIENT
Start: 2020-12-10 | End: 2020-12-11

## 2020-12-10 RX ORDER — ACETAMINOPHEN 500 MG
650 TABLET ORAL ONCE
Refills: 0 | Status: COMPLETED | OUTPATIENT
Start: 2020-12-10 | End: 2020-12-10

## 2020-12-10 RX ORDER — LEVOTHYROXINE SODIUM 125 MCG
25 TABLET ORAL DAILY
Refills: 0 | Status: DISCONTINUED | OUTPATIENT
Start: 2020-12-10 | End: 2020-12-11

## 2020-12-10 RX ADMIN — REGADENOSON 0.4 MILLIGRAM(S): 0.08 INJECTION, SOLUTION INTRAVENOUS at 12:10

## 2020-12-10 RX ADMIN — Medication 650 MILLIGRAM(S): at 17:29

## 2020-12-10 RX ADMIN — Medication 650 MILLIGRAM(S): at 18:29

## 2020-12-10 NOTE — PROGRESS NOTE ADULT - PROBLEM SELECTOR PLAN 1
COVID negative with GGO on CT  COVID antibodies are pending  Will hold off on antibiotics, procalcitonin is trending  Pulm consulted

## 2020-12-10 NOTE — CONSULT NOTE ADULT - SUBJECTIVE AND OBJECTIVE BOX
Initial HPI on admission:  HPI:  60 year old with a history of breast cancer comes with 3 weeks of progressive shortness of breath and more recently has developed midsternal chest pain.  Shortness of breath is brought on by ambulation and resolves with resting.  The chest pain is dull in nature, does not radiate, and is not illicited by palpation. She describes not finding anything that made the chest pain better or worse.  (09 Dec 2020 16:18)      BRIEF HOSPITAL COURSE: Patient seen at bedside, resting comfortably. Denies any dyspnea. States has worsening shortness of breath for the past three weeks. She is a former smoker with about 40 pack year history of smoking, quit about 2 years ago when diagnosed with breast cancer. Has received chemotherapy, but not on radiation.     PAST MEDICAL & SURGICAL HISTORY:  Language barrier  Mauritanian speaking    Anxiety about mastectomy    Chronic bilateral low back pain, with sciatica presence unspecified    Smoker    Other chronic gastritis    No significant past surgical history      Allergies    Cipro (Unknown)    Intolerances      FAMILY HISTORY:    Social history:      Smoking: Former smoker, quit about two years ago     Drinking: Denies      Drug use: Denies    Review of Systems:  All systems negative except for above    Medications:  enoxaparin Injectable 40 milliGRAM(s) SubCutaneous daily  levothyroxine 25 MICROGram(s) Oral daily  pantoprazole    Tablet 40 milliGRAM(s) Oral before breakfast    MEDICATIONS  (STANDING):  enoxaparin Injectable 40 milliGRAM(s) SubCutaneous daily  levothyroxine 25 MICROGram(s) Oral daily  pantoprazole    Tablet 40 milliGRAM(s) Oral before breakfast    MEDICATIONS  (PRN):      Home Medications:  Last Order Reconciliation Date: 12-10-20 @ 12:06 (Admission Reconciliation)  dexamethasone 4 mg oral tablet: 1 tab(s) orally 2 times a day  on days 2 to 5 (12-09-20 @ 13:15)  Emend 2-Day 80 mg oral capsule: 1 cap(s) orally once a day (in the morning) (12-09-20 @ 13:15)  meloxicam 15 mg oral tablet: 1 tab(s) orally once a day (12-09-20 @ 15:33)  oxybutynin 10 mg/24 hr oral tablet, extended release: 1 tab(s) orally once a day (03-13-19 @ 10:19)  oxyCODONE 5 mg oral tablet: 1 tab(s) orally every 4 hours, As needed, Moderate Pain (4 - 6) (11-23-18 @ 07:27)  oxyCODONE 5 mg oral tablet: 1 tab(s) orally every 4 hours, As needed, Moderate Pain (4 - 6) MDD:6 (01-31-19 @ 11:21)  pantoprazole 40 mg oral delayed release tablet: 1 tab(s) orally once a day (12-09-20 @ 13:15)  Pepcid 20 mg oral tablet: 1 tab(s) orally 2 times a day (11-15-18 @ 19:26)  Protonix 40 mg oral delayed release tablet: 1 tab(s) orally once a day (12-09-20 @ 15:33)  Reglan 10 mg oral tablet: 1 tab(s) orally 4 times a day (before meals and at bedtime), As Needed (12-09-20 @ 13:15)  Synthroid 25 mcg (0.025 mg) oral tablet: 1 tab(s) orally once a day (12-09-20 @ 15:33)      LABS:                        13.9   6.57  )-----------( 301      ( 10 Dec 2020 06:00 )             42.8     12-10    141  |  106  |  11  ----------------------------<  82  4.8   |  26  |  0.80    Ca    8.9      10 Dec 2020 06:00    TPro  7.0  /  Alb  3.2<L>  /  TBili  0.6  /  DBili  x   /  AST  19  /  ALT  21  /  AlkPhos  91  12-10    HIT ab -- 12-09 @ 13:06  HIT ab EIA --  D Dimer -<150    CARDIAC MARKERS ( 10 Dec 2020 10:30 )  .219 ng/mL / x     / x     / x     / x      CARDIAC MARKERS ( 10 Dec 2020 06:00 )  .188 ng/mL / x     / x     / x     / x      CARDIAC MARKERS ( 09 Dec 2020 22:20 )  .189 ng/mL / x     / x     / x     / x      CARDIAC MARKERS ( 09 Dec 2020 13:06 )  .170 ng/mL / x     / x     / x     / x            PT/INR - ( 09 Dec 2020 13:06 )   PT: 11.6 sec;   INR: 0.96 ratio         PTT - ( 09 Dec 2020 13:06 )  PTT:31.8 sec    Procalcitonin, Serum: 0.03 ng/mL (12-10-20 @ 06:00)  Procalcitonin, Serum: 0.03 ng/mL (12-09-20 @ 22:20)    Serum Pro-Brain Natriuretic Peptide: 124 pg/mL (12-09-20 @ 13:06)    VITALS:  T(C): 36.6 (12-10-20 @ 13:43), Max: 36.9 (12-09-20 @ 19:18)  T(F): 97.9 (12-10-20 @ 13:43), Max: 98.4 (12-09-20 @ 19:18)  HR: 63 (12-10-20 @ 13:43) (55 - 72)  BP: 135/70 (12-10-20 @ 13:43) (133/67 - 155/81)  BP(mean): 91 (12-09-20 @ 16:22) (81 - 91)  ABP: --  ABP(mean): --  RR: 18 (12-10-20 @ 13:43) (12 - 18)  SpO2: 97% (12-10-20 @ 13:43) (95% - 99%)  CVP(mm Hg): --  CVP(cm H2O): --    Ins and Outs     12-09-20 @ 07:01  -  12-10-20 @ 07:00  --------------------------------------------------------  IN: 0 mL / OUT: 1 mL / NET: -1 mL        Height (cm): 157.5 (12-09-20 @ 19:18)  Weight (kg): 73.5 (12-09-20 @ 19:18)  BMI (kg/m2): 29.6 (12-09-20 @ 19:18)        I&O's Detail    09 Dec 2020 07:01  -  10 Dec 2020 07:00  --------------------------------------------------------  IN:  Total IN: 0 mL    OUT:    Voided (mL): 1 mL  Total OUT: 1 mL    Total NET: -1 mL          Physical Examination:  GENERAL:               Alert, Oriented, No acute distress.    HEENT:                    Pupils equal, reactive to light.  Symmetric. No JVD, Moist MM  PULM:                     Bilateral air entry, No Rales, No Rhonchi, No Wheezing  CVS:                         S1, S2,  No Murmur  ABD:                        Soft, nondistended, nontender, normoactive bowel sounds,   EXT:                         No edema, nontender, No Cyanosis or Clubbing   Vascular:                Warm Extremities, Normal Capillary refill, Normal Distal Pulses  SKIN:                       Warm and well perfused, no rashes noted.   NEURO:                  Alert, oriented, interactive, nonfocal, follows commands  PSYC:                      Calm, + Insight.

## 2020-12-10 NOTE — PROGRESS NOTE ADULT - ATTENDING COMMENTS
ejection fraction is mildly impaired  patient has an abnormal plain ekg response to adenosine and symptom response. would exclude a Covid 19 illness and consider cath ejection fraction is mildly impaired  patient has an abnormal plain ekg response to adenosine and symptom response. would exclude a Covid 19 illness and consider cath    addendum  care discussed with johnosn daughter would favor cardiac cath angiogram rba discussed . will discuss with patient and consider transfer in am if patient accepts inherent risks.

## 2020-12-10 NOTE — CONSULT NOTE ADULT - ASSESSMENT
60 year old with a history of breast cancer comes with 3 weeks of progressive shortness of breath and more recently has developed midsternal chest pain. .     *SOB with chest pain:  Serial trops uptrending--> 0.170--> 0.189--> -.188--> 0.2--> cont to trend   EKG with TWI-(seen on prior EKGs-not new)  Discussed with Dr Wong--plan for stress test today and ? need for AC for ACS  Called oncology Dr Qureshi--awaiting call back to see if patient under active chemo and any contraindications to possible LHC  TTE reviewed: EF 40-45%, mild to moderate LV systolic function, grade I diastolic dysfunction  Monitor on tele: SB, SR 56-70  CTA chest negative for PE but groundglass opacities c/f COVID PNA--> initial covid PCR negative, obtain repeat PCR  Pulm:  Atypical pattern on ctscan.  History not c/w covid infection. covid ab pending.  Doubt covid infection.  Will need to find out if patient had any radiation therapy to areas where there is now ground glass opacities.  Repeat cxr ordered.    *Breast cancer:  Seens Dr Qureshi as outpatient  Called placed to her--seems last chemo in June 2020--awaiting call back from her     *Hypothyroid:  TSH 8.7 with normal T3 and T4   cont synthroid 25  Elevated TSH is acute phase reactant most likely and should be repeated in 4-6 wks    Dispo: pending further workup. May require cath  Plan d/w Dr Wong

## 2020-12-10 NOTE — PROGRESS NOTE ADULT - SUBJECTIVE AND OBJECTIVE BOX
Patient is a 60y old  Female who presents with a chief complaint of shortness of breath (09 Dec 2020 16:18).  Reports she still has some chest discomfort when she takes a deep breath      Patient seen and examined at bedside.    ALLERGIES:  Cipro (Unknown)    MEDICATIONS  (STANDING):  enoxaparin Injectable 40 milliGRAM(s) SubCutaneous daily  regadenoson Injectable 0.4 milliGRAM(s) IV Push once    MEDICATIONS  (PRN):    Vital Signs Last 24 Hrs  T(F): 98 (10 Dec 2020 06:33), Max: 98.4 (09 Dec 2020 19:18)  HR: 55 (10 Dec 2020 09:47) (55 - 73)  BP: 138/76 (10 Dec 2020 09:47) (133/67 - 155/81)  RR: 18 (10 Dec 2020 09:47) (12 - 18)  SpO2: 96% (10 Dec 2020 09:47) (95% - 99%)  I&O's Summary    09 Dec 2020 07:01  -  10 Dec 2020 07:00  --------------------------------------------------------  IN: 0 mL / OUT: 1 mL / NET: -1 mL      BMI (kg/m2): 29.6 (12-09-20 @ 19:18)  PHYSICAL EXAM:  General: NAD, A/O x 3  ENT: MMM, no thrush  Neck: Supple, No JVD  Lungs: Non labored breathing,  Clear to auscultation bilaterally,   Cardio: RRR, S1/S2, No murmurs, no pitting edema bilaterally  Abdomen: Soft, Nontender, Nondistended; Bowel sounds present  Extremities: No calf tenderness, moves all extremities    LABS:                        13.9   6.57  )-----------( 301      ( 10 Dec 2020 06:00 )             42.8       12-10    141  |  106  |  11  ----------------------------<  82  4.8   |  26  |  0.80    Ca    8.9      10 Dec 2020 06:00    TPro  7.0  /  Alb  3.2  /  TBili  0.6  /  DBili  x   /  AST  19  /  ALT  21  /  AlkPhos  91  12-10     eGFR if African American: 91 mL/min/1.73M2 (12-10-20 @ 06:00)  eGFR if Non African American: 78 mL/min/1.73M2 (12-10-20 @ 06:00)    PT/INR - ( 09 Dec 2020 13:06 )   PT: 11.6 sec;   INR: 0.96 ratio         PTT - ( 09 Dec 2020 13:06 )  PTT:31.8 sec   Lactate, Blood: 1.4 mmol/L (12-09 @ 13:50)    CARDIAC MARKERS ( 10 Dec 2020 10:30 )  .219 ng/mL / x     / x     / x     / x      CARDIAC MARKERS ( 10 Dec 2020 06:00 )  .188 ng/mL / x     / x     / x     / x      CARDIAC MARKERS ( 09 Dec 2020 22:20 )  .189 ng/mL / x     / x     / x     / x      CARDIAC MARKERS ( 09 Dec 2020 13:06 )  .170 ng/mL / x     / x     / x     / x            TSH 8.73   TSH with FT4 reflex --  Total T3 87                        RADIOLOGY & ADDITIONAL TESTS:  e< from: TTE Echo Complete w/o Contrast w/ Doppler (12.10.20 @ 08:30) >  Summary:   1. Left ventricular ejection fraction, by visual estimation, is 40 to 45%.   2. Mildly to moderately decreased global left ventricular systolic function.   3. Normal left ventricular internal cavity size.   4. Spectral Doppler shows impaired relaxation pattern of left ventricular myocardial filling (Grade I diastolic dysfunction).   5. Mildly enlarged right atrium.   6. Mild mitral valve regurgitation.   7. Thickening of the anterior and posterior mitral valve leaflets.   8. Mild tricuspid regurgitation.   9. Sclerotic aortic valve with normal opening.  10. Estimated pulmonary artery systolic pressure is 36.4 mmHg assuming a right atrial pressure of 10 mmHg, which is consistent with borderline pulmonary hypertension.    < end of copied text >      Care Discussed with Consultants/Other Providers:    Patient is a 60y old  Female who presents with a chief complaint of shortness of breath (09 Dec 2020 16:18).  Reports she still has some chest discomfort when she takes a deep breath    Patient seen and examined at bedside.    ALLERGIES:  Cipro (Unknown)    MEDICATIONS  (STANDING):  enoxaparin Injectable 40 milliGRAM(s) SubCutaneous daily  regadenoson Injectable 0.4 milliGRAM(s) IV Push once    MEDICATIONS  (PRN):    Vital Signs Last 24 Hrs  T(F): 98 (10 Dec 2020 06:33), Max: 98.4 (09 Dec 2020 19:18)  HR: 55 (10 Dec 2020 09:47) (55 - 73)  BP: 138/76 (10 Dec 2020 09:47) (133/67 - 155/81)  RR: 18 (10 Dec 2020 09:47) (12 - 18)  SpO2: 96% (10 Dec 2020 09:47) (95% - 99%)  I&O's Summary    09 Dec 2020 07:01  -  10 Dec 2020 07:00  --------------------------------------------------------  IN: 0 mL / OUT: 1 mL / NET: -1 mL      BMI (kg/m2): 29.6 (12-09-20 @ 19:18)  PHYSICAL EXAM:  General: NAD, A/O x 3  ENT: MMM, no thrush  Neck: Supple, No JVD  Lungs: Non labored breathing,  Clear to auscultation bilaterally,   Cardio: RRR, S1/S2, No murmurs, no pitting edema bilaterally  Abdomen: Soft, Nontender, Nondistended; Bowel sounds present  Extremities: No calf tenderness, moves all extremities    LABS:                        13.9   6.57  )-----------( 301      ( 10 Dec 2020 06:00 )             42.8       12-10    141  |  106  |  11  ----------------------------<  82  4.8   |  26  |  0.80    Ca    8.9      10 Dec 2020 06:00    TPro  7.0  /  Alb  3.2  /  TBili  0.6  /  DBili  x   /  AST  19  /  ALT  21  /  AlkPhos  91  12-10     eGFR if African American: 91 mL/min/1.73M2 (12-10-20 @ 06:00)  eGFR if Non African American: 78 mL/min/1.73M2 (12-10-20 @ 06:00)    PT/INR - ( 09 Dec 2020 13:06 )   PT: 11.6 sec;   INR: 0.96 ratio         PTT - ( 09 Dec 2020 13:06 )  PTT:31.8 sec   Lactate, Blood: 1.4 mmol/L (12-09 @ 13:50)    CARDIAC MARKERS ( 10 Dec 2020 10:30 )  .219 ng/mL / x     / x     / x     / x      CARDIAC MARKERS ( 10 Dec 2020 06:00 )  .188 ng/mL / x     / x     / x     / x      CARDIAC MARKERS ( 09 Dec 2020 22:20 )  .189 ng/mL / x     / x     / x     / x      CARDIAC MARKERS ( 09 Dec 2020 13:06 )  .170 ng/mL / x     / x     / x     / x            TSH 8.73   TSH with FT4 reflex --  Total T3 87                        RADIOLOGY & ADDITIONAL TESTS:  e< from: TTE Echo Complete w/o Contrast w/ Doppler (12.10.20 @ 08:30) >  Summary:   1. Left ventricular ejection fraction, by visual estimation, is 40 to 45%.   2. Mildly to moderately decreased global left ventricular systolic function.   3. Normal left ventricular internal cavity size.   4. Spectral Doppler shows impaired relaxation pattern of left ventricular myocardial filling (Grade I diastolic dysfunction).   5. Mildly enlarged right atrium.   6. Mild mitral valve regurgitation.   7. Thickening of the anterior and posterior mitral valve leaflets.   8. Mild tricuspid regurgitation.   9. Sclerotic aortic valve with normal opening.  10. Estimated pulmonary artery systolic pressure is 36.4 mmHg assuming a right atrial pressure of 10 mmHg, which is consistent with borderline pulmonary hypertension.    < end of copied text >      Care Discussed with Consultants/Other Providers:

## 2020-12-10 NOTE — PROGRESS NOTE ADULT - SUBJECTIVE AND OBJECTIVE BOX
Chief Complaint:     60 year old with a history of breast cancer comes with 3 weeks of progressive shortness of breath and more recently has developed midsternal chest pain.  Shortness of breath is brought on by ambulation and resolves with resting.  The chest pain is dull in nature, does not radiate, and is not illicited by palpation. She describes not finding anything that made the chest pain better or worse.     HPI:    PMH:   Language barrier    Anxiety about mastectomy    Chronic bilateral low back pain, with sciatica presence unspecified    Smoker    Other chronic gastritis      PSH:   No significant past surgical history      Family History:  FAMILY HISTORY:      Social History:  Smoking:  Alcohol:  Drugs:    Allergies:  Cipro (Unknown)      Medications:  enoxaparin Injectable 40 milliGRAM(s) SubCutaneous daily  levothyroxine 25 MICROGram(s) Oral daily  pantoprazole    Tablet 40 milliGRAM(s) Oral before breakfast      REVIEW OF SYSTEMS:  CONSTITUTIONAL: No fever, weight loss, or fatigue  EYES: No eye pain, visual disturbances, or discharge  ENMT:  No difficulty hearing, tinnitus, vertigo; No sinus or throat pain  NECK: No pain or stiffness  BREASTS: No pain, masses, or nipple discharge  RESPIRATORY: No cough, wheezing, chills or hemoptysis; No shortness of breath  CARDIOVASCULAR: No chest pain, palpitations, dizziness, or leg swelling  GASTROINTESTINAL: No abdominal or epigastric pain. No nausea, vomiting, or hematemesis; No diarrhea or constipation. No melena or hematochezia.  GENITOURINARY: No dysuria, frequency, hematuria, or incontinence  NEUROLOGICAL: No headaches, memory loss, loss of strength, numbness, or tremors  SKIN: No itching, burning, rashes, or lesions   LYMPH NODES: No enlarged glands  ENDOCRINE: No heat or cold intolerance; No hair loss  MUSCULOSKELETAL: No joint pain or swelling; No muscle, back, or extremity pain  PSYCHIATRIC: No depression, anxiety, mood swings, or difficulty sleeping  HEME/LYMPH: No easy bruising, or bleeding gums  ALLERY AND IMMUNOLOGIC: No hives or eczema    Physical Exam:  T(C): 36.6 (12-10-20 @ 13:43), Max: 36.9 (12-09-20 @ 19:18)  HR: 63 (12-10-20 @ 13:43) (55 - 72)  BP: 135/70 (12-10-20 @ 13:43) (133/67 - 155/81)  RR: 18 (12-10-20 @ 13:43) (12 - 18)  SpO2: 97% (12-10-20 @ 13:43) (95% - 99%)  Wt(kg): --    GENERAL: NAD, well-groomed, well-developed  HEAD:  Atraumatic, Normocephalic  EYES: EOMI, conjunctiva and sclera clear  ENT: Moist mucous membranes,  NECK: Supple, No JVD, no bruits  CHEST/LUNG: Clear to percussion bilaterally; No rales, rhonchi, wheezing, or rubs  HEART: Regular rate and rhythm; No murmurs, rubs, or gallops PMI non displaced.  ABDOMEN: Soft, Nontender, Nondistended; Bowel sounds present  EXTREMITIES:  2+ Peripheral Pulses, No clubbing, cyanosis, or edema  SKIN: No rashes or lesions  NERVOUS SYSTEM:  Cranial Nerves II-XII intact     Cardiovascular Diagnostic Testing:  ECG:    < from: TTE Echo Complete w/o Contrast w/ Doppler (12.10.20 @ 08:30) >  Summary:   1. Left ventricular ejection fraction, by visual estimation, is 40 to 45%.   2. Mildly to moderately decreased global left ventricular systolic function.   3. Normal left ventricular internal cavity size.   4. Spectral Doppler shows impaired relaxation pattern of left ventricular myocardial filling (Grade I diastolic dysfunction).   5. Mildly enlarged right atrium.   6. Mild mitral valve regurgitation.   7. Thickening of the anterior and posterior mitral valve leaflets.   8. Mild tricuspid regurgitation.   9. Sclerotic aortic valve with normal opening.  10. Estimated pulmonary artery systolic pressure is 36.4 mmHg assuming a right atrial pressure of 10 mmHg, which is consistent with borderline pulmonary hypertension.    [id number] Mckinley Larios , Electronically signed on 12/10/2020 at 11:04:13 AM        *** Final ***          MCKINLEY LARIOS   This document has been electronically signed. Dec 10 2020  8:30AM    < end of copied text >      ECHO:    Labs:                        13.9   6.57  )-----------( 301      ( 10 Dec 2020 06:00 )             42.8     12-10    141  |  106  |  11  ----------------------------<  82  4.8   |  26  |  0.80    Ca    8.9      10 Dec 2020 06:00    TPro  7.0  /  Alb  3.2<L>  /  TBili  0.6  /  DBili  x   /  AST  19  /  ALT  21  /  AlkPhos  91  12-10    PT/INR - ( 09 Dec 2020 13:06 )   PT: 11.6 sec;   INR: 0.96 ratio         PTT - ( 09 Dec 2020 13:06 )  PTT:31.8 sec  CARDIAC MARKERS ( 10 Dec 2020 10:30 )  .219 ng/mL / x     / x     / x     / x      CARDIAC MARKERS ( 10 Dec 2020 06:00 )  .188 ng/mL / x     / x     / x     / x      CARDIAC MARKERS ( 09 Dec 2020 22:20 )  .189 ng/mL / x     / x     / x     / x      CARDIAC MARKERS ( 09 Dec 2020 13:06 )  .170 ng/mL / x     / x     / x     / x          Serum Pro-Brain Natriuretic Peptide: 124 pg/mL (12-09 @ 13:06)        Thyroid Stimulating Hormone, Serum: 8.73 uIU/mL (12-09 @ 22:20)      Imaging:

## 2020-12-10 NOTE — PROGRESS NOTE ADULT - ATTENDING COMMENTS
I have personally seen and examined patient on the above date. I discussed the case with OSIRIS Aguilar and I agree with the findings and plan as detailed per note above, which I have amended where appropriate.  61 yo F hx of breast ca admitted for 3 weeks [place], midsternal cp.  Elevated trop, cp - TTE showing reduced EF. Cardio consulted, awaiting further recommendations on ac, transfer for cath. PE ruled out.   GGOs on CT - Pulm input appreciated. initial covid pcr negative, will repeat.   breast CA - spoke to Oncology office, last chemo april 2019; report no contraindication to ischemic workup (ie, angiogram)  Hypothyroidism - c/w synthroid, repeat tfts in 4-6 weeks, outpt med adjustment  Awaiting covid pcr and cardio rec for role of ac and potential transfer for cath

## 2020-12-10 NOTE — CONSULT NOTE ADULT - ASSESSMENT
60 year old female with breast cancer, admitted with chest pain. CT scan of the chest showing patchy ground glass opacification. Patient with history of smoking, breast cancer.     Assessment:  1. Patchy bilateral ground glass opacification   2. Breast cancer  3. Chest pain    Plan  - Repeat COVID19 PCR  - check viral panel  - For stress test today   - Consider autoimmune work up for ILD if viral panel is negative  - Will likely need outpatient PFTs upon discharge  - DVT prophylaxis

## 2020-12-10 NOTE — CONSULT NOTE ADULT - SUBJECTIVE AND OBJECTIVE BOX
HPI:HPI:  60 year old with a history of breast cancer comes with 3 weeks of progressive shortness of breath and more recently has developed midsternal chest pain.  Shortness of breath is brought on by ambulation and resolves with resting.  The chest pain is dull in nature, does not radiate, and is not illicited by palpation. She describes not finding anything that made the chest pain better or worse.  (09 Dec 2020 16:18)  Pulm:  Covid testing is negative.  Ct shows atrypical ground glass pattern that is non specific and minmal.    PAST MEDICAL & SURGICAL HISTORY:  Language barrier  Divehi speaking    Anxiety about mastectomy    Chronic bilateral low back pain, with sciatica presence unspecified    Smoker(quit more than 20 years ago)    Other chronic gastritis    No significant past surgical history        FAMILY HISTORY:      SOCIAL HISTORY:  Smokin__ packs x __2_ years      Allergies    Cipro (Unknown)    Intolerances        HOME  MEDICATIONS:Home Medications:  meloxicam 15 mg oral tablet: 1 tab(s) orally once a day (09 Dec 2020 15:33)  Protonix 40 mg oral delayed release tablet: 1 tab(s) orally once a day (09 Dec 2020 15:33)  Synthroid 25 mcg (0.025 mg) oral tablet: 1 tab(s) orally once a day (09 Dec 2020 15:33)      REVIEW OF SYSTEMS:  Constitutional: No fevers or chills. No weight loss. .  Eyes: No itching or discharge from the eyes  ENT: . No nasal congestion. No post nasal drip  CV: chest tightness with exertion  Resp:  dyspnea on exertion. .  GI: No nausea. No vomiting. No diarrhea.  MSK: No joint pain or pain in any extremities  Integumentary: No skin lesions. No pedal edema.  Neurological: No gross motor weakness. No sensory changes.    [ ] All other systems negative  [ ] Unable to assess ROS because ________    OBJECTIVE:  ICU Vital Signs Last 24 Hrs  T(C): 36.6 (10 Dec 2020 13:43), Max: 36.9 (09 Dec 2020 19:18)  T(F): 97.9 (10 Dec 2020 13:43), Max: 98.4 (09 Dec 2020 19:18)  HR: 63 (10 Dec 2020 13:43) (55 - 72)  BP: 135/70 (10 Dec 2020 13:43) (133/67 - 155/81)  BP(mean): 91 (09 Dec 2020 16:22) (81 - 91)  ABP: --  ABP(mean): --  RR: 18 (10 Dec 2020 13:43) (12 - 18)  SpO2: 97% (10 Dec 2020 13:43) (95% - 99%)         @ 07:01  -  12-10 @ 07:00  --------------------------------------------------------  IN: 0 mL / OUT: 1 mL / NET: -1 mL      CAPILLARY BLOOD GLUCOSE  rad< from: CT Angio Chest w/ IV Cont (20 @ 14:23) >    EXAM:  CT ANGIO CHEST (W)AW IC      PROCEDURE DATE:  2020        INTERPRETATION:  CLINICAL INDICATION: 60 years  Female with PE suspected, high pretest prob. History of breast cancer, pleuritic right chest pain and shortness of breath    COMPARISON: Chest CT 10/26/2018 and PET/CT 2020    PROCEDURE:  CT Angiography of the Chest.  25 75 ml of Omnipaque 350 was injected intravenously. 10 ml were discarded.  Sagittal and coronal reformats were performed as well as 3D (MIP) reconstructions.    FINDINGS:    LUNGS AND AIRWAYS: Patent central airways.  Patchy groundglass infiltrates bilaterally, greatest in the lower lobes and more pronounced on the right than the left.  PLEURA: No pleural effusion.  MEDIASTINUM AND JESSI: No lymphadenopathy.  VESSELS: No pulmonary arterial filling defects. Atherosclerotic aorta without aneurysm.  HEART: Heart size is normal. No pericardial effusion.  CHEST WALL AND LOWER NECK: Status post bilateral mastectomy.  VISUALIZED UPPER ABDOMEN: Within normallimits.  BONES: Within normal limits.    IMPRESSION:  No evidence of pulmonary embolism.    Bilateral ground glass infiltrates concerning for Covid 19 pneumonia. Other atypical pneumonia not excluded.                SARI FERRARO MD; Attending Radiologist  This document has been electronically signed. Dec  9 2020  2:42PM    < end of copied text >  < from: CT Angio Chest w/ IV Cont (20 @ 14:23) >    EXAM:  CT ANGIO CHEST (W)AW IC      PROCEDURE DATE:  2020        INTERPRETATION:  CLINICAL INDICATION: 60 years  Female with PE suspected, high pretest prob. History of breast cancer, pleuritic right chest pain and shortness of breath    COMPARISON: Chest CT 10/26/2018 and PET/CT 2020    PROCEDURE:  CT Angiography of the Chest.  25 75 ml of Omnipaque 350 was injected intravenously. 10 ml were discarded.  Sagittal and coronal reformats were performed as well as 3D (MIP) reconstructions.    FINDINGS:    LUNGS AND AIRWAYS: Patent central airways.  Patchy groundglass infiltrates bilaterally, greatest in the lower lobes and more pronounced on the right than the left.  PLEURA: No pleural effusion.  MEDIASTINUM AND JESSI: No lymphadenopathy.  VESSELS: No pulmonary arterial filling defects. Atherosclerotic aorta without aneurysm.  HEART: Heart size is normal. No pericardial effusion.  CHEST WALL AND LOWER NECK: Status post bilateral mastectomy.  VISUALIZED UPPER ABDOMEN: Within normallimits.  BONES: Within normal limits.    IMPRESSION:  No evidence of pulmonary embolism.    Bilateral ground glass infiltrates concerning for Covid 19 pneumonia. Other atypical pneumonia not excluded.                SARI FERRARO MD; Attending Radiologist  This document has been electronically signed. Dec  9 2020  2:42PM    < end of copied text >  < from: Xray Chest 1 View- PORTABLE-Urgent (Xray Chest 1 View- PORTABLE-Urgent .) (20 @ 13:41) >  EXAM:  XR CHEST PORTABLE URGENT 1V      PROCEDURE DATE:  2020        INTERPRETATION:  AP chest on 2020 at 1:31 PM. Patient is short of breath. 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    Heart is normal for projection. Clips over the right chest wall and left axilla noted.    Question is raised of a small infiltrate in the inferior lingular region new since PET/CT of  of this year. There may also be some slight right base linear atelectasis.    IMPRESSION: Small basilar lung findings suspect for small infiltrates.              STONE BECK M.D., ATTENDING RADIOLOGIST  This document has been electronically signed. Dec  9 2020  1:45PM    < end of copied text >          PHYSICAL EXAM:  General: Awake, alert, oriented X 3.   HEENT: Atraumatic, normocephalic.                            .  Lymph Nodes: No palpable lymphadenopathy  Neck: No JVD. No carotid bruit.   Respiratory:                          Normal and equal air entry                         No wheeze, rhonchi or rales.  Cardiovascular: S1 S2 normal. No murmurs, rubs or gallops.   Abdomen: Soft, non-tender,   Extremities: Warm to touch.    Skin: No rashes or skin lesions  Neurological: Motor and sensory examination equal and normal in all four extremities.  Psychiatry: Appropriate mood and affect.    HOSPITAL MEDICATIONS:  MEDICATIONS  (STANDING):  enoxaparin Injectable 40 milliGRAM(s) SubCutaneous daily  levothyroxine 25 MICROGram(s) Oral daily  pantoprazole    Tablet 40 milliGRAM(s) Oral before breakfast    MEDICATIONS  (PRN):      LABS:                        13.9   6.57  )-----------( 301      ( 10 Dec 2020 06:00 )             42.8     12-10    141  |  106  |  11  ----------------------------<  82  4.8   |  26  |  0.80    Ca    8.9      10 Dec 2020 06:00    TPro  7.0  /  Alb  3.2<L>  /  TBili  0.6  /  DBili  x   /  AST  19  /  ALT  21  /  AlkPhos  91  12-10    PT/INR - ( 09 Dec 2020 13:06 )   PT: 11.6 sec;   INR: 0.96 ratio         PTT - ( 09 Dec 2020 13:06 )  PTT:31.8 sec          MICROBIOLOGY:

## 2020-12-10 NOTE — PROGRESS NOTE ADULT - ASSESSMENT
60 year old with a history of breast cancer comes with 3 weeks of progressive shortness of breath and more recently has developed midsternal chest pain. .     *SOB with chest pain:  Serial trops uptrending--> 0.170--> 0.189--> -.188--> 0.2--> cont to trend   EKG with TWI-(seen on prior EKGs-not new)  Discussed with Dr Wong--plan for stress test today and ? need for AC for ACS  Called oncology Dr Qureshi--awaiting call back to see if patient under active chemo and any contraindications to possible LHC  TTE reviewed: EF 40-45%, mild to moderate LV systolic function, grade I diastolic dysfunction  Monitor on tele: SB, SR 56-70    *Breast cancer:  Seens Dr Qureshi as outpatient  Called placed to her--seems last chemo in June 2020--awaiting call back from her     *Hypothyroid:  TSH 8.7 with normal T3 and T4   Pt not on synthroid    Dispo: pending further workup. May require cath  Plan d/w Dr Wong 60 year old with a history of breast cancer comes with 3 weeks of progressive shortness of breath and more recently has developed midsternal chest pain. .     *SOB with chest pain:  Serial trops uptrending--> 0.170--> 0.189--> -.188--> 0.2--> cont to trend   EKG with TWI-(seen on prior EKGs-not new)  Discussed with Dr Wong--plan for stress test today and ? need for AC for ACS  Called oncology Dr Qureshi--awaiting call back to see if patient under active chemo and any contraindications to possible LHC  TTE reviewed: EF 40-45%, mild to moderate LV systolic function, grade I diastolic dysfunction  Monitor on tele: SB, SR 56-70  CTA chest negative for PE but groundglass opacities c/f COVID PNA--> initial covid PCR negative, obtain repeat PCR    *Breast cancer:  Seens Dr Qureshi as outpatient  Called placed to her--seems last chemo in June 2020--awaiting call back from her     *Hypothyroid:  TSH 8.7 with normal T3 and T4   Pt not on synthroid    Dispo: pending further workup. May require cath  Plan d/w Dr Wong 60 year old with a history of breast cancer comes with 3 weeks of progressive shortness of breath and more recently has developed midsternal chest pain. .     *SOB with chest pain:  Serial trops uptrending--> 0.170--> 0.189--> -.188--> 0.2--> cont to trend   EKG with TWI-(seen on prior EKGs-not new)  Discussed with Dr Wong--plan for stress test today and ? need for AC for ACS  Called oncology Dr Qureshi--awaiting call back to see if patient under active chemo and any contraindications to possible LHC  TTE reviewed: EF 40-45%, mild to moderate LV systolic function, grade I diastolic dysfunction  Monitor on tele: SB, SR 56-70  CTA chest negative for PE but groundglass opacities c/f COVID PNA--> initial covid PCR negative, obtain repeat PCR  Covid abx pending    *Breast cancer:  Seens Dr Qureshi as outpatient  Called placed to her--seems last chemo in June 2020--awaiting call back from her     *Hypothyroid:  TSH 8.7 with normal T3 and T4   cont synthroid 25  Elevated TSH is acute phase reactant most likely and should be repeated in 4-6 wks    Dispo: pending further workup. May require cath  Plan d/w Dr Wong

## 2020-12-11 ENCOUNTER — INPATIENT (INPATIENT)
Facility: HOSPITAL | Age: 60
LOS: 0 days | Discharge: HOME | DRG: 287 | End: 2020-12-11
Attending: STUDENT IN AN ORGANIZED HEALTH CARE EDUCATION/TRAINING PROGRAM | Admitting: STUDENT IN AN ORGANIZED HEALTH CARE EDUCATION/TRAINING PROGRAM
Payer: MEDICAID

## 2020-12-11 ENCOUNTER — TRANSCRIPTION ENCOUNTER (OUTPATIENT)
Age: 60
End: 2020-12-11

## 2020-12-11 ENCOUNTER — OUTPATIENT (OUTPATIENT)
Dept: OUTPATIENT SERVICES | Facility: HOSPITAL | Age: 60
LOS: 1 days | Discharge: HOME | End: 2020-12-11

## 2020-12-11 VITALS
DIASTOLIC BLOOD PRESSURE: 60 MMHG | TEMPERATURE: 98 F | OXYGEN SATURATION: 98 % | RESPIRATION RATE: 16 BRPM | HEART RATE: 70 BPM | SYSTOLIC BLOOD PRESSURE: 105 MMHG

## 2020-12-11 VITALS
HEART RATE: 65 BPM | RESPIRATION RATE: 18 BRPM | SYSTOLIC BLOOD PRESSURE: 138 MMHG | DIASTOLIC BLOOD PRESSURE: 69 MMHG | OXYGEN SATURATION: 98 %

## 2020-12-11 VITALS
HEART RATE: 69 BPM | TEMPERATURE: 99 F | RESPIRATION RATE: 18 BRPM | OXYGEN SATURATION: 97 % | SYSTOLIC BLOOD PRESSURE: 117 MMHG | DIASTOLIC BLOOD PRESSURE: 67 MMHG

## 2020-12-11 DIAGNOSIS — I20.0 UNSTABLE ANGINA: ICD-10-CM

## 2020-12-11 DIAGNOSIS — Z90.10 ACQUIRED ABSENCE OF UNSPECIFIED BREAST AND NIPPLE: Chronic | ICD-10-CM

## 2020-12-11 DIAGNOSIS — C50.919 MALIGNANT NEOPLASM OF UNSPECIFIED SITE OF UNSPECIFIED FEMALE BREAST: ICD-10-CM

## 2020-12-11 LAB
ALBUMIN SERPL ELPH-MCNC: 3.2 G/DL — LOW (ref 3.3–5)
ALP SERPL-CCNC: 93 U/L — SIGNIFICANT CHANGE UP (ref 40–120)
ALT FLD-CCNC: 19 U/L — SIGNIFICANT CHANGE UP (ref 10–45)
ANION GAP SERPL CALC-SCNC: 9 MMOL/L — SIGNIFICANT CHANGE UP (ref 5–17)
AST SERPL-CCNC: 13 U/L — SIGNIFICANT CHANGE UP (ref 10–40)
BILIRUB SERPL-MCNC: 0.3 MG/DL — SIGNIFICANT CHANGE UP (ref 0.2–1.2)
BUN SERPL-MCNC: 16 MG/DL — SIGNIFICANT CHANGE UP (ref 7–23)
CALCIUM SERPL-MCNC: 8.9 MG/DL — SIGNIFICANT CHANGE UP (ref 8.4–10.5)
CHLORIDE SERPL-SCNC: 104 MMOL/L — SIGNIFICANT CHANGE UP (ref 96–108)
CO2 SERPL-SCNC: 26 MMOL/L — SIGNIFICANT CHANGE UP (ref 22–31)
CREAT SERPL-MCNC: 0.76 MG/DL — SIGNIFICANT CHANGE UP (ref 0.5–1.3)
GLUCOSE SERPL-MCNC: 114 MG/DL — HIGH (ref 70–99)
HCT VFR BLD CALC: 42.1 % — SIGNIFICANT CHANGE UP (ref 34.5–45)
HGB BLD-MCNC: 14 G/DL — SIGNIFICANT CHANGE UP (ref 11.5–15.5)
MCHC RBC-ENTMCNC: 31.9 PG — SIGNIFICANT CHANGE UP (ref 27–34)
MCHC RBC-ENTMCNC: 33.3 GM/DL — SIGNIFICANT CHANGE UP (ref 32–36)
MCV RBC AUTO: 95.9 FL — SIGNIFICANT CHANGE UP (ref 80–100)
NRBC # BLD: 0 /100 WBCS — SIGNIFICANT CHANGE UP (ref 0–0)
PLATELET # BLD AUTO: 309 K/UL — SIGNIFICANT CHANGE UP (ref 150–400)
POTASSIUM SERPL-MCNC: 4.4 MMOL/L — SIGNIFICANT CHANGE UP (ref 3.5–5.3)
POTASSIUM SERPL-SCNC: 4.4 MMOL/L — SIGNIFICANT CHANGE UP (ref 3.5–5.3)
PROT SERPL-MCNC: 6.8 G/DL — SIGNIFICANT CHANGE UP (ref 6–8.3)
RBC # BLD: 4.39 M/UL — SIGNIFICANT CHANGE UP (ref 3.8–5.2)
RBC # FLD: 12.2 % — SIGNIFICANT CHANGE UP (ref 10.3–14.5)
SODIUM SERPL-SCNC: 139 MMOL/L — SIGNIFICANT CHANGE UP (ref 135–145)
TROPONIN I SERPL-MCNC: 0.16 NG/ML — HIGH (ref 0.02–0.06)
WBC # BLD: 6.75 K/UL — SIGNIFICANT CHANGE UP (ref 3.8–10.5)
WBC # FLD AUTO: 6.75 K/UL — SIGNIFICANT CHANGE UP (ref 3.8–10.5)

## 2020-12-11 PROCEDURE — 86769 SARS-COV-2 COVID-19 ANTIBODY: CPT

## 2020-12-11 PROCEDURE — 99233 SBSQ HOSP IP/OBS HIGH 50: CPT

## 2020-12-11 PROCEDURE — 84145 PROCALCITONIN (PCT): CPT

## 2020-12-11 PROCEDURE — 78452 HT MUSCLE IMAGE SPECT MULT: CPT

## 2020-12-11 PROCEDURE — 99285 EMERGENCY DEPT VISIT HI MDM: CPT | Mod: 25

## 2020-12-11 PROCEDURE — C1769: CPT

## 2020-12-11 PROCEDURE — 84443 ASSAY THYROID STIM HORMONE: CPT

## 2020-12-11 PROCEDURE — 85730 THROMBOPLASTIN TIME PARTIAL: CPT

## 2020-12-11 PROCEDURE — 93456 R HRT CORONARY ARTERY ANGIO: CPT

## 2020-12-11 PROCEDURE — 36415 COLL VENOUS BLD VENIPUNCTURE: CPT

## 2020-12-11 PROCEDURE — A9500: CPT

## 2020-12-11 PROCEDURE — 93456 R HRT CORONARY ARTERY ANGIO: CPT | Mod: 26,59

## 2020-12-11 PROCEDURE — 93017 CV STRESS TEST TRACING ONLY: CPT

## 2020-12-11 PROCEDURE — 87635 SARS-COV-2 COVID-19 AMP PRB: CPT

## 2020-12-11 PROCEDURE — 71275 CT ANGIOGRAPHY CHEST: CPT

## 2020-12-11 PROCEDURE — 86803 HEPATITIS C AB TEST: CPT

## 2020-12-11 PROCEDURE — 93306 TTE W/DOPPLER COMPLETE: CPT

## 2020-12-11 PROCEDURE — 80053 COMPREHEN METABOLIC PANEL: CPT

## 2020-12-11 PROCEDURE — 87040 BLOOD CULTURE FOR BACTERIA: CPT

## 2020-12-11 PROCEDURE — 96374 THER/PROPH/DIAG INJ IV PUSH: CPT

## 2020-12-11 PROCEDURE — 85025 COMPLETE CBC W/AUTO DIFF WBC: CPT

## 2020-12-11 PROCEDURE — 71045 X-RAY EXAM CHEST 1 VIEW: CPT | Mod: 26

## 2020-12-11 PROCEDURE — 83605 ASSAY OF LACTIC ACID: CPT

## 2020-12-11 PROCEDURE — 85027 COMPLETE CBC AUTOMATED: CPT

## 2020-12-11 PROCEDURE — C1887: CPT

## 2020-12-11 PROCEDURE — 84480 ASSAY TRIIODOTHYRONINE (T3): CPT

## 2020-12-11 PROCEDURE — 93005 ELECTROCARDIOGRAM TRACING: CPT

## 2020-12-11 PROCEDURE — 99152 MOD SED SAME PHYS/QHP 5/>YRS: CPT

## 2020-12-11 PROCEDURE — 99239 HOSP IP/OBS DSCHRG MGMT >30: CPT

## 2020-12-11 PROCEDURE — 99223 1ST HOSP IP/OBS HIGH 75: CPT

## 2020-12-11 PROCEDURE — 99152 MOD SED SAME PHYS/QHP 5/>YRS: CPT | Mod: 59

## 2020-12-11 PROCEDURE — 71045 X-RAY EXAM CHEST 1 VIEW: CPT

## 2020-12-11 PROCEDURE — 93010 ELECTROCARDIOGRAM REPORT: CPT

## 2020-12-11 PROCEDURE — 84484 ASSAY OF TROPONIN QUANT: CPT

## 2020-12-11 PROCEDURE — 85610 PROTHROMBIN TIME: CPT

## 2020-12-11 PROCEDURE — 83880 ASSAY OF NATRIURETIC PEPTIDE: CPT

## 2020-12-11 PROCEDURE — 84436 ASSAY OF TOTAL THYROXINE: CPT

## 2020-12-11 PROCEDURE — C1894: CPT

## 2020-12-11 PROCEDURE — 85379 FIBRIN DEGRADATION QUANT: CPT

## 2020-12-11 RX ORDER — LEVOTHYROXINE SODIUM 125 MCG
25 TABLET ORAL DAILY
Refills: 0 | Status: DISCONTINUED | OUTPATIENT
Start: 2020-12-11 | End: 2020-12-11

## 2020-12-11 RX ORDER — SODIUM CHLORIDE 9 MG/ML
3 INJECTION INTRAMUSCULAR; INTRAVENOUS; SUBCUTANEOUS EVERY 8 HOURS
Refills: 0 | Status: DISCONTINUED | OUTPATIENT
Start: 2020-12-11 | End: 2020-12-11

## 2020-12-11 RX ORDER — ATORVASTATIN CALCIUM 80 MG/1
40 TABLET, FILM COATED ORAL AT BEDTIME
Refills: 0 | Status: DISCONTINUED | OUTPATIENT
Start: 2020-12-11 | End: 2020-12-11

## 2020-12-11 RX ORDER — ENOXAPARIN SODIUM 100 MG/ML
40 INJECTION SUBCUTANEOUS
Qty: 0 | Refills: 0 | DISCHARGE

## 2020-12-11 RX ORDER — ACETAMINOPHEN 500 MG
650 TABLET ORAL EVERY 6 HOURS
Refills: 0 | Status: DISCONTINUED | OUTPATIENT
Start: 2020-12-11 | End: 2020-12-11

## 2020-12-11 RX ORDER — ASPIRIN/CALCIUM CARB/MAGNESIUM 324 MG
81 TABLET ORAL DAILY
Refills: 0 | Status: DISCONTINUED | OUTPATIENT
Start: 2020-12-11 | End: 2020-12-11

## 2020-12-11 RX ORDER — PANTOPRAZOLE SODIUM 20 MG/1
40 TABLET, DELAYED RELEASE ORAL
Refills: 0 | Status: DISCONTINUED | OUTPATIENT
Start: 2020-12-11 | End: 2020-12-11

## 2020-12-11 RX ORDER — ACETAMINOPHEN 500 MG
2 TABLET ORAL
Qty: 0 | Refills: 0 | DISCHARGE
Start: 2020-12-11

## 2020-12-11 RX ADMIN — ENOXAPARIN SODIUM 40 MILLIGRAM(S): 100 INJECTION SUBCUTANEOUS at 12:19

## 2020-12-11 RX ADMIN — ATORVASTATIN CALCIUM 40 MILLIGRAM(S): 80 TABLET, FILM COATED ORAL at 22:19

## 2020-12-11 RX ADMIN — Medication 25 MICROGRAM(S): at 05:02

## 2020-12-11 RX ADMIN — PANTOPRAZOLE SODIUM 40 MILLIGRAM(S): 20 TABLET, DELAYED RELEASE ORAL at 05:02

## 2020-12-11 NOTE — H&P CARDIOLOGY - HISTORY OF PRESENT ILLNESS
60 year old transferred from [place] with Pmhx of breast cancer s/p mastectomy, fam h/o CAD (mother  of MI) went to [place] ED with 3 weeks of progressive shortness of breath and more recently had developed midsternal chest pain.  Shortness of breath is brought on by ambulation and resolves with resting.  The chest pain is dull in nature, does not radiate, no palpations, nothing made the chest pain better or worse.   No fevers or chills, no cough, no nausea or vomiting, no abdominal pain, no calf pain.      Serial trops uptrending--> 0.170--> 0.189--> -.188--> 0.2   EKG with TWI-(seen on prior EKGs-not new)    TTE 12/10/20: Summary:   1. Left ventricular ejection fraction, by visual estimation, is 40 to 45%.   2. Mildly to moderately decreased global left ventricular systolic function.   3. Normal left ventricular internal cavity size.   4. Spectral Doppler shows impaired relaxation pattern of left ventricular myocardial filling (Grade I diastolic dysfunction).   5. Mildly enlarged right atrium.   6. Mild mitral valve regurgitation.   7. Thickening of the anterior and posterior mitral valve leaflets.   8. Mild tricuspid regurgitation.   9. Sclerotic aortic valve with normal opening.  10. Estimated pulmonary artery systolic pressure is 36.4 mmHg assuming a right atrial pressure of 10 mmHg, which is consistent with borderline pulmonary hypertension.    12/10/20 ST: IMPRESSION:  Normal  SPECT Myocardial Perfusion Imaging post rest and post vasodilator.       No scan evidence of reversible or fixed perfusion defects.       Normal left ventricular wall motion with ejection fraction of 56 % (normal: 50% or greater).       No regional wall motion abnormalities.    20 CTA Chest: IMPRESSION:  No evidence of pulmonary embolism.  Bilateral ground glass infiltrates concerning for Covid 19 pneumonia. Other atypical pneumonia not excluded.    Oncology : Dr Qureshi as outpatient - last chemo in 2020   60 year old transferred from [place] with Pmhx of breast cancer s/p mastectomy, fam h/o CAD (mother  of MI) went to [place] ED with 3 weeks of progressive shortness of breath and more recently had developed midsternal chest pain.  Shortness of breath is brought on by ambulation and resolves with resting.  The chest pain is dull in nature, does not radiate, no palpations, nothing made the chest pain better or worse.   No fevers or chills, no cough, no nausea or vomiting, no abdominal pain, no calf pain.  Currently pt is comfortable with no chest pain.      Serial trops --> 0.170--> 0.189--> - 0.188 --> 0.2 --> 0.183 --> 0.160  EKG with TWI-(seen on prior EKGs-not new)    TTE 12/10/20: Summary:   1. Left ventricular ejection fraction, by visual estimation, is 40 to 45%.   2. Mildly to moderately decreased global left ventricular systolic function.   3. Normal left ventricular internal cavity size.   4. Spectral Doppler shows impaired relaxation pattern of left ventricular myocardial filling (Grade I diastolic dysfunction).   5. Mildly enlarged right atrium.   6. Mild mitral valve regurgitation.   7. Thickening of the anterior and posterior mitral valve leaflets.   8. Mild tricuspid regurgitation.   9. Sclerotic aortic valve with normal opening.  10. Estimated pulmonary artery systolic pressure is 36.4 mmHg assuming a right atrial pressure of 10 mmHg, which is consistent with borderline pulmonary hypertension.    12/10/20 ST: IMPRESSION:  Normal  SPECT Myocardial Perfusion Imaging post rest and post vasodilator.       No scan evidence of reversible or fixed perfusion defects.       Normal left ventricular wall motion with ejection fraction of 56 % (normal: 50% or greater).       No regional wall motion abnormalities.    20 CTA Chest: IMPRESSION:  No evidence of pulmonary embolism.  Bilateral ground glass infiltrates concerning for Covid 19 pneumonia. Other atypical pneumonia not excluded.    Oncology : Dr Qureshi as outpatient - last chemo in 2020

## 2020-12-11 NOTE — DISCHARGE NOTE PROVIDER - CARE PROVIDER_API CALL
Nora Qureshi)  Internal Medicine; Medical Oncology  [address] [place] [address]  Phone: [phone]  Fax: [phone]  Follow Up Time:

## 2020-12-11 NOTE — DISCHARGE NOTE PROVIDER - NSDCFUSCHEDAPPT_GEN_ALL_CORE_FT
MABLE QUICK ; 12/15/2020 ; [facility]  MABLE QUICK ; 02/02/2021 ; [facility] [address] [place] [address]

## 2020-12-11 NOTE — DISCHARGE NOTE PROVIDER - NSDCCPTREATMENT_GEN_ALL_CORE_FT
PRINCIPAL PROCEDURE  Procedure: Left heart catheterization  Findings and Treatment: luminal irregularities

## 2020-12-11 NOTE — DISCHARGE NOTE PROVIDER - NSDCCPCAREPLAN_GEN_ALL_CORE_FT
PRINCIPAL DISCHARGE DIAGNOSIS  Diagnosis: Chest pain  Assessment and Plan of Treatment: Pt remains chest pain free and understands post cath discharge instructions No heavy lifting, strenuous activity, bending, straining or unnecessary stair climbing  for 2 weeks. No sex for 1 week.  No driving for 2 days. You may shower 24 hours following procedure but avoid baths and swimming for 1 week. Check groin site for bleeding and/or swelling daily following procedure. Call your doctor/cardiologist immediately should it occur or if you have increased/persistent pain at the site. Follow up with your cardiologist in 1- 2 weeks. You may call [facility] at [phone] or [phone] after office hours and weekends  with any questions or concerns following your procedure. Take medications as prescribed.      SECONDARY DISCHARGE DIAGNOSES  Diagnosis: Hypothyroidism  Assessment and Plan of Treatment: continue with synthroid 25mcg daily

## 2020-12-11 NOTE — DISCHARGE NOTE NURSING/CASE MANAGEMENT/SOCIAL WORK - PATIENT PORTAL LINK FT
You can access the FollowMyHealth Patient Portal offered by [facility] by registering at the following website: [email or web address]. By joining [employer]s FollowMyHealth portal, you will also be able to view your health information using other applications (apps) compatible with our system.

## 2020-12-11 NOTE — PROGRESS NOTE ADULT - ASSESSMENT
59 y/o F with PMH breast cancer on chemo c/o progressive CANTRELL x3 weeks now associated with midsternal chest pain admitted for SOB, chest pain,  r/o COVID-19.      #SOB with chest pain, r/o COVID-19  -Serial trops trended flat, EKG with TWI - (TWI seen on prior EKGs)  -TTE: EF 40-45%, mild to moderate LV systolic function, grade I diastolic dysfunction  -Positive stress test 12/10/20 per cardio, recommend transfer to [facility] for cardiac cath; patient and family in agreement 12/10/20 per cardio  -Oncology Dr Qureshi called 12/10 - awaiting call back to determine if any contraindications to [facility] while on chemo   -CTA chest negative for PE but groundglass opacities c/w COVID PNA. COVID-19 PCR neg x2 .   -Repeat CXR reviewed - no active infiltrates or effusions noted    #Breast cancer, last chemo June 2020  -Follows with Dr Qureshi as outpatient    #Hypothyroidism  -TSH 8.7, likely acute phase reactant, with normal T3 and T4   -Cont synthroid 25mcg  -Monitor outpatient in 4-6wks    Dispo - Confirmed plan to transfer to [facility] today for cardiac cath. Transfer center confirmed, accepting physician Dr. Meagan Martin. Awaiting bed, likely transfer in afternoon. [facility] Cardio Dr. Wong aware.  Pulm:  out patient pulm f/u recommended

## 2020-12-11 NOTE — DISCHARGE NOTE PROVIDER - HOSPITAL COURSE
60 year old transferred from [place] with Pmhx of breast cancer s/p mastectomy, fam h/o CAD (mother  of MI) went to [place] ED with 3 weeks of progressive shortness of breath and more recently had developed midsternal chest pain.  Shortness of breath is brought on by ambulation and resolves with resting.  The chest pain is dull in nature, does not radiate, no palpations, nothing made the chest pain better or worse.   No fevers or chills, no cough, no nausea or vomiting, no abdominal pain, no calf pain.  Currently pt is comfortable with no chest pain.     20 [facility] diagnostic, no intervention - luminal irregularities

## 2020-12-11 NOTE — DISCHARGE NOTE PROVIDER - NSDCMRMEDTOKEN_GEN_ALL_CORE_FT
meloxicam 15 mg oral tablet: 1 tab(s) orally once a day - home  Protonix 40 mg oral delayed release tablet: 1 tab(s) orally once a day - home  Synthroid 25 mcg (0.025 mg) oral tablet: 1 tab(s) orally once a day - home

## 2020-12-11 NOTE — H&P CARDIOLOGY - PMH
Anxiety about mastectomy    Chronic bilateral low back pain, with sciatica presence unspecified    Language barrier  Frisian speaking  Other chronic gastritis    Smoker

## 2020-12-11 NOTE — DISCHARGE NOTE NURSING/CASE MANAGEMENT/SOCIAL WORK - PATIENT PORTAL LINK FT
You can access the FollowMyHealth Patient Portal offered by [facility] by registering at the following website: [email or web address]. By joining [employer]’s FollowMyHealth portal, you will also be able to view your health information using other applications (apps) compatible with our system.

## 2020-12-11 NOTE — DISCHARGE NOTE PROVIDER - NSDCFUSCHEDAPPT_GEN_ALL_CORE_FT
MABLE QUICK ; 12/15/2020 ; [facility]  MABLE QUICK ; 02/02/2021 ; [facility] [address] [place] [address]

## 2020-12-11 NOTE — DISCHARGE NOTE PROVIDER - HOSPITAL COURSE
Hospital Course  HPI:  60 year old F with a history of breast cancer (last chemo June 2020)  c/o 3 weeks of progressive shortness of breath and more recently has developed midsternal chest pain.  Shortness of breath is brought on by ambulation and resolves with resting.  The chest pain is dull in nature, does not radiate, and is not illicited by palpation. She describes not finding anything that made the chest pain better or worse.  (09 Dec 2020 16:18)    Patient was admitted to Telemetry in stable condition. Serial trops trended flat, EKG with TWI - (TWI seen on prior EKGs). TTE: EF 40-45%, mild to moderate LV systolic function, grade I diastolic dysfunction. Stress test 12/10/20 per cardio reviewed,  recommend transfer to [facility] for cardiac cath; patient and family in agreement 12/10/20 per cardio.     CTA chest negative for PE but groundglass opacities c/w COVID PNA. COVID-19 PCR neg x2. Repeat CXR reviewed - no active infiltrates or effusions. Pulm consult appreciated - history not consistent with covid infection. covid ab pending. Patient remained afebrile, hemodynamically stable, non toxic, and non hypoxic for safe transfer on 12/11/20 to [facility] for cardiac cath. Accepting physican Dr. David Martin.     NM Nuclear Stress Pharmacologic Multiple (12.10.20 @ 13:26) >  FINDINGS: The technical quality was of the resting and post stress perfusion images was good. Review of resting and post stress myocardial scintigrams revealed homogeneous left ventricular perfusion. There was no evidence of reversible or fixed perfusion defects. Gated wall motion study revealed normal left ventricular wall motion. There were no regional wall motion abnormalities or regions of decreased systolic wall thickening. Transient ischemic dilation (TID) was absent. Left ventricular ejection fraction was 56 %.   IMPRESSION:  Normal  SPECT Myocardial Perfusion Imaging post rest and post vasodilator. No scan evidence of reversible or fixed perfusion defects. Normal left ventricular wall motion with ejection fraction of 56 % (normal: 50% or greater). No regional wall motion abnormalities. Please refer to cardiac stress test report. Comparison: No prior study available.    CT Angio Chest w/ IV Cont (12.09.20 @ 14:23)  FINDINGS:  LUNGS AND AIRWAYS: Patent central airways.  Patchy groundglass infiltrates bilaterally, greatest in the lower lobes and more pronounced on the right than the left.  PLEURA: No pleural effusion.  MEDIASTINUM AND JESSI: No lymphadenopathy.  VESSELS: No pulmonary arterial filling defects. Atherosclerotic aorta without aneurysm.  HEART: Heart size is normal. No pericardial effusion.  CHEST WALL AND LOWER NECK: Status post bilateral mastectomy.  VISUALIZED UPPER ABDOMEN: Within normallimits.  BONES: Within normal limits.  IMPRESSION:  No evidence of pulmonary embolism. Bilateral ground glass infiltrates concerning for Covid 19 pneumonia. Other atypical pneumonia not excluded.    Discharging Provider:  Rosario Menchaca DO  Contact Info: [phone] - Please call with any questions or concerns     Time Spent: 35 minutes

## 2020-12-11 NOTE — PROGRESS NOTE ADULT - SUBJECTIVE AND OBJECTIVE BOX
Follow-up Pulm Progress Note    Jai Van MD  [phone]    No new respiratory events overnight.  Denies SOB/CP. Pt being transferred to [facility] for cardiac f/u.    Medications:  Vital Signs Last 24 Hrs  T(C): 36.4 (11 Dec 2020 13:33), Max: 36.8 (11 Dec 2020 08:22)  T(F): 97.6 (11 Dec 2020 13:33), Max: 98.2 (11 Dec 2020 08:22)  HR: 70 (11 Dec 2020 13:33) (63 - 72)  BP: 105/60 (11 Dec 2020 13:33) (105/60 - 120/98)  BP(mean): --  RR: 16 (11 Dec 2020 13:33) (15 - 16)  SpO2: 98% (11 Dec 2020 13:33) (95% - 98%)              LABS:                        14.0   6.75  )-----------( 309      ( 11 Dec 2020 06:00 )             42.1     12-11    139  |  104  |  16  ----------------------------<  114<H>  4.4   |  26  |  0.76    Ca    8.9      11 Dec 2020 06:00    TPro  6.8  /  Alb  3.2<L>  /  TBili  0.3  /  DBili  x   /  AST  13  /  ALT  19  /  AlkPhos  93  12-11        Procalcitonin, Serum: 0.03 ng/mL (12-10-20 @ 06:00)  Procalcitonin, Serum: 0.03 ng/mL (12-09-20 @ 22:20)    Serum Pro-Brain Natriuretic Peptide: 124 pg/mL (12-09-20 @ 13:06)      CULTURES:  Culture Results:   No growth to date. (12-09 @ 13:50)  Culture Results:   No growth to date. (12-09 @ 13:50)    Most recent blood culture -- 12-09 @ 13:50   -- -- .Blood Blood-Peripheral 12-09 @ 13:50      Physical Examination:  PULM: Clear to auscultation bilaterally, no significant sputum production  CVS: Regular rate and rhythm, no murmurs, rubs, or gallops  ABD: Soft, non-tender  EXT:  No clubbing, cyanosis, or edema    RADIOLOGY REVIEWED  CXR:  < from: Xray Chest 1 View- PORTABLE-Routine (Xray Chest 1 View- PORTABLE-Routine .) (12.11.20 @ 09:29) >    EXAM:  XR CHEST PORTABLE ROUTINE 1V      PROCEDURE DATE:  12/11/2020        INTERPRETATION:  Views:1  Comparison: 12/09/20  History: Dyspnea on exertion    The lungs are clear of lobar consolidation or effusions. There is mild linear atelectasis orscarring in both lung bases, stable. The cardiac and mediastinal contours appear unremarkable.    Impression:  1. Stable as above                  JAYLEN PRIETO MD; Attending Radiologist  This document has been electronically signed. Dec 11 2020 10:35AM    < end of copied text >    CT chest:    TTE:

## 2020-12-11 NOTE — DISCHARGE NOTE PROVIDER - NSDCMRMEDTOKEN_GEN_ALL_CORE_FT
acetaminophen 325 mg oral tablet: 2 tab(s) orally every 6 hours, As needed, Mild Pain (1 - 3), Moderate Pain (4 - 6), Severe Pain (7 - 10)  meloxicam 15 mg oral tablet: 1 tab(s) orally once a day  Protonix 40 mg oral delayed release tablet: 1 tab(s) orally once a day  Synthroid 25 mcg (0.025 mg) oral tablet: 1 tab(s) orally once a day

## 2020-12-11 NOTE — DISCHARGE NOTE PROVIDER - CARE PROVIDER_API CALL
David Martin)  Cardiology; Internal Medicine  [address] [place] [address]  Phone: [phone]  Fax: [phone]  Follow Up Time:

## 2020-12-11 NOTE — H&P CARDIOLOGY - ATTENDING COMMENTS
Cardiac enzymes positive in setting of nuclear stress test. Will further evaluate patient in the cardiac catheterization laboratory.

## 2020-12-11 NOTE — PROGRESS NOTE ADULT - SUBJECTIVE AND OBJECTIVE BOX
Patient is a 60y old  Female who presents with a chief complaint of r/o covid (10 Dec 2020 19:03)    Patient seen and examined at bedside. No acute overnight events. At time of evaluation, patient denies headache, fever, chills, chest pain, palpitations, SOB, cough, nausea, vomiting, abdominal pain, diarrhea, constipation, numbness/tingling of the extremities or worsening edema. +chronic nasal congestion, stable      ALLERGIES:  Cipro (Unknown)    MEDICATIONS  (STANDING):  enoxaparin Injectable 40 milliGRAM(s) SubCutaneous daily  levothyroxine 25 MICROGram(s) Oral daily  pantoprazole    Tablet 40 milliGRAM(s) Oral before breakfast    MEDICATIONS  (PRN):    Vital Signs Last 24 Hrs  T(F): 98.2 (11 Dec 2020 08:22), Max: 98.2 (11 Dec 2020 08:22)  HR: 63 (11 Dec 2020 08:22) (55 - 72)  BP: 120/98 (11 Dec 2020 08:22) (106/67 - 138/76)  RR: 16 (11 Dec 2020 08:22) (15 - 18)  SpO2: 96% (11 Dec 2020 08:22) (95% - 97%)  I&O's Summary    BMI (kg/m2): 29.6 (12-09-20 @ 19:18)  PHYSICAL EXAM:  General: NAD, A/O x 3  ENT: MMM, no scleral icterus  Neck: Supple, No JVD  Lungs: Clear to auscultation bilaterally, no wheezes, rales, rhonchi  Cardio: RRR, S1/S2, No murmurs  Abdomen: Soft, Nontender, Nondistended; Bowel sounds present  Extremities: No calf tenderness, No pitting edema    LABS:                        14.0   6.75  )-----------( 309      ( 11 Dec 2020 06:00 )             42.1       12-11    139  |  104  |  16  ----------------------------<  114  4.4   |  26  |  0.76    Ca    8.9      11 Dec 2020 06:00    TPro  6.8  /  Alb  3.2  /  TBili  0.3  /  DBili  x   /  AST  13  /  ALT  19  /  AlkPhos  93  12-11     eGFR if Non African American: 84 mL/min/1.73M2 (12-11-20 @ 06:00)  eGFR if : 97 mL/min/1.73M2 (12-11-20 @ 06:00)    PT/INR - ( 09 Dec 2020 13:06 )   PT: 11.6 sec;   INR: 0.96 ratio         PTT - ( 09 Dec 2020 13:06 )  PTT:31.8 sec   Lactate, Blood: 1.4 mmol/L (12-09 @ 13:50)    CARDIAC MARKERS ( 11 Dec 2020 06:00 )  .160 ng/mL / x     / x     / x     / x      CARDIAC MARKERS ( 10 Dec 2020 22:25 )  .183 ng/mL / x     / x     / x     / x      CARDIAC MARKERS ( 10 Dec 2020 14:28 )  .200 ng/mL / x     / x     / x     / x      CARDIAC MARKERS ( 10 Dec 2020 10:30 )  .219 ng/mL / x     / x     / x     / x      CARDIAC MARKERS ( 10 Dec 2020 06:00 )  .188 ng/mL / x     / x     / x     / x      CARDIAC MARKERS ( 09 Dec 2020 22:20 )  .189 ng/mL / x     / x     / x     / x      CARDIAC MARKERS ( 09 Dec 2020 13:06 )  .170 ng/mL / x     / x     / x     / x            TSH 8.73   TSH with FT4 reflex --  Total T3 87                      Culture - Blood (collected 09 Dec 2020 13:50)  Source: .Blood Blood-Peripheral  Preliminary Report (10 Dec 2020 19:02):    No growth to date.    Culture - Blood (collected 09 Dec 2020 13:50)  Source: .Blood Blood-Peripheral  Preliminary Report (10 Dec 2020 19:02):    No growth to date.        RADIOLOGY & ADDITIONAL TESTS:    Care Discussed with Consultants/Other Providers:    Patient is a 60y old  Female who presents with a chief complaint of r/o covid (10 Dec 2020 19:03)    Patient seen and examined at bedside. No acute overnight events. At time of evaluation, patient denies headache, fever, chills, chest pain, palpitations, SOB, nausea, vomiting, abdominal pain, diarrhea, constipation, numbness/tingling of the extremities or worsening edema. +chronic nasal congestion, stable      ALLERGIES:  Cipro (Unknown)    MEDICATIONS  (STANDING):  enoxaparin Injectable 40 milliGRAM(s) SubCutaneous daily  levothyroxine 25 MICROGram(s) Oral daily  pantoprazole    Tablet 40 milliGRAM(s) Oral before breakfast    MEDICATIONS  (PRN):    Vital Signs Last 24 Hrs  T(F): 98.2 (11 Dec 2020 08:22), Max: 98.2 (11 Dec 2020 08:22)  HR: 63 (11 Dec 2020 08:22) (55 - 72)  BP: 120/98 (11 Dec 2020 08:22) (106/67 - 138/76)  RR: 16 (11 Dec 2020 08:22) (15 - 18)  SpO2: 96% (11 Dec 2020 08:22) (95% - 97%)  I&O's Summary    BMI (kg/m2): 29.6 (12-09-20 @ 19:18)  PHYSICAL EXAM:  General: NAD, A/O x 3  ENT: MMM, no scleral icterus  Neck: Supple, No JVD  Lungs: Clear to auscultation bilaterally, no wheezes, rales, rhonchi  Cardio: RRR, S1/S2, No murmurs  Abdomen: Soft, Nontender, Nondistended; Bowel sounds present  Extremities: No calf tenderness, No pitting edema    LABS:                        14.0   6.75  )-----------( 309      ( 11 Dec 2020 06:00 )             42.1       12-11    139  |  104  |  16  ----------------------------<  114  4.4   |  26  |  0.76    Ca    8.9      11 Dec 2020 06:00    TPro  6.8  /  Alb  3.2  /  TBili  0.3  /  DBili  x   /  AST  13  /  ALT  19  /  AlkPhos  93  12-11     eGFR if Non African American: 84 mL/min/1.73M2 (12-11-20 @ 06:00)  eGFR if : 97 mL/min/1.73M2 (12-11-20 @ 06:00)    PT/INR - ( 09 Dec 2020 13:06 )   PT: 11.6 sec;   INR: 0.96 ratio         PTT - ( 09 Dec 2020 13:06 )  PTT:31.8 sec   Lactate, Blood: 1.4 mmol/L (12-09 @ 13:50)    CARDIAC MARKERS ( 11 Dec 2020 06:00 )  .160 ng/mL / x     / x     / x     / x      CARDIAC MARKERS ( 10 Dec 2020 22:25 )  .183 ng/mL / x     / x     / x     / x      CARDIAC MARKERS ( 10 Dec 2020 14:28 )  .200 ng/mL / x     / x     / x     / x      CARDIAC MARKERS ( 10 Dec 2020 10:30 )  .219 ng/mL / x     / x     / x     / x      CARDIAC MARKERS ( 10 Dec 2020 06:00 )  .188 ng/mL / x     / x     / x     / x      CARDIAC MARKERS ( 09 Dec 2020 22:20 )  .189 ng/mL / x     / x     / x     / x      CARDIAC MARKERS ( 09 Dec 2020 13:06 )  .170 ng/mL / x     / x     / x     / x            TSH 8.73   TSH with FT4 reflex --  Total T3 87      Culture - Blood (collected 09 Dec 2020 13:50)  Source: .Blood Blood-Peripheral  Preliminary Report (10 Dec 2020 19:02):    No growth to date.    Culture - Blood (collected 09 Dec 2020 13:50)  Source: .Blood Blood-Peripheral  Preliminary Report (10 Dec 2020 19:02):    No growth to date.      RADIOLOGY & ADDITIONAL TESTS:     < from: Xray Chest 1 View- PORTABLE-Routine (Xray Chest 1 View- PORTABLE-Routine .) (12.11.20 @ 09:29) >  The lungs are clear of lobar consolidation or effusions. There is mild linear atelectasis orscarring in both lung bases, stable. The cardiac and mediastinal contours appear unremarkable.    Impression:  1. Stable as above    < end of copied text >  < from: NM Nuclear Stress Pharmacologic Multiple (12.10.20 @ 13:26) >  FINDINGS: The technical quality was of the resting and post stress perfusion images was good.    Review of resting and post stress myocardial scintigrams revealed homogeneous left ventricular perfusion.    There was no evidence of reversible or fixed perfusion defects.    Gated wall motion study revealed normal left ventricular wall motion. There were no regional wall motion abnormalities or regions of decreased systolic wall thickening. Transient ischemic dilation (TID) was absent.    Left ventricular ejection fraction was 56 %.      IMPRESSION:  Normal  SPECT Myocardial Perfusion Imaging post rest and post vasodilator.       No scan evidence of reversible or fixed perfusion defects.       Normal left ventricular wall motion with ejection fraction of 56 % (normal: 50% or greater).       No regional wall motion abnormalities.      Please refer to cardiac stress test report.    Comparison: No prior study available.    < end of copied text >  < from: CT Angio Chest w/ IV Cont (12.09.20 @ 14:23) >  FINDINGS:    LUNGS AND AIRWAYS: Patent central airways.  Patchy groundglass infiltrates bilaterally, greatest in the lower lobes and more pronounced on the right than the left.  PLEURA: No pleural effusion.  MEDIASTINUM AND JESSI: No lymphadenopathy.  VESSELS: No pulmonary arterial filling defects. Atherosclerotic aorta without aneurysm.  HEART: Heart size is normal. No pericardial effusion.  CHEST WALL AND LOWER NECK: Status post bilateral mastectomy.  VISUALIZED UPPER ABDOMEN: Within normallimits.  BONES: Within normal limits.    IMPRESSION:  No evidence of pulmonary embolism.    Bilateral ground glass infiltrates concerning for Covid 19 pneumonia. Other atypical pneumonia not excluded.    < end of copied text >  < from: Xray Chest 1 View- PORTABLE-Urgent (Xray Chest 1 View- PORTABLE-Urgent .) (12.09.20 @ 13:41) >  INTERPRETATION:  AP chest on December 9, 2020 at 1:31 PM. Patient is short of breath. 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    Heart is normal for projection. Clips over the right chest wall and left axilla noted.    Question is raised of a small infiltrate in the inferior lingular region new since PET/CT of June 11 of this year. There may also be some slight right base linear atelectasis.    IMPRESSION: Small basilar lung findings suspect for small infiltrates.    < end of copied text >      Care Discussed with Consultants/Other Providers: yes

## 2020-12-11 NOTE — PROGRESS NOTE ADULT - ASSESSMENT
60 year old with a history of breast cancer comes with 3 weeks of progressive shortness of breath and more recently has developed midsternal chest pain. .     #SOB with chest pain:  -Serial trops trended flat, EKG with TWI-(seen on prior EKGs-not new)  -TTE reviewed: EF 40-45%, mild to moderate LV systolic function, grade I diastolic dysfunction  -Called oncology Dr Qureshi--awaiting call back to see if patient under active chemo and any contraindications to possible LHC  -CTA chest negative for PE but groundglass opacities c/f COVID PNA--> initial covid PCR negative, repeat PCR negative.   -AM CXR reviewed - no active infiltrates or effusions noted     #Breast cancer, last chemo June 2020  -Follows with  Dr Qureshi as outpatient    #Hypothyroid  -TSH 8.7 with normal T3 and T4   -cont synthroid 25mcg  Elevated TSH is acute phase reactant most likely and should be repeated in 4-6 wks    Dispo - [place] for Cath today, confirmed by cardio Dr. Wong. Accepting physician Dr. Martin 61 y/o F with PMH breast cancer on chemo c/o progressive CANTRELL x3 weeks now associated with midsternal chest pain admitted for SOB, chest pain,  r/o COVID-19.      #SOB with chest pain, r/o COVID-19  -Serial trops trended flat, EKG with TWI - (TWI seen on prior EKGs)  -TTE: EF 40-45%, mild to moderate LV systolic function, grade I diastolic dysfunction  -Positive stress test 12/10/20 per cardio, recommend transfer to [facility] for cardiac cath; patient and family in agreement 12/10/20 per cardio  -Oncology Dr Qureshi called 12/10 - awaiting call back to determine if any contraindications to [facility] while on chemo   -CTA chest negative for PE but groundglass opacities c/w COVID PNA. COVID-19 PCR neg x2 .   -Repeat CXR reviewed - no active infiltrates or effusions noted    #Breast cancer, last chemo June 2020  -Follows with Dr Qureshi as outpatient    #Hypothyroidism  -TSH 8.7, likely acute phase reactant, with normal T3 and T4   -Cont synthroid 25mcg  -Monitor outpatient in 4-6wks    Dispo - Confirmed plan to transfer to [facility] today for cardiac cath. Transfer center confirmed, accepting physician Dr. Meagan Martin. Awaiting bed, likely transfer in afternoon. [facility] Cardio Dr. Wong aware.

## 2020-12-11 NOTE — DISCHARGE NOTE PROVIDER - NSDCCPCAREPLAN_GEN_ALL_CORE_FT
PRINCIPAL DISCHARGE DIAGNOSIS  Diagnosis: Abnormal EKG  Assessment and Plan of Treatment:       SECONDARY DISCHARGE DIAGNOSES  Diagnosis: COVID-19  Assessment and Plan of Treatment:     Diagnosis: Smoker  Assessment and Plan of Treatment:     Diagnosis: CAP (community acquired pneumonia)  Assessment and Plan of Treatment:     Diagnosis: NSTEMI (non-ST elevated myocardial infarction)  Assessment and Plan of Treatment:

## 2020-12-11 NOTE — H&P CARDIOLOGY - CONSTITUTIONAL DETAILS
I spoke with patient regarding her symptoms. Patient states she has no SOB or swelling. Patient is feeling good. Patient states she has been taking her BP and weights everyday. She stopped doing daily weights when she was gone on her trip but will start again. Patient will bring her weights and BP to her clinic appointment next week.     No changes to her diuretic dose at this time per Brent. Patient agreeable to plan.     Zuleyma Lindsay RN   Pulmonary/CORE Care Coordinator  [facility] Center[facility]diBrent APRN CNP Wells, Alison M, RN             Please see how she is feeling? Her weights? SOB ? Swelling? . Would continue at the current diuretic dose unless she has noticed an increase in weight and /or symptoms.         well-groomed/well-developed

## 2020-12-13 LAB
SARS-COV-2 IGG SERPL QL IA: NEGATIVE — SIGNIFICANT CHANGE UP
SARS-COV-2 IGM SERPL IA-ACNC: 0.06 INDEX — SIGNIFICANT CHANGE UP

## 2020-12-14 ENCOUNTER — NON-APPOINTMENT (OUTPATIENT)
Age: 60
End: 2020-12-14

## 2020-12-14 LAB
CULTURE RESULTS: SIGNIFICANT CHANGE UP
CULTURE RESULTS: SIGNIFICANT CHANGE UP
SPECIMEN SOURCE: SIGNIFICANT CHANGE UP
SPECIMEN SOURCE: SIGNIFICANT CHANGE UP

## 2020-12-14 RX ORDER — FLUTICASONE PROPIONATE 50 UG/1
50 SPRAY, METERED NASAL DAILY
Qty: 1 | Refills: 0 | Status: COMPLETED | COMMUNITY
Start: 2020-01-15 | End: 2020-12-14

## 2020-12-14 RX ORDER — LORAZEPAM 0.5 MG/1
0.5 TABLET ORAL
Qty: 2 | Refills: 0 | Status: COMPLETED | COMMUNITY
Start: 2019-12-26 | End: 2020-12-14

## 2020-12-14 RX ORDER — CLOBETASOL PROPIONATE 0.5 MG/G
0.05 CREAM TOPICAL
Qty: 1 | Refills: 4 | Status: COMPLETED | COMMUNITY
Start: 2020-01-22 | End: 2020-12-14

## 2020-12-15 ENCOUNTER — APPOINTMENT (OUTPATIENT)
Dept: HEMATOLOGY ONCOLOGY | Facility: CLINIC | Age: 60
End: 2020-12-15
Payer: MEDICAID

## 2020-12-15 PROCEDURE — 99215 OFFICE O/P EST HI 40 MIN: CPT | Mod: 95

## 2020-12-17 DIAGNOSIS — Z71.89 OTHER SPECIFIED COUNSELING: ICD-10-CM

## 2020-12-23 ENCOUNTER — APPOINTMENT (OUTPATIENT)
Dept: FAMILY MEDICINE | Facility: CLINIC | Age: 60
End: 2020-12-23
Payer: MEDICAID

## 2020-12-23 VITALS
DIASTOLIC BLOOD PRESSURE: 76 MMHG | OXYGEN SATURATION: 96 % | HEART RATE: 60 BPM | WEIGHT: 164 LBS | SYSTOLIC BLOOD PRESSURE: 130 MMHG | BODY MASS INDEX: 29.06 KG/M2 | HEIGHT: 63 IN | RESPIRATION RATE: 14 BRPM

## 2020-12-23 PROBLEM — Z87.09 HISTORY OF ACUTE SINUSITIS: Status: RESOLVED | Noted: 2020-01-15 | Resolved: 2020-12-23

## 2020-12-23 PROBLEM — Z01.419 ENCOUNTER FOR GYNECOLOGICAL EXAMINATION WITH PAPANICOLAOU SMEAR OF CERVIX: Status: RESOLVED | Noted: 2020-01-22 | Resolved: 2020-12-23

## 2020-12-23 PROCEDURE — 99072 ADDL SUPL MATRL&STAF TM PHE: CPT

## 2020-12-23 PROCEDURE — 99495 TRANSJ CARE MGMT MOD F2F 14D: CPT

## 2020-12-23 NOTE — HISTORY OF PRESENT ILLNESS
[Post-hospitalization from ___ Hospital] : Post-hospitalization from [unfilled] Hospital [Admitted on: ___] : The patient was admitted on [unfilled] [Discharged on ___] : discharged on [unfilled] [Discharge Summary] : discharge summary [Pertinent Labs] : pertinent labs [Radiology Findings] : radiology findings [Discharge Med List] : discharge medication list [Other: ____] : [unfilled] [Med Reconciliation] : medication reconciliation has been completed [Patient Contacted By: ____] : and contacted by [unfilled] [FreeTextEntry2] : Patient is a 60-year-old female who presents today for follow-up status post hospitalization patient basically was hospitalized at [facility] on December 9 and subsequently then transferred to [facility] on December 10 for cardiac catheterization.  Patient has history of breast cancer status post bilateral mastectomy and a history of hypothyroidism patient was complaining of chest pain and shortness of breath subsequently work-up involved included 2D echo, CT scan of chest which showed a ground glass appearance patient was worked up for COVID-19 with PCR testing all negative, exercise stress test, nuclear stress test, and cardiac catheterization on 12/11/2020.\par \par Presently the patient states that she is feeling much better she presently denies any chest pain she is awake alert and oriented x3 in no acute distress calm and cooperative.

## 2020-12-23 NOTE — ASSESSMENT
[FreeTextEntry1] : Assessment and plan:\par \par Status post hospitalization, status post cardiac catheterization patient has follow-up appointment with cardiology.\par \par Continue present medical management without change reviewed with patient her hospitalization and all procedures that were done.\par \par Patient is up-to-date with vaccinations follow-up appointment with me in 3 months at that time I will obtain comprehensive blood work

## 2020-12-23 NOTE — CURRENT MEDS
[Time Spent: ___ minutes] : I have spent [unfilled] minutes of time on the encounter. [>50% of the face to face encounter time was spent on counseling and/or coordination of care for ___] : Greater than 50% of the face to face encounter time was spent on counseling and/or coordination of care for [unfilled] [Takes medication as prescribed] : takes [None] : Patient does not have any barriers to medication adherence

## 2020-12-23 NOTE — HEALTH RISK ASSESSMENT
[Yes] : Yes [Monthly or less (1 pt)] : Monthly or less (1 point) [1 or 2 (0 pts)] : 1 or 2 (0 points) [Never (0 pts)] : Never (0 points) [No] : In the past 12 months have you used drugs other than those required for medical reasons? No [No falls in past year] : Patient reported no falls in the past year [0] : 2) Feeling down, depressed, or hopeless: Not at all (0) [] : No [Audit-CScore] : 1 [[id number]] : 0

## 2020-12-24 ENCOUNTER — APPOINTMENT (OUTPATIENT)
Dept: CARDIOLOGY | Facility: CLINIC | Age: 60
End: 2020-12-24
Payer: MEDICAID

## 2020-12-24 ENCOUNTER — NON-APPOINTMENT (OUTPATIENT)
Age: 60
End: 2020-12-24

## 2020-12-24 VITALS
HEART RATE: 61 BPM | HEIGHT: 63 IN | SYSTOLIC BLOOD PRESSURE: 108 MMHG | RESPIRATION RATE: 14 BRPM | TEMPERATURE: 97.6 F | OXYGEN SATURATION: 100 % | WEIGHT: 162 LBS | BODY MASS INDEX: 28.7 KG/M2 | DIASTOLIC BLOOD PRESSURE: 74 MMHG

## 2020-12-24 VITALS — HEART RATE: 72 BPM | DIASTOLIC BLOOD PRESSURE: 88 MMHG | SYSTOLIC BLOOD PRESSURE: 116 MMHG

## 2020-12-24 DIAGNOSIS — J18.9 PNEUMONIA, UNSPECIFIED ORGANISM: ICD-10-CM

## 2020-12-24 PROCEDURE — 99072 ADDL SUPL MATRL&STAF TM PHE: CPT

## 2020-12-24 PROCEDURE — 93000 ELECTROCARDIOGRAM COMPLETE: CPT

## 2020-12-24 PROCEDURE — 99213 OFFICE O/P EST LOW 20 MIN: CPT

## 2020-12-28 ENCOUNTER — NON-APPOINTMENT (OUTPATIENT)
Age: 60
End: 2020-12-28

## 2021-01-04 ENCOUNTER — APPOINTMENT (OUTPATIENT)
Dept: PHYSICAL MEDICINE AND REHAB | Facility: CLINIC | Age: 61
End: 2021-01-04
Payer: MEDICAID

## 2021-01-04 VITALS
HEART RATE: 72 BPM | SYSTOLIC BLOOD PRESSURE: 122 MMHG | DIASTOLIC BLOOD PRESSURE: 76 MMHG | TEMPERATURE: 97.3 F | OXYGEN SATURATION: 94 %

## 2021-01-04 PROCEDURE — 99212 OFFICE O/P EST SF 10 MIN: CPT

## 2021-01-04 PROCEDURE — 99072 ADDL SUPL MATRL&STAF TM PHE: CPT

## 2021-01-04 NOTE — HISTORY OF PRESENT ILLNESS
[FreeTextEntry1] : 59 yo female with h/o breast ca\par She underwent b/l mastectomy with Dr. Raymundo. 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 Grade 1 neuropathy, otherwise tolerated treatment well.\par She was previously on Duloxetine but stopped as symptoms were better. \par \par Interval: She continues to have some tightness in her axilla/breast incisions, but better with lidocaine cream.

## 2021-01-04 NOTE — ASSESSMENT
[FreeTextEntry1] : Continue HEP \par lidocaine cream to incision\par refer to plastic surgery for surgery eval regarding 2 pockets of tissue laterally \par F/u in 6 m

## 2021-01-05 ENCOUNTER — APPOINTMENT (OUTPATIENT)
Dept: PLASTIC SURGERY | Facility: CLINIC | Age: 61
End: 2021-01-05
Payer: MEDICAID

## 2021-01-05 VITALS
OXYGEN SATURATION: 95 % | HEART RATE: 67 BPM | SYSTOLIC BLOOD PRESSURE: 128 MMHG | HEIGHT: 63 IN | DIASTOLIC BLOOD PRESSURE: 77 MMHG | BODY MASS INDEX: 28.7 KG/M2 | TEMPERATURE: 97.4 F | WEIGHT: 162 LBS

## 2021-01-05 PROCEDURE — 99072 ADDL SUPL MATRL&STAF TM PHE: CPT

## 2021-01-05 PROCEDURE — 99204 OFFICE O/P NEW MOD 45 MIN: CPT

## 2021-01-07 NOTE — CONSULT LETTER
[Dear  ___] : Dear  [unfilled], [Consult Letter:] : I had the pleasure of evaluating your patient, [unfilled]. [Please see my note below.] : Please see my note below. [Consult Closing:] : Thank you very much for allowing me to participate in the care of this patient.  If you have any questions, please do not hesitate to contact me. [Sincerely,] : Sincerely, [FreeTextEntry3] : Zbigniew Dejesus MD

## 2021-01-07 NOTE — REASON FOR VISIT
[Initial Evaluation] : an initial evaluation [Family Member] : family member [FreeTextEntry1] : Dr. Ayo Raymundo (Surgical Oncology)

## 2021-01-13 ENCOUNTER — RESULT REVIEW (OUTPATIENT)
Age: 61
End: 2021-01-13

## 2021-01-13 ENCOUNTER — APPOINTMENT (OUTPATIENT)
Dept: CT IMAGING | Facility: CLINIC | Age: 61
End: 2021-01-13

## 2021-01-13 ENCOUNTER — OUTPATIENT (OUTPATIENT)
Dept: OUTPATIENT SERVICES | Facility: HOSPITAL | Age: 61
LOS: 1 days | End: 2021-01-13
Payer: MEDICAID

## 2021-01-13 DIAGNOSIS — Z90.10 ACQUIRED ABSENCE OF UNSPECIFIED BREAST AND NIPPLE: Chronic | ICD-10-CM

## 2021-01-13 DIAGNOSIS — Z00.8 ENCOUNTER FOR OTHER GENERAL EXAMINATION: ICD-10-CM

## 2021-01-13 PROCEDURE — 74174 CTA ABD&PLVS W/CONTRAST: CPT

## 2021-01-13 PROCEDURE — 74174 CTA ABD&PLVS W/CONTRAST: CPT | Mod: 26

## 2021-01-14 ENCOUNTER — OUTPATIENT (OUTPATIENT)
Dept: OUTPATIENT SERVICES | Facility: HOSPITAL | Age: 61
LOS: 1 days | End: 2021-01-14
Payer: MEDICAID

## 2021-01-14 ENCOUNTER — APPOINTMENT (OUTPATIENT)
Dept: OBGYN | Facility: CLINIC | Age: 61
End: 2021-01-14
Payer: MEDICAID

## 2021-01-14 VITALS — TEMPERATURE: 97.1 F

## 2021-01-14 DIAGNOSIS — N76.0 ACUTE VAGINITIS: ICD-10-CM

## 2021-01-14 DIAGNOSIS — Z01.419 ENCOUNTER FOR GYNECOLOGICAL EXAMINATION (GENERAL) (ROUTINE) W/OUT ABNORMAL FINDINGS: ICD-10-CM

## 2021-01-14 DIAGNOSIS — N83.299 OTHER OVARIAN CYST, UNSPECIFIED SIDE: ICD-10-CM

## 2021-01-14 DIAGNOSIS — Z90.10 ACQUIRED ABSENCE OF UNSPECIFIED BREAST AND NIPPLE: Chronic | ICD-10-CM

## 2021-01-14 PROCEDURE — 99213 OFFICE O/P EST LOW 20 MIN: CPT

## 2021-01-14 PROCEDURE — G0463: CPT

## 2021-01-15 NOTE — HISTORY OF PRESENT ILLNESS
[FreeTextEntry1] : 59yo P3 ( h/o breast cancer s/p b/l mastectomy, chemo)  presents for annual without complaint.  Pt being followed for PM ovarian cyst seen on surveillance PET scant.  S/p gyn surg consult- no intervention indicated at that time.  \par F/u sono 11/2020- 3.1cm mixed hyperechoic mass unchanged from prior studies.\par \par - PAP 1/2020 NEG\par - Colonoscopy- 2018\par \par Gen health followed by heme onc and medicine

## 2021-01-15 NOTE — PLAN
[FreeTextEntry1] : 59yo P3-  annual gyn exam. \par - pap up to date , rpt 1/2023\par - h/o breast cancer- followed by heme/leandra, surg  (pt refused breast exam today)\par - stable complex ov cyst-  s/p gyn surg consult, surg not recommended.  follow up sono 11/2020- stable.    [ ]f/u 6 months 5/2021.  If cont to be stable can space out surveillance\par - colonoscopy up to date\par \par Gen health followed by medicine\par Nancy Joyner, PAC\par

## 2021-01-15 NOTE — PHYSICAL EXAM
[Appropriately responsive] : appropriately responsive [Alert] : alert [No Acute Distress] : no acute distress [No Lymphadenopathy] : no lymphadenopathy [Regular Rate Rhythm] : regular rate rhythm [No Murmurs] : no murmurs [Clear to Auscultation B/L] : clear to auscultation bilaterally [Soft] : soft [Non-tender] : non-tender [Non-distended] : non-distended [No HSM] : No HSM [No Lesions] : no lesions [No Mass] : no mass [Oriented x3] : oriented x3 [Vulvar Atrophy] : vulvar atrophy [Labia Majora] : normal [Labia Minora] : normal [Atrophy] : atrophy [Normal] : normal [Uterine Adnexae] : normal [FreeTextEntry6] : Deferred by pt

## 2021-02-02 ENCOUNTER — APPOINTMENT (OUTPATIENT)
Dept: ORTHOPEDIC SURGERY | Facility: CLINIC | Age: 61
End: 2021-02-02

## 2021-02-05 ENCOUNTER — OUTPATIENT (OUTPATIENT)
Dept: OUTPATIENT SERVICES | Facility: HOSPITAL | Age: 61
LOS: 1 days | End: 2021-02-05

## 2021-02-05 VITALS
DIASTOLIC BLOOD PRESSURE: 86 MMHG | HEIGHT: 62.5 IN | HEART RATE: 84 BPM | RESPIRATION RATE: 16 BRPM | OXYGEN SATURATION: 98 % | TEMPERATURE: 98 F | SYSTOLIC BLOOD PRESSURE: 130 MMHG | WEIGHT: 167.99 LBS

## 2021-02-05 DIAGNOSIS — Z98.890 OTHER SPECIFIED POSTPROCEDURAL STATES: Chronic | ICD-10-CM

## 2021-02-05 DIAGNOSIS — Z85.3 PERSONAL HISTORY OF MALIGNANT NEOPLASM OF BREAST: ICD-10-CM

## 2021-02-05 DIAGNOSIS — I25.10 ATHEROSCLEROTIC HEART DISEASE OF NATIVE CORONARY ARTERY WITHOUT ANGINA PECTORIS: ICD-10-CM

## 2021-02-05 DIAGNOSIS — Z90.10 ACQUIRED ABSENCE OF UNSPECIFIED BREAST AND NIPPLE: Chronic | ICD-10-CM

## 2021-02-05 DIAGNOSIS — C50.919 MALIGNANT NEOPLASM OF UNSPECIFIED SITE OF UNSPECIFIED FEMALE BREAST: ICD-10-CM

## 2021-02-05 DIAGNOSIS — E03.9 HYPOTHYROIDISM, UNSPECIFIED: ICD-10-CM

## 2021-02-05 LAB
BLD GP AB SCN SERPL QL: NEGATIVE — SIGNIFICANT CHANGE UP
RH IG SCN BLD-IMP: POSITIVE — SIGNIFICANT CHANGE UP

## 2021-02-05 RX ORDER — LEVOTHYROXINE SODIUM 125 MCG
1 TABLET ORAL
Qty: 0 | Refills: 0 | DISCHARGE

## 2021-02-05 RX ORDER — SODIUM CHLORIDE 9 MG/ML
1000 INJECTION, SOLUTION INTRAVENOUS
Refills: 0 | Status: DISCONTINUED | OUTPATIENT
Start: 2021-02-15 | End: 2021-02-17

## 2021-02-05 RX ORDER — PANTOPRAZOLE SODIUM 20 MG/1
1 TABLET, DELAYED RELEASE ORAL
Qty: 0 | Refills: 0 | DISCHARGE

## 2021-02-05 RX ORDER — MELOXICAM 15 MG/1
1 TABLET ORAL
Qty: 0 | Refills: 0 | DISCHARGE

## 2021-02-05 NOTE — H&P PST ADULT - HISTORY OF PRESENT ILLNESS
Ms. 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 Dejesus.     Patient reports 4 day hospitalization and cardiac workup December 2020 for SOB & chest pain which was due to pneumonia.

## 2021-02-05 NOTE — H&P PST ADULT - NSICDXPASTMEDICALHX_GEN_ALL_CORE_FT
PAST MEDICAL HISTORY:  Anxiety about mastectomy     Breast cancer bilateral    Chronic bilateral low back pain, with sciatica presence unspecified     Language barrier Afghan speaking    Other chronic gastritis     Smoker

## 2021-02-05 NOTE — H&P PST ADULT - NSICDXPROBLEM_GEN_ALL_CORE_FT
PROBLEM DIAGNOSES  Problem: Breast cancer  Assessment and Plan: scheduled for delayed bilateral breast reconstruction with deep inferior epigastric  flaps, possible internal mammary lymph node biopsy, possible rib resection, possible meshy replacement with Dr. Dejesus on 02/15/2021.  Verbal and written pre-op instructions provided to patient. Patient verbalized understanding and will call surgeons office for revised instructions if surgery is rescheduled.  Pantoprazole for GI prophylaxis.   Patient given verbal and written instruction with teach back on chlorhexidine shampoo, and the patient verbalized understanding with return demonstration.   Patient aware of need for COVID testing prior to  procedure and advised to coordinate with surgeon.     Problem: Hypothyroidism  Assessment and Plan: Pt. instructed to take Synthroid DOS with a small sip of water.     Problem: CAD (coronary artery disease)  Assessment and Plan: Patient will obtain medical clearance as per surgeons request-copy requested.   PST NP requesting medical eval and cardiac eval if deemed necessary by PMD

## 2021-02-05 NOTE — H&P PST ADULT - BLOOD AVOIDANCE/RESTRICTIONS, PROFILE
Problem: Diabetes  Goal: Glycemic balance achieved/maintained  Goal is to maintain blood sugar within range with no episodes of hypoglycemia   Outcome: Outcome Not Met, Plan Adjusted  SS insulin given    Problem: At Risk for Falls  Goal: # Patient does not fall  Outcome: Outcome Met, Continue evaluating goal progress toward completion  No falls this shift. Fall precautions remain in place.        none

## 2021-02-05 NOTE — H&P PST ADULT - NEGATIVE NEUROLOGICAL SYMPTOMS
no weakness/no paresthesias/no generalized seizures/no focal seizures/no syncope/no tremors/no difficulty walking/no headache

## 2021-02-05 NOTE — H&P PST ADULT - ASSESSMENT
Ms. 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 Dejesus.

## 2021-02-09 ENCOUNTER — APPOINTMENT (OUTPATIENT)
Dept: FAMILY MEDICINE | Facility: CLINIC | Age: 61
End: 2021-02-09
Payer: MEDICAID

## 2021-02-09 ENCOUNTER — APPOINTMENT (OUTPATIENT)
Dept: RADIOLOGY | Facility: HOSPITAL | Age: 61
End: 2021-02-09
Payer: MEDICAID

## 2021-02-09 ENCOUNTER — OUTPATIENT (OUTPATIENT)
Dept: OUTPATIENT SERVICES | Facility: HOSPITAL | Age: 61
LOS: 1 days | End: 2021-02-09
Payer: MEDICAID

## 2021-02-09 ENCOUNTER — RESULT REVIEW (OUTPATIENT)
Age: 61
End: 2021-02-09

## 2021-02-09 VITALS
HEART RATE: 56 BPM | TEMPERATURE: 96.5 F | WEIGHT: 167 LBS | SYSTOLIC BLOOD PRESSURE: 124 MMHG | HEIGHT: 63 IN | BODY MASS INDEX: 29.59 KG/M2 | OXYGEN SATURATION: 99 % | DIASTOLIC BLOOD PRESSURE: 64 MMHG | RESPIRATION RATE: 14 BRPM

## 2021-02-09 DIAGNOSIS — Z90.10 ACQUIRED ABSENCE OF UNSPECIFIED BREAST AND NIPPLE: Chronic | ICD-10-CM

## 2021-02-09 DIAGNOSIS — M77.8 OTHER ENTHESOPATHIES, NOT ELSEWHERE CLASSIFIED: ICD-10-CM

## 2021-02-09 DIAGNOSIS — Z87.898 PERSONAL HISTORY OF OTHER SPECIFIED CONDITIONS: ICD-10-CM

## 2021-02-09 DIAGNOSIS — N89.8 OTHER SPECIFIED NONINFLAMMATORY DISORDERS OF VAGINA: ICD-10-CM

## 2021-02-09 DIAGNOSIS — I51.9 HEART DISEASE, UNSPECIFIED: ICD-10-CM

## 2021-02-09 DIAGNOSIS — Z85.3 ENCOUNTER FOR FOLLOW-UP EXAMINATION AFTER COMPLETED TREATMENT FOR MALIGNANT NEOPLASM: ICD-10-CM

## 2021-02-09 DIAGNOSIS — Z85.3 PERSONAL HISTORY OF MALIGNANT NEOPLASM OF BREAST: ICD-10-CM

## 2021-02-09 DIAGNOSIS — T23.009A BURN OF UNSPECIFIED DEGREE OF UNSPECIFIED HAND, UNSPECIFIED SITE, INITIAL ENCOUNTER: ICD-10-CM

## 2021-02-09 DIAGNOSIS — Z98.890 OTHER SPECIFIED POSTPROCEDURAL STATES: Chronic | ICD-10-CM

## 2021-02-09 DIAGNOSIS — Z08 ENCOUNTER FOR FOLLOW-UP EXAMINATION AFTER COMPLETED TREATMENT FOR MALIGNANT NEOPLASM: ICD-10-CM

## 2021-02-09 DIAGNOSIS — Z00.8 ENCOUNTER FOR OTHER GENERAL EXAMINATION: ICD-10-CM

## 2021-02-09 DIAGNOSIS — M25.561 PAIN IN RIGHT KNEE: ICD-10-CM

## 2021-02-09 DIAGNOSIS — Z87.09 PERSONAL HISTORY OF OTHER DISEASES OF THE RESPIRATORY SYSTEM: ICD-10-CM

## 2021-02-09 DIAGNOSIS — G89.18 OTHER ACUTE POSTPROCEDURAL PAIN: ICD-10-CM

## 2021-02-09 DIAGNOSIS — M25.469 EFFUSION, UNSPECIFIED KNEE: ICD-10-CM

## 2021-02-09 DIAGNOSIS — Z87.2 PERSONAL HISTORY OF DISEASES OF THE SKIN AND SUBCUTANEOUS TISSUE: ICD-10-CM

## 2021-02-09 PROCEDURE — 71046 X-RAY EXAM CHEST 2 VIEWS: CPT | Mod: 26

## 2021-02-09 PROCEDURE — 71046 X-RAY EXAM CHEST 2 VIEWS: CPT

## 2021-02-09 PROCEDURE — 99215 OFFICE O/P EST HI 40 MIN: CPT

## 2021-02-09 PROCEDURE — 99072 ADDL SUPL MATRL&STAF TM PHE: CPT

## 2021-02-09 RX ORDER — MELOXICAM 15 MG/1
15 TABLET ORAL
Qty: 30 | Refills: 3 | Status: DISCONTINUED | COMMUNITY
Start: 2019-10-25 | End: 2021-02-09

## 2021-02-09 NOTE — PHYSICAL EXAM
[No Acute Distress] : no acute distress [Well Nourished] : well nourished [Well Developed] : well developed [Well-Appearing] : well-appearing [Normal Sclera/Conjunctiva] : normal sclera/conjunctiva [PERRL] : pupils equal round and reactive to light [EOMI] : extraocular movements intact [Normal Outer Ear/Nose] : the outer ears and nose were normal in appearance [Normal Oropharynx] : the oropharynx was normal [No JVD] : no jugular venous distention [No Lymphadenopathy] : no lymphadenopathy [Supple] : supple [Thyroid Normal, No Nodules] : the thyroid was normal and there were no nodules present [No Respiratory Distress] : no respiratory distress  [No Accessory Muscle Use] : no accessory muscle use [Clear to Auscultation] : lungs were clear to auscultation bilaterally [Regular Rhythm] : with a regular rhythm [Normal Rate] : normal rate  [Normal S1, S2] : normal S1 and S2 [No Murmur] : no murmur heard [No Edema] : there was no peripheral edema [No Palpable Aorta] : no palpable aorta [No Extremity Clubbing/Cyanosis] : no extremity clubbing/cyanosis [Soft] : abdomen soft [Non Tender] : non-tender [Non-distended] : non-distended [No Masses] : no abdominal mass palpated [No HSM] : no HSM [Normal Bowel Sounds] : normal bowel sounds [Normal Posterior Cervical Nodes] : no posterior cervical lymphadenopathy [Normal Anterior Cervical Nodes] : no anterior cervical lymphadenopathy [No CVA Tenderness] : no CVA  tenderness [No Spinal Tenderness] : no spinal tenderness [No Joint Swelling] : no joint swelling [Grossly Normal Strength/Tone] : grossly normal strength/tone [No Rash] : no rash [Coordination Grossly Intact] : coordination grossly intact [No Focal Deficits] : no focal deficits [Normal Gait] : normal gait [Deep Tendon Reflexes (DTR)] : deep tendon reflexes were 2+ and symmetric [Normal Affect] : the affect was normal [Normal Insight/Judgement] : insight and judgment were intact

## 2021-02-10 ENCOUNTER — APPOINTMENT (OUTPATIENT)
Dept: CARDIOLOGY | Facility: CLINIC | Age: 61
End: 2021-02-10
Payer: MEDICAID

## 2021-02-10 ENCOUNTER — NON-APPOINTMENT (OUTPATIENT)
Age: 61
End: 2021-02-10

## 2021-02-10 VITALS — HEART RATE: 72 BPM | DIASTOLIC BLOOD PRESSURE: 80 MMHG | SYSTOLIC BLOOD PRESSURE: 130 MMHG

## 2021-02-10 VITALS
TEMPERATURE: 97.3 F | SYSTOLIC BLOOD PRESSURE: 134 MMHG | WEIGHT: 166 LBS | HEIGHT: 63 IN | BODY MASS INDEX: 29.41 KG/M2 | RESPIRATION RATE: 16 BRPM | OXYGEN SATURATION: 97 % | HEART RATE: 61 BPM | DIASTOLIC BLOOD PRESSURE: 86 MMHG

## 2021-02-10 LAB
ALBUMIN SERPL ELPH-MCNC: 4.6 G/DL
ALP BLD-CCNC: 118 U/L
ALT SERPL-CCNC: 14 U/L
ANION GAP SERPL CALC-SCNC: 14 MMOL/L
APPEARANCE: CLEAR
APTT BLD: 32 SEC
AST SERPL-CCNC: 17 U/L
BASOPHILS # BLD AUTO: 0.06 K/UL
BASOPHILS NFR BLD AUTO: 0.6 %
BILIRUB SERPL-MCNC: 0.3 MG/DL
BILIRUBIN URINE: NEGATIVE
BLOOD URINE: NEGATIVE
BUN SERPL-MCNC: 12 MG/DL
CALCIUM SERPL-MCNC: 10 MG/DL
CHLORIDE SERPL-SCNC: 98 MMOL/L
CHOLEST SERPL-MCNC: 231 MG/DL
CO2 SERPL-SCNC: 25 MMOL/L
COLOR: NORMAL
CREAT SERPL-MCNC: 0.92 MG/DL
EOSINOPHIL # BLD AUTO: 0.17 K/UL
EOSINOPHIL NFR BLD AUTO: 1.6 %
ESTIMATED AVERAGE GLUCOSE: 108 MG/DL
GLUCOSE QUALITATIVE U: NEGATIVE
GLUCOSE SERPL-MCNC: 86 MG/DL
HBA1C MFR BLD HPLC: 5.4 %
HCT VFR BLD CALC: 47.5 %
HDLC SERPL-MCNC: 52 MG/DL
HGB BLD-MCNC: 15.3 G/DL
IMM GRANULOCYTES NFR BLD AUTO: 0.4 %
INR PPP: 0.94 RATIO
KETONES URINE: NEGATIVE
LDLC SERPL CALC-MCNC: 127 MG/DL
LEUKOCYTE ESTERASE URINE: NEGATIVE
LYMPHOCYTES # BLD AUTO: 3.81 K/UL
LYMPHOCYTES NFR BLD AUTO: 35.7 %
MAN DIFF?: NORMAL
MCHC RBC-ENTMCNC: 31.2 PG
MCHC RBC-ENTMCNC: 32.2 GM/DL
MCV RBC AUTO: 96.9 FL
MONOCYTES # BLD AUTO: 0.69 K/UL
MONOCYTES NFR BLD AUTO: 6.5 %
NEUTROPHILS # BLD AUTO: 5.91 K/UL
NEUTROPHILS NFR BLD AUTO: 55.2 %
NITRITE URINE: NEGATIVE
NONHDLC SERPL-MCNC: 179 MG/DL
PH URINE: 6
PLATELET # BLD AUTO: 364 K/UL
POTASSIUM SERPL-SCNC: 4.7 MMOL/L
PROT SERPL-MCNC: 7.7 G/DL
PROTEIN URINE: NEGATIVE
PT BLD: 11.1 SEC
RBC # BLD: 4.9 M/UL
RBC # FLD: 12.9 %
SODIUM SERPL-SCNC: 137 MMOL/L
SPECIFIC GRAVITY URINE: 1.01
T4 FREE SERPL-MCNC: 1.1 NG/DL
TRIGL SERPL-MCNC: 262 MG/DL
TSH SERPL-ACNC: 3.62 UIU/ML
UROBILINOGEN URINE: NORMAL
WBC # FLD AUTO: 10.68 K/UL

## 2021-02-10 PROCEDURE — 93000 ELECTROCARDIOGRAM COMPLETE: CPT

## 2021-02-10 PROCEDURE — 93306 TTE W/DOPPLER COMPLETE: CPT

## 2021-02-10 PROCEDURE — 99214 OFFICE O/P EST MOD 30 MIN: CPT

## 2021-02-10 PROCEDURE — 99072 ADDL SUPL MATRL&STAF TM PHE: CPT

## 2021-02-10 NOTE — REASON FOR VISIT
[Consultation] : a consultation regarding [FreeTextEntry1] : Patient presents for cardiac clearance for breast reconstruction. She feels well and has no complaints of chest discomfort shortness of breath palpitations dizziness or syncope. Her post catheterization groin pain has resolved. She underwent echocardiography today which reveals a normal ejection fraction.

## 2021-02-10 NOTE — PHYSICAL EXAM
[General Appearance - Well Developed] : well developed [Normal Appearance] : normal appearance [Well Groomed] : well groomed [General Appearance - Well Nourished] : well nourished [No Deformities] : no deformities [General Appearance - In No Acute Distress] : no acute distress [Normal Oral Mucosa] : normal oral mucosa [No Oral Pallor] : no oral pallor [No Oral Cyanosis] : no oral cyanosis [Normal Jugular Venous A Waves Present] : normal jugular venous A waves present [Normal Jugular Venous V Waves Present] : normal jugular venous V waves present [No Jugular Venous Esipnoza A Waves] : no jugular venous espinoza A waves [Respiration, Rhythm And Depth] : normal respiratory rhythm and effort [Exaggerated Use Of Accessory Muscles For Inspiration] : no accessory muscle use [Auscultation Breath Sounds / Voice Sounds] : lungs were clear to auscultation bilaterally [Abdomen Soft] : soft [Abdomen Tenderness] : non-tender [Abdomen Mass (___ Cm)] : no abdominal mass palpated [Abnormal Walk] : normal gait [Gait - Sufficient For Exercise Testing] : the gait was sufficient for exercise testing [FreeTextEntry1] : no bruising/no pulsatility/no pain to palpation [Skin Color & Pigmentation] : normal skin color and pigmentation [] : no rash [No Venous Stasis] : no venous stasis [Skin Lesions] : no skin lesions [No Skin Ulcers] : no skin ulcer [No Xanthoma] : no  xanthoma was observed [Oriented To Time, Place, And Person] : oriented to person, place, and time [Affect] : the affect was normal [Mood] : the mood was normal [No Anxiety] : not feeling anxious [Normal Rate] : normal [Normal S1] : normal S1 [Normal S2] : normal S2 [S3] : no S3 [S4] : no S4 [No Murmur] : no murmurs heard [Right Carotid Bruit] : no bruit heard over the right carotid [Left Carotid Bruit] : no bruit heard over the left carotid [Right Femoral Bruit] : no bruit heard over the right femoral artery [Left Femoral Bruit] : no bruit heard over the left femoral artery [2+] : left 2+ [No Abnormalities] : the abdominal aorta was not enlarged and no bruit was heard [No Pitting Edema] : no pitting edema present

## 2021-02-10 NOTE — RESULTS/DATA
[] : results reviewed [de-identified] : mild leukocytosis [de-identified] : wnl [de-identified] : wnl [de-identified] : wnl [de-identified] : EKG 12/2020 NSR, nonspecific ST-T wave abnormality

## 2021-02-10 NOTE — HISTORY OF PRESENT ILLNESS
[No Pertinent Pulmonary History] : no history of asthma, COPD, sleep apnea, or smoking [No Adverse Anesthesia Reaction] : no adverse anesthesia reaction in self or family member [(Patient denies any chest pain, claudication, dyspnea on exertion, orthopnea, palpitations or syncope)] : Patient denies any chest pain, claudication, dyspnea on exertion, orthopnea, palpitations or syncope [Aortic Stenosis] : no aortic stenosis [Atrial Fibrillation] : no atrial fibrillation [Coronary Artery Disease] : no coronary artery disease [Recent Myocardial Infarction] : no recent myocardial infarction [Implantable Device/Pacemaker] : no implantable device/pacemaker [Chronic Anticoagulation] : no chronic anticoagulation [Chronic Kidney Disease] : no chronic kidney disease [Diabetes] : no diabetes [FreeTextEntry1] : B/L breast reconstruction  [FreeTextEntry2] : 02/15/2021 [FreeTextEntry3] : dr. Zbigniew Dejesus [FreeTextEntry4] : Here for preoperative clearance. Patient had pneumonia in December and was treated in hospital. She denies chest pain, fever, cough, SOB.  [FreeTextEntry5] : LV DYSFUNCTION based on echo  [FreeTextEntry7] :  abnormal stress test and angiogram in 12/2020. pt. seen by   in 12/2020 . had ekg and echo done

## 2021-02-10 NOTE — ASSESSMENT
[Cardiology consultation] : Cardiology consultation [High Risk Surgery - Intraperitoneal, Intrathoracic or Supringuinal Vascular Procedures] : High Risk Surgery - Intraperitoneal, Intrathoracic or Supringuinal Vascular Procedures - No (0) [Ischemic Heart Disease] : Ischemic Heart Disease - No (0) [Congestive Heart Failure] : Congestive Heart Failure - No (0) [Prior Cerebrovascular Accident or TIA] : Prior Cerebrovascular Accident or TIA - No (0) [Creatinine >= 2mg/dL (1 Point)] : Creatinine >= 2mg/dL - No (0) [Insulin-dependent Diabetic (1 Point)] : Insulin-dependent Diabetic - No (0) [0] : 0 , RCRI Class: I, Risk of Post-Op Cardiac Complications: 3.9%, 95% CI for Risk Estimate: 2.8% - 5.4% [Patient Requires Further Testing] : Patient requires further testing

## 2021-02-10 NOTE — PLAN
[FreeTextEntry1] : defer to cardiology for cardiac clearance. labs unremarkable. CXR negative. wbc mild elevation, low chol. low fat diet.  No medical contraindication to surgery.\par  fax medical clearance, CXR,labs to [phone].

## 2021-02-10 NOTE — ASSESSMENT
[FreeTextEntry1] : In summary, the patient is a 61-year-old woman with an abnormal EKG but with minimal coronary disease by catheterization in mid December and now with a normal ejection fraction.\par \par There is no cardiac contraindication to the proposed surgery.

## 2021-02-12 ENCOUNTER — APPOINTMENT (OUTPATIENT)
Dept: DISASTER EMERGENCY | Facility: CLINIC | Age: 61
End: 2021-02-12

## 2021-02-12 ENCOUNTER — RX RENEWAL (OUTPATIENT)
Age: 61
End: 2021-02-12

## 2021-02-12 LAB — SARS-COV-2 N GENE NPH QL NAA+PROBE: NOT DETECTED

## 2021-02-12 NOTE — ASU PATIENT PROFILE, ADULT - PMH
Anxiety about mastectomy    Breast cancer  bilateral  Chronic bilateral low back pain, with sciatica presence unspecified    Language barrier  Serbian speaking  Other chronic gastritis    Smoker

## 2021-02-14 ENCOUNTER — TRANSCRIPTION ENCOUNTER (OUTPATIENT)
Age: 61
End: 2021-02-14

## 2021-02-15 ENCOUNTER — INPATIENT (INPATIENT)
Facility: HOSPITAL | Age: 61
LOS: 2 days | Discharge: HOME HEALTH | End: 2021-02-18
Attending: PLASTIC SURGERY | Admitting: PLASTIC SURGERY
Payer: MEDICAID

## 2021-02-15 ENCOUNTER — RESULT REVIEW (OUTPATIENT)
Age: 61
End: 2021-02-15

## 2021-02-15 ENCOUNTER — APPOINTMENT (OUTPATIENT)
Dept: PLASTIC SURGERY | Facility: HOSPITAL | Age: 61
End: 2021-02-15
Payer: MEDICAID

## 2021-02-15 ENCOUNTER — NON-APPOINTMENT (OUTPATIENT)
Age: 61
End: 2021-02-15

## 2021-02-15 VITALS
DIASTOLIC BLOOD PRESSURE: 73 MMHG | RESPIRATION RATE: 15 BRPM | HEIGHT: 62 IN | TEMPERATURE: 98 F | HEART RATE: 64 BPM | SYSTOLIC BLOOD PRESSURE: 131 MMHG | OXYGEN SATURATION: 97 % | WEIGHT: 167.99 LBS

## 2021-02-15 DIAGNOSIS — Z85.3 PERSONAL HISTORY OF MALIGNANT NEOPLASM OF BREAST: ICD-10-CM

## 2021-02-15 DIAGNOSIS — Z98.890 OTHER SPECIFIED POSTPROCEDURAL STATES: Chronic | ICD-10-CM

## 2021-02-15 DIAGNOSIS — Z90.10 ACQUIRED ABSENCE OF UNSPECIFIED BREAST AND NIPPLE: Chronic | ICD-10-CM

## 2021-02-15 LAB
ANION GAP SERPL CALC-SCNC: 12 MMOL/L — SIGNIFICANT CHANGE UP (ref 7–14)
BUN SERPL-MCNC: 10 MG/DL — SIGNIFICANT CHANGE UP (ref 7–23)
CALCIUM SERPL-MCNC: 8.5 MG/DL — SIGNIFICANT CHANGE UP (ref 8.4–10.5)
CHLORIDE SERPL-SCNC: 104 MMOL/L — SIGNIFICANT CHANGE UP (ref 98–107)
CO2 SERPL-SCNC: 23 MMOL/L — SIGNIFICANT CHANGE UP (ref 22–31)
CREAT SERPL-MCNC: 0.6 MG/DL — SIGNIFICANT CHANGE UP (ref 0.5–1.3)
GLUCOSE SERPL-MCNC: 169 MG/DL — HIGH (ref 70–99)
HCT VFR BLD CALC: 36.8 % — SIGNIFICANT CHANGE UP (ref 34.5–45)
HGB BLD-MCNC: 12.1 G/DL — SIGNIFICANT CHANGE UP (ref 11.5–15.5)
MCHC RBC-ENTMCNC: 30.9 PG — SIGNIFICANT CHANGE UP (ref 27–34)
MCHC RBC-ENTMCNC: 32.9 GM/DL — SIGNIFICANT CHANGE UP (ref 32–36)
MCV RBC AUTO: 94.1 FL — SIGNIFICANT CHANGE UP (ref 80–100)
NRBC # BLD: 0 /100 WBCS — SIGNIFICANT CHANGE UP
NRBC # FLD: 0 K/UL — SIGNIFICANT CHANGE UP
PLATELET # BLD AUTO: 288 K/UL — SIGNIFICANT CHANGE UP (ref 150–400)
POTASSIUM SERPL-MCNC: 4 MMOL/L — SIGNIFICANT CHANGE UP (ref 3.5–5.3)
POTASSIUM SERPL-SCNC: 4 MMOL/L — SIGNIFICANT CHANGE UP (ref 3.5–5.3)
RBC # BLD: 3.91 M/UL — SIGNIFICANT CHANGE UP (ref 3.8–5.2)
RBC # FLD: 12.3 % — SIGNIFICANT CHANGE UP (ref 10.3–14.5)
RH IG SCN BLD-IMP: POSITIVE — SIGNIFICANT CHANGE UP
SODIUM SERPL-SCNC: 139 MMOL/L — SIGNIFICANT CHANGE UP (ref 135–145)
WBC # BLD: 16.12 K/UL — HIGH (ref 3.8–10.5)
WBC # FLD AUTO: 16.12 K/UL — HIGH (ref 3.8–10.5)

## 2021-02-15 PROCEDURE — 21600 PARTIAL REMOVAL OF RIB: CPT | Mod: 59,LT

## 2021-02-15 PROCEDURE — S2068: CPT | Mod: 59,LT

## 2021-02-15 PROCEDURE — S2068: CPT | Mod: RT

## 2021-02-15 PROCEDURE — 49999 UNLISTED PX ABD PERTM&OMN: CPT | Mod: 59,LT

## 2021-02-15 PROCEDURE — 38530 BIOPSY/REMOVAL LYMPH NODES: CPT | Mod: RT

## 2021-02-15 PROCEDURE — 21600 PARTIAL REMOVAL OF RIB: CPT | Mod: RT

## 2021-02-15 PROCEDURE — 15002 WOUND PREP TRK/ARM/LEG: CPT

## 2021-02-15 PROCEDURE — 38530 BIOPSY/REMOVAL LYMPH NODES: CPT | Mod: 59,LT

## 2021-02-15 PROCEDURE — 49999 UNLISTED PX ABD PERTM&OMN: CPT

## 2021-02-15 PROCEDURE — 88305 TISSUE EXAM BY PATHOLOGIST: CPT | Mod: 26

## 2021-02-15 RX ORDER — HEPARIN SODIUM 5000 [USP'U]/ML
5000 INJECTION INTRAVENOUS; SUBCUTANEOUS EVERY 8 HOURS
Refills: 0 | Status: DISCONTINUED | OUTPATIENT
Start: 2021-02-15 | End: 2021-02-15

## 2021-02-15 RX ORDER — ACETAMINOPHEN 500 MG
1000 TABLET ORAL EVERY 6 HOURS
Refills: 0 | Status: COMPLETED | OUTPATIENT
Start: 2021-02-15 | End: 2021-02-16

## 2021-02-15 RX ORDER — SENNA PLUS 8.6 MG/1
2 TABLET ORAL AT BEDTIME
Refills: 0 | Status: DISCONTINUED | OUTPATIENT
Start: 2021-02-15 | End: 2021-02-18

## 2021-02-15 RX ORDER — MELOXICAM 15 MG/1
1 TABLET ORAL
Qty: 0 | Refills: 0 | DISCHARGE

## 2021-02-15 RX ORDER — HEPARIN SODIUM 5000 [USP'U]/ML
5000 INJECTION INTRAVENOUS; SUBCUTANEOUS EVERY 8 HOURS
Refills: 0 | Status: DISCONTINUED | OUTPATIENT
Start: 2021-02-15 | End: 2021-02-18

## 2021-02-15 RX ORDER — CEFAZOLIN SODIUM 1 G
2000 VIAL (EA) INJECTION EVERY 8 HOURS
Refills: 0 | Status: DISCONTINUED | OUTPATIENT
Start: 2021-02-15 | End: 2021-02-18

## 2021-02-15 RX ORDER — KETOROLAC TROMETHAMINE 30 MG/ML
15 SYRINGE (ML) INJECTION EVERY 6 HOURS
Refills: 0 | Status: DISCONTINUED | OUTPATIENT
Start: 2021-02-15 | End: 2021-02-18

## 2021-02-15 RX ORDER — SODIUM CHLORIDE 9 MG/ML
1000 INJECTION, SOLUTION INTRAVENOUS
Refills: 0 | Status: DISCONTINUED | OUTPATIENT
Start: 2021-02-15 | End: 2021-02-15

## 2021-02-15 RX ORDER — ACETAMINOPHEN 500 MG
975 TABLET ORAL EVERY 6 HOURS
Refills: 0 | Status: COMPLETED | OUTPATIENT
Start: 2021-02-15 | End: 2022-01-14

## 2021-02-15 RX ORDER — CALCIUM CARBONATE 500(1250)
1 TABLET ORAL EVERY 4 HOURS
Refills: 0 | Status: DISCONTINUED | OUTPATIENT
Start: 2021-02-15 | End: 2021-02-18

## 2021-02-15 RX ORDER — LEVOTHYROXINE SODIUM 125 MCG
25 TABLET ORAL DAILY
Refills: 0 | Status: DISCONTINUED | OUTPATIENT
Start: 2021-02-15 | End: 2021-02-18

## 2021-02-15 RX ORDER — BENZOCAINE AND MENTHOL 5; 1 G/100ML; G/100ML
1 LIQUID ORAL
Refills: 0 | Status: DISCONTINUED | OUTPATIENT
Start: 2021-02-15 | End: 2021-02-18

## 2021-02-15 RX ORDER — LANOLIN ALCOHOL/MO/W.PET/CERES
3 CREAM (GRAM) TOPICAL AT BEDTIME
Refills: 0 | Status: DISCONTINUED | OUTPATIENT
Start: 2021-02-15 | End: 2021-02-18

## 2021-02-15 RX ORDER — OXYCODONE HYDROCHLORIDE 5 MG/1
5 TABLET ORAL EVERY 4 HOURS
Refills: 0 | Status: DISCONTINUED | OUTPATIENT
Start: 2021-02-15 | End: 2021-02-18

## 2021-02-15 RX ORDER — PANTOPRAZOLE SODIUM 20 MG/1
40 TABLET, DELAYED RELEASE ORAL
Refills: 0 | Status: DISCONTINUED | OUTPATIENT
Start: 2021-02-15 | End: 2021-02-18

## 2021-02-15 RX ORDER — SIMETHICONE 80 MG/1
80 TABLET, CHEWABLE ORAL EVERY 4 HOURS
Refills: 0 | Status: DISCONTINUED | OUTPATIENT
Start: 2021-02-15 | End: 2021-02-18

## 2021-02-15 RX ORDER — IBUPROFEN 200 MG
400 TABLET ORAL EVERY 6 HOURS
Refills: 0 | Status: DISCONTINUED | OUTPATIENT
Start: 2021-02-15 | End: 2021-02-16

## 2021-02-15 RX ORDER — OXYCODONE HYDROCHLORIDE 5 MG/1
10 TABLET ORAL EVERY 4 HOURS
Refills: 0 | Status: DISCONTINUED | OUTPATIENT
Start: 2021-02-15 | End: 2021-02-18

## 2021-02-15 RX ORDER — METOCLOPRAMIDE HCL 10 MG
10 TABLET ORAL ONCE
Refills: 0 | Status: DISCONTINUED | OUTPATIENT
Start: 2021-02-15 | End: 2021-02-18

## 2021-02-15 RX ORDER — DIPHENHYDRAMINE HCL 50 MG
25 CAPSULE ORAL EVERY 4 HOURS
Refills: 0 | Status: DISCONTINUED | OUTPATIENT
Start: 2021-02-15 | End: 2021-02-18

## 2021-02-15 RX ORDER — ACETAMINOPHEN 500 MG
975 TABLET ORAL EVERY 8 HOURS
Refills: 0 | Status: DISCONTINUED | OUTPATIENT
Start: 2021-02-15 | End: 2021-02-15

## 2021-02-15 RX ADMIN — Medication 15 MILLIGRAM(S): at 22:05

## 2021-02-15 RX ADMIN — SODIUM CHLORIDE 125 MILLILITER(S): 9 INJECTION, SOLUTION INTRAVENOUS at 21:06

## 2021-02-15 RX ADMIN — HEPARIN SODIUM 5000 UNIT(S): 5000 INJECTION INTRAVENOUS; SUBCUTANEOUS at 22:05

## 2021-02-15 RX ADMIN — Medication 100 MILLIGRAM(S): at 18:38

## 2021-02-15 RX ADMIN — Medication 400 MILLIGRAM(S): at 21:06

## 2021-02-15 NOTE — PACU DISCHARGE NOTE - NSPTMEETSDISCHCRITERIADT_GEN_A_CORE
Reached patient by phone and shared her coronary artery CT score of 148 with her. I also shared that the majority of this score was in a single vessel. We discussed the meaning of this score. Patient states she has a family friend who is a cardiologist, and she would like to discuss this with him before decide who to follow up with. Patient has no further questions at this time.
15-Feb-2021 19:05

## 2021-02-15 NOTE — ASU PREOP CHECKLIST - BMI (KG/M2)
Patient Education     Staple Removal (No Complication)  You were seen today for a suture removal. Your wound is healing as expected. It has healed well enough that the sutures or staples were ready to be removed. The wound will continue to heal below the surface of the skin for a few months. It is unlikely that you will have any further problem.  At this time there is no sign of a wound infection. Signs of a wound infection include:  · Increasing redness or swelling around the wound  · Increased warmth of the wound  · Worsening pain  · Red streaking lines away from the wound  · Draining pus  Home care  · Keep the wound clean and dry. Use an adhesive strip, if needed, to keep the wound from getting dirty for the next week.  · Wash the wound carefully with soap and water daily during the next week.  · You may shower and bathe as usual.  · The wound may separate, or split open. If this happens, keep it clean and covered until you follow up or return for a recheck.  · When exposed to the sun, scars can take on red or brown discoloration. To decrease scar color, protect the area from sun exposure. Use sun block for 6 to 12 months.  Follow-up care   Follow up with your healthcare provider, or as advised.  When to seek medical advice   Contact your healthcare provider right away if any of the following occur:  · Increasing pain in the wound  · Redness, swelling, or pus coming from the wound  · Fever of 100.4ºF (38ºC) or higher, or as directed by your healthcare provider  © 7264-2535 [employer]. [address] [place] [address], [place] [address]. All rights reserved. This information is not intended as a substitute for professional medical care. Always follow your healthcare professional's instructions.           
30.7

## 2021-02-16 ENCOUNTER — TRANSCRIPTION ENCOUNTER (OUTPATIENT)
Age: 61
End: 2021-02-16

## 2021-02-16 LAB
ANION GAP SERPL CALC-SCNC: 9 MMOL/L — SIGNIFICANT CHANGE UP (ref 7–14)
BUN SERPL-MCNC: 8 MG/DL — SIGNIFICANT CHANGE UP (ref 7–23)
CALCIUM SERPL-MCNC: 8.8 MG/DL — SIGNIFICANT CHANGE UP (ref 8.4–10.5)
CHLORIDE SERPL-SCNC: 105 MMOL/L — SIGNIFICANT CHANGE UP (ref 98–107)
CO2 SERPL-SCNC: 24 MMOL/L — SIGNIFICANT CHANGE UP (ref 22–31)
CREAT SERPL-MCNC: 0.59 MG/DL — SIGNIFICANT CHANGE UP (ref 0.5–1.3)
GLUCOSE SERPL-MCNC: 116 MG/DL — HIGH (ref 70–99)
HCT VFR BLD CALC: 33.3 % — LOW (ref 34.5–45)
HGB BLD-MCNC: 11 G/DL — LOW (ref 11.5–15.5)
MCHC RBC-ENTMCNC: 31.1 PG — SIGNIFICANT CHANGE UP (ref 27–34)
MCHC RBC-ENTMCNC: 33 GM/DL — SIGNIFICANT CHANGE UP (ref 32–36)
MCV RBC AUTO: 94.1 FL — SIGNIFICANT CHANGE UP (ref 80–100)
NRBC # BLD: 0 /100 WBCS — SIGNIFICANT CHANGE UP
NRBC # FLD: 0 K/UL — SIGNIFICANT CHANGE UP
PLATELET # BLD AUTO: 272 K/UL — SIGNIFICANT CHANGE UP (ref 150–400)
POTASSIUM SERPL-MCNC: 4.4 MMOL/L — SIGNIFICANT CHANGE UP (ref 3.5–5.3)
POTASSIUM SERPL-SCNC: 4.4 MMOL/L — SIGNIFICANT CHANGE UP (ref 3.5–5.3)
RBC # BLD: 3.54 M/UL — LOW (ref 3.8–5.2)
RBC # FLD: 12.5 % — SIGNIFICANT CHANGE UP (ref 10.3–14.5)
SODIUM SERPL-SCNC: 138 MMOL/L — SIGNIFICANT CHANGE UP (ref 135–145)
WBC # BLD: 10.72 K/UL — HIGH (ref 3.8–10.5)
WBC # FLD AUTO: 10.72 K/UL — HIGH (ref 3.8–10.5)

## 2021-02-16 RX ORDER — ACETAMINOPHEN 500 MG
975 TABLET ORAL EVERY 6 HOURS
Refills: 0 | Status: DISCONTINUED | OUTPATIENT
Start: 2021-02-16 | End: 2021-02-18

## 2021-02-16 RX ADMIN — Medication 100 MILLIGRAM(S): at 18:46

## 2021-02-16 RX ADMIN — HEPARIN SODIUM 5000 UNIT(S): 5000 INJECTION INTRAVENOUS; SUBCUTANEOUS at 22:21

## 2021-02-16 RX ADMIN — Medication 400 MILLIGRAM(S): at 14:29

## 2021-02-16 RX ADMIN — PANTOPRAZOLE SODIUM 40 MILLIGRAM(S): 20 TABLET, DELAYED RELEASE ORAL at 06:20

## 2021-02-16 RX ADMIN — SODIUM CHLORIDE 75 MILLILITER(S): 9 INJECTION, SOLUTION INTRAVENOUS at 09:39

## 2021-02-16 RX ADMIN — SODIUM CHLORIDE 125 MILLILITER(S): 9 INJECTION, SOLUTION INTRAVENOUS at 00:00

## 2021-02-16 RX ADMIN — Medication 15 MILLIGRAM(S): at 22:21

## 2021-02-16 RX ADMIN — Medication 25 MICROGRAM(S): at 06:20

## 2021-02-16 RX ADMIN — Medication 100 MILLIGRAM(S): at 03:13

## 2021-02-16 RX ADMIN — HEPARIN SODIUM 5000 UNIT(S): 5000 INJECTION INTRAVENOUS; SUBCUTANEOUS at 13:51

## 2021-02-16 RX ADMIN — Medication 100 MILLIGRAM(S): at 11:46

## 2021-02-16 RX ADMIN — Medication 400 MILLIGRAM(S): at 03:31

## 2021-02-16 RX ADMIN — Medication 400 MILLIGRAM(S): at 09:38

## 2021-02-16 RX ADMIN — Medication 975 MILLIGRAM(S): at 22:22

## 2021-02-16 RX ADMIN — Medication 15 MILLIGRAM(S): at 17:08

## 2021-02-16 RX ADMIN — Medication 15 MILLIGRAM(S): at 11:47

## 2021-02-16 RX ADMIN — Medication 15 MILLIGRAM(S): at 04:17

## 2021-02-16 RX ADMIN — HEPARIN SODIUM 5000 UNIT(S): 5000 INJECTION INTRAVENOUS; SUBCUTANEOUS at 06:21

## 2021-02-16 RX ADMIN — OXYCODONE HYDROCHLORIDE 10 MILLIGRAM(S): 5 TABLET ORAL at 04:17

## 2021-02-16 NOTE — DISCHARGE NOTE PROVIDER - CARE PROVIDER_API CALL
Ajay Shanks)  Plastic Surgery  [address] [place] [address], [address]  [place] [address]  Phone: [phone]  Fax: [phone]  Follow Up Time: 1 week   Zbigniew Dejesus (MD)  ColonRectal Surgery; Plastic Surgery; Surgery; Surgery of the Hand  [address] [facility], [facility] [address]  [place] [address]  Phone: [phone]  Fax: [phone]  Follow Up Time: 1 week    Ajay Shanks)  Plastic Surgery  [address] [place] [address], [place] [address]  Phone: [phone]  Fax: [phone]  Follow Up Time: 1 week

## 2021-02-16 NOTE — DISCHARGE NOTE PROVIDER - PROVIDER TOKENS
PROVIDER:[TOKEN:[[id number]],FOLLOWUP:[1 week]] PROVIDER:[TOKEN:[[id number]],FOLLOWUP:[1 week]],PROVIDER:[TOKEN:[[id number]],FOLLOWUP:[1 week]]

## 2021-02-16 NOTE — DISCHARGE NOTE PROVIDER - CARE PROVIDERS DIRECT ADDRESSES
[email or web address]

## 2021-02-16 NOTE — DISCHARGE NOTE PROVIDER - NSDCMRMEDTOKEN_GEN_ALL_CORE_FT
levothyroxine 25 mcg (0.025 mg) oral tablet: 1 tab(s) orally once a day, am  meloxicam 15 mg oral tablet: 1 tab(s) orally once a day, As Needed, last dose 2/5/21  pantoprazole 40 mg oral delayed release tablet: 1 tab(s) orally once a day, am   aspirin 81 mg oral delayed release tablet: 1 tab(s) orally once a day MDD:1  cephalexin 500 mg oral tablet: 1 tab(s) orally 2 times a day MDD:2  levothyroxine 25 mcg (0.025 mg) oral tablet: 1 tab(s) orally once a day, am  meloxicam 15 mg oral tablet: 1 tab(s) orally once a day, As Needed, last dose 2/5/21  oxyCODONE 5 mg oral tablet: 1 tab(s) orally 2 times a day, As Needed -for severe pain MDD:2 tablets  pantoprazole 40 mg oral delayed release tablet: 1 tab(s) orally once a day, am

## 2021-02-16 NOTE — DISCHARGE NOTE PROVIDER - HOSPITAL COURSE
Uma Page underwent bilateral mastectomies and bilateral breast reconstruction with Dr. Shanks and Dr. Dejesus in the OR. The patient tolerated the procedure well. Postoperatively the patient was sent to the PACU. The patient remained in the PACU overnight. The patient's flaps were monitored by Vioptix and by clinical examination. On POD 1, the patient was hemodynamically stable; was transferred to a surgical floor; was advanced to a regular diet; was placed on her home medications; and was out of bed to a chair; the neal was removed and the patient voided appropriately. On POD 2, Vioptix monitors were removed; and the patient ambulated. During the patient's hospital course, the patient's pain was controlled by IV pain medications and then by PO pain medications.    At the time of discharge, the patient was hemodynamically stable, was tolerating PO diet, was voiding urine, was ambulating, and was comfortable with adequate pain control. The patient was instructed to follow up with Dr. Shanks within x1 week(s) after discharge from the hospital and Dr. Dejesus within x1 week(s) after discharge from the hospital. The patient/family felt comfortable with discharge. The patient received prescriptions for oral pain medication. The patient had no other issues. Uma Page underwent bilateral mastectomies and bilateral breast reconstruction with Dr. Dejesus and Dr. Shanks in the OR. The patient tolerated the procedure well. Postoperatively the patient was sent to the PACU. The patient remained in the PACU overnight. The patient's flaps were monitored by Vioptix and by clinical examination. On POD 1, the patient was hemodynamically stable; was transferred to a surgical floor; was advanced to a regular diet; was placed on her home medications; and was out of bed to a chair; the neal was removed and the patient voided appropriately. On POD 2, Vioptix monitors were removed; and the patient ambulated. During the patient's hospital course, the patient's pain was controlled by IV pain medications and then by PO pain medications.    At the time of discharge, the patient was hemodynamically stable, was tolerating PO diet, was voiding urine, was ambulating, and was comfortable with adequate pain control. The patient was instructed to follow up with Dr. Dejesus within x1 week(s) after discharge from the hospital and Dr. Shanks within x1 week(s) after discharge from the hospital. The patient/family felt comfortable with discharge. The patient received prescriptions for oral pain medication. The patient had no other issues.

## 2021-02-16 NOTE — DISCHARGE NOTE PROVIDER - NSDCCPCAREPLAN_GEN_ALL_CORE_FT
PRINCIPAL DISCHARGE DIAGNOSIS  Diagnosis: History of bilateral mastectomy  Assessment and Plan of Treatment:

## 2021-02-16 NOTE — DISCHARGE NOTE PROVIDER - NSDCCPTREATMENT_GEN_ALL_CORE_FT
PRINCIPAL PROCEDURE  Procedure: Reconstruction, breast, bilateral, with REMI flap  Findings and Treatment:       SECONDARY PROCEDURE  Procedure: Reconstruction, breast, bilateral, with REMI flap  Findings and Treatment:

## 2021-02-16 NOTE — DISCHARGE NOTE PROVIDER - NSDCFUSCHEDAPPT_GEN_ALL_CORE_FT
MABLE QUICK ; 03/11/2021 ; [facility] [address]  MABLE QUICK ; 03/17/2021 ; [facility] [address]   MABLE QUICK ; 03/17/2021 ; [facility] [address]   MABLE QUICK ; 03/25/2021 ; [facility]  MABLE QUICK ; 03/30/2021 ; [facility]

## 2021-02-16 NOTE — DISCHARGE NOTE PROVIDER - NSDCFUADDINST_GEN_ALL_CORE_FT
WOUND CARE:  Please keep incisions clean and dry. Please do not Scrub or rub incisions. Do not use lotion or powder on incisions. You will be discharged with BELGICA drains. You will need to empty them and record outputs accurately. This will be taught to you by the nursing staff. Please do not remove the BELGICA drains. They will be removed in the office. Please bring to the office accurate records of output.  BATHING: You may shower and/or sponge bathe. You may use warm soapy water in the shower and rinse, pat dry.  ACTIVITY: No heavy lifting or straining. Otherwise, you may return to your usual level of physical activity. If you are taking narcotic pain medication DO NOT drive a car, operate machinery or make important decisions.  DIET: Return to your usual diet.  NOTIFY YOUR SURGEON IF YOU HAVE: any bleeding that does not stop, any pus draining from your wound(s), any fever (over 100.4 F) persistent nausea/vomiting, or if your pain is not controlled on your discharge pain medications, unable to urinate.  Please follow up with your primary care physician in one week regarding your hospitalization, bring copies of your discharge paperwork.  Please follow up with Dr. Shanks within x1 week after discharge from the hospital. You may call [phone] to schedule an appointment.

## 2021-02-17 RX ADMIN — Medication 15 MILLIGRAM(S): at 05:24

## 2021-02-17 RX ADMIN — Medication 975 MILLIGRAM(S): at 22:02

## 2021-02-17 RX ADMIN — Medication 100 MILLIGRAM(S): at 03:42

## 2021-02-17 RX ADMIN — HEPARIN SODIUM 5000 UNIT(S): 5000 INJECTION INTRAVENOUS; SUBCUTANEOUS at 22:02

## 2021-02-17 RX ADMIN — HEPARIN SODIUM 5000 UNIT(S): 5000 INJECTION INTRAVENOUS; SUBCUTANEOUS at 05:24

## 2021-02-17 RX ADMIN — Medication 15 MILLIGRAM(S): at 17:51

## 2021-02-17 RX ADMIN — Medication 975 MILLIGRAM(S): at 05:24

## 2021-02-17 RX ADMIN — Medication 100 MILLIGRAM(S): at 18:53

## 2021-02-17 RX ADMIN — HEPARIN SODIUM 5000 UNIT(S): 5000 INJECTION INTRAVENOUS; SUBCUTANEOUS at 13:00

## 2021-02-17 RX ADMIN — Medication 25 MICROGRAM(S): at 05:24

## 2021-02-17 RX ADMIN — PANTOPRAZOLE SODIUM 40 MILLIGRAM(S): 20 TABLET, DELAYED RELEASE ORAL at 05:24

## 2021-02-17 RX ADMIN — Medication 100 MILLIGRAM(S): at 11:13

## 2021-02-17 RX ADMIN — Medication 975 MILLIGRAM(S): at 12:42

## 2021-02-17 RX ADMIN — Medication 975 MILLIGRAM(S): at 18:52

## 2021-02-17 RX ADMIN — BENZOCAINE AND MENTHOL 1 LOZENGE: 5; 1 LIQUID ORAL at 22:02

## 2021-02-17 RX ADMIN — Medication 15 MILLIGRAM(S): at 11:14

## 2021-02-17 RX ADMIN — Medication 15 MILLIGRAM(S): at 22:02

## 2021-02-17 NOTE — PROVIDER CONTACT NOTE (OTHER) - ACTION/TREATMENT ORDERED:
MD aware of situation. Continue to monitor pts BP and assess for dizziness. Will assess patient on rounds.
MD aware of situation. WIll evaluate patient.

## 2021-02-17 NOTE — PROVIDER CONTACT NOTE (OTHER) - ASSESSMENT
BP 95/48.  HR 63. Area above left breast appears more swollen than in AM. Tissue still soft to touch.
PT left vioptix dropped to 78-79% from baseline of 92%. Flap remains ecchymotic, swollen above left flap area but unchanged.

## 2021-02-17 NOTE — PROVIDER CONTACT NOTE (OTHER) - SITUATION
BP 95/48. HR 63. Above left breast noted to be more swollen than in AM. MD made aware.
PT left vioptix dropped to 78-79% from baseline of 92%. MD notified.

## 2021-02-17 NOTE — PROVIDER CONTACT NOTE (OTHER) - BACKGROUND
PT S/P BL reconstruction with REMI flap 2/15. Flaps intact. Vioptix fixed by team in AM.
PT s/p BL reconstruction with REMI flap 2/15. Left vioptix baseline usually around 92. Last reading at 0900 83%/ 90. Prior readings 86% at 0700 and 92% at 0500.

## 2021-02-18 ENCOUNTER — TRANSCRIPTION ENCOUNTER (OUTPATIENT)
Age: 61
End: 2021-02-18

## 2021-02-18 VITALS
RESPIRATION RATE: 18 BRPM | DIASTOLIC BLOOD PRESSURE: 59 MMHG | HEART RATE: 72 BPM | TEMPERATURE: 98 F | SYSTOLIC BLOOD PRESSURE: 115 MMHG | OXYGEN SATURATION: 100 %

## 2021-02-18 RX ORDER — CEPHALEXIN 500 MG
1 CAPSULE ORAL
Qty: 20 | Refills: 0
Start: 2021-02-18 | End: 2021-02-27

## 2021-02-18 RX ORDER — ASPIRIN/CALCIUM CARB/MAGNESIUM 324 MG
1 TABLET ORAL
Qty: 30 | Refills: 0
Start: 2021-02-18 | End: 2021-03-19

## 2021-02-18 RX ORDER — OXYCODONE HYDROCHLORIDE 5 MG/1
1 TABLET ORAL
Qty: 10 | Refills: 0
Start: 2021-02-18

## 2021-02-18 RX ADMIN — Medication 15 MILLIGRAM(S): at 05:44

## 2021-02-18 RX ADMIN — Medication 25 MICROGRAM(S): at 05:44

## 2021-02-18 RX ADMIN — Medication 100 MILLIGRAM(S): at 03:24

## 2021-02-18 RX ADMIN — HEPARIN SODIUM 5000 UNIT(S): 5000 INJECTION INTRAVENOUS; SUBCUTANEOUS at 05:44

## 2021-02-18 RX ADMIN — Medication 975 MILLIGRAM(S): at 05:44

## 2021-02-18 RX ADMIN — PANTOPRAZOLE SODIUM 40 MILLIGRAM(S): 20 TABLET, DELAYED RELEASE ORAL at 05:44

## 2021-02-18 NOTE — PROGRESS NOTE ADULT - SUBJECTIVE AND OBJECTIVE BOX
Plastic Surgery Progress Note (pg [facility]: [id number], [facility]: [phone])    SUBJECTIVE  The patient was seen and examined. No acute events overnight.    OBJECTIVE  ___________________________________________________  VITAL SIGNS / I&O's   Vital Signs Last 24 Hrs  T(C): 37 (16 Feb 2021 03:00), Max: 37 (16 Feb 2021 03:00)  T(F): 98.6 (16 Feb 2021 03:00), Max: 98.6 (16 Feb 2021 03:00)  HR: 61 (16 Feb 2021 06:00) (58 - 77)  BP: 108/59 (16 Feb 2021 06:00) (95/79 - 137/75)  BP(mean): 69 (16 Feb 2021 06:00) (69 - 104)  RR: 18 (16 Feb 2021 06:00) (12 - 20)  SpO2: 100% (16 Feb 2021 06:00) (100% - 100%)      15 Feb 2021 07:01  -  16 Feb 2021 06:59  --------------------------------------------------------  IN:    IV PiggyBack: 150 mL    Lactated Ringers: 1750 mL    Oral Fluid: 50 mL  Total IN: 1950 mL    OUT:    Bulb (mL): 36 mL    Bulb (mL): 18 mL    Bulb (mL): 6 mL    Bulb (mL): 134 mL    Bulb (mL): 23 mL    Bulb (mL): 12 mL    Indwelling Catheter - Urethral (mL): 2260 mL  Total OUT: 2489 mL    Total NET: -539 mL        ___________________________________________________  PHYSICAL EXAM    CONSTITUTIONAL: AOx3. NAD.   RIGHT BREAST / FLAP: Mastectomy skin flap ecchymosis, stable. No collections. Flap soft and pink with 2-3 second capillary refill. Vioptix: 87%. Drain(s) serosanguinous.  LEFT BREAST / FLAP: Mastectomy skin flap ecchymosis, stable. No collections. Flap soft and pink with 2-3 second capillary refill. Vioptix: 92%. Drain(s) serosanguinous.  CARDIOVASCULAR: Regular rate and rhythm  RESPIRATORY: Unlabored breathing  ABDOMEN: Soft. No collections. Incision intact. Umbilicus viable. Drains serosanguinous.    ___________________________________________________  LABS                        11.0   10.72 )-----------( 272      ( 16 Feb 2021 05:39 )             33.3     16 Feb 2021 05:39    138    |  105    |  8      ----------------------------<  116    4.4     |  24     |  0.59     Ca    8.8        16 Feb 2021 05:39        CAPILLARY BLOOD GLUCOSE              ___________________________________________________  MICRO  Recent Cultures:    ___________________________________________________  MEDICATIONS  (STANDING):  acetaminophen   Tablet .. 975 milliGRAM(s) Oral every 6 hours  acetaminophen  IVPB .. 1000 milliGRAM(s) IV Intermittent every 6 hours  ceFAZolin   IVPB 2000 milliGRAM(s) IV Intermittent every 8 hours  heparin   Injectable 5000 Unit(s) SubCutaneous every 8 hours  ibuprofen  Tablet. 400 milliGRAM(s) Oral every 6 hours  ketorolac   Injectable 15 milliGRAM(s) IV Push every 6 hours  lactated ringers. 1000 milliLiter(s) (125 mL/Hr) IV Continuous <Continuous>  levothyroxine 25 MICROGram(s) Oral daily  pantoprazole    Tablet 40 milliGRAM(s) Oral before breakfast    MEDICATIONS  (PRN):  artificial tears (preservative free) Ophthalmic Solution 1 Drop(s) Both EYES every 1 hour PRN Dry Eyes  benzocaine 15 mG/menthol 3.6 mG (Sugar-Free) Lozenge 1 Lozenge Oral every 3 hours PRN Sore Throat  calcium carbonate    500 mG (Tums) Chewable 1 Tablet(s) Chew every 4 hours PRN Indigestion  diphenhydrAMINE 25 milliGRAM(s) Oral every 4 hours PRN Itching  melatonin 3 milliGRAM(s) Oral at bedtime PRN Insomnia  metoclopramide Injectable 10 milliGRAM(s) IV Push once PRN breakthrough nausea/vomiting  oxyCODONE    IR 5 milliGRAM(s) Oral every 4 hours PRN Moderate Pain (4 - 6)  oxyCODONE    IR 10 milliGRAM(s) Oral every 4 hours PRN Severe Pain (7 - 10)  senna 2 Tablet(s) Oral at bedtime PRN Constipation  simethicone 80 milliGRAM(s) Chew every 4 hours PRN Gas  
Plastic Surgery Progress Note (pg [facility]: [id number], [facility]: [phone])    SUBJECTIVE  Doing well. No overnight events. Pain well controlled, ambulating, voiding, and tolerating PO.     OBJECTIVE  ___________________________________________________  VITAL SIGNS / I&O's   Vital Signs Last 24 Hrs  T(C): 36.9 (18 Feb 2021 06:01), Max: 36.9 (17 Feb 2021 09:50)  T(F): 98.5 (18 Feb 2021 06:01), Max: 98.5 (17 Feb 2021 09:50)  HR: 71 (18 Feb 2021 06:01) (71 - 86)  BP: 131/70 (18 Feb 2021 06:01) (101/48 - 134/71)  BP(mean): --  RR: 18 (18 Feb 2021 06:01) (16 - 18)  SpO2: 99% (18 Feb 2021 06:01) (96% - 100%)      17 Feb 2021 07:01  -  18 Feb 2021 07:00  --------------------------------------------------------  IN:    IV PiggyBack: 100 mL  Total IN: 100 mL    OUT:    Bulb (mL): 8 mL    Bulb (mL): 4.5 mL    Bulb (mL): 47.5 mL    Bulb (mL): 232 mL    Bulb (mL): 9.5 mL    Bulb (mL): 13.5 mL    Voided (mL): 1750 mL  Total OUT: 2065 mL    Total NET: -1965 mL        ___________________________________________________  PHYSICAL EXAM    -- CONSTITUTIONAL: AOx3. NAD.   -- CHEST: B/L flaps soft; +richelle-incisional ecchymosis on flap and mastectomy skin flaps but centrally normal color w CRT 2 seconds; Incisions c/d/i, no appreciable collections, appropriate swelling throughout; JPs ss; Vioptix stable  -- ABDOMEN: Soft, no appreciable collections, incision c/d/i, JPs ss    ___________________________________________________  LABS            CAPILLARY BLOOD GLUCOSE              ___________________________________________________  MICRO  Recent Cultures:    ___________________________________________________  MEDICATIONS  (STANDING):  acetaminophen   Tablet .. 975 milliGRAM(s) Oral every 6 hours  ceFAZolin   IVPB 2000 milliGRAM(s) IV Intermittent every 8 hours  heparin   Injectable 5000 Unit(s) SubCutaneous every 8 hours  ketorolac   Injectable 15 milliGRAM(s) IV Push every 6 hours  levothyroxine 25 MICROGram(s) Oral daily  pantoprazole    Tablet 40 milliGRAM(s) Oral before breakfast    MEDICATIONS  (PRN):  artificial tears (preservative free) Ophthalmic Solution 1 Drop(s) Both EYES every 1 hour PRN Dry Eyes  benzocaine 15 mG/menthol 3.6 mG (Sugar-Free) Lozenge 1 Lozenge Oral every 3 hours PRN Sore Throat  calcium carbonate    500 mG (Tums) Chewable 1 Tablet(s) Chew every 4 hours PRN Indigestion  diphenhydrAMINE 25 milliGRAM(s) Oral every 4 hours PRN Itching  melatonin 3 milliGRAM(s) Oral at bedtime PRN Insomnia  metoclopramide Injectable 10 milliGRAM(s) IV Push once PRN breakthrough nausea/vomiting  oxyCODONE    IR 5 milliGRAM(s) Oral every 4 hours PRN Moderate Pain (4 - 6)  oxyCODONE    IR 10 milliGRAM(s) Oral every 4 hours PRN Severe Pain (7 - 10)  senna 2 Tablet(s) Oral at bedtime PRN Constipation  simethicone 80 milliGRAM(s) Chew every 4 hours PRN Gas  
ANESTHESIA POSTOP CHECK    61y Female POSTOP DAY 1     Vital Signs Last 24 Hrs  T(C): 36.7 (16 Feb 2021 09:02), Max: 37 (16 Feb 2021 03:00)  T(F): 98.1 (16 Feb 2021 09:02), Max: 98.6 (16 Feb 2021 03:00)  HR: 66 (16 Feb 2021 09:02) (58 - 77)  BP: 111/62 (16 Feb 2021 09:02) (95/79 - 137/75)  BP(mean): 76 (16 Feb 2021 07:00) (69 - 104)  RR: 17 (16 Feb 2021 09:02) (12 - 26)  SpO2: 99% (16 Feb 2021 09:02) (99% - 100%)  I&O's Summary    15 Feb 2021 07:01  -  16 Feb 2021 07:00  --------------------------------------------------------  IN: 2075 mL / OUT: 2614 mL / NET: -539 mL    16 Feb 2021 07:01  -  16 Feb 2021 09:37  --------------------------------------------------------  IN: 75 mL / OUT: 121.5 mL / NET: -46.5 mL        [X ] NO APPARENT ANESTHESIA COMPLICATIONS      Comments: 
No significant events overnight.  Ambulated yesterday  Pain under control    Vital Signs Last 24 Hrs  T(C): 36.6 (17 Feb 2021 05:14), Max: 36.8 (16 Feb 2021 07:00)  T(F): 97.8 (17 Feb 2021 05:14), Max: 98.2 (16 Feb 2021 07:00)  HR: 70 (17 Feb 2021 05:14) (58 - 70)  BP: 112/64 (17 Feb 2021 05:14) (95/48 - 112/64)  BP(mean): 76 (16 Feb 2021 07:00) (76 - 76)  RR: 18 (17 Feb 2021 05:14) (17 - 26)  SpO2: 100% (17 Feb 2021 05:14) (98% - 100%)    Breast - flaps are warm, no congestion, echymosis stable, Vioptix stable. errol serosanlei  Abd - incision cdi, errol serhenriqueg  
Plastic Surgery Progress Note (pg [facility]: [id number], [facility]: [phone], [facility]: [phone])    SUBJECTIVE:  Doing well. No overnight events. Pain well controlled, ambulating, voiding, and tolerating PO.     OBJECTIVE:     ** VITAL SIGNS / I&O's **    Vital Signs Last 24 Hrs  T(C): 36.6 (17 Feb 2021 05:14), Max: 36.8 (16 Feb 2021 08:00)  T(F): 97.8 (17 Feb 2021 05:14), Max: 98.2 (16 Feb 2021 08:00)  HR: 70 (17 Feb 2021 05:14) (62 - 70)  BP: 112/64 (17 Feb 2021 05:14) (95/48 - 112/64)  BP(mean): --  RR: 18 (17 Feb 2021 05:14) (17 - 26)  SpO2: 100% (17 Feb 2021 05:14) (98% - 100%)      16 Feb 2021 07:01  -  17 Feb 2021 07:00  --------------------------------------------------------  IN:    IV PiggyBack: 200 mL    Lactated Ringers: 1700 mL  Total IN: 1900 mL    OUT:    Bulb (mL): 0 mL    Bulb (mL): 11 mL    Bulb (mL): 36 mL    Bulb (mL): 7.5 mL    Bulb (mL): 135 mL    Bulb (mL): 44 mL    Indwelling Catheter - Urethral (mL): 175 mL    Voided (mL): 750 mL  Total OUT: 1158.5 mL    Total NET: 741.5 mL          ** PHYSICAL EXAM **    -- CONSTITUTIONAL: AOx3. NAD.   -- CHEST: B/L flaps soft; +richelle-incisional ecchymosis on flap and mastectomy skin flaps but centrally normal color w CRT 2 seconds; Incisions c/d/i, no appreciable collections, appropriate swelling throughout; JPs ss; Vioptix stable  -- ABDOMEN: Soft, no appreciable collections, incision c/d/i, JPs ss      **MEDS**  acetaminophen   Tablet .. 975 milliGRAM(s) Oral every 6 hours  artificial tears (preservative free) Ophthalmic Solution 1 Drop(s) Both EYES every 1 hour PRN  benzocaine 15 mG/menthol 3.6 mG (Sugar-Free) Lozenge 1 Lozenge Oral every 3 hours PRN  calcium carbonate    500 mG (Tums) Chewable 1 Tablet(s) Chew every 4 hours PRN  ceFAZolin   IVPB 2000 milliGRAM(s) IV Intermittent every 8 hours  diphenhydrAMINE 25 milliGRAM(s) Oral every 4 hours PRN  heparin   Injectable 5000 Unit(s) SubCutaneous every 8 hours  ketorolac   Injectable 15 milliGRAM(s) IV Push every 6 hours  lactated ringers. 1000 milliLiter(s) IV Continuous <Continuous>  levothyroxine 25 MICROGram(s) Oral daily  melatonin 3 milliGRAM(s) Oral at bedtime PRN  metoclopramide Injectable 10 milliGRAM(s) IV Push once PRN  oxyCODONE    IR 5 milliGRAM(s) Oral every 4 hours PRN  oxyCODONE    IR 10 milliGRAM(s) Oral every 4 hours PRN  pantoprazole    Tablet 40 milliGRAM(s) Oral before breakfast  senna 2 Tablet(s) Oral at bedtime PRN  simethicone 80 milliGRAM(s) Chew every 4 hours PRN      ** LABS **                          11.0   10.72 )-----------( 272      ( 16 Feb 2021 05:39 )             33.3     16 Feb 2021 05:39    138    |  105    |  8      ----------------------------<  116    4.4     |  24     |  0.59     Ca    8.8        16 Feb 2021 05:39        CAPILLARY BLOOD GLUCOSE

## 2021-02-18 NOTE — PROGRESS NOTE ADULT - ATTENDING COMMENTS
Pt seen and examined. Agree with above  No significant events overnight  Pain under control  Denies CP or SOB    Flaps are warm, no congestion, Vioptix stable. ERROL serosang  Abd incision cdi, errol serosang    Plan -POD#1    -Cont flap check  -OOB  -DVT prophyl  -Cont toradol/Tylenol IV  -Reg diet  -No Caffeine  -Ok to tx to floor
Pt seen and examined. Agree with above  Ambulating  Pain under control    Flaps are soft, no congestion, warm, vioptix stable, errol serosang  Abd incision cdi, errol serosang    PLan - POD#3  Doing well    -Plan for D/c home today     Errol care     Abd binder/bra     ASA x 1 month     Percocet PRN     Instructions for monitoring     Follow up next week at [phone]

## 2021-02-18 NOTE — DISCHARGE NOTE NURSING/CASE MANAGEMENT/SOCIAL WORK - PATIENT PORTAL LINK FT
You can access the FollowMyHealth Patient Portal offered by [facility] by registering at the following website: [email or web address]. By joining [facility]’s FollowMyHealth portal, you will also be able to view your health information using other applications (apps) compatible with our system.

## 2021-02-18 NOTE — PROGRESS NOTE ADULT - ASSESSMENT
ASSESSMENT/PLAN:   MABLE QUICK is a 61yFemale s/p bilateral mastectomy with REMI reconstruction (2/15/21)    - q2 flap checks   - continue vioptix q2 checks  - PO Tylenol, Oxy for severe pain PRN; standing Toradol until d/c  - IVL  - continue SQH for DVT ChemoPPx, SCDs for DVT PPx  - regular diet   - OOB with assistance  - q4 vitals and I&Os  - [place] drain care / teaching   - Set up VNS    Josafat Peguero  PGY-6  Plastic Surgery  [id number] 
Plan -POD#2  Doing well    -Cont flap check  -OOB ambulate  -DVT prophylaxis  -Cont Toradol IV  -D/c planning
ASSESSMENT/PLAN:   MABLE QUICK is a 61yFemale s/p bilateral mastectomy with REMI reconstruction    - q2 flap checks   - continue vioptix q2 checks  - d/c White   - PO Tylenol and Ibuprofen, Oxy for severe pain PRN  - IVF to 75cc/hr  - continue SQH for DVT ChemoPPx, SCDs for DVT PPx  - regular diet   - OOB with assistance  - q4 vitals and I&Os  - O2 nasal cannula to 2L/min   - restart home medications  - transfer to surgical floor from PACU  -  drain care / teaching   - Set up VNS    Johnson Wheeler MD PGY1  Plastic Surgery   [facility]: [id number]  [facility]: [phone]
ASSESSMENT/PLAN:   MABLE QUICK is a 61yFemale s/p bilateral mastectomy with REMI reconstruction (2/15/21)    - Continue ambulation  - continue SQH DVT ChemoPPx and SCD DVT PPx  - PO Tylenol; Oxy prn; Toradol q6 standing until discharge  - D/C Vioptix probes before discharge  - q4 flap checks  - q4 vitals and I&Os  - ensure VNS is set up  - [facility] Drain teaching and care   - Ensure has prescriptions for home   - dispo - home with VNS    Johnson Wheeler MD PGY1  Plastic Surgery   [facility]: [id number]

## 2021-02-18 NOTE — DISCHARGE NOTE NURSING/CASE MANAGEMENT/SOCIAL WORK - NSDCPNINST_GEN_ALL_CORE
Pls report to MD / Ed for fever, drainage, bleeding, swelling, pain that is not relieved with pain med., inability to tolerate foods or liquids.

## 2021-02-19 ENCOUNTER — NON-APPOINTMENT (OUTPATIENT)
Age: 61
End: 2021-02-19

## 2021-02-22 LAB — SURGICAL PATHOLOGY STUDY: SIGNIFICANT CHANGE UP

## 2021-02-23 ENCOUNTER — APPOINTMENT (OUTPATIENT)
Dept: PLASTIC SURGERY | Facility: CLINIC | Age: 61
End: 2021-02-23
Payer: MEDICAID

## 2021-02-23 PROCEDURE — 99024 POSTOP FOLLOW-UP VISIT: CPT

## 2021-02-27 ENCOUNTER — APPOINTMENT (OUTPATIENT)
Dept: PLASTIC SURGERY | Facility: CLINIC | Age: 61
End: 2021-02-27
Payer: MEDICAID

## 2021-02-27 PROCEDURE — 99024 POSTOP FOLLOW-UP VISIT: CPT

## 2021-03-02 ENCOUNTER — APPOINTMENT (OUTPATIENT)
Dept: PLASTIC SURGERY | Facility: CLINIC | Age: 61
End: 2021-03-02
Payer: MEDICAID

## 2021-03-02 VITALS
HEIGHT: 63 IN | OXYGEN SATURATION: 97 % | BODY MASS INDEX: 29.41 KG/M2 | DIASTOLIC BLOOD PRESSURE: 71 MMHG | WEIGHT: 166 LBS | TEMPERATURE: 97.8 F | SYSTOLIC BLOOD PRESSURE: 109 MMHG | HEART RATE: 74 BPM

## 2021-03-02 PROCEDURE — 99024 POSTOP FOLLOW-UP VISIT: CPT

## 2021-03-09 ENCOUNTER — APPOINTMENT (OUTPATIENT)
Dept: PLASTIC SURGERY | Facility: CLINIC | Age: 61
End: 2021-03-09
Payer: MEDICAID

## 2021-03-09 VITALS
OXYGEN SATURATION: 97 % | SYSTOLIC BLOOD PRESSURE: 109 MMHG | HEART RATE: 65 BPM | DIASTOLIC BLOOD PRESSURE: 76 MMHG | TEMPERATURE: 97.6 F | HEIGHT: 63 IN | BODY MASS INDEX: 29.41 KG/M2 | WEIGHT: 166 LBS

## 2021-03-09 PROCEDURE — 99024 POSTOP FOLLOW-UP VISIT: CPT

## 2021-03-10 NOTE — PHYSICAL EXAM
[de-identified] : Bilateral Breast -\par Incisions are CDI, no erythema, no gaps, no drainage noted. No sign of active infection.\par REMI flaps are warm, soft, no sign of congestion,\par ecchymosis is WNL, fullness noted superior pole WNL unchanged from last visit, soft, no flucutance\par drains are serosanguineous  [de-identified] : Abdomen -\par Incisions are CDI, no erythema, no gaps, no drainage noted. No sign of active infection.\par prineo is intact\par drains are serosanguineous

## 2021-03-10 NOTE — HISTORY OF PRESENT ILLNESS
[FreeTextEntry1] : 61 year old female who presents for a post op visit s/p bilateral delayed REMI flap reconstruction of breast DOS: 02/15/21.\par Patient called with concern of swelling of breasts noted yesterday.\par Denies trauma to area.\par Denies fever or chills\par

## 2021-03-10 NOTE — ASSESSMENT
[FreeTextEntry1] : 61 year old female who presents for a post op visit s/p bilateral delayed REMI flap reconstruction of breast DOS: 02/15/21. \par On exam, flaps are viable, no evidence of hematoma.\par \par - Continue to wear bra & supportive binder for at least 6 weeks\par - Continue drain output\par - Restrictions and ROM was reviewed\par - F/u next week for drain removal

## 2021-03-11 ENCOUNTER — APPOINTMENT (OUTPATIENT)
Dept: FAMILY MEDICINE | Facility: CLINIC | Age: 61
End: 2021-03-11
Payer: MEDICAID

## 2021-03-11 VITALS
OXYGEN SATURATION: 96 % | WEIGHT: 166 LBS | TEMPERATURE: 97.1 F | HEART RATE: 74 BPM | RESPIRATION RATE: 15 BRPM | DIASTOLIC BLOOD PRESSURE: 79 MMHG | HEIGHT: 63 IN | SYSTOLIC BLOOD PRESSURE: 120 MMHG | BODY MASS INDEX: 29.41 KG/M2

## 2021-03-11 PROCEDURE — 99072 ADDL SUPL MATRL&STAF TM PHE: CPT

## 2021-03-11 PROCEDURE — 99214 OFFICE O/P EST MOD 30 MIN: CPT

## 2021-03-11 NOTE — CURRENT MEDS
[Takes medication as prescribed] : takes [None] : Patient does not have any barriers to medication adherence [Yes] : Reviewed medication list for presence of high-risk medications. [Opioids] : opioids [FreeTextEntry1] :  post surgery oxycodone prescribed patient stopped taking medications no need for pain meds.

## 2021-03-11 NOTE — HEALTH RISK ASSESSMENT
[] : No [30 or more] : 30 or more [Yes] : Yes [Monthly or less (1 pt)] : Monthly or less (1 point) [1 or 2 (0 pts)] : 1 or 2 (0 points) [Never (0 pts)] : Never (0 points) [No] : In the past 12 months have you used drugs other than those required for medical reasons? No [No falls in past year] : Patient reported no falls in the past year [0] : 2) Feeling down, depressed, or hopeless: Not at all (0) [YearQuit] : 2018 [Audit-CScore] : 1 [TUZ7Hdbby] : 0

## 2021-03-11 NOTE — HISTORY OF PRESENT ILLNESS
[FreeTextEntry1] : See HPI [de-identified] : Patient is a 61-year-old female who was diagnosed with breast cancer subsequently had bilateral mastectomy and she is presently status post bilateral delayed D IEP flap reconstruction drain is still in place.  Medical history is significant for hypothyroidism, abnormal electrocardiogram patient had comprehensive cardiac work-up discussed with patient minimal coronary artery disease was found with a normal 2D echo and she also has history of acid reflux.\par \par Presently the patient is awake alert and oriented x3 she is in no acute distress calm and cooperative.

## 2021-03-11 NOTE — COUNSELING
[Fall prevention counseling provided] : Fall prevention counseling provided [Adequate lighting] : Adequate lighting [No throw rugs] : No throw rugs [Use proper foot wear] : Use proper foot wear [Behavioral health counseling provided] : Behavioral health counseling provided [Sleep ___ hours/day] : Sleep [unfilled] hours/day [Engage in a relaxing activity] : Engage in a relaxing activity [Plan in advance] : Plan in advance [FreeTextEntry2] : Patient discontinued tobacco use greater than 2 years [AUDIT-C Screening administered and reviewed] : AUDIT-C Screening administered and reviewed [Potential consequences of obesity discussed] : Potential consequences of obesity discussed [Benefits of weight loss discussed] : Benefits of weight loss discussed [Structured Weight Management Program suggested:] : Structured weight management program suggested [Encouraged to maintain food diary] : Encouraged to maintain food diary [Encouraged to increase physical activity] : Encouraged to increase physical activity [Encouraged to use exercise tracking device] : Encouraged to use exercise tracking device [Target Wt Loss Goal ___] : Weight Loss Goals: Target weight loss goal [unfilled] lbs [Weigh Self Weekly] : weigh self weekly [Decrease Portions] : decrease portions [____ min/wk Activity] : [unfilled] min/wk activity [Keep Food Diary] : keep food diary [None] : None [Good understanding] : Patient has a good understanding of lifestyle changes and steps needed to achieve self management goal

## 2021-03-11 NOTE — PHYSICAL EXAM
[Well Nourished] : well nourished [Well Developed] : well developed [Well-Appearing] : well-appearing [Normal Sclera/Conjunctiva] : normal sclera/conjunctiva [PERRL] : pupils equal round and reactive to light [EOMI] : extraocular movements intact [Normal Outer Ear/Nose] : the outer ears and nose were normal in appearance [Normal Oropharynx] : the oropharynx was normal [No JVD] : no jugular venous distention [No Lymphadenopathy] : no lymphadenopathy [Supple] : supple [Thyroid Normal, No Nodules] : the thyroid was normal and there were no nodules present [No Respiratory Distress] : no respiratory distress  [No Accessory Muscle Use] : no accessory muscle use [Clear to Auscultation] : lungs were clear to auscultation bilaterally [Normal Rate] : normal rate  [Regular Rhythm] : with a regular rhythm [Normal S1, S2] : normal S1 and S2 [No Murmur] : no murmur heard [No Carotid Bruits] : no carotid bruits [No Abdominal Bruit] : a ~M bruit was not heard ~T in the abdomen [No Varicosities] : no varicosities [Pedal Pulses Present] : the pedal pulses are present [No Edema] : there was no peripheral edema [No Palpable Aorta] : no palpable aorta [No Extremity Clubbing/Cyanosis] : no extremity clubbing/cyanosis [Soft] : abdomen soft [Non Tender] : non-tender [Non-distended] : non-distended [No Masses] : no abdominal mass palpated [No HSM] : no HSM [Normal Bowel Sounds] : normal bowel sounds [Normal Posterior Cervical Nodes] : no posterior cervical lymphadenopathy [Normal Anterior Cervical Nodes] : no anterior cervical lymphadenopathy [No CVA Tenderness] : no CVA  tenderness [No Spinal Tenderness] : no spinal tenderness [No Joint Swelling] : no joint swelling [Grossly Normal Strength/Tone] : grossly normal strength/tone [No Rash] : no rash [Coordination Grossly Intact] : coordination grossly intact [No Focal Deficits] : no focal deficits [Normal Gait] : normal gait [Normal Affect] : the affect was normal [Normal Insight/Judgement] : insight and judgment were intact [de-identified] : Status post reconstruction status post mastectomy patient following closely with plastic surgery drain is still in place

## 2021-03-11 NOTE — ASSESSMENT
[FreeTextEntry1] : Assessment and plan:\par \par 1.  Breast cancer status post bilateral mastectomy status post bilateral delayed D IEP flap reconstruction patient is healing well drain is still in place detailed discussion with patient regarding pain management she said that oxycodone was prescribed but she states she does not need that type of pain medication therefore patient takes Tylenol only as needed but doing relatively well.  She will be following up with plastic surgery this coming Tuesday.\par \par 2.  Hypothyroidism I reviewed with patient comprehensive blood work that was done last month in February thyroid function tests are excellent therefore my recommendations are to continue levothyroxine 25 mcg p.o. daily no change in medical management.\par \par 3.  History of abnormal electrocardiogram I reviewed with patient comprehensive cardiac work-up that was done prior to surgery and I also reviewed echocardiogram with patient patient had multiple questions which I answered to patient's satisfaction.\par \par 4.  Gastroesophageal reflux disease discussed diet and patient will continue proton pump inhibitor pantoprazole 40 mg p.o. daily she does get great relief with PPI.\par \par 5.  Health maintenance issues reviewed and discussed with patient patient will be following up for comprehensive health maintenance physical exam.

## 2021-03-16 ENCOUNTER — APPOINTMENT (OUTPATIENT)
Dept: PLASTIC SURGERY | Facility: CLINIC | Age: 61
End: 2021-03-16
Payer: MEDICAID

## 2021-03-16 VITALS
TEMPERATURE: 97.6 F | SYSTOLIC BLOOD PRESSURE: 121 MMHG | HEIGHT: 63 IN | WEIGHT: 166 LBS | HEART RATE: 75 BPM | BODY MASS INDEX: 29.41 KG/M2 | DIASTOLIC BLOOD PRESSURE: 74 MMHG | OXYGEN SATURATION: 99 %

## 2021-03-16 PROCEDURE — 99024 POSTOP FOLLOW-UP VISIT: CPT

## 2021-03-17 ENCOUNTER — APPOINTMENT (OUTPATIENT)
Dept: CARDIOLOGY | Facility: CLINIC | Age: 61
End: 2021-03-17

## 2021-03-22 ENCOUNTER — OUTPATIENT (OUTPATIENT)
Dept: OUTPATIENT SERVICES | Facility: HOSPITAL | Age: 61
LOS: 1 days | Discharge: HOME | End: 2021-03-22

## 2021-03-22 DIAGNOSIS — C50.919 MALIGNANT NEOPLASM OF UNSPECIFIED SITE OF UNSPECIFIED FEMALE BREAST: ICD-10-CM

## 2021-03-22 DIAGNOSIS — Z98.890 OTHER SPECIFIED POSTPROCEDURAL STATES: Chronic | ICD-10-CM

## 2021-03-22 DIAGNOSIS — Z90.10 ACQUIRED ABSENCE OF UNSPECIFIED BREAST AND NIPPLE: Chronic | ICD-10-CM

## 2021-03-24 ENCOUNTER — EMERGENCY (EMERGENCY)
Facility: HOSPITAL | Age: 61
LOS: 1 days | Discharge: ANOTHER HOSPITAL | End: 2021-03-24
Attending: EMERGENCY MEDICINE | Admitting: EMERGENCY MEDICINE
Payer: MEDICAID

## 2021-03-24 ENCOUNTER — TRANSCRIPTION ENCOUNTER (OUTPATIENT)
Age: 61
End: 2021-03-24

## 2021-03-24 VITALS
RESPIRATION RATE: 18 BRPM | SYSTOLIC BLOOD PRESSURE: 107 MMHG | DIASTOLIC BLOOD PRESSURE: 48 MMHG | OXYGEN SATURATION: 95 % | HEART RATE: 103 BPM | HEIGHT: 62 IN | WEIGHT: 166.01 LBS | TEMPERATURE: 98 F

## 2021-03-24 VITALS
RESPIRATION RATE: 19 BRPM | HEART RATE: 74 BPM | OXYGEN SATURATION: 97 % | DIASTOLIC BLOOD PRESSURE: 72 MMHG | SYSTOLIC BLOOD PRESSURE: 101 MMHG

## 2021-03-24 DIAGNOSIS — Z90.10 ACQUIRED ABSENCE OF UNSPECIFIED BREAST AND NIPPLE: Chronic | ICD-10-CM

## 2021-03-24 DIAGNOSIS — Z98.890 OTHER SPECIFIED POSTPROCEDURAL STATES: Chronic | ICD-10-CM

## 2021-03-24 LAB
ALBUMIN SERPL ELPH-MCNC: 2.8 G/DL — LOW (ref 3.3–5)
ALP SERPL-CCNC: 99 U/L — SIGNIFICANT CHANGE UP (ref 40–120)
ALT FLD-CCNC: 16 U/L — SIGNIFICANT CHANGE UP (ref 10–45)
ANION GAP SERPL CALC-SCNC: 11 MMOL/L — SIGNIFICANT CHANGE UP (ref 5–17)
APPEARANCE UR: CLEAR — SIGNIFICANT CHANGE UP
APTT BLD: 31.4 SEC — SIGNIFICANT CHANGE UP (ref 27.5–35.5)
AST SERPL-CCNC: 15 U/L — SIGNIFICANT CHANGE UP (ref 10–40)
BASOPHILS # BLD AUTO: 0.08 K/UL — SIGNIFICANT CHANGE UP (ref 0–0.2)
BASOPHILS NFR BLD AUTO: 0.5 % — SIGNIFICANT CHANGE UP (ref 0–2)
BILIRUB SERPL-MCNC: 0.4 MG/DL — SIGNIFICANT CHANGE UP (ref 0.2–1.2)
BILIRUB UR-MCNC: NEGATIVE — SIGNIFICANT CHANGE UP
BUN SERPL-MCNC: 12 MG/DL — SIGNIFICANT CHANGE UP (ref 7–23)
CALCIUM SERPL-MCNC: 9.1 MG/DL — SIGNIFICANT CHANGE UP (ref 8.4–10.5)
CHLORIDE SERPL-SCNC: 99 MMOL/L — SIGNIFICANT CHANGE UP (ref 96–108)
CO2 SERPL-SCNC: 24 MMOL/L — SIGNIFICANT CHANGE UP (ref 22–31)
COLOR SPEC: YELLOW — SIGNIFICANT CHANGE UP
CREAT SERPL-MCNC: 0.89 MG/DL — SIGNIFICANT CHANGE UP (ref 0.5–1.3)
DIFF PNL FLD: NEGATIVE — SIGNIFICANT CHANGE UP
EOSINOPHIL # BLD AUTO: 0.05 K/UL — SIGNIFICANT CHANGE UP (ref 0–0.5)
EOSINOPHIL NFR BLD AUTO: 0.3 % — SIGNIFICANT CHANGE UP (ref 0–6)
GLUCOSE SERPL-MCNC: 128 MG/DL — HIGH (ref 70–99)
GLUCOSE UR QL: NEGATIVE — SIGNIFICANT CHANGE UP
HCT VFR BLD CALC: 34.6 % — SIGNIFICANT CHANGE UP (ref 34.5–45)
HGB BLD-MCNC: 11.6 G/DL — SIGNIFICANT CHANGE UP (ref 11.5–15.5)
IMM GRANULOCYTES NFR BLD AUTO: 1 % — SIGNIFICANT CHANGE UP (ref 0–1.5)
INR BLD: 1.17 RATIO — HIGH (ref 0.88–1.16)
KETONES UR-MCNC: NEGATIVE — SIGNIFICANT CHANGE UP
LACTATE SERPL-SCNC: 1.3 MMOL/L — SIGNIFICANT CHANGE UP (ref 0.7–2)
LEUKOCYTE ESTERASE UR-ACNC: ABNORMAL
LYMPHOCYTES # BLD AUTO: 13.7 % — SIGNIFICANT CHANGE UP (ref 13–44)
LYMPHOCYTES # BLD AUTO: 2.3 K/UL — SIGNIFICANT CHANGE UP (ref 1–3.3)
MCHC RBC-ENTMCNC: 30.7 PG — SIGNIFICANT CHANGE UP (ref 27–34)
MCHC RBC-ENTMCNC: 33.5 GM/DL — SIGNIFICANT CHANGE UP (ref 32–36)
MCV RBC AUTO: 91.5 FL — SIGNIFICANT CHANGE UP (ref 80–100)
MONOCYTES # BLD AUTO: 1.3 K/UL — HIGH (ref 0–0.9)
MONOCYTES NFR BLD AUTO: 7.7 % — SIGNIFICANT CHANGE UP (ref 2–14)
NEUTROPHILS # BLD AUTO: 12.88 K/UL — HIGH (ref 1.8–7.4)
NEUTROPHILS NFR BLD AUTO: 76.8 % — SIGNIFICANT CHANGE UP (ref 43–77)
NITRITE UR-MCNC: NEGATIVE — SIGNIFICANT CHANGE UP
NRBC # BLD: 0 /100 WBCS — SIGNIFICANT CHANGE UP (ref 0–0)
PH UR: 6.5 — SIGNIFICANT CHANGE UP (ref 5–8)
PLATELET # BLD AUTO: 535 K/UL — HIGH (ref 150–400)
POTASSIUM SERPL-MCNC: 4.1 MMOL/L — SIGNIFICANT CHANGE UP (ref 3.5–5.3)
POTASSIUM SERPL-SCNC: 4.1 MMOL/L — SIGNIFICANT CHANGE UP (ref 3.5–5.3)
PROT SERPL-MCNC: 7.5 G/DL — SIGNIFICANT CHANGE UP (ref 6–8.3)
PROT UR-MCNC: NEGATIVE — SIGNIFICANT CHANGE UP
PROTHROM AB SERPL-ACNC: 14.1 SEC — HIGH (ref 10.6–13.6)
RBC # BLD: 3.78 M/UL — LOW (ref 3.8–5.2)
RBC # FLD: 12.8 % — SIGNIFICANT CHANGE UP (ref 10.3–14.5)
SARS-COV-2 RNA SPEC QL NAA+PROBE: SIGNIFICANT CHANGE UP
SODIUM SERPL-SCNC: 134 MMOL/L — LOW (ref 135–145)
SP GR SPEC: 1 — LOW (ref 1.01–1.02)
UROBILINOGEN FLD QL: NEGATIVE — SIGNIFICANT CHANGE UP
WBC # BLD: 16.78 K/UL — HIGH (ref 3.8–10.5)
WBC # FLD AUTO: 16.78 K/UL — HIGH (ref 3.8–10.5)

## 2021-03-24 PROCEDURE — 85730 THROMBOPLASTIN TIME PARTIAL: CPT

## 2021-03-24 PROCEDURE — 80053 COMPREHEN METABOLIC PANEL: CPT

## 2021-03-24 PROCEDURE — 96365 THER/PROPH/DIAG IV INF INIT: CPT | Mod: XU

## 2021-03-24 PROCEDURE — 85025 COMPLETE CBC W/AUTO DIFF WBC: CPT

## 2021-03-24 PROCEDURE — 87040 BLOOD CULTURE FOR BACTERIA: CPT

## 2021-03-24 PROCEDURE — 85610 PROTHROMBIN TIME: CPT

## 2021-03-24 PROCEDURE — 74177 CT ABD & PELVIS W/CONTRAST: CPT | Mod: 26,MA

## 2021-03-24 PROCEDURE — 96367 TX/PROPH/DG ADDL SEQ IV INF: CPT

## 2021-03-24 PROCEDURE — 71045 X-RAY EXAM CHEST 1 VIEW: CPT | Mod: 26

## 2021-03-24 PROCEDURE — 71045 X-RAY EXAM CHEST 1 VIEW: CPT

## 2021-03-24 PROCEDURE — 74177 CT ABD & PELVIS W/CONTRAST: CPT

## 2021-03-24 PROCEDURE — 93005 ELECTROCARDIOGRAM TRACING: CPT

## 2021-03-24 PROCEDURE — 83605 ASSAY OF LACTIC ACID: CPT

## 2021-03-24 PROCEDURE — 87086 URINE CULTURE/COLONY COUNT: CPT

## 2021-03-24 PROCEDURE — 99284 EMERGENCY DEPT VISIT MOD MDM: CPT | Mod: 25

## 2021-03-24 PROCEDURE — 36415 COLL VENOUS BLD VENIPUNCTURE: CPT

## 2021-03-24 PROCEDURE — 93010 ELECTROCARDIOGRAM REPORT: CPT

## 2021-03-24 PROCEDURE — 81001 URINALYSIS AUTO W/SCOPE: CPT

## 2021-03-24 PROCEDURE — 99285 EMERGENCY DEPT VISIT HI MDM: CPT

## 2021-03-24 PROCEDURE — 87635 SARS-COV-2 COVID-19 AMP PRB: CPT

## 2021-03-24 RX ORDER — CEFTRIAXONE 500 MG/1
1000 INJECTION, POWDER, FOR SOLUTION INTRAMUSCULAR; INTRAVENOUS ONCE
Refills: 0 | Status: COMPLETED | OUTPATIENT
Start: 2021-03-24 | End: 2021-03-24

## 2021-03-24 RX ORDER — VANCOMYCIN HCL 1 G
1000 VIAL (EA) INTRAVENOUS ONCE
Refills: 0 | Status: COMPLETED | OUTPATIENT
Start: 2021-03-24 | End: 2021-03-24

## 2021-03-24 RX ORDER — SODIUM CHLORIDE 9 MG/ML
2300 INJECTION INTRAMUSCULAR; INTRAVENOUS; SUBCUTANEOUS ONCE
Refills: 0 | Status: COMPLETED | OUTPATIENT
Start: 2021-03-24 | End: 2021-03-24

## 2021-03-24 RX ORDER — ACETAMINOPHEN 500 MG
975 TABLET ORAL ONCE
Refills: 0 | Status: COMPLETED | OUTPATIENT
Start: 2021-03-24 | End: 2021-03-24

## 2021-03-24 RX ADMIN — Medication 1000 MILLIGRAM(S): at 22:15

## 2021-03-24 RX ADMIN — SODIUM CHLORIDE 2300 MILLILITER(S): 9 INJECTION INTRAMUSCULAR; INTRAVENOUS; SUBCUTANEOUS at 20:30

## 2021-03-24 RX ADMIN — CEFTRIAXONE 100 MILLIGRAM(S): 500 INJECTION, POWDER, FOR SOLUTION INTRAMUSCULAR; INTRAVENOUS at 20:30

## 2021-03-24 RX ADMIN — Medication 250 MILLIGRAM(S): at 21:15

## 2021-03-24 RX ADMIN — CEFTRIAXONE 1000 MILLIGRAM(S): 500 INJECTION, POWDER, FOR SOLUTION INTRAMUSCULAR; INTRAVENOUS at 21:00

## 2021-03-24 RX ADMIN — SODIUM CHLORIDE 2300 MILLILITER(S): 9 INJECTION INTRAMUSCULAR; INTRAVENOUS; SUBCUTANEOUS at 22:30

## 2021-03-24 RX ADMIN — Medication 975 MILLIGRAM(S): at 20:30

## 2021-03-24 RX ADMIN — Medication 975 MILLIGRAM(S): at 21:00

## 2021-03-24 NOTE — ED ADULT NURSE NOTE - PMH
Anxiety about mastectomy    Breast cancer  bilateral  Chronic bilateral low back pain, with sciatica presence unspecified    Language barrier  Bolivian speaking  Other chronic gastritis    Smoker

## 2021-03-24 NOTE — ED PROVIDER NOTE - PMH
Anxiety about mastectomy    Breast cancer  bilateral  Chronic bilateral low back pain, with sciatica presence unspecified    Language barrier  Gambian speaking  Other chronic gastritis    Smoker

## 2021-03-24 NOTE — ED PROVIDER NOTE - CLINICAL SUMMARY MEDICAL DECISION MAKING FREE TEXT BOX
62yo F s/p breast reconstruction c REMI flaps 2/15/21 with lower abd pain around surg site 4 days with fever.  exam c/w surgical site infection, cellulitis. check ct r/o abscess.  give iv fluids, abx. reassess 62yo F s/p breast reconstruction c REMI flaps 2/15/21 with lower abd pain around surg site 4 days with fever.  exam c/w surgical site infection, cellulitis. check ct r/o abscess.  give iv fluids, abx. reassess    2250: pt with fever 103.4, WBC 17, CT shows inflammation and large fluid collection with enhancement by lower abd surg site. d/w surgeon Dr LC Dejesus at [facility] via transfer center, accepting transfer for continuity of care.

## 2021-03-24 NOTE — ED PROVIDER NOTE - OBJECTIVE STATEMENT
pt c/o lower abd pain, sharp, moderate for 4 days, assoc c swelling and induration near surgical sites. assoc c fever 100 today.  pt s/p bilat breast reconstruction with REMI flaps 2/15/21 with DR Zbigniew Dejesus at [facility].  no chset pain, sob, cough. no sore throat, myalgias.  no rhinorhea, loss of smell/taste. no covid+ contacts or travel.

## 2021-03-24 NOTE — ED PROVIDER NOTE - GASTROINTESTINAL, MLM
Abdomen soft, nondistended. surgical incisions across lower abd closed , with diffuse erythema around prior incision sites and ~12x8cm area of swelling around site just Left of midline with increased warmth, moderate ttp. no discharge

## 2021-03-25 ENCOUNTER — INPATIENT (INPATIENT)
Facility: HOSPITAL | Age: 61
LOS: 6 days | Discharge: HOME HEALTH | End: 2021-04-01
Attending: PLASTIC SURGERY | Admitting: PLASTIC SURGERY
Payer: MEDICAID

## 2021-03-25 ENCOUNTER — APPOINTMENT (OUTPATIENT)
Dept: HEMATOLOGY ONCOLOGY | Facility: CLINIC | Age: 61
End: 2021-03-25

## 2021-03-25 ENCOUNTER — APPOINTMENT (OUTPATIENT)
Dept: PLASTIC SURGERY | Facility: CLINIC | Age: 61
End: 2021-03-25

## 2021-03-25 VITALS
SYSTOLIC BLOOD PRESSURE: 88 MMHG | TEMPERATURE: 98 F | OXYGEN SATURATION: 99 % | HEIGHT: 62 IN | RESPIRATION RATE: 16 BRPM | DIASTOLIC BLOOD PRESSURE: 45 MMHG | HEART RATE: 68 BPM

## 2021-03-25 DIAGNOSIS — Z98.890 OTHER SPECIFIED POSTPROCEDURAL STATES: Chronic | ICD-10-CM

## 2021-03-25 DIAGNOSIS — Z90.10 ACQUIRED ABSENCE OF UNSPECIFIED BREAST AND NIPPLE: Chronic | ICD-10-CM

## 2021-03-25 DIAGNOSIS — L02.211 CUTANEOUS ABSCESS OF ABDOMINAL WALL: ICD-10-CM

## 2021-03-25 LAB
ANION GAP SERPL CALC-SCNC: 8 MMOL/L — SIGNIFICANT CHANGE UP (ref 7–14)
BLD GP AB SCN SERPL QL: NEGATIVE — SIGNIFICANT CHANGE UP
BUN SERPL-MCNC: 8 MG/DL — SIGNIFICANT CHANGE UP (ref 7–23)
CALCIUM SERPL-MCNC: 8.7 MG/DL — SIGNIFICANT CHANGE UP (ref 8.4–10.5)
CHLORIDE SERPL-SCNC: 104 MMOL/L — SIGNIFICANT CHANGE UP (ref 98–107)
CO2 SERPL-SCNC: 23 MMOL/L — SIGNIFICANT CHANGE UP (ref 22–31)
CREAT SERPL-MCNC: 0.67 MG/DL — SIGNIFICANT CHANGE UP (ref 0.5–1.3)
GLUCOSE SERPL-MCNC: 100 MG/DL — HIGH (ref 70–99)
HCG SERPL-ACNC: <5 MIU/ML — SIGNIFICANT CHANGE UP
HCT VFR BLD CALC: 32.1 % — LOW (ref 34.5–45)
HCT VFR BLD CALC: 34.9 % — SIGNIFICANT CHANGE UP (ref 34.5–45)
HGB BLD-MCNC: 10.4 G/DL — LOW (ref 11.5–15.5)
HGB BLD-MCNC: 11.1 G/DL — LOW (ref 11.5–15.5)
MAGNESIUM SERPL-MCNC: 2 MG/DL — SIGNIFICANT CHANGE UP (ref 1.6–2.6)
MCHC RBC-ENTMCNC: 30 PG — SIGNIFICANT CHANGE UP (ref 27–34)
MCHC RBC-ENTMCNC: 30.8 PG — SIGNIFICANT CHANGE UP (ref 27–34)
MCHC RBC-ENTMCNC: 31.8 GM/DL — LOW (ref 32–36)
MCHC RBC-ENTMCNC: 32.4 GM/DL — SIGNIFICANT CHANGE UP (ref 32–36)
MCV RBC AUTO: 94.3 FL — SIGNIFICANT CHANGE UP (ref 80–100)
MCV RBC AUTO: 95 FL — SIGNIFICANT CHANGE UP (ref 80–100)
NRBC # BLD: 0 /100 WBCS — SIGNIFICANT CHANGE UP
NRBC # BLD: 0 /100 WBCS — SIGNIFICANT CHANGE UP
NRBC # FLD: 0 K/UL — SIGNIFICANT CHANGE UP
NRBC # FLD: 0 K/UL — SIGNIFICANT CHANGE UP
PHOSPHATE SERPL-MCNC: 2.7 MG/DL — SIGNIFICANT CHANGE UP (ref 2.5–4.5)
PLATELET # BLD AUTO: 519 K/UL — HIGH (ref 150–400)
PLATELET # BLD AUTO: 538 K/UL — HIGH (ref 150–400)
POTASSIUM SERPL-MCNC: 4.1 MMOL/L — SIGNIFICANT CHANGE UP (ref 3.5–5.3)
POTASSIUM SERPL-SCNC: 4.1 MMOL/L — SIGNIFICANT CHANGE UP (ref 3.5–5.3)
RBC # BLD: 3.38 M/UL — LOW (ref 3.8–5.2)
RBC # BLD: 3.7 M/UL — LOW (ref 3.8–5.2)
RBC # FLD: 12.8 % — SIGNIFICANT CHANGE UP (ref 10.3–14.5)
RBC # FLD: 12.8 % — SIGNIFICANT CHANGE UP (ref 10.3–14.5)
RH IG SCN BLD-IMP: POSITIVE — SIGNIFICANT CHANGE UP
SODIUM SERPL-SCNC: 135 MMOL/L — SIGNIFICANT CHANGE UP (ref 135–145)
WBC # BLD: 15.48 K/UL — HIGH (ref 3.8–10.5)
WBC # BLD: 17.61 K/UL — HIGH (ref 3.8–10.5)
WBC # FLD AUTO: 15.48 K/UL — HIGH (ref 3.8–10.5)
WBC # FLD AUTO: 17.61 K/UL — HIGH (ref 3.8–10.5)

## 2021-03-25 PROCEDURE — 99223 1ST HOSP IP/OBS HIGH 75: CPT | Mod: GC

## 2021-03-25 PROCEDURE — 99285 EMERGENCY DEPT VISIT HI MDM: CPT

## 2021-03-25 PROCEDURE — 10140 I&D HMTMA SEROMA/FLUID COLLJ: CPT | Mod: 78

## 2021-03-25 RX ORDER — BENZOCAINE AND MENTHOL 5; 1 G/100ML; G/100ML
1 LIQUID ORAL
Refills: 0 | Status: DISCONTINUED | OUTPATIENT
Start: 2021-03-25 | End: 2021-04-01

## 2021-03-25 RX ORDER — SODIUM CHLORIDE 9 MG/ML
1000 INJECTION, SOLUTION INTRAVENOUS
Refills: 0 | Status: DISCONTINUED | OUTPATIENT
Start: 2021-03-25 | End: 2021-03-29

## 2021-03-25 RX ORDER — VANCOMYCIN HCL 1 G
VIAL (EA) INTRAVENOUS
Refills: 0 | Status: DISCONTINUED | OUTPATIENT
Start: 2021-03-25 | End: 2021-03-27

## 2021-03-25 RX ORDER — PIPERACILLIN AND TAZOBACTAM 4; .5 G/20ML; G/20ML
3.38 INJECTION, POWDER, LYOPHILIZED, FOR SOLUTION INTRAVENOUS ONCE
Refills: 0 | Status: DISCONTINUED | OUTPATIENT
Start: 2021-03-25 | End: 2021-03-25

## 2021-03-25 RX ORDER — PIPERACILLIN AND TAZOBACTAM 4; .5 G/20ML; G/20ML
3.38 INJECTION, POWDER, LYOPHILIZED, FOR SOLUTION INTRAVENOUS ONCE
Refills: 0 | Status: COMPLETED | OUTPATIENT
Start: 2021-03-25 | End: 2021-03-25

## 2021-03-25 RX ORDER — CALCIUM CARBONATE 500(1250)
1 TABLET ORAL EVERY 4 HOURS
Refills: 0 | Status: DISCONTINUED | OUTPATIENT
Start: 2021-03-25 | End: 2021-04-01

## 2021-03-25 RX ORDER — SENNA PLUS 8.6 MG/1
2 TABLET ORAL AT BEDTIME
Refills: 0 | Status: DISCONTINUED | OUTPATIENT
Start: 2021-03-25 | End: 2021-03-31

## 2021-03-25 RX ORDER — VANCOMYCIN HCL 1 G
1000 VIAL (EA) INTRAVENOUS ONCE
Refills: 0 | Status: COMPLETED | OUTPATIENT
Start: 2021-03-25 | End: 2021-03-25

## 2021-03-25 RX ORDER — VANCOMYCIN HCL 1 G
1000 VIAL (EA) INTRAVENOUS EVERY 12 HOURS
Refills: 0 | Status: DISCONTINUED | OUTPATIENT
Start: 2021-03-25 | End: 2021-03-27

## 2021-03-25 RX ORDER — PIPERACILLIN AND TAZOBACTAM 4; .5 G/20ML; G/20ML
3.38 INJECTION, POWDER, LYOPHILIZED, FOR SOLUTION INTRAVENOUS EVERY 8 HOURS
Refills: 0 | Status: DISCONTINUED | OUTPATIENT
Start: 2021-03-25 | End: 2021-03-29

## 2021-03-25 RX ORDER — DIPHENHYDRAMINE HCL 50 MG
25 CAPSULE ORAL EVERY 4 HOURS
Refills: 0 | Status: DISCONTINUED | OUTPATIENT
Start: 2021-03-25 | End: 2021-04-01

## 2021-03-25 RX ORDER — ACETAMINOPHEN 500 MG
650 TABLET ORAL EVERY 6 HOURS
Refills: 0 | Status: DISCONTINUED | OUTPATIENT
Start: 2021-03-25 | End: 2021-03-25

## 2021-03-25 RX ORDER — ACETAMINOPHEN 500 MG
975 TABLET ORAL ONCE
Refills: 0 | Status: COMPLETED | OUTPATIENT
Start: 2021-03-25 | End: 2021-03-25

## 2021-03-25 RX ORDER — LEVOTHYROXINE SODIUM 125 MCG
25 TABLET ORAL DAILY
Refills: 0 | Status: DISCONTINUED | OUTPATIENT
Start: 2021-03-25 | End: 2021-04-01

## 2021-03-25 RX ORDER — SODIUM CHLORIDE 9 MG/ML
1000 INJECTION, SOLUTION INTRAVENOUS
Refills: 0 | Status: DISCONTINUED | OUTPATIENT
Start: 2021-03-25 | End: 2021-03-25

## 2021-03-25 RX ORDER — ONDANSETRON 8 MG/1
4 TABLET, FILM COATED ORAL EVERY 4 HOURS
Refills: 0 | Status: DISCONTINUED | OUTPATIENT
Start: 2021-03-25 | End: 2021-04-01

## 2021-03-25 RX ORDER — PANTOPRAZOLE SODIUM 20 MG/1
40 TABLET, DELAYED RELEASE ORAL
Refills: 0 | Status: DISCONTINUED | OUTPATIENT
Start: 2021-03-25 | End: 2021-04-01

## 2021-03-25 RX ORDER — METOCLOPRAMIDE HCL 10 MG
10 TABLET ORAL EVERY 4 HOURS
Refills: 0 | Status: DISCONTINUED | OUTPATIENT
Start: 2021-03-25 | End: 2021-04-01

## 2021-03-25 RX ORDER — ACETAMINOPHEN 500 MG
650 TABLET ORAL EVERY 6 HOURS
Refills: 0 | Status: DISCONTINUED | OUTPATIENT
Start: 2021-03-25 | End: 2021-04-01

## 2021-03-25 RX ORDER — OXYCODONE HYDROCHLORIDE 5 MG/1
5 TABLET ORAL EVERY 6 HOURS
Refills: 0 | Status: DISCONTINUED | OUTPATIENT
Start: 2021-03-25 | End: 2021-03-31

## 2021-03-25 RX ORDER — LANOLIN ALCOHOL/MO/W.PET/CERES
3 CREAM (GRAM) TOPICAL AT BEDTIME
Refills: 0 | Status: DISCONTINUED | OUTPATIENT
Start: 2021-03-25 | End: 2021-04-01

## 2021-03-25 RX ORDER — HYDROMORPHONE HYDROCHLORIDE 2 MG/ML
0.5 INJECTION INTRAMUSCULAR; INTRAVENOUS; SUBCUTANEOUS
Refills: 0 | Status: DISCONTINUED | OUTPATIENT
Start: 2021-03-25 | End: 2021-03-25

## 2021-03-25 RX ORDER — ONDANSETRON 8 MG/1
4 TABLET, FILM COATED ORAL ONCE
Refills: 0 | Status: DISCONTINUED | OUTPATIENT
Start: 2021-03-25 | End: 2021-03-25

## 2021-03-25 RX ORDER — SIMETHICONE 80 MG/1
80 TABLET, CHEWABLE ORAL EVERY 4 HOURS
Refills: 0 | Status: DISCONTINUED | OUTPATIENT
Start: 2021-03-25 | End: 2021-04-01

## 2021-03-25 RX ORDER — HEPARIN SODIUM 5000 [USP'U]/ML
5000 INJECTION INTRAVENOUS; SUBCUTANEOUS EVERY 8 HOURS
Refills: 0 | Status: DISCONTINUED | OUTPATIENT
Start: 2021-03-25 | End: 2021-04-01

## 2021-03-25 RX ORDER — OXYCODONE HYDROCHLORIDE 5 MG/1
10 TABLET ORAL EVERY 4 HOURS
Refills: 0 | Status: DISCONTINUED | OUTPATIENT
Start: 2021-03-25 | End: 2021-03-31

## 2021-03-25 RX ADMIN — PIPERACILLIN AND TAZOBACTAM 25 GRAM(S): 4; .5 INJECTION, POWDER, LYOPHILIZED, FOR SOLUTION INTRAVENOUS at 20:39

## 2021-03-25 RX ADMIN — Medication 25 MICROGRAM(S): at 05:55

## 2021-03-25 RX ADMIN — PIPERACILLIN AND TAZOBACTAM 200 GRAM(S): 4; .5 INJECTION, POWDER, LYOPHILIZED, FOR SOLUTION INTRAVENOUS at 03:26

## 2021-03-25 RX ADMIN — PIPERACILLIN AND TAZOBACTAM 25 GRAM(S): 4; .5 INJECTION, POWDER, LYOPHILIZED, FOR SOLUTION INTRAVENOUS at 10:58

## 2021-03-25 RX ADMIN — SODIUM CHLORIDE 125 MILLILITER(S): 9 INJECTION, SOLUTION INTRAVENOUS at 20:50

## 2021-03-25 RX ADMIN — Medication 250 MILLIGRAM(S): at 03:57

## 2021-03-25 RX ADMIN — HEPARIN SODIUM 5000 UNIT(S): 5000 INJECTION INTRAVENOUS; SUBCUTANEOUS at 14:39

## 2021-03-25 RX ADMIN — Medication 975 MILLIGRAM(S): at 03:26

## 2021-03-25 RX ADMIN — Medication 650 MILLIGRAM(S): at 12:05

## 2021-03-25 RX ADMIN — OXYCODONE HYDROCHLORIDE 5 MILLIGRAM(S): 5 TABLET ORAL at 23:10

## 2021-03-25 RX ADMIN — HEPARIN SODIUM 5000 UNIT(S): 5000 INJECTION INTRAVENOUS; SUBCUTANEOUS at 22:37

## 2021-03-25 RX ADMIN — HEPARIN SODIUM 5000 UNIT(S): 5000 INJECTION INTRAVENOUS; SUBCUTANEOUS at 05:55

## 2021-03-25 RX ADMIN — SODIUM CHLORIDE 125 MILLILITER(S): 9 INJECTION, SOLUTION INTRAVENOUS at 03:57

## 2021-03-25 RX ADMIN — SENNA PLUS 2 TABLET(S): 8.6 TABLET ORAL at 23:21

## 2021-03-25 RX ADMIN — Medication 650 MILLIGRAM(S): at 11:14

## 2021-03-25 RX ADMIN — SODIUM CHLORIDE 125 MILLILITER(S): 9 INJECTION, SOLUTION INTRAVENOUS at 20:30

## 2021-03-25 RX ADMIN — Medication 250 MILLIGRAM(S): at 17:57

## 2021-03-25 NOTE — ED PROVIDER NOTE - NS ED ROS FT
Constitutional: +fevers, no chills.  Eyes: no visual changes.  Ears: no ear drainage, no ear pain.  Nose: no nasal congestion.  Mouth/Throat: no sore throat.  Cardiovascular: no chest pain.  Respiratory: no shortness of breath, no wheezing, no cough  Gastrointestinal: no nausea, no vomiting, no diarrhea, +abdominal pain.  MSK: no flank pain, no back pain.  Genitourinary: no dysuria, no hematuria.  Skin: no rashes.  Neuro: no headache

## 2021-03-25 NOTE — CONSULT NOTE ADULT - ASSESSMENT
61y female s/p delayed breast reconstruction with REMI based flaps on 2/15/2021 who now presents with abdominal pain x4 days with fever, found to have abdominal cellulitis and fluid collection.  s/p needle drain by Plastic Surg on 3/25.    #post-op abd fluid collection, cellulitis, leukocytosis, fever - with WBC 17k. Temp to 103.5 at [facility].  Abdomen  with horizontal erythema above and below the incision site.   CT with Large peripherally enhancing fluid collection involving both sides of the ventral abdominal wall and communicating in the lower aspect  with stranding in adj subcutaneous fat.  s/p needle drainage/aspiration in ED by plastics on3/25. Cultures pending.   On empiric vanc/ zosyn.    Rec:  - f/u wound clx  - cont vanc  1g q12, check vanc trough pre 4th dose  - cont zosyn 3.375 q8   - monitor fever curve  - trend leukocytosis and monitor renal function daily    Angel Gongora MD  Fellow, Infectious Diseases, PGY-4  Pager: [phone]  Before 9am or after 5pm/Weekends: Call [phone]

## 2021-03-25 NOTE — ED PROVIDER NOTE - PROGRESS NOTE DETAILS
Dr Delatorre: plastic surgery at bedside to do debridement and take wound cultures, recommends switching from ctx to zosyn
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
## 2021-03-25 NOTE — ED PROVIDER NOTE - PMH
Anxiety about mastectomy    Breast cancer  bilateral  Chronic bilateral low back pain, with sciatica presence unspecified    Language barrier  Panamanian speaking  Other chronic gastritis    Smoker

## 2021-03-25 NOTE — PROGRESS NOTE ADULT - ASSESSMENT
ASSESSMENT/PLAN:   MABLE QUICK is a 61yFemale s/p b/l REMI flap reconstruction (2/15) now presenting w/ abdominal fluid collection and fevers    - Admitted to PRS service  - F/U fluid cultures  - IV fluids  - Encourage OOB/ambulation  - IV antibiotics  - Pain control  - Rest of plan pending discussion w/ attending Dr. Dejesus. Will modify note    Van Anderson MD PGY1  Plastic Surgery   [facility]: [id number]  [facility]: [phone] ASSESSMENT/PLAN:   MABLE QUICK is a 61yFemale s/p b/l REMI flap reconstruction (2/15) now presenting w/ abdominal fluid collection and fevers    - Admitted to PRS service  - F/U fluid cultures  - IV fluids  - Encourage OOB/ambulation  - IV antibiotics  - Pain control  - Will take to OR for washout of abdominal wound  - NPO for OR later today  - ID consult    Van Anderson MD PGY1  Plastic Surgery   [facility]: [id number]  [facility]: [phone]

## 2021-03-25 NOTE — ED PROVIDER NOTE - ATTENDING CONTRIBUTION TO CARE
61F with hx of br ca s/p mastectomy and b/l flap for reconstructive surgery p/e abdominal pain and redness to abdominal donor site. patient was seen at [facility] and ct a/p showed a large abscess collection. patient transferred to [facility] for care. Plastic Surgeon Dr. Dejesus.    ***GEN - NAD; well appearing; A+O x3 ***HEAD - NC/AT ***EYES/NOSE - PERRL, EOMI, mucous membranes moist, no discharge ***THROAT: Oral cavity and pharynx normal. No inflammation, swelling, exudate, or lesions.  ***NECK: Neck supple, non-tender without lymphadenopathy, no masses, no thyromegaly.  ***PULMONARY - CTA b/l, symmetric breath sounds. ***CARDIAC -s1s2, RRR, no M,G,R  ***ABDOMEN - +BS, ND, ttp over left lower abdominal area with redness and swelling. well healing surgical incision, soft, no guarding, no rebound, no masses   ***BACK - no CVA tenderness, Normal  spine ***EXTREMITIES - symmetric pulses, 2+ dp, capillary refill < 2 seconds, no clubbing, no cyanosis, no edema ***SKIN - no rash or bruising   ***NEUROLOGIC - alert, CN 2-12 intact    MDM: 61F with abscess collection to abdominal area, post-op. fever. labs, abx done at [place], will redose abx. tba to sx.

## 2021-03-25 NOTE — ED ADULT NURSE NOTE - OBJECTIVE STATEMENT
Break Coverage - Patient aaox4, ambulatory, history R Breast CA - sent from [facility] for surgical consultation for post-op infection.  2/15 Surgery R Breast reconstruction, with abdominal incision = reports 4 days of redness, pain, heat, tenderness around lower midline abdominal incision scar.  Patient with notable redness/cellulitic appearing skin around incision site.  Patient denies other significant PMHx, diabetes.  Patient vitals as noted, BP Hypotensive in triage - at present 99/50, respirations even/nonlabored, orally afebrile.  IVL to L AC #20g from [facility].  Received Tylenol, Rocephin, Vanco, 2500cc NS prior to transfer., COVID Negative prior to transfer.  Surgical resident at bedside for evaluation, no acute distress at this time, awaiting further plan of care.
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
## 2021-03-25 NOTE — ED ADULT NURSE NOTE - CHIEF COMPLAINT QUOTE
alert oriented transfer from [place] 2/15 priscila breast surgery  at [facility] c/o pain on surgical site ( abd ) CT shows fluid collection  had fever  had vanco rocephin tylenol  2500ml fluid  hx right breast Ca

## 2021-03-25 NOTE — H&P ADULT - NSHPLABSRESULTS_GEN_ALL_CORE
11.6   16.78  )----------(  535       ( 24 Mar 2021 20:15 )               34.6      134    |  99     |  12     ----------------------------<  128        ( 24 Mar 2021 20:15 )  4.1     |  24     |  0.89     Ca    9.1        ( 24 Mar 2021 20:15 )    TPro  7.5    /  Alb  2.8    /  TBili  0.4    /  DBili  x      /  AST  15     /  ALT  16     /  AlkPhos  99     ( 24 Mar 2021 20:15 )    PT/INR -  14.1 sec / 1.17 ratio   ( 24 Mar 2021 20:15 )       PTT -  31.4 sec   ( 24 Mar 2021 20:15 )  CAPILLARY BLOOD GLUCOSE      < from: CT Abdomen and Pelvis w/ IV Cont (03.24.21 @ 21:19) >      EXAM:  CT ABDOMEN AND PELVIS IC      PROCEDURE DATE:  03/24/2021        INTERPRETATION:  CLINICAL INFORMATION: Fever and lower abdominal pain for 4 days. History of breast and abdominal surgery (REMI flap following mastectomy) 6 weeks ago.    COMPARISON: CT abdomen and pelvis from 1/13/2021    CONTRAST/COMPLICATIONS:  IV Contrast: Omnipaque 350  90 cc administered   10 cc discarded  Oral Contrast: NONE  Complications: None reported at time of study completion    TECHNIQUE:  CT scan of the abdomen and pelvis with IV contrast.  Transaxial images were acquired from the domes of the diaphragm to the symphysis pubis with intravenous contrast. Oral contrast was withheld. Coronal and sagittal images were also provided from the transaxial source data.    FINDINGS:  Bilateral lower lobe bronchiectasis and scattered groundglass opacities in the right middle and both lower lobes, unchanged. Heart is mildly enlarged.    The large and small bowel are normal in caliber without obstruction. There is no free intraperitoneal air or abdominal abscess.  The appendix is normal. There is no abnormal bowel wall thickening or inflammatory change.    The liver, gallbladder, spleen, pancreas and adrenal glands are stable in appearance inclusive of a calcified granuloma in the right hepatic lobe. The kidneys enhance symmetrically without hydronephrosis.    The abdominal aorta is normal in caliber. There is no retroperitoneal, pelvic or inguinal adenopathy. There is no ascites.    The urinary bladder is underdistended which sends evaluation of the wall thickness. Fibroid uterus. 3.4 x 2.5 cm ovoid hyperdense mass in the right adnexa, inseparable from the ovary could represent an exophytic fibroid. This is unchanged.    The visualized osseous structures demonstrate no acute abnormality. Status post ventral abdominal wall hernia repair with mesh placement. There is a large communicating fluid collection within the ventral abdominal wall which has peripheral enhancement and stranding in the adjacent subcutaneous fat. The lower communicating component measures approximately 28.9 x 1.4 cm x 3.5 cm (transverse by AP by craniocaudad) with the left sided component measuring approximately 10.1 x 2.2 x 17.9 cm and right-sided component measuring 7.4 x 1.9 x 14.2 cm. Skin thickening is seen overlying the lower aspect of the collection.    IMPRESSION:  Large peripherally enhancing fluid collection involving both sides of the ventral abdominal wall and communicating in the lower aspect with stranding in the adjacent subcutaneous fat and overlying skin thickening. While this may represent a postoperative seroma superimposed infection cannot be excluded and clinical correlation is recommended. There is no intraperitoneal extension.    No acute finding in the abdomen or pelvis.      ASHLEY RM MD; Attending Radiologist  This document has been electronically signed. Mar 24 2021 10:30PM    < end of copied text > T(C): 36.7 (03-25-21 @ 03:58), Max: 39.7 (03-24-21 @ 20:22)  T(F): 98.1 (03-25-21 @ 03:58), Max: 103.4 (03-24-21 @ 20:22)  HR: 70 (03-25-21 @ 03:58) (66 - 103)  BP: 95/45 (03-25-21 @ 03:58) (84/53 - 118/62)  RR: 15 (03-25-21 @ 03:58) (15 - 32)  SpO2: 100% (03-25-21 @ 03:58) (95% - 100%)                      11.6   16.78  )----------(  535       ( 24 Mar 2021 20:15 )               34.6      134    |  99     |  12     ----------------------------<  128        ( 24 Mar 2021 20:15 )  4.1     |  24     |  0.89     Ca    9.1        ( 24 Mar 2021 20:15 )    TPro  7.5    /  Alb  2.8    /  TBili  0.4    /  DBili  x      /  AST  15     /  ALT  16     /  AlkPhos  99     ( 24 Mar 2021 20:15 )    PT/INR -  14.1 sec / 1.17 ratio   ( 24 Mar 2021 20:15 )       PTT -  31.4 sec   ( 24 Mar 2021 20:15 )  CAPILLARY BLOOD GLUCOSE      < from: CT Abdomen and Pelvis w/ IV Cont (03.24.21 @ 21:19) >      EXAM:  CT ABDOMEN AND PELVIS IC      PROCEDURE DATE:  03/24/2021        INTERPRETATION:  CLINICAL INFORMATION: Fever and lower abdominal pain for 4 days. History of breast and abdominal surgery (REMI flap following mastectomy) 6 weeks ago.    COMPARISON: CT abdomen and pelvis from 1/13/2021    CONTRAST/COMPLICATIONS:  IV Contrast: Omnipaque 350  90 cc administered   10 cc discarded  Oral Contrast: NONE  Complications: None reported at time of study completion    TECHNIQUE:  CT scan of the abdomen and pelvis with IV contrast.  Transaxial images were acquired from the domes of the diaphragm to the symphysis pubis with intravenous contrast. Oral contrast was withheld. Coronal and sagittal images were also provided from the transaxial source data.    FINDINGS:  Bilateral lower lobe bronchiectasis and scattered groundglass opacities in the right middle and both lower lobes, unchanged. Heart is mildly enlarged.    The large and small bowel are normal in caliber without obstruction. There is no free intraperitoneal air or abdominal abscess.  The appendix is normal. There is no abnormal bowel wall thickening or inflammatory change.    The liver, gallbladder, spleen, pancreas and adrenal glands are stable in appearance inclusive of a calcified granuloma in the right hepatic lobe. The kidneys enhance symmetrically without hydronephrosis.    The abdominal aorta is normal in caliber. There is no retroperitoneal, pelvic or inguinal adenopathy. There is no ascites.    The urinary bladder is underdistended which sends evaluation of the wall thickness. Fibroid uterus. 3.4 x 2.5 cm ovoid hyperdense mass in the right adnexa, inseparable from the ovary could represent an exophytic fibroid. This is unchanged.    The visualized osseous structures demonstrate no acute abnormality. Status post ventral abdominal wall hernia repair with mesh placement. There is a large communicating fluid collection within the ventral abdominal wall which has peripheral enhancement and stranding in the adjacent subcutaneous fat. The lower communicating component measures approximately 28.9 x 1.4 cm x 3.5 cm (transverse by AP by craniocaudad) with the left sided component measuring approximately 10.1 x 2.2 x 17.9 cm and right-sided component measuring 7.4 x 1.9 x 14.2 cm. Skin thickening is seen overlying the lower aspect of the collection.    IMPRESSION:  Large peripherally enhancing fluid collection involving both sides of the ventral abdominal wall and communicating in the lower aspect with stranding in the adjacent subcutaneous fat and overlying skin thickening. While this may represent a postoperative seroma superimposed infection cannot be excluded and clinical correlation is recommended. There is no intraperitoneal extension.    No acute finding in the abdomen or pelvis.      ASHLEY RM MD; Attending Radiologist  This document has been electronically signed. Mar 24 2021 10:30PM    < end of copied text >

## 2021-03-25 NOTE — CONSULT NOTE ADULT - ATTENDING COMMENTS
61 f with breast ca s/p delayed breast reconstruction with REMI based flaps on 2/15/2021, p/w abdominal pain, erythema and fever to 103.5  WBC 17  abd/pelvis CT: large peripherally enhancing fluid collection involving both sides of the ventral abdominal wall and communicating in the lower aspect with stranding in the adjacent subcutaneous fat and overlying skin thickening  s/p needle aspiration by Plastic Surg on 3/25 now plan for OR wash out    fever, leukocytosis, sepsis due to post-op abd fluid abscess and cellulitis after breast reconstruction with REMI    * s/p needle drainage/aspiration in ED by plastics on3/25  * f/u the abscess cx  * f/u with surgery for surgical intervention, please send for bacterial and AFB cx  * c/w vanco 1 q 12 and check the trough before the 4th dose  * monitor the vanco trough and renal function to prevent nephrotoxicity  * c/w zosyn for now and will adjust as per the cultures  * monitor the WBC/diff and temp curve    The above assessment and plan was discussed with the primary team    Yael Ruby MD  Pager [phone]  After 5pm and on weekends call [phone]

## 2021-03-25 NOTE — H&P ADULT - HISTORY OF PRESENT ILLNESS
MABLE QUICK is a 61y female s/p delayed breast reconstruction with REMI based flaps on 2/15/2021 who now presents with abdominal pain x4 days with new fever today. She initially presented to [facility] where CT scan showed a large subcutaneous collection of the abdominal wall. She was febrile at [place].  MABLE QUICK is a 61y female s/p delayed breast reconstruction with REMI based flaps on 2/15/2021 who now presents with abdominal pain x4 days with new fever today. She initially presented to [facility] where CT scan showed a large subcutaneous collection of the abdominal wall. She was febrile at [place]. She reports pain in the richelle-incisional area and new swelling inferior to the incision.

## 2021-03-25 NOTE — ED PROVIDER NOTE - CLINICAL SUMMARY MEDICAL DECISION MAKING FREE TEXT BOX
61y female s/p delayed breast reconstruction with REMI based flaps on 2/15/2021 who now presents with abdominal pain x4 days with new fever today. [place] cov CT scan showed a large subcutaneous collection of the abdominal wall. admit to surg

## 2021-03-25 NOTE — H&P ADULT - ATTENDING COMMENTS
Patient presented to ED with c/o fever, abdominal pain and swelling. Patient is s/p bilateral mastectomies with REMI flap reconstruction 2/15/21.   On exam, there's ertyhema and fluctuance around abdominal incision. CT revealed fluid collection seroma vs abscess. WBC 16.    Plan  Will admit and start IV abx.   Aspiration at bedside  Send cultures  Repeat WBC in am  Keep NPO  Possible exploration in OR if no improvement.
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
## 2021-03-25 NOTE — H&P ADULT - NSHPPHYSICALEXAM_GEN_ALL_CORE
PHYSICAL EXAM:    GENERAL: NAD, lying in bed comfortably  HEAD:  Atraumatic, Normocephalic  ENT: Moist mucous membranes  CHEST/LUNG: Unlabored respirations  ABDOMEN: Soft, non-distended, richelle-incisional erythema with tenderness, palpable collection inferior to incision. skin warm to touch.   NERVOUS SYSTEM:  Alert & Oriented X3, speech clear.   SKIN: No rashes or lesions

## 2021-03-25 NOTE — H&P ADULT - NSICDXPASTMEDICALHX_GEN_ALL_CORE_FT
CERTIFICATE OF WORK      March 16, 2021      Re: Gabriela Schwab [place] [id number]      This is to certify that Gabriela Schwab has been under my care from 3/16/2021 and can return to regular work on 3/18/2021    RESTRICTIONS: none      REMARKS: none        SIGNATURE:___________________________________________          [address]  [address] [place] [id number]  Dept Phone: [phone]
PAST MEDICAL HISTORY:  Anxiety about mastectomy     Breast cancer bilateral    Chronic bilateral low back pain, with sciatica presence unspecified     Language barrier Nigerien speaking    Other chronic gastritis     Smoker

## 2021-03-25 NOTE — ED PROVIDER NOTE - OBJECTIVE STATEMENT
61y female s/p delayed breast reconstruction with REMI based flaps on 2/15/2021 who now presents with abdominal pain x4 days with new fever today. [place] CT scan showed a large subcutaneous collection of the abdominal wall. She was febrile at [place] to 103F. Patient denies chest pain, SOB, cough, N/V/D/C, weakness, HA, dizziness, urinary symptoms, extremity pain or swelling or other complaints.

## 2021-03-25 NOTE — ED ADULT TRIAGE NOTE - CHIEF COMPLAINT QUOTE
alert oriented transfer from [place] 2/15 priscila breast surgery  at [facility] c/o pain on surgical site ( abd ) CT shows fluid collection  had fever  had vanco rocephin tylenol  2500ml fluid  hx right breast Ca

## 2021-03-25 NOTE — PROGRESS NOTE ADULT - ATTENDING COMMENTS
Pt on IV abx, remains afebrile, but WBC 17. On exam, minimal change.     Plan  Will plan for OR exploration, washout, possible VAC  Keep NPO  Cont IV abx  ID eval  Discussed plan with patient, including alternatives, risks and potential complications. All questions answered and patient consented to proceed.

## 2021-03-25 NOTE — ED PROVIDER NOTE - PHYSICAL EXAMINATION
GEN: Well appearing, well nourished, in no apparent distress.  HEAD: NCAT  HEENT: PERRL, Airway patent, EOMI, non-erythematous pharynx, no exudates, uvula midline, MMM, neck supple, no LAD, no JVD  LUNG: CTAB, no adventitious sounds, no retractions, no nasal flaring  CV: RRR, no murmurs,   Abd: soft, ttp abd, nd, no rebound or guarding, BS+ in all quadrants, no CVAT  MSK: WWP, Pulses 2+ in extremities, No edema   Neuro:  AAOx3, Ambulatory with stable gait. ALEJANDRA without laterality  Skin: Warm and dry, erythema and warm to lower abd wall incision site, fluctuance palpated   Psych: normal mood and affect

## 2021-03-25 NOTE — H&P ADULT - ASSESSMENT
A/P:  A/P: 61y female with PSH of REMI based breast reconstruction on 2/15/2021 now presents with cellulitis and abdominal wall seroma.     - approximately 250 cc cloudy serous fluid drained bedside by needle aspiration under local anesthesia following sterile technique (see separate procedure note) - written consent obtained and in chart   - culture of fluid sent to lab   - start Vancomycin/Zosyn while awaiting culture results   - tylenol for pain and antipyretic   - oxycodone 5mg PRN moderate pain  - SQH and SCDs for DVT PPx  - NPO but will advance diet pending clinical improvement  - IVF while NPO  - Vitals and I&Os q4  - plan d/w Dr. Dejesus   - admit to plastic surgery     Harshad Whiteside MD, PhD  Plastic Surgery Resident, PGY2  [facility]: [phone]  [facility]: [id number]

## 2021-03-25 NOTE — BRIEF OPERATIVE NOTE - OPERATION/FINDINGS
Right side of incision opened up. Large amount of cloudy fluid encountered. Cultures sent. The fluid collection tracked superiorly towards the umbilicus and transversely to the other side of the abdomen. The area was irrigated with triple-antibiotic saline. Two drains were placed. The incision was closed.

## 2021-03-26 LAB
ALBUMIN SERPL ELPH-MCNC: 2.6 G/DL — LOW (ref 3.3–5)
ALP SERPL-CCNC: 84 U/L — SIGNIFICANT CHANGE UP (ref 40–120)
ALT FLD-CCNC: <5 U/L — SIGNIFICANT CHANGE UP (ref 4–33)
ANION GAP SERPL CALC-SCNC: 10 MMOL/L — SIGNIFICANT CHANGE UP (ref 7–14)
AST SERPL-CCNC: 9 U/L — SIGNIFICANT CHANGE UP (ref 4–32)
BASOPHILS # BLD AUTO: 0 K/UL — SIGNIFICANT CHANGE UP (ref 0–0.2)
BASOPHILS NFR BLD AUTO: 0 % — SIGNIFICANT CHANGE UP (ref 0–2)
BILIRUB SERPL-MCNC: 0.3 MG/DL — SIGNIFICANT CHANGE UP (ref 0.2–1.2)
BUN SERPL-MCNC: 7 MG/DL — SIGNIFICANT CHANGE UP (ref 7–23)
CALCIUM SERPL-MCNC: 8.7 MG/DL — SIGNIFICANT CHANGE UP (ref 8.4–10.5)
CHLORIDE SERPL-SCNC: 101 MMOL/L — SIGNIFICANT CHANGE UP (ref 98–107)
CO2 SERPL-SCNC: 24 MMOL/L — SIGNIFICANT CHANGE UP (ref 22–31)
COVID-19 SPIKE DOMAIN AB INTERP: POSITIVE
COVID-19 SPIKE DOMAIN ANTIBODY RESULT: 6.93 U/ML — HIGH
CREAT SERPL-MCNC: 0.72 MG/DL — SIGNIFICANT CHANGE UP (ref 0.5–1.3)
CULTURE RESULTS: SIGNIFICANT CHANGE UP
EOSINOPHIL # BLD AUTO: 0 K/UL — SIGNIFICANT CHANGE UP (ref 0–0.5)
EOSINOPHIL NFR BLD AUTO: 0 % — SIGNIFICANT CHANGE UP (ref 0–6)
GLUCOSE SERPL-MCNC: 138 MG/DL — HIGH (ref 70–99)
GRAM STN FLD: SIGNIFICANT CHANGE UP
HCT VFR BLD CALC: 32.9 % — LOW (ref 34.5–45)
HGB BLD-MCNC: 10.2 G/DL — LOW (ref 11.5–15.5)
IANC: 11.32 K/UL — HIGH (ref 1.5–8.5)
LYMPHOCYTES # BLD AUTO: 0.51 K/UL — LOW (ref 1–3.3)
LYMPHOCYTES # BLD AUTO: 3.5 % — LOW (ref 13–44)
MCHC RBC-ENTMCNC: 29.7 PG — SIGNIFICANT CHANGE UP (ref 27–34)
MCHC RBC-ENTMCNC: 31 GM/DL — LOW (ref 32–36)
MCV RBC AUTO: 95.9 FL — SIGNIFICANT CHANGE UP (ref 80–100)
MONOCYTES # BLD AUTO: 0.64 K/UL — SIGNIFICANT CHANGE UP (ref 0–0.9)
MONOCYTES NFR BLD AUTO: 4.4 % — SIGNIFICANT CHANGE UP (ref 2–14)
NEUTROPHILS # BLD AUTO: 11.66 K/UL — HIGH (ref 1.8–7.4)
NEUTROPHILS NFR BLD AUTO: 77.2 % — HIGH (ref 43–77)
NIGHT BLUE STAIN TISS: SIGNIFICANT CHANGE UP
PLATELET # BLD AUTO: 536 K/UL — HIGH (ref 150–400)
POTASSIUM SERPL-MCNC: 4.2 MMOL/L — SIGNIFICANT CHANGE UP (ref 3.5–5.3)
POTASSIUM SERPL-SCNC: 4.2 MMOL/L — SIGNIFICANT CHANGE UP (ref 3.5–5.3)
PROT SERPL-MCNC: 6.5 G/DL — SIGNIFICANT CHANGE UP (ref 6–8.3)
RBC # BLD: 3.43 M/UL — LOW (ref 3.8–5.2)
RBC # FLD: 12.8 % — SIGNIFICANT CHANGE UP (ref 10.3–14.5)
SARS-COV-2 IGG+IGM SERPL QL IA: 6.93 U/ML — HIGH
SARS-COV-2 IGG+IGM SERPL QL IA: POSITIVE
SODIUM SERPL-SCNC: 135 MMOL/L — SIGNIFICANT CHANGE UP (ref 135–145)
SPECIMEN SOURCE: SIGNIFICANT CHANGE UP
VANCOMYCIN TROUGH SERPL-MCNC: 11.2 UG/ML — SIGNIFICANT CHANGE UP (ref 10–20)
WBC # BLD: 14.61 K/UL — HIGH (ref 3.8–10.5)
WBC # FLD AUTO: 14.61 K/UL — HIGH (ref 3.8–10.5)

## 2021-03-26 PROCEDURE — 99232 SBSQ HOSP IP/OBS MODERATE 35: CPT | Mod: GC

## 2021-03-26 RX ADMIN — PIPERACILLIN AND TAZOBACTAM 25 GRAM(S): 4; .5 INJECTION, POWDER, LYOPHILIZED, FOR SOLUTION INTRAVENOUS at 12:40

## 2021-03-26 RX ADMIN — Medication 3 MILLIGRAM(S): at 23:00

## 2021-03-26 RX ADMIN — HEPARIN SODIUM 5000 UNIT(S): 5000 INJECTION INTRAVENOUS; SUBCUTANEOUS at 13:06

## 2021-03-26 RX ADMIN — Medication 650 MILLIGRAM(S): at 07:05

## 2021-03-26 RX ADMIN — Medication 650 MILLIGRAM(S): at 01:15

## 2021-03-26 RX ADMIN — Medication 650 MILLIGRAM(S): at 12:40

## 2021-03-26 RX ADMIN — HEPARIN SODIUM 5000 UNIT(S): 5000 INJECTION INTRAVENOUS; SUBCUTANEOUS at 23:06

## 2021-03-26 RX ADMIN — PANTOPRAZOLE SODIUM 40 MILLIGRAM(S): 20 TABLET, DELAYED RELEASE ORAL at 06:09

## 2021-03-26 RX ADMIN — OXYCODONE HYDROCHLORIDE 5 MILLIGRAM(S): 5 TABLET ORAL at 15:43

## 2021-03-26 RX ADMIN — SENNA PLUS 2 TABLET(S): 8.6 TABLET ORAL at 23:00

## 2021-03-26 RX ADMIN — Medication 650 MILLIGRAM(S): at 06:08

## 2021-03-26 RX ADMIN — PIPERACILLIN AND TAZOBACTAM 25 GRAM(S): 4; .5 INJECTION, POWDER, LYOPHILIZED, FOR SOLUTION INTRAVENOUS at 23:00

## 2021-03-26 RX ADMIN — Medication 650 MILLIGRAM(S): at 00:19

## 2021-03-26 RX ADMIN — HEPARIN SODIUM 5000 UNIT(S): 5000 INJECTION INTRAVENOUS; SUBCUTANEOUS at 06:08

## 2021-03-26 RX ADMIN — Medication 250 MILLIGRAM(S): at 06:09

## 2021-03-26 RX ADMIN — SODIUM CHLORIDE 125 MILLILITER(S): 9 INJECTION, SOLUTION INTRAVENOUS at 00:21

## 2021-03-26 RX ADMIN — Medication 25 MICROGRAM(S): at 06:09

## 2021-03-26 RX ADMIN — PIPERACILLIN AND TAZOBACTAM 25 GRAM(S): 4; .5 INJECTION, POWDER, LYOPHILIZED, FOR SOLUTION INTRAVENOUS at 04:11

## 2021-03-26 RX ADMIN — Medication 3 MILLIGRAM(S): at 00:18

## 2021-03-26 RX ADMIN — Medication 250 MILLIGRAM(S): at 20:00

## 2021-03-26 RX ADMIN — OXYCODONE HYDROCHLORIDE 5 MILLIGRAM(S): 5 TABLET ORAL at 00:10

## 2021-03-26 RX ADMIN — OXYCODONE HYDROCHLORIDE 5 MILLIGRAM(S): 5 TABLET ORAL at 16:40

## 2021-03-26 RX ADMIN — Medication 650 MILLIGRAM(S): at 23:00

## 2021-03-26 NOTE — PROGRESS NOTE ADULT - ASSESSMENT
A/P: 61F s/p REMI reconstruction on 2/15/21 who presented on 3/24 with erythema and swelling around abdominal incision found to have cellulitis and seroma.     - POD1 from OR for washout  - recovering appropriately  - continue Abx  - continue BELGICA drains  - SQH and SCDs for DVT PPx  - abdominal binder  - ambulate as tolerated  - encourage IS    Harshad Whiteside MD, PhD  Plastic Surgery Resident, PGY2  [facility]: [phone]  [facility]: [id number]

## 2021-03-26 NOTE — PROGRESS NOTE ADULT - ATTENDING COMMENTS
Pt seen and examined. Agree with above  No significant events overnight  Afebrile  Wbc 14 this morning    Abd - erythema improved, errol serosang    Plan    -Cont abx  -Check cultures  -Cont ERROL/abd binder  -ID recs appreciated  -DVT prophyl

## 2021-03-26 NOTE — PROGRESS NOTE ADULT - ASSESSMENT
61 f with breast ca s/p delayed breast reconstruction with REMI based flaps on 2/15/2021, p/w abdominal pain, erythema and fever to 103.5  WBC 17  abd/pelvis CT: large peripherally enhancing fluid collection involving both sides of the ventral abdominal wall and communicating in the lower aspect with stranding in the adjacent subcutaneous fat and overlying skin thickening  s/p needle aspiration by Plastic Surg on 3/25 and then went to OR, R side of incision was opened and large amount of cloudy liquid drained    fever, leukocytosis, sepsis due to post-op abd fluid abscess and cellulitis after breast reconstruction with REMI    * s/p needle drainage/aspiration in ED then OR  by plastics on3/25  * f/u the abscess cx and OR cx including AFB  * c/w vanco 1 q 12 and check the trough before the 4th dose  * monitor the vanco trough and renal function to prevent nephrotoxicity  * c/w zosyn for now and will adjust as per the cultures  * monitor the WBC/diff and temp curve    The above assessment and plan was discussed with the primary team    Yael Ruby MD  Pager [phone]  After 5pm and on weekends call [phone]

## 2021-03-27 LAB
-  AMPICILLIN/SULBACTAM: SIGNIFICANT CHANGE UP
-  CEFAZOLIN: SIGNIFICANT CHANGE UP
-  CLINDAMYCIN: SIGNIFICANT CHANGE UP
-  DAPTOMYCIN: SIGNIFICANT CHANGE UP
-  ERYTHROMYCIN: SIGNIFICANT CHANGE UP
-  GENTAMICIN: SIGNIFICANT CHANGE UP
-  LINEZOLID: SIGNIFICANT CHANGE UP
-  OXACILLIN: SIGNIFICANT CHANGE UP
-  PENICILLIN: SIGNIFICANT CHANGE UP
-  RIFAMPIN: SIGNIFICANT CHANGE UP
-  TETRACYCLINE: SIGNIFICANT CHANGE UP
-  VANCOMYCIN: SIGNIFICANT CHANGE UP
CULTURE RESULTS: SIGNIFICANT CHANGE UP
METHOD TYPE: SIGNIFICANT CHANGE UP
ORGANISM # SPEC MICROSCOPIC CNT: SIGNIFICANT CHANGE UP
ORGANISM # SPEC MICROSCOPIC CNT: SIGNIFICANT CHANGE UP
SPECIMEN SOURCE: SIGNIFICANT CHANGE UP

## 2021-03-27 RX ORDER — VANCOMYCIN HCL 1 G
1250 VIAL (EA) INTRAVENOUS EVERY 12 HOURS
Refills: 0 | Status: DISCONTINUED | OUTPATIENT
Start: 2021-03-27 | End: 2021-03-29

## 2021-03-27 RX ADMIN — PIPERACILLIN AND TAZOBACTAM 25 GRAM(S): 4; .5 INJECTION, POWDER, LYOPHILIZED, FOR SOLUTION INTRAVENOUS at 20:37

## 2021-03-27 RX ADMIN — SODIUM CHLORIDE 125 MILLILITER(S): 9 INJECTION, SOLUTION INTRAVENOUS at 20:41

## 2021-03-27 RX ADMIN — Medication 650 MILLIGRAM(S): at 08:39

## 2021-03-27 RX ADMIN — Medication 650 MILLIGRAM(S): at 14:49

## 2021-03-27 RX ADMIN — SODIUM CHLORIDE 125 MILLILITER(S): 9 INJECTION, SOLUTION INTRAVENOUS at 12:25

## 2021-03-27 RX ADMIN — Medication 650 MILLIGRAM(S): at 22:28

## 2021-03-27 RX ADMIN — Medication 650 MILLIGRAM(S): at 15:20

## 2021-03-27 RX ADMIN — HEPARIN SODIUM 5000 UNIT(S): 5000 INJECTION INTRAVENOUS; SUBCUTANEOUS at 14:51

## 2021-03-27 RX ADMIN — Medication 3 MILLIGRAM(S): at 22:37

## 2021-03-27 RX ADMIN — Medication 250 MILLIGRAM(S): at 05:27

## 2021-03-27 RX ADMIN — OXYCODONE HYDROCHLORIDE 5 MILLIGRAM(S): 5 TABLET ORAL at 23:33

## 2021-03-27 RX ADMIN — SENNA PLUS 2 TABLET(S): 8.6 TABLET ORAL at 22:37

## 2021-03-27 RX ADMIN — Medication 650 MILLIGRAM(S): at 20:40

## 2021-03-27 RX ADMIN — HEPARIN SODIUM 5000 UNIT(S): 5000 INJECTION INTRAVENOUS; SUBCUTANEOUS at 22:38

## 2021-03-27 RX ADMIN — Medication 650 MILLIGRAM(S): at 09:15

## 2021-03-27 RX ADMIN — Medication 166.67 MILLIGRAM(S): at 18:42

## 2021-03-27 RX ADMIN — HEPARIN SODIUM 5000 UNIT(S): 5000 INJECTION INTRAVENOUS; SUBCUTANEOUS at 05:27

## 2021-03-27 RX ADMIN — Medication 25 MICROGRAM(S): at 05:27

## 2021-03-27 RX ADMIN — SODIUM CHLORIDE 125 MILLILITER(S): 9 INJECTION, SOLUTION INTRAVENOUS at 08:39

## 2021-03-27 RX ADMIN — PIPERACILLIN AND TAZOBACTAM 25 GRAM(S): 4; .5 INJECTION, POWDER, LYOPHILIZED, FOR SOLUTION INTRAVENOUS at 05:26

## 2021-03-27 RX ADMIN — PIPERACILLIN AND TAZOBACTAM 25 GRAM(S): 4; .5 INJECTION, POWDER, LYOPHILIZED, FOR SOLUTION INTRAVENOUS at 12:27

## 2021-03-27 RX ADMIN — OXYCODONE HYDROCHLORIDE 5 MILLIGRAM(S): 5 TABLET ORAL at 22:37

## 2021-03-27 RX ADMIN — PANTOPRAZOLE SODIUM 40 MILLIGRAM(S): 20 TABLET, DELAYED RELEASE ORAL at 05:27

## 2021-03-27 RX ADMIN — SODIUM CHLORIDE 125 MILLILITER(S): 9 INJECTION, SOLUTION INTRAVENOUS at 05:26

## 2021-03-27 NOTE — PROGRESS NOTE ADULT - ASSESSMENT
ASSESSMENT/PLAN:   MABLE QUICK is a 61yFemale s/p b/l REMI flap reconstruction (2/15) now s/p washout of abdominal wound 3/25    - POD2 from OR for washout  - recovering appropriately  - continue Abx  - Cultures growing staph aureus  - continue BELGICA drains  - SQH and SCDs for DVT PPx  - abdominal binder  - ambulate as tolerated  - encourage IS      Van Anderson MD PGY1  Plastic Surgery   [facility]: [id number]  [facility]: [phone]

## 2021-03-27 NOTE — PROGRESS NOTE ADULT - ATTENDING COMMENTS
Agree with above.    Clinically improving, remains afebrile, wbc trending down.  No complaints    Abd - erythema improving, incision cdi, errol serosang    PLan  -Cont IV abx  -Check culture results  -DVT prophyl  -OOB  -ID following  -Will await ID's recommendation for home PO abx

## 2021-03-28 LAB
-  AMPICILLIN/SULBACTAM: SIGNIFICANT CHANGE UP
-  AMPICILLIN/SULBACTAM: SIGNIFICANT CHANGE UP
-  CEFAZOLIN: SIGNIFICANT CHANGE UP
-  CEFAZOLIN: SIGNIFICANT CHANGE UP
-  CLINDAMYCIN: SIGNIFICANT CHANGE UP
-  CLINDAMYCIN: SIGNIFICANT CHANGE UP
-  DAPTOMYCIN: SIGNIFICANT CHANGE UP
-  ERYTHROMYCIN: SIGNIFICANT CHANGE UP
-  ERYTHROMYCIN: SIGNIFICANT CHANGE UP
-  GENTAMICIN: SIGNIFICANT CHANGE UP
-  GENTAMICIN: SIGNIFICANT CHANGE UP
-  LINEZOLID: SIGNIFICANT CHANGE UP
-  OXACILLIN: SIGNIFICANT CHANGE UP
-  OXACILLIN: SIGNIFICANT CHANGE UP
-  PENICILLIN: SIGNIFICANT CHANGE UP
-  RIFAMPIN: SIGNIFICANT CHANGE UP
-  RIFAMPIN: SIGNIFICANT CHANGE UP
-  TETRACYCLINE: SIGNIFICANT CHANGE UP
-  TETRACYCLINE: SIGNIFICANT CHANGE UP
-  TRIMETHOPRIM/SULFAMETHOXAZOLE: SIGNIFICANT CHANGE UP
-  TRIMETHOPRIM/SULFAMETHOXAZOLE: SIGNIFICANT CHANGE UP
-  VANCOMYCIN: SIGNIFICANT CHANGE UP
-  VANCOMYCIN: SIGNIFICANT CHANGE UP
HCT VFR BLD CALC: 30.6 % — LOW (ref 34.5–45)
HGB BLD-MCNC: 9.3 G/DL — LOW (ref 11.5–15.5)
MCHC RBC-ENTMCNC: 28.9 PG — SIGNIFICANT CHANGE UP (ref 27–34)
MCHC RBC-ENTMCNC: 30.4 GM/DL — LOW (ref 32–36)
MCV RBC AUTO: 95 FL — SIGNIFICANT CHANGE UP (ref 80–100)
METHOD TYPE: SIGNIFICANT CHANGE UP
METHOD TYPE: SIGNIFICANT CHANGE UP
NRBC # BLD: 0 /100 WBCS — SIGNIFICANT CHANGE UP
NRBC # FLD: 0 K/UL — SIGNIFICANT CHANGE UP
PLATELET # BLD AUTO: 580 K/UL — HIGH (ref 150–400)
RBC # BLD: 3.22 M/UL — LOW (ref 3.8–5.2)
RBC # FLD: 12.9 % — SIGNIFICANT CHANGE UP (ref 10.3–14.5)
WBC # BLD: 7.09 K/UL — SIGNIFICANT CHANGE UP (ref 3.8–10.5)
WBC # FLD AUTO: 7.09 K/UL — SIGNIFICANT CHANGE UP (ref 3.8–10.5)

## 2021-03-28 RX ADMIN — Medication 650 MILLIGRAM(S): at 02:45

## 2021-03-28 RX ADMIN — Medication 25 MICROGRAM(S): at 06:31

## 2021-03-28 RX ADMIN — Medication 166.67 MILLIGRAM(S): at 06:29

## 2021-03-28 RX ADMIN — Medication 3 MILLIGRAM(S): at 22:30

## 2021-03-28 RX ADMIN — Medication 650 MILLIGRAM(S): at 09:14

## 2021-03-28 RX ADMIN — HEPARIN SODIUM 5000 UNIT(S): 5000 INJECTION INTRAVENOUS; SUBCUTANEOUS at 15:16

## 2021-03-28 RX ADMIN — HEPARIN SODIUM 5000 UNIT(S): 5000 INJECTION INTRAVENOUS; SUBCUTANEOUS at 22:30

## 2021-03-28 RX ADMIN — PIPERACILLIN AND TAZOBACTAM 25 GRAM(S): 4; .5 INJECTION, POWDER, LYOPHILIZED, FOR SOLUTION INTRAVENOUS at 11:45

## 2021-03-28 RX ADMIN — Medication 650 MILLIGRAM(S): at 17:23

## 2021-03-28 RX ADMIN — Medication 25 MILLIGRAM(S): at 20:45

## 2021-03-28 RX ADMIN — Medication 166.67 MILLIGRAM(S): at 17:30

## 2021-03-28 RX ADMIN — Medication 650 MILLIGRAM(S): at 02:15

## 2021-03-28 RX ADMIN — SODIUM CHLORIDE 125 MILLILITER(S): 9 INJECTION, SOLUTION INTRAVENOUS at 06:31

## 2021-03-28 RX ADMIN — HEPARIN SODIUM 5000 UNIT(S): 5000 INJECTION INTRAVENOUS; SUBCUTANEOUS at 06:33

## 2021-03-28 RX ADMIN — PANTOPRAZOLE SODIUM 40 MILLIGRAM(S): 20 TABLET, DELAYED RELEASE ORAL at 06:31

## 2021-03-28 RX ADMIN — SODIUM CHLORIDE 125 MILLILITER(S): 9 INJECTION, SOLUTION INTRAVENOUS at 21:00

## 2021-03-28 RX ADMIN — PIPERACILLIN AND TAZOBACTAM 25 GRAM(S): 4; .5 INJECTION, POWDER, LYOPHILIZED, FOR SOLUTION INTRAVENOUS at 03:57

## 2021-03-28 RX ADMIN — Medication 650 MILLIGRAM(S): at 20:48

## 2021-03-28 RX ADMIN — PIPERACILLIN AND TAZOBACTAM 25 GRAM(S): 4; .5 INJECTION, POWDER, LYOPHILIZED, FOR SOLUTION INTRAVENOUS at 20:44

## 2021-03-28 RX ADMIN — Medication 650 MILLIGRAM(S): at 21:00

## 2021-03-28 NOTE — PROVIDER CONTACT NOTE (OTHER) - ASSESSMENT
pt resting in bed conformably, denies pain at this time.  redness spreading outside marked area on abdomen to right hip/ flank

## 2021-03-28 NOTE — PROGRESS NOTE ADULT - ATTENDING COMMENTS
Agree with above.    No significant events overnight. Clinically improving, remains afebrile, wbc trending down.  No complaints    Abd - erythema improving, incision cdi, errol serosang    PLan  -Cont IV abx  -Check culture results  -DVT prophyl  -OOB  -Will await ID's recommendation for home PO abx Agree with above.    No significant events overnight. Clinically improving, remains afebrile, wbc trending down.  No complaints    Abd - erythema improving, incision cdi, errol serosang    Plan   MABLE QUICK is a 61yFemale s/p b/l REMI flap reconstruction (2/15) who presented with fever, leukocytosis, sepsis due to abdominal fluid abscess. Patient was admitted and started on IV abx, and taken to OR, now s/p washout of abdominal wound 3/25. Cultures from washout with few S. aureus growing, prelim.     -Cont IV abx  -Check culture results  -DVT prophyl  -OOB  -ID following  -Will await ID's recommendation for home PO abx

## 2021-03-28 NOTE — CHART NOTE - NSCHARTNOTEFT_GEN_A_CORE
On-call ID Fellow contacted regarding Vanc trough and Cx results  - Chart reviewed and discussed with primary team. Patient not examined at this time  - Afebrile, improving leucocytosis. Per team, Ondansetron is not a home medication. Last ECG QTc 438  - wound cx reviewed - MRSA  ----  RECOMMENDATIONS:  1. Continue current dosing of Vanc and Zosyn while inpatient (target trough 10-15)  2. At time of discharge, to switch to Linezolid 600mg PO q12h PLUS PO Augmentin 875-125 mg PO q12h  3. Plan for 10 days of abx therapy from date of irrigation and debridement 3/25 - including days she received Vanc and Zosyn  Recs conveyed to primary team NP    ANDREA Jerez, MD  Fellow, Infectious Diseases  Pager: [phone]  If no response, after 5pm and on weekends: Call [phone]

## 2021-03-28 NOTE — PROGRESS NOTE ADULT - ASSESSMENT
ASSESSMENT/PLAN:   MABLE QUICK is a 61yFemale s/p b/l REMI flap reconstruction (2/15) now s/p washout of abdominal wound 3/25. Cultures from washout with few S. aureus growing, prelim.     - Increased vanc from 1 to 1.25 given vanc trough of 11, will follow ID recs  - Continue zosyn  - continue BELGICA drains  - SQH and SCDs for DVT PPx  - abdominal binder  - ambulate as tolerated  - encourage IS  - discharge pending final cultures and ID home abx recommendations       Plastic Surgery   [facility]: [id number]  [facility]: [phone]

## 2021-03-29 LAB
ANION GAP SERPL CALC-SCNC: 12 MMOL/L — SIGNIFICANT CHANGE UP (ref 7–14)
BUN SERPL-MCNC: 5 MG/DL — LOW (ref 7–23)
CALCIUM SERPL-MCNC: 9.3 MG/DL — SIGNIFICANT CHANGE UP (ref 8.4–10.5)
CHLORIDE SERPL-SCNC: 103 MMOL/L — SIGNIFICANT CHANGE UP (ref 98–107)
CO2 SERPL-SCNC: 23 MMOL/L — SIGNIFICANT CHANGE UP (ref 22–31)
CREAT SERPL-MCNC: 0.7 MG/DL — SIGNIFICANT CHANGE UP (ref 0.5–1.3)
GLUCOSE SERPL-MCNC: 89 MG/DL — SIGNIFICANT CHANGE UP (ref 70–99)
HCT VFR BLD CALC: 33.2 % — LOW (ref 34.5–45)
HGB BLD-MCNC: 10.4 G/DL — LOW (ref 11.5–15.5)
MCHC RBC-ENTMCNC: 29.8 PG — SIGNIFICANT CHANGE UP (ref 27–34)
MCHC RBC-ENTMCNC: 31.3 GM/DL — LOW (ref 32–36)
MCV RBC AUTO: 95.1 FL — SIGNIFICANT CHANGE UP (ref 80–100)
NRBC # BLD: 0 /100 WBCS — SIGNIFICANT CHANGE UP
NRBC # FLD: 0 K/UL — SIGNIFICANT CHANGE UP
PLATELET # BLD AUTO: 686 K/UL — HIGH (ref 150–400)
POTASSIUM SERPL-MCNC: 4.4 MMOL/L — SIGNIFICANT CHANGE UP (ref 3.5–5.3)
POTASSIUM SERPL-SCNC: 4.4 MMOL/L — SIGNIFICANT CHANGE UP (ref 3.5–5.3)
RBC # BLD: 3.49 M/UL — LOW (ref 3.8–5.2)
RBC # FLD: 13 % — SIGNIFICANT CHANGE UP (ref 10.3–14.5)
SODIUM SERPL-SCNC: 138 MMOL/L — SIGNIFICANT CHANGE UP (ref 135–145)
VANCOMYCIN TROUGH SERPL-MCNC: 20.3 UG/ML — HIGH (ref 10–20)
WBC # BLD: 7.86 K/UL — SIGNIFICANT CHANGE UP (ref 3.8–10.5)
WBC # FLD AUTO: 7.86 K/UL — SIGNIFICANT CHANGE UP (ref 3.8–10.5)

## 2021-03-29 PROCEDURE — 99233 SBSQ HOSP IP/OBS HIGH 50: CPT

## 2021-03-29 RX ORDER — VANCOMYCIN HCL 1 G
1000 VIAL (EA) INTRAVENOUS EVERY 12 HOURS
Refills: 0 | Status: DISCONTINUED | OUTPATIENT
Start: 2021-03-29 | End: 2021-04-01

## 2021-03-29 RX ORDER — SODIUM CHLORIDE 9 MG/ML
1000 INJECTION, SOLUTION INTRAVENOUS
Refills: 0 | Status: DISCONTINUED | OUTPATIENT
Start: 2021-03-29 | End: 2021-03-31

## 2021-03-29 RX ADMIN — Medication 650 MILLIGRAM(S): at 22:09

## 2021-03-29 RX ADMIN — Medication 3 MILLIGRAM(S): at 22:10

## 2021-03-29 RX ADMIN — HEPARIN SODIUM 5000 UNIT(S): 5000 INJECTION INTRAVENOUS; SUBCUTANEOUS at 05:26

## 2021-03-29 RX ADMIN — SENNA PLUS 2 TABLET(S): 8.6 TABLET ORAL at 22:10

## 2021-03-29 RX ADMIN — Medication 250 MILLIGRAM(S): at 17:37

## 2021-03-29 RX ADMIN — PIPERACILLIN AND TAZOBACTAM 25 GRAM(S): 4; .5 INJECTION, POWDER, LYOPHILIZED, FOR SOLUTION INTRAVENOUS at 04:23

## 2021-03-29 RX ADMIN — HEPARIN SODIUM 5000 UNIT(S): 5000 INJECTION INTRAVENOUS; SUBCUTANEOUS at 13:30

## 2021-03-29 RX ADMIN — Medication 25 MICROGRAM(S): at 05:26

## 2021-03-29 RX ADMIN — SODIUM CHLORIDE 50 MILLILITER(S): 9 INJECTION, SOLUTION INTRAVENOUS at 17:37

## 2021-03-29 RX ADMIN — HEPARIN SODIUM 5000 UNIT(S): 5000 INJECTION INTRAVENOUS; SUBCUTANEOUS at 22:10

## 2021-03-29 RX ADMIN — PANTOPRAZOLE SODIUM 40 MILLIGRAM(S): 20 TABLET, DELAYED RELEASE ORAL at 06:32

## 2021-03-29 NOTE — PROGRESS NOTE ADULT - ASSESSMENT
ASSESSMENT/PLAN:   MABLE QUICK is a 61yFemale s/p b/l REMI flap reconstruction (2/15) now s/p washout of abdominal wound 3/25. Cultures from washout with few S. aureus growing    - Vanc trough this AM 20.3, will hold AM dose  - Will discuss continueing zosyn w/ ID  - continue BELGICA drains  - SQH and SCDs for DVT PPx  - abdominal binder  - ambulate as tolerated  - encourage IS  - discharge pending final cultures and ID home abx recommendations     Van Anderson MD PGY1  Plastic Surgery   [facility]: [id number]  [facility]: [phone] ASSESSMENT/PLAN:   MABLE QUICK is a 61yFemale s/p b/l REMI flap reconstruction (2/15) now s/p washout of abdominal wound 3/25. Cultures from washout with few S. aureus growing    - Vanc trough this AM 20.3, will hold AM dose  - Will discuss continueing zosyn w/ ID  - continue BELGICA drains  - SQH and SCDs for DVT PPx  - F/U AM BMP  - abdominal binder  - ambulate as tolerated  - encourage IS  - discharge pending final cultures and ID home abx recommendations     Van Anderson MD PGY1  Plastic Surgery   [facility]: [id number]  [facility]: [phone]

## 2021-03-29 NOTE — PROVIDER CONTACT NOTE (OTHER) - BACKGROUND
s/p REMI 2/16 3/25 abdominal aspiration and drainage
REMI 2/17 right abdomen abscess evacuation and needle aspiration
s/p 2/17 REMI and 3/25 abdominal abscess aspiration and drainage- on contact precautions for MRSA in wound

## 2021-03-29 NOTE — PROVIDER CONTACT NOTE (OTHER) - REASON
pt redness advancing past line drawn on right thigh by team
pt redness spreading to right hip/ flank
pt had another episode of watery diarrhea

## 2021-03-29 NOTE — PROVIDER CONTACT NOTE (OTHER) - ACTION/TREATMENT ORDERED:
no new interventions at this time, not concerned with that area.  keep monitoring pt
no new interventions at this time, pt on abx and white blood cell count isn't elevated- continue to monitor
will see pt

## 2021-03-29 NOTE — PROGRESS NOTE ADULT - ATTENDING COMMENTS
Pt seen and examined. Agree with above  Afebrile overnight  No complaints  Wbc normal      Abd incision cdi, erythema improved, errol serosang  Right thigh and flank with erythema noted, no fluctuance, no tenderness    PLan  - Monitor right thigh  -Check WBC in am  -Cont IV abx as per ID  -OOB  -DVT prophyl

## 2021-03-29 NOTE — PROGRESS NOTE ADULT - ASSESSMENT
61 f with breast ca s/p delayed breast reconstruction with REMI based flaps on 2/15/2021, p/w abdominal pain, erythema and fever to 103.5  WBC 17  abd/pelvis CT: large peripherally enhancing fluid collection involving both sides of the ventral abdominal wall and communicating in the lower aspect with stranding in the adjacent subcutaneous fat and overlying skin thickening  s/p needle aspiration by Plastic Surg on 3/25 and then went to OR, R side of incision was opened and large amount of cloudy liquid drained    fever, leukocytosis, sepsis due to post-op abd fluid abscess and cellulitis after breast reconstruction with REMI    * s/p needle drainage/aspiration in ED then OR  by plastics on 3/25, aspiration cx showed MRSA but OR cx one with MSSA and one with MRSA  * I called the lab to clarify about both MRSA and MSSA, they will repeat the sensitivities   * vanco trough elevated, will hold vanco and restart at 1 q 12  * monitor the vanco trough and renal function to prevent nephrotoxicity  * DC zosyn  * will likely do a 10 day course post op, now day 5  * repeat abd/pelvis CT for resolution   * monitor the WBC/diff and temp curve    The above assessment and plan was discussed with the primary team    Yael Ruby MD  Pager [phone]  After 5pm and on weekends call [phone]

## 2021-03-30 LAB
ANION GAP SERPL CALC-SCNC: 10 MMOL/L — SIGNIFICANT CHANGE UP (ref 7–14)
BASOPHILS # BLD AUTO: 0.06 K/UL — SIGNIFICANT CHANGE UP (ref 0–0.2)
BASOPHILS NFR BLD AUTO: 0.7 % — SIGNIFICANT CHANGE UP (ref 0–2)
BUN SERPL-MCNC: 7 MG/DL — SIGNIFICANT CHANGE UP (ref 7–23)
CALCIUM SERPL-MCNC: 9 MG/DL — SIGNIFICANT CHANGE UP (ref 8.4–10.5)
CHLORIDE SERPL-SCNC: 105 MMOL/L — SIGNIFICANT CHANGE UP (ref 98–107)
CO2 SERPL-SCNC: 23 MMOL/L — SIGNIFICANT CHANGE UP (ref 22–31)
CREAT SERPL-MCNC: 0.58 MG/DL — SIGNIFICANT CHANGE UP (ref 0.5–1.3)
CULTURE RESULTS: SIGNIFICANT CHANGE UP
CULTURE RESULTS: SIGNIFICANT CHANGE UP
EOSINOPHIL # BLD AUTO: 0.33 K/UL — SIGNIFICANT CHANGE UP (ref 0–0.5)
EOSINOPHIL NFR BLD AUTO: 3.8 % — SIGNIFICANT CHANGE UP (ref 0–6)
GLUCOSE SERPL-MCNC: 88 MG/DL — SIGNIFICANT CHANGE UP (ref 70–99)
HCT VFR BLD CALC: 34.1 % — LOW (ref 34.5–45)
HGB BLD-MCNC: 10.8 G/DL — LOW (ref 11.5–15.5)
IANC: 4.85 K/UL — SIGNIFICANT CHANGE UP (ref 1.5–8.5)
IMM GRANULOCYTES NFR BLD AUTO: 1.2 % — SIGNIFICANT CHANGE UP (ref 0–1.5)
LYMPHOCYTES # BLD AUTO: 2.76 K/UL — SIGNIFICANT CHANGE UP (ref 1–3.3)
LYMPHOCYTES # BLD AUTO: 31.8 % — SIGNIFICANT CHANGE UP (ref 13–44)
MAGNESIUM SERPL-MCNC: 1.9 MG/DL — SIGNIFICANT CHANGE UP (ref 1.6–2.6)
MCHC RBC-ENTMCNC: 30.2 PG — SIGNIFICANT CHANGE UP (ref 27–34)
MCHC RBC-ENTMCNC: 31.7 GM/DL — LOW (ref 32–36)
MCV RBC AUTO: 95.3 FL — SIGNIFICANT CHANGE UP (ref 80–100)
MONOCYTES # BLD AUTO: 0.57 K/UL — SIGNIFICANT CHANGE UP (ref 0–0.9)
MONOCYTES NFR BLD AUTO: 6.6 % — SIGNIFICANT CHANGE UP (ref 2–14)
NEUTROPHILS # BLD AUTO: 4.85 K/UL — SIGNIFICANT CHANGE UP (ref 1.8–7.4)
NEUTROPHILS NFR BLD AUTO: 55.9 % — SIGNIFICANT CHANGE UP (ref 43–77)
NRBC # BLD: 0 /100 WBCS — SIGNIFICANT CHANGE UP
NRBC # FLD: 0 K/UL — SIGNIFICANT CHANGE UP
PHOSPHATE SERPL-MCNC: 3.2 MG/DL — SIGNIFICANT CHANGE UP (ref 2.5–4.5)
PLATELET # BLD AUTO: 723 K/UL — HIGH (ref 150–400)
POTASSIUM SERPL-MCNC: 3.9 MMOL/L — SIGNIFICANT CHANGE UP (ref 3.5–5.3)
POTASSIUM SERPL-SCNC: 3.9 MMOL/L — SIGNIFICANT CHANGE UP (ref 3.5–5.3)
RBC # BLD: 3.58 M/UL — LOW (ref 3.8–5.2)
RBC # FLD: 12.9 % — SIGNIFICANT CHANGE UP (ref 10.3–14.5)
SODIUM SERPL-SCNC: 138 MMOL/L — SIGNIFICANT CHANGE UP (ref 135–145)
SPECIMEN SOURCE: SIGNIFICANT CHANGE UP
SPECIMEN SOURCE: SIGNIFICANT CHANGE UP
WBC # BLD: 8.67 K/UL — SIGNIFICANT CHANGE UP (ref 3.8–10.5)
WBC # FLD AUTO: 8.67 K/UL — SIGNIFICANT CHANGE UP (ref 3.8–10.5)

## 2021-03-30 PROCEDURE — 99232 SBSQ HOSP IP/OBS MODERATE 35: CPT

## 2021-03-30 PROCEDURE — 74177 CT ABD & PELVIS W/CONTRAST: CPT | Mod: 26

## 2021-03-30 RX ADMIN — Medication 650 MILLIGRAM(S): at 21:07

## 2021-03-30 RX ADMIN — Medication 250 MILLIGRAM(S): at 06:15

## 2021-03-30 RX ADMIN — Medication 3 MILLIGRAM(S): at 21:07

## 2021-03-30 RX ADMIN — PANTOPRAZOLE SODIUM 40 MILLIGRAM(S): 20 TABLET, DELAYED RELEASE ORAL at 06:16

## 2021-03-30 RX ADMIN — HEPARIN SODIUM 5000 UNIT(S): 5000 INJECTION INTRAVENOUS; SUBCUTANEOUS at 06:16

## 2021-03-30 RX ADMIN — Medication 650 MILLIGRAM(S): at 15:40

## 2021-03-30 RX ADMIN — HEPARIN SODIUM 5000 UNIT(S): 5000 INJECTION INTRAVENOUS; SUBCUTANEOUS at 13:30

## 2021-03-30 RX ADMIN — Medication 650 MILLIGRAM(S): at 16:40

## 2021-03-30 RX ADMIN — SODIUM CHLORIDE 50 MILLILITER(S): 9 INJECTION, SOLUTION INTRAVENOUS at 17:20

## 2021-03-30 RX ADMIN — HEPARIN SODIUM 5000 UNIT(S): 5000 INJECTION INTRAVENOUS; SUBCUTANEOUS at 21:09

## 2021-03-30 RX ADMIN — SENNA PLUS 2 TABLET(S): 8.6 TABLET ORAL at 21:07

## 2021-03-30 RX ADMIN — Medication 25 MICROGRAM(S): at 06:16

## 2021-03-30 RX ADMIN — OXYCODONE HYDROCHLORIDE 5 MILLIGRAM(S): 5 TABLET ORAL at 13:30

## 2021-03-30 RX ADMIN — OXYCODONE HYDROCHLORIDE 5 MILLIGRAM(S): 5 TABLET ORAL at 14:08

## 2021-03-30 RX ADMIN — Medication 250 MILLIGRAM(S): at 17:20

## 2021-03-30 RX ADMIN — Medication 650 MILLIGRAM(S): at 04:18

## 2021-03-30 NOTE — PROGRESS NOTE ADULT - ASSESSMENT
61 f with breast ca s/p delayed breast reconstruction with REMI based flaps on 2/15/2021, p/w abdominal pain, erythema and fever to 103.5  WBC 17  abd/pelvis CT: large peripherally enhancing fluid collection involving both sides of the ventral abdominal wall and communicating in the lower aspect with stranding in the adjacent subcutaneous fat and overlying skin thickening  s/p needle aspiration by Plastic Surg on 3/25 and then went to OR, R side of incision was opened and large amount of cloudy liquid drained    fever, leukocytosis, sepsis due to post-op abd fluid abscess and cellulitis after breast reconstruction with REMI   s/p needle drainage/aspiration in ED then OR  by plastics on 3/25, aspiration cx showed MRSA but OR cx one with MSSA and one with MRSA  an erythematous warm area on the R thigh with itching and no tenderness, seems to be the extension of abd wall cellulitis but not tender, now improving  * I called the lab to clarify about both MRSA and MSSA, they will repeat the sensitivities   * c/w vanco  1 q 12  * monitor the vanco trough and renal function to prevent nephrotoxicity  * will likely do a 10-14 day course post op, now day 6  * repeat abd/pelvis CT for resolution, if improved, will switch to bactrim DS on discharge to complete the course  * monitor the WBC/diff and temp curve    The above assessment and plan was discussed with plastics    Yael Ruby MD  Pager [phone]  After 5pm and on weekends call [phone]

## 2021-03-30 NOTE — PROGRESS NOTE ADULT - ATTENDING COMMENTS
Pt seen and examined. Agree with above  NO significant events  Afebrile  WBC 8    Abd incisio cdi, erythema resolved, errol serous  R thigh erythema is slightly improved, flank area resolving, no fluctuance, no tenderness    Plan  -Cont IV abx as per ID  -Monitor right thigh  -OOB  -DVT prophyl

## 2021-03-30 NOTE — PROGRESS NOTE ADULT - ASSESSMENT
ASSESSMENT/PLAN:   MABLE QUICK is a 61yFemale s/p b/l REMI flap reconstruction (2/15) now s/p washout of abdominal wound 3/25. Cultures from washout with few S. aureus growing    - Vanc trough last 20.3, per ID will reduce to 1q12 - plan for total of 10 day course, 3/29 was day 5 of course.   - continue to monitor right hip/thigh erythema, no intervention at this time.   - D/c Zosyn at this time per ID  - continue BELGICA drains  - SQH and SCDs for DVT PPx  - F/U AM BMP   - continue abdominal binder  - ambulate as tolerated  - encourage IS  - discharge pending final cultures and ID home abx recommendations     Harshad Whiteside MD, PhD  Plastic Surgery Resident, PGY2  [facility]: [phone]  [facility]: [id number]

## 2021-03-31 LAB
ANION GAP SERPL CALC-SCNC: 14 MMOL/L — SIGNIFICANT CHANGE UP (ref 7–14)
BUN SERPL-MCNC: 7 MG/DL — SIGNIFICANT CHANGE UP (ref 7–23)
CALCIUM SERPL-MCNC: 9.2 MG/DL — SIGNIFICANT CHANGE UP (ref 8.4–10.5)
CHLORIDE SERPL-SCNC: 104 MMOL/L — SIGNIFICANT CHANGE UP (ref 98–107)
CO2 SERPL-SCNC: 23 MMOL/L — SIGNIFICANT CHANGE UP (ref 22–31)
CREAT SERPL-MCNC: 0.69 MG/DL — SIGNIFICANT CHANGE UP (ref 0.5–1.3)
CULTURE RESULTS: SIGNIFICANT CHANGE UP
GLUCOSE SERPL-MCNC: 83 MG/DL — SIGNIFICANT CHANGE UP (ref 70–99)
HCT VFR BLD CALC: 36.2 % — SIGNIFICANT CHANGE UP (ref 34.5–45)
HGB BLD-MCNC: 11.4 G/DL — LOW (ref 11.5–15.5)
MCHC RBC-ENTMCNC: 29.3 PG — SIGNIFICANT CHANGE UP (ref 27–34)
MCHC RBC-ENTMCNC: 31.5 GM/DL — LOW (ref 32–36)
MCV RBC AUTO: 93.1 FL — SIGNIFICANT CHANGE UP (ref 80–100)
NRBC # BLD: 0 /100 WBCS — SIGNIFICANT CHANGE UP
NRBC # FLD: 0 K/UL — SIGNIFICANT CHANGE UP
ORGANISM # SPEC MICROSCOPIC CNT: SIGNIFICANT CHANGE UP
PLATELET # BLD AUTO: 758 K/UL — HIGH (ref 150–400)
POTASSIUM SERPL-MCNC: 4.2 MMOL/L — SIGNIFICANT CHANGE UP (ref 3.5–5.3)
POTASSIUM SERPL-SCNC: 4.2 MMOL/L — SIGNIFICANT CHANGE UP (ref 3.5–5.3)
RBC # BLD: 3.89 M/UL — SIGNIFICANT CHANGE UP (ref 3.8–5.2)
RBC # FLD: 13.1 % — SIGNIFICANT CHANGE UP (ref 10.3–14.5)
SODIUM SERPL-SCNC: 141 MMOL/L — SIGNIFICANT CHANGE UP (ref 135–145)
SPECIMEN SOURCE: SIGNIFICANT CHANGE UP
VANCOMYCIN TROUGH SERPL-MCNC: 15.4 UG/ML — SIGNIFICANT CHANGE UP (ref 10–20)
WBC # BLD: 10.08 K/UL — SIGNIFICANT CHANGE UP (ref 3.8–10.5)
WBC # FLD AUTO: 10.08 K/UL — SIGNIFICANT CHANGE UP (ref 3.8–10.5)

## 2021-03-31 PROCEDURE — 99232 SBSQ HOSP IP/OBS MODERATE 35: CPT

## 2021-03-31 RX ORDER — OXYCODONE HYDROCHLORIDE 5 MG/1
5 TABLET ORAL EVERY 6 HOURS
Refills: 0 | Status: DISCONTINUED | OUTPATIENT
Start: 2021-03-31 | End: 2021-04-01

## 2021-03-31 RX ORDER — OXYCODONE HYDROCHLORIDE 5 MG/1
10 TABLET ORAL EVERY 4 HOURS
Refills: 0 | Status: DISCONTINUED | OUTPATIENT
Start: 2021-03-31 | End: 2021-04-01

## 2021-03-31 RX ADMIN — Medication 650 MILLIGRAM(S): at 21:40

## 2021-03-31 RX ADMIN — Medication 3 MILLIGRAM(S): at 21:40

## 2021-03-31 RX ADMIN — Medication 650 MILLIGRAM(S): at 09:37

## 2021-03-31 RX ADMIN — ONDANSETRON 4 MILLIGRAM(S): 8 TABLET, FILM COATED ORAL at 05:46

## 2021-03-31 RX ADMIN — Medication 250 MILLIGRAM(S): at 09:39

## 2021-03-31 RX ADMIN — HEPARIN SODIUM 5000 UNIT(S): 5000 INJECTION INTRAVENOUS; SUBCUTANEOUS at 21:40

## 2021-03-31 RX ADMIN — HEPARIN SODIUM 5000 UNIT(S): 5000 INJECTION INTRAVENOUS; SUBCUTANEOUS at 05:46

## 2021-03-31 RX ADMIN — Medication 650 MILLIGRAM(S): at 02:07

## 2021-03-31 RX ADMIN — PANTOPRAZOLE SODIUM 40 MILLIGRAM(S): 20 TABLET, DELAYED RELEASE ORAL at 05:46

## 2021-03-31 RX ADMIN — OXYCODONE HYDROCHLORIDE 5 MILLIGRAM(S): 5 TABLET ORAL at 06:40

## 2021-03-31 RX ADMIN — Medication 250 MILLIGRAM(S): at 21:40

## 2021-03-31 RX ADMIN — OXYCODONE HYDROCHLORIDE 5 MILLIGRAM(S): 5 TABLET ORAL at 05:46

## 2021-03-31 RX ADMIN — HEPARIN SODIUM 5000 UNIT(S): 5000 INJECTION INTRAVENOUS; SUBCUTANEOUS at 15:08

## 2021-03-31 RX ADMIN — Medication 25 MICROGRAM(S): at 05:46

## 2021-03-31 RX ADMIN — Medication 650 MILLIGRAM(S): at 15:08

## 2021-03-31 NOTE — PROGRESS NOTE ADULT - ASSESSMENT
61 f with breast ca s/p delayed breast reconstruction with REMI based flaps on 2/15/2021, p/w abdominal pain, erythema and fever to 103.5  WBC 17  abd/pelvis CT: large peripherally enhancing fluid collection involving both sides of the ventral abdominal wall and communicating in the lower aspect with stranding in the adjacent subcutaneous fat and overlying skin thickening  s/p needle aspiration by Plastic Surg on 3/25 and then went to OR, R side of incision was opened and large amount of cloudy liquid drained    fever, leukocytosis, sepsis due to post-op abd fluid abscess and cellulitis after breast reconstruction with REMI   s/p needle drainage/aspiration in ED then OR  by plastics on 3/25, aspiration cx and OR cx showed MRSA but only one cx showed MSSA  an erythematous warm area on the R thigh with itching and no tenderness, seems to be the extension of abd wall cellulitis but not tender, now improving  repeat CT 3/30 showed improvement in the abscess, with a residual 4 cm collection  * c/w vanco  1 q 12  * monitor the vanco trough and renal function to prevent nephrotoxicity  * will likely do a 10-14 day course post op, now day 7  * switch to bactrim DS on discharge to complete the course  * monitor the WBC/diff and temp curve      The above assessment and plan was discussed with plastics    Yael Ruby MD  Pager [phone]  After 5pm and on weekends call [phone]

## 2021-03-31 NOTE — PROGRESS NOTE ADULT - ATTENDING COMMENTS
Pt seen and examined. Agree with above.    Pt with lose BM's  Afebrile  WBC 10    Abd: incision cdi, erythema resolved, errol serosang  Thigh - erythema improved, no fluctuance    Plan  -Cont IV abx as per ID  -R/o c diff  -DVT prophyl

## 2021-03-31 NOTE — PROGRESS NOTE ADULT - ASSESSMENT
ASSESSMENT/PLAN:   MABLE QUICK is a 61yFemale s/p b/l REMI flap reconstruction (2/15) now s/p washout of abdominal wound 3/25. Cultures from washout with few S. aureus growing    - Vanc trough last 20.3, per ID will reduce to 1q12  - F/U AM Vanc trough  - Per ID, comfortable switching to bactrim to continue course  - C. Diff PCR ordered, follow up results  - continue to monitor right hip/thigh erythema, no intervention at this time.   - continue BELGICA drains  - SQH and SCDs for DVT PPx  - continue abdominal binder  - ambulate as tolerated  - encourage IS  - discharge pending final cultures and ID home abx recommendations     Van Anderson MD PGY1  Plastic Surgery   [facility]: [id number]  [facility]: [phone]

## 2021-04-01 ENCOUNTER — TRANSCRIPTION ENCOUNTER (OUTPATIENT)
Age: 61
End: 2021-04-01

## 2021-04-01 ENCOUNTER — APPOINTMENT (OUTPATIENT)
Dept: PLASTIC SURGERY | Facility: CLINIC | Age: 61
End: 2021-04-01

## 2021-04-01 VITALS — WEIGHT: 166.01 LBS

## 2021-04-01 PROCEDURE — G9005: CPT

## 2021-04-01 PROCEDURE — 99232 SBSQ HOSP IP/OBS MODERATE 35: CPT

## 2021-04-01 RX ADMIN — HEPARIN SODIUM 5000 UNIT(S): 5000 INJECTION INTRAVENOUS; SUBCUTANEOUS at 06:34

## 2021-04-01 RX ADMIN — PANTOPRAZOLE SODIUM 40 MILLIGRAM(S): 20 TABLET, DELAYED RELEASE ORAL at 06:34

## 2021-04-01 RX ADMIN — Medication 650 MILLIGRAM(S): at 13:54

## 2021-04-01 RX ADMIN — Medication 650 MILLIGRAM(S): at 06:34

## 2021-04-01 RX ADMIN — Medication 25 MICROGRAM(S): at 06:34

## 2021-04-01 NOTE — PROGRESS NOTE ADULT - REASON FOR ADMISSION
collection of abdominal wall s/p REMI breast reconstruction 2/15/2021

## 2021-04-01 NOTE — PROGRESS NOTE ADULT - SUBJECTIVE AND OBJECTIVE BOX
Follow Up:  abd wall abscess after breast reconstruction with REMI    Interval History: pt feels better, the OR cx now with MRSA and MSSA, the aspiration cx also MRSA    ROS:      All other systems negative    Constitutional: no fever, no chills  Cardiovascular:  no chest pain, no palpitation  Respiratory:  no SOB, no cough  GI:  still slight abd pain  no vomiting, no diarrhea  urinary: no dysuria, no hematuria, no flank pain  musculoskeletal:  no joint pain, no joint swelling  skin:  no rash  neurology:  no headache, no seizure        Allergies  Cipro (Unknown)        ANTIMICROBIALS:  vancomycin  IVPB 1000 every 12 hours      OTHER MEDS:  acetaminophen   Tablet .. 650 milliGRAM(s) Oral every 6 hours  benzocaine 15 mG/menthol 3.6 mG (Sugar-Free) Lozenge 1 Lozenge Oral every 3 hours PRN  calcium carbonate    500 mG (Tums) Chewable 1 Tablet(s) Chew every 4 hours PRN  diphenhydrAMINE   Injectable 25 milliGRAM(s) IntraMuscular every 4 hours PRN  heparin   Injectable 5000 Unit(s) SubCutaneous every 8 hours  lactated ringers. 1000 milliLiter(s) IV Continuous <Continuous>  levothyroxine 25 MICROGram(s) Oral daily  melatonin 3 milliGRAM(s) Oral at bedtime  metoclopramide Injectable 10 milliGRAM(s) IV Push every 4 hours PRN  ondansetron Injectable 4 milliGRAM(s) IV Push every 4 hours PRN  oxyCODONE    IR 10 milliGRAM(s) Oral every 4 hours PRN  oxyCODONE    IR 5 milliGRAM(s) Oral every 6 hours PRN  pantoprazole    Tablet 40 milliGRAM(s) Oral before breakfast  senna 2 Tablet(s) Oral at bedtime  simethicone 80 milliGRAM(s) Chew every 4 hours PRN      Vital Signs Last 24 Hrs  T(C): 36.9 (29 Mar 2021 13:41), Max: 36.9 (29 Mar 2021 13:41)  T(F): 98.5 (29 Mar 2021 13:41), Max: 98.5 (29 Mar 2021 13:41)  HR: 68 (29 Mar 2021 13:41) (52 - 68)  BP: 125/69 (29 Mar 2021 13:41) (104/67 - 126/73)  BP(mean): --  RR: 18 (29 Mar 2021 13:41) (17 - 18)  SpO2: 100% (29 Mar 2021 13:41) (97% - 100%)    Physical Exam:  General:    NAD,  non toxic  Cardio:     regular S1, S2,  no murmur  Respiratory:    clear b/l,    no wheezing  abd:   R side of incision now with sutures 2 drains, improved erythema and edema but still some firmness  :   no CVAT,  no suprapubic tenderness,   no  neal  Musculoskeletal:   no joint swelling  vascular: no phlebitis  Skin:    no rash                                     10.4   7.86  )-----------( 686      ( 29 Mar 2021 05:41 )             33.2       03-29    138  |  103  |  5<L>  ----------------------------<  89  4.4   |  23  |  0.70    Ca    9.3      29 Mar 2021 07:17            MICROBIOLOGY:  Vancomycin Level, Trough: 20.3 ug/mL (03-29-21 @ 05:41)  v  .Abscess ABDOMINAL WALL ABSCESS #2  03-26-21   Few Methicillin resistant Staphylococcus aureus  See previous culture [id number]  --  --      .Abscess ABDOMINAL WALL ABSCESS  03-26-21   Few Methicillin resistant Staphylococcus aureus  --  Methicillin resistant Staphylococcus aureus      .Tissue CUNTANEOUS ABSCESS OF ABDOMIANL WALL  03-26-21   Culture is being performed.  --  --      .Tissue CUNTANEOUS ABSCESS OF ABDOMINAL WALL  03-26-21   Testing in progress  --  --      .Tissue CUNTANEOUS ABSCESS OF ABDOMINAL WALL  03-26-21   Moderate Staphylococcus aureus  --  Staphylococcus aureus      .Other Abdominal Wall Wound  03-25-21   Few Methicillin resistant Staphylococcus aureus  --  Methicillin resistant Staphylococcus aureus      .Urine Clean Catch (Midstream)  03-24-21   <10,000 CFU/mL Normal [facility]  --  --      .Blood Blood-Peripheral  03-24-21   No growth to date.  --  --                RADIOLOGY:  Images independently visualized and reviewed personally, findings as below  < from: CT Abdomen and Pelvis w/ IV Cont (03.24.21 @ 21:19) >  IMPRESSION:  Large peripherally enhancing fluid collection involving both sides of the ventral abdominal wall and communicating in the lower aspect with stranding in the adjacent subcutaneous fat and overlying skin thickening. While this may represent a postoperative seroma superimposed infection cannot be excluded and clinical correlation is recommended. There is no intraperitoneal extension.    No acute finding in the abdomen or pelvis.    < end of copied text >  
  Follow Up:  abd wall abscess after breast reconstruction with REMI    Interval History: pt feels better, erythema improving     ROS:      All other systems negative    Constitutional: no fever, no chills  Cardiovascular:  no chest pain, no palpitation  Respiratory:  no SOB, no cough  GI:  improved abd pain  no vomiting, no diarrhea  urinary: no dysuria, no hematuria, no flank pain  musculoskeletal:  no joint pain, no joint swelling  skin:  no rash, improved erythema on the R flank and thigh  neurology:  no headache, no seizure        Allergies  Cipro (Unknown)        ANTIMICROBIALS:  vancomycin  IVPB 1000 every 12 hours      OTHER MEDS:  acetaminophen   Tablet .. 650 milliGRAM(s) Oral every 6 hours  benzocaine 15 mG/menthol 3.6 mG (Sugar-Free) Lozenge 1 Lozenge Oral every 3 hours PRN  calcium carbonate    500 mG (Tums) Chewable 1 Tablet(s) Chew every 4 hours PRN  diphenhydrAMINE   Injectable 25 milliGRAM(s) IntraMuscular every 4 hours PRN  heparin   Injectable 5000 Unit(s) SubCutaneous every 8 hours  levothyroxine 25 MICROGram(s) Oral daily  melatonin 3 milliGRAM(s) Oral at bedtime  metoclopramide Injectable 10 milliGRAM(s) IV Push every 4 hours PRN  ondansetron Injectable 4 milliGRAM(s) IV Push every 4 hours PRN  oxyCODONE    IR 5 milliGRAM(s) Oral every 6 hours PRN  oxyCODONE    IR 10 milliGRAM(s) Oral every 4 hours PRN  pantoprazole    Tablet 40 milliGRAM(s) Oral before breakfast  simethicone 80 milliGRAM(s) Chew every 4 hours PRN      Vital Signs Last 24 Hrs  T(C): 36.9 (01 Apr 2021 09:11), Max: 36.9 (31 Mar 2021 17:24)  T(F): 98.5 (01 Apr 2021 09:11), Max: 98.5 (31 Mar 2021 17:24)  HR: 56 (01 Apr 2021 09:11) (55 - 66)  BP: 110/59 (01 Apr 2021 09:11) (96/57 - 119/64)  BP(mean): --  RR: 15 (01 Apr 2021 09:11) (15 - 18)  SpO2: 100% (01 Apr 2021 09:11) (97% - 100%)    Physical Exam:  General:    NAD,  non toxic  Cardio:     regular S1, S2,  no murmur  Respiratory:    clear b/l,    no wheezing  abd:   R side of incision now with sutures 2 drains, resolving erythema and edema on the abd, and R thigh, not warm   :   no CVAT,  no suprapubic tenderness,   no  neal  Musculoskeletal:   no joint swelling  vascular: no phlebitis  Skin:    no rash                        11.7   8.31  )-----------( 731      ( 01 Apr 2021 07:23 )             37.6       04-01    138  |  104  |  8   ----------------------------<  75  4.3   |  24  |  0.66    Ca    9.3      01 Apr 2021 07:23            MICROBIOLOGY:  v  .Abscess ABDOMINAL WALL ABSCESS #2  03-26-21   Few Methicillin resistant Staphylococcus aureus  See previous culture [id number]  --  --      .Abscess ABDOMINAL WALL ABSCESS  03-26-21   Few Methicillin resistant Staphylococcus aureus  --  Methicillin resistant Staphylococcus aureus      .Tissue CUNTANEOUS ABSCESS OF ABDOMIANL WALL  03-26-21   Culture is being performed.  --  --      .Tissue CUNTANEOUS ABSCESS OF ABDOMINAL WALL  03-26-21   Testing in progress  --  --      .Tissue CUNTANEOUS ABSCESS OF ABDOMINAL WALL  03-25-21   Moderate Staphylococcus aureus  --  Staphylococcus aureus      .Other Abdominal Wall Wound  03-25-21   Few Methicillin resistant Staphylococcus aureus  --  Methicillin resistant Staphylococcus aureus      .Urine Clean Catch (Midstream)  03-24-21   <10,000 CFU/mL Normal [facility]  --  --      .Blood Blood-Peripheral  03-24-21   No Growth Final  --  --                RADIOLOGY:  Images independently visualized and reviewed personally, findings as below  < from: CT Abdomen and Pelvis w/ IV Cont (03.30.21 @ 18:15) >  IMPRESSION:  Interval drainage of ventral abdominal wall fluid collections with residual midline ventral abdominal wall fluid collection as above.      < end of copied text >  
  Follow Up:  abd wall abscess after breast reconstruction with REMI    Interval History: pt feels better, the R thigh erythema resolving, CT showed improvement in the abscess, with a residual 4 cm collection    ROS:      All other systems negative    Constitutional: no fever, no chills  Cardiovascular:  no chest pain, no palpitation  Respiratory:  no SOB, no cough  GI:  improved abd pain  no vomiting, no diarrhea  urinary: no dysuria, no hematuria, no flank pain  musculoskeletal:  no joint pain, no joint swelling  skin:  no rash, improved erythema on the R flank and thigh  neurology:  no headache, no seizure        Allergies  Cipro (Unknown)        ANTIMICROBIALS:  vancomycin  IVPB 1000 every 12 hours      OTHER MEDS:  acetaminophen   Tablet .. 650 milliGRAM(s) Oral every 6 hours  benzocaine 15 mG/menthol 3.6 mG (Sugar-Free) Lozenge 1 Lozenge Oral every 3 hours PRN  calcium carbonate    500 mG (Tums) Chewable 1 Tablet(s) Chew every 4 hours PRN  diphenhydrAMINE   Injectable 25 milliGRAM(s) IntraMuscular every 4 hours PRN  heparin   Injectable 5000 Unit(s) SubCutaneous every 8 hours  levothyroxine 25 MICROGram(s) Oral daily  melatonin 3 milliGRAM(s) Oral at bedtime  metoclopramide Injectable 10 milliGRAM(s) IV Push every 4 hours PRN  ondansetron Injectable 4 milliGRAM(s) IV Push every 4 hours PRN  oxyCODONE    IR 10 milliGRAM(s) Oral every 4 hours PRN  oxyCODONE    IR 5 milliGRAM(s) Oral every 6 hours PRN  pantoprazole    Tablet 40 milliGRAM(s) Oral before breakfast  simethicone 80 milliGRAM(s) Chew every 4 hours PRN      Vital Signs Last 24 Hrs  T(C): 36.4 (31 Mar 2021 10:05), Max: 36.8 (31 Mar 2021 05:45)  T(F): 97.5 (31 Mar 2021 10:05), Max: 98.2 (31 Mar 2021 05:45)  HR: 66 (31 Mar 2021 10:05) (62 - 66)  BP: 106/77 (31 Mar 2021 10:05) (106/77 - 126/73)  BP(mean): --  RR: 18 (31 Mar 2021 10:05) (18 - 18)  SpO2: 99% (31 Mar 2021 10:05) (97% - 100%)    Physical Exam:  General:    NAD,  non toxic  Cardio:     regular S1, S2,  no murmur  Respiratory:    clear b/l,    no wheezing  abd:   R side of incision now with sutures 2 drains, improved erythema and edema on the abd, and R thigh, not warm anymore   :   no CVAT,  no suprapubic tenderness,   no  neal  Musculoskeletal:   no joint swelling  vascular: no phlebitis  Skin:    no rash                          11.4   10.08 )-----------( 758      ( 31 Mar 2021 05:38 )             36.2       03-31    141  |  104  |  7   ----------------------------<  83  4.2   |  23  |  0.69    Ca    9.2      31 Mar 2021 05:38  Phos  3.2     03-30  Mg     1.9     03-30            MICROBIOLOGY:  Vancomycin Level, Trough: 15.4 ug/mL (03-31-21 @ 05:38)  v  .Abscess ABDOMINAL WALL ABSCESS #2  03-26-21   Few Methicillin resistant Staphylococcus aureus  See previous culture [id number]  --  --      .Abscess ABDOMINAL WALL ABSCESS  03-26-21   Few Methicillin resistant Staphylococcus aureus  --  Methicillin resistant Staphylococcus aureus      .Tissue CUNTANEOUS ABSCESS OF ABDOMIANL WALL  03-26-21   Culture is being performed.  --  --      .Tissue CUNTANEOUS ABSCESS OF ABDOMINAL WALL  03-26-21   Testing in progress  --  --      .Tissue CUNTANEOUS ABSCESS OF ABDOMINAL WALL  03-25-21   Moderate Staphylococcus aureus  --  Staphylococcus aureus      .Other Abdominal Wall Wound  03-25-21   Few Methicillin resistant Staphylococcus aureus  --  Methicillin resistant Staphylococcus aureus      .Urine Clean Catch (Midstream)  03-24-21   <10,000 CFU/mL Normal [facility]  --  --      .Blood Blood-Peripheral  03-24-21   No Growth Final  --  --                RADIOLOGY:  Images independently visualized and reviewed personally, findings as below  < from: CT Abdomen and Pelvis w/ IV Cont (03.30.21 @ 18:15) >  ABDOMINAL WALL: Postsurgical changes. Interval drainage of ventral abdominal wall fluid collections with residual midline ventral abdominal wall fluid collection containing tiny focus of air measuring 0.7 x 4.3 x 3.8 cm (TV x AP x CC).  BONES: Within normal limits.    IMPRESSION:  Interval drainage of ventral abdominal wall fluid collections with residual midline ventral abdominal wall fluid collection as above.      
  Follow Up:  abd wall abscess after breast reconstruction with REMI    Interval History: pt feels better, the R thigh erythema also improving    ROS:      All other systems negative    Constitutional: no fever, no chills  Cardiovascular:  no chest pain, no palpitation  Respiratory:  no SOB, no cough  GI:  improving abd pain  no vomiting, no diarrhea  urinary: no dysuria, no hematuria, no flank pain  musculoskeletal:  no joint pain, no joint swelling  skin:  no rash  neurology:  no headache, no seizure      Allergies  Cipro (Unknown)        ANTIMICROBIALS:  vancomycin  IVPB 1000 every 12 hours      OTHER MEDS:  acetaminophen   Tablet .. 650 milliGRAM(s) Oral every 6 hours  benzocaine 15 mG/menthol 3.6 mG (Sugar-Free) Lozenge 1 Lozenge Oral every 3 hours PRN  calcium carbonate    500 mG (Tums) Chewable 1 Tablet(s) Chew every 4 hours PRN  diphenhydrAMINE   Injectable 25 milliGRAM(s) IntraMuscular every 4 hours PRN  heparin   Injectable 5000 Unit(s) SubCutaneous every 8 hours  lactated ringers. 1000 milliLiter(s) IV Continuous <Continuous>  levothyroxine 25 MICROGram(s) Oral daily  melatonin 3 milliGRAM(s) Oral at bedtime  metoclopramide Injectable 10 milliGRAM(s) IV Push every 4 hours PRN  ondansetron Injectable 4 milliGRAM(s) IV Push every 4 hours PRN  oxyCODONE    IR 10 milliGRAM(s) Oral every 4 hours PRN  oxyCODONE    IR 5 milliGRAM(s) Oral every 6 hours PRN  pantoprazole    Tablet 40 milliGRAM(s) Oral before breakfast  senna 2 Tablet(s) Oral at bedtime  simethicone 80 milliGRAM(s) Chew every 4 hours PRN      Vital Signs Last 24 Hrs  T(C): 36.4 (30 Mar 2021 10:12), Max: 36.9 (29 Mar 2021 13:41)  T(F): 97.6 (30 Mar 2021 10:12), Max: 98.5 (29 Mar 2021 13:41)  HR: 66 (30 Mar 2021 10:12) (60 - 68)  BP: 111/69 (30 Mar 2021 10:12) (106/66 - 125/69)  BP(mean): --  RR: 18 (30 Mar 2021 10:12) (17 - 20)  SpO2: 100% (30 Mar 2021 10:12) (100% - 100%)    Physical Exam:  General:    NAD,  non toxic  Cardio:     regular S1, S2,  no murmur  Respiratory:    clear b/l,    no wheezing  abd:   R side of incision now with sutures 2 drains, improved erythema and edema on the abd, the R thigh has a large area of erythema and warmth but not tender, pt has itching over there but the erythema seems to be smaller based on the markings  :   no CVAT,  no suprapubic tenderness,   no  neal  Musculoskeletal:   no joint swelling  vascular: no phlebitis  Skin:    no rash                          10.8   8.67  )-----------( 723      ( 30 Mar 2021 06:29 )             34.1       03-30    138  |  105  |  7   ----------------------------<  88  3.9   |  23  |  0.58    Ca    9.0      30 Mar 2021 06:29  Phos  3.2     03-30  Mg     1.9     03-30            MICROBIOLOGY:  v  .Abscess ABDOMINAL WALL ABSCESS #2  03-26-21   Few Methicillin resistant Staphylococcus aureus  See previous culture [id number]  --  --      .Abscess ABDOMINAL WALL ABSCESS  03-26-21   Few Methicillin resistant Staphylococcus aureus  --  Methicillin resistant Staphylococcus aureus      .Tissue CUNTANEOUS ABSCESS OF ABDOMIANL WALL  03-26-21   Culture is being performed.  --  --      .Tissue CUNTANEOUS ABSCESS OF ABDOMINAL WALL  03-26-21   Testing in progress  --  --      .Tissue CUNTANEOUS ABSCESS OF ABDOMINAL WALL  03-26-21   Moderate Staphylococcus aureus  --  Staphylococcus aureus      .Other Abdominal Wall Wound  03-25-21   Few Methicillin resistant Staphylococcus aureus  --  Methicillin resistant Staphylococcus aureus      .Urine Clean Catch (Midstream)  03-24-21   <10,000 CFU/mL Normal [facility]  --  --      .Blood Blood-Peripheral  03-24-21   No Growth Final  --  --                RADIOLOGY:  Images independently visualized and reviewed personally, findings as below  < from: CT Abdomen and Pelvis w/ IV Cont (03.24.21 @ 21:19) >  IMPRESSION:  Large peripherally enhancing fluid collection involving both sides of the ventral abdominal wall and communicating in the lower aspect with stranding in the adjacent subcutaneous fat and overlying skin thickening. While this may represent a postoperative seroma superimposed infection cannot be excluded and clinical correlation is recommended. There is no intraperitoneal extension.    No acute finding in the abdomen or pelvis.    < end of copied text >  
  Follow Up:  abd wall abscess after breast reconstruction with REMI    Interval History: pt was taken to OR yesterday now afebrile, feels much better    ROS:      All other systems negative    Constitutional: no fever, no chills  Cardiovascular:  no chest pain, no palpitation  Respiratory:  no SOB, no cough  GI:  +abd pain on the R side, no vomiting, no diarrhea  urinary: no dysuria, no hematuria, no flank pain  musculoskeletal:  no joint pain, no joint swelling  skin:  no rash  neurology:  no headache, no seizure      Allergies  Cipro (Unknown)        ANTIMICROBIALS:  piperacillin/tazobactam IVPB.. 3.375 every 8 hours  vancomycin  IVPB 1000 every 12 hours  vancomycin  IVPB        OTHER MEDS:  acetaminophen   Tablet .. 650 milliGRAM(s) Oral every 6 hours  benzocaine 15 mG/menthol 3.6 mG (Sugar-Free) Lozenge 1 Lozenge Oral every 3 hours PRN  calcium carbonate    500 mG (Tums) Chewable 1 Tablet(s) Chew every 4 hours PRN  diphenhydrAMINE   Injectable 25 milliGRAM(s) IntraMuscular every 4 hours PRN  heparin   Injectable 5000 Unit(s) SubCutaneous every 8 hours  lactated ringers. 1000 milliLiter(s) IV Continuous <Continuous>  levothyroxine 25 MICROGram(s) Oral daily  melatonin 3 milliGRAM(s) Oral at bedtime  metoclopramide Injectable 10 milliGRAM(s) IV Push every 4 hours PRN  ondansetron Injectable 4 milliGRAM(s) IV Push every 4 hours PRN  oxyCODONE    IR 10 milliGRAM(s) Oral every 4 hours PRN  oxyCODONE    IR 5 milliGRAM(s) Oral every 6 hours PRN  pantoprazole    Tablet 40 milliGRAM(s) Oral before breakfast  senna 2 Tablet(s) Oral at bedtime  simethicone 80 milliGRAM(s) Chew every 4 hours PRN      Vital Signs Last 24 Hrs  T(C): 36.4 (26 Mar 2021 09:57), Max: 37.6 (25 Mar 2021 23:00)  T(F): 97.5 (26 Mar 2021 09:57), Max: 99.7 (25 Mar 2021 23:00)  HR: 64 (26 Mar 2021 09:58) (61 - 88)  BP: 80/50 (26 Mar 2021 09:58) (80/50 - 119/78)  BP(mean): 73 (25 Mar 2021 22:30) (73 - 83)  RR: 16 (26 Mar 2021 09:57) (11 - 23)  SpO2: 100% (26 Mar 2021 09:57) (95% - 100%)    Physical Exam:  General:    NAD,  non toxic  Cardio:     regular S1, S2,  no murmur  Respiratory:    clear b/l,    no wheezing  abd:   R side of incision now with sutures 2 drains, improved erythema and edema but   :   no CVAT,  no suprapubic tenderness,   no  neal  Musculoskeletal:   no joint swelling  vascular: no phlebitis  Skin:    no rash                        10.2   14.61 )-----------( 536      ( 26 Mar 2021 06:34 )             32.9           135  |  101  |  7   ----------------------------<  138<H>  4.2   |  24  |  0.72    Ca    8.7      26 Mar 2021 06:34  Phos  2.7       Mg     2.0         TPro  6.5  /  Alb  2.6<L>  /  TBili  0.3  /  DBili  x   /  AST  9   /  ALT  <5  /  AlkPhos  84        Urinalysis Basic - ( 24 Mar 2021 22:15 )    Color: Yellow / Appearance: Clear / S.005 / pH: x  Gluc: x / Ketone: Negative  / Bili: Negative / Urobili: Negative   Blood: x / Protein: Negative / Nitrite: Negative   Leuk Esterase: Trace / RBC: 0-4 /HPF / WBC 0-2 /HPF   Sq Epi: x / Non Sq Epi: Neg.-Few / Bacteria: Trace /HPF        MICROBIOLOGY:  v  .Tissue CUNTANEOUS ABSCESS OF ABDOMINAL WALL  21   Testing in progress  --  --      .Tissue CUNTANEOUS ABSCESS OF ABDOMINAL WALL  21 --  --    No polymorphonuclear cells seen per low power field  No organisms seen per oil power field      .Urine Clean Catch (Midstream)  21   <10,000 CFU/mL Normal [facility]  --  --      .Blood Blood-Peripheral  21   No growth to date.  --  --                RADIOLOGY:  Images independently visualized and reviewed personally, findings as below  < from: CT Abdomen and Pelvis w/ IV Cont (21 @ 21:19) >    IMPRESSION:  Large peripherally enhancing fluid collection involving both sides of the ventral abdominal wall and communicating in the lower aspect with stranding in the adjacent subcutaneous fat and overlying skin thickening. While this may represent a postoperative seroma superimposed infection cannot be excluded and clinical correlation is recommended. There is no intraperitoneal extension.    No acute finding in the abdomen or pelvis.    < end of copied text >  
Plastic Surgery Progress Note    S: Patient seen and examined at bedside. No events overnight. Pain well controlled. Denies chest pain, shortness of breath, nausea/vomiting    Physical Exam:    General: NAD  Breast: Bilateral with well healed REMI flaps in place. No erythema or s/s infection.   Abdomen: Improved erythema receding back from marked borders, though still present and not completely resolved. There is expected mild TTP across erythematous areas. Drains with appropriate SS output.   Extremities: R hip area with erythema and itching. Itching improved w/ benadryl    PAST MEDICAL HISTORY:  Breast cancer    Language barrier    Anxiety about mastectomy    Chronic bilateral low back pain, with sciatica presence unspecified    Smoker    Other chronic gastritis          MEDICATIONS:  acetaminophen   Tablet .. 650 milliGRAM(s) Oral every 6 hours  benzocaine 15 mG/menthol 3.6 mG (Sugar-Free) Lozenge 1 Lozenge Oral every 3 hours PRN  calcium carbonate    500 mG (Tums) Chewable 1 Tablet(s) Chew every 4 hours PRN  diphenhydrAMINE   Injectable 25 milliGRAM(s) IntraMuscular every 4 hours PRN  heparin   Injectable 5000 Unit(s) SubCutaneous every 8 hours  lactated ringers. 1000 milliLiter(s) IV Continuous <Continuous>  levothyroxine 25 MICROGram(s) Oral daily  melatonin 3 milliGRAM(s) Oral at bedtime  metoclopramide Injectable 10 milliGRAM(s) IV Push every 4 hours PRN  ondansetron Injectable 4 milliGRAM(s) IV Push every 4 hours PRN  oxyCODONE    IR 10 milliGRAM(s) Oral every 4 hours PRN  oxyCODONE    IR 5 milliGRAM(s) Oral every 6 hours PRN  pantoprazole    Tablet 40 milliGRAM(s) Oral before breakfast  piperacillin/tazobactam IVPB.. 3.375 Gram(s) IV Intermittent every 8 hours  senna 2 Tablet(s) Oral at bedtime  simethicone 80 milliGRAM(s) Chew every 4 hours PRN  vancomycin  IVPB 1250 milliGRAM(s) IV Intermittent every 12 hours      ALLERGIES:  Cipro (Unknown)      VITALS & I/Os:  Vital Signs Last 24 Hrs  T(C): 36.4 (29 Mar 2021 05:21), Max: 37.2 (28 Mar 2021 17:26)  T(F): 97.5 (29 Mar 2021 05:21), Max: 99 (28 Mar 2021 17:26)  HR: 58 (29 Mar 2021 05:21) (52 - 88)  BP: 120/72 (29 Mar 2021 05:21) (90/54 - 126/73)  BP(mean): --  RR: 18 (29 Mar 2021 05:21) (17 - 18)  SpO2: 98% (29 Mar 2021 05:21) (96% - 100%)    I&O's Summary    28 Mar 2021 07:01  -  29 Mar 2021 07:00  --------------------------------------------------------  IN: 3670 mL / OUT: 3196 mL / NET: 474 mL          LABS:                        10.4   7.86  )-----------( 686      ( 29 Mar 2021 05:41 )             33.2           Lactate:                  
Plastic Surgery Progress Note    S: Patient seen and examined at bedside. No events overnight. Pain well controlled. Denies chest pain, shortness of breath, nausea/vomiting    Physical Exam:  General: No acute distress  Respiratory: Nonlabored, no use of accessory muscles  Abdominal: Incisions c/d/i. No palpable collections. Decreasing erythema, drains intact w/ serosanguinous output    Extremities: WWP, moving all extremities spontaneously      PAST MEDICAL HISTORY:  Breast cancer    Language barrier    Anxiety about mastectomy    Chronic bilateral low back pain, with sciatica presence unspecified    Smoker    Other chronic gastritis          MEDICATIONS:  acetaminophen   Tablet .. 650 milliGRAM(s) Oral every 6 hours  benzocaine 15 mG/menthol 3.6 mG (Sugar-Free) Lozenge 1 Lozenge Oral every 3 hours PRN  calcium carbonate    500 mG (Tums) Chewable 1 Tablet(s) Chew every 4 hours PRN  diphenhydrAMINE   Injectable 25 milliGRAM(s) IntraMuscular every 4 hours PRN  heparin   Injectable 5000 Unit(s) SubCutaneous every 8 hours  lactated ringers. 1000 milliLiter(s) IV Continuous <Continuous>  levothyroxine 25 MICROGram(s) Oral daily  melatonin 3 milliGRAM(s) Oral at bedtime  metoclopramide Injectable 10 milliGRAM(s) IV Push every 4 hours PRN  ondansetron Injectable 4 milliGRAM(s) IV Push every 4 hours PRN  oxyCODONE    IR 10 milliGRAM(s) Oral every 4 hours PRN  oxyCODONE    IR 5 milliGRAM(s) Oral every 6 hours PRN  pantoprazole    Tablet 40 milliGRAM(s) Oral before breakfast  piperacillin/tazobactam IVPB.. 3.375 Gram(s) IV Intermittent every 8 hours  senna 2 Tablet(s) Oral at bedtime  simethicone 80 milliGRAM(s) Chew every 4 hours PRN  vancomycin  IVPB 1250 milliGRAM(s) IV Intermittent every 12 hours      ALLERGIES:  Cipro (Unknown)      VITALS & I/Os:  Vital Signs Last 24 Hrs  T(C): 36.9 (27 Mar 2021 10:37), Max: 37.3 (26 Mar 2021 22:24)  T(F): 98.4 (27 Mar 2021 10:37), Max: 99.2 (26 Mar 2021 22:24)  HR: 60 (27 Mar 2021 10:37) (60 - 77)  BP: 94/62 (27 Mar 2021 10:37) (94/62 - 98/65)  BP(mean): --  RR: 16 (27 Mar 2021 10:37) (16 - 17)  SpO2: 98% (27 Mar 2021 10:37) (98% - 100%)    I&O's Summary    26 Mar 2021 07:01  -  27 Mar 2021 07:00  --------------------------------------------------------  IN: 4520 mL / OUT: 1115 mL / NET: 3405 mL    27 Mar 2021 07:01  -  27 Mar 2021 14:51  --------------------------------------------------------  IN: 100 mL / OUT: 0 mL / NET: 100 mL          LABS:                        10.2   14.61 )-----------( 536      ( 26 Mar 2021 06:34 )             32.9     03-26    135  |  101  |  7   ----------------------------<  138<H>  4.2   |  24  |  0.72    Ca    8.7      26 Mar 2021 06:34    TPro  6.5  /  Alb  2.6<L>  /  TBili  0.3  /  DBili  x   /  AST  9   /  ALT  <5  /  AlkPhos  84  03-26    Lactate:                  
Plastic Surgery Progress Note    S: Patient seen and examined at bedside. No events overnight. Pain well controlled. Denies chest pain, shortness of breath, nausea/vomiting. No further diarrhea    General: NAD  Breast: Bilateral with well healed REMI flaps in place. No erythema or s/s infection.   Abdomen: Improved erythema receding back from marked borders, though still present and not completely resolved. There is expected mild TTP across erythematous areas. Drains intact with serosanguinous output.   Extremities: R hip area with erythema, further interval improvement relative to previously drawn margins       PAST MEDICAL HISTORY:  Other chronic gastritis    Smoker    Chronic bilateral low back pain, with sciatica presence unspecified    Anxiety about mastectomy    Language barrier    Breast cancer          MEDICATIONS:  acetaminophen   Tablet .. 650 milliGRAM(s) Oral every 6 hours  benzocaine 15 mG/menthol 3.6 mG (Sugar-Free) Lozenge 1 Lozenge Oral every 3 hours PRN  calcium carbonate    500 mG (Tums) Chewable 1 Tablet(s) Chew every 4 hours PRN  diphenhydrAMINE   Injectable 25 milliGRAM(s) IntraMuscular every 4 hours PRN  heparin   Injectable 5000 Unit(s) SubCutaneous every 8 hours  levothyroxine 25 MICROGram(s) Oral daily  melatonin 3 milliGRAM(s) Oral at bedtime  metoclopramide Injectable 10 milliGRAM(s) IV Push every 4 hours PRN  ondansetron Injectable 4 milliGRAM(s) IV Push every 4 hours PRN  oxyCODONE    IR 5 milliGRAM(s) Oral every 6 hours PRN  oxyCODONE    IR 10 milliGRAM(s) Oral every 4 hours PRN  pantoprazole    Tablet 40 milliGRAM(s) Oral before breakfast  simethicone 80 milliGRAM(s) Chew every 4 hours PRN  vancomycin  IVPB 1000 milliGRAM(s) IV Intermittent every 12 hours      ALLERGIES:  Cipro (Unknown)      VITALS & I/Os:  Vital Signs Last 24 Hrs  T(C): 36.3 (01 Apr 2021 06:30), Max: 36.9 (31 Mar 2021 17:24)  T(F): 97.4 (01 Apr 2021 06:30), Max: 98.5 (31 Mar 2021 17:24)  HR: 55 (01 Apr 2021 06:30) (55 - 66)  BP: 113/76 (01 Apr 2021 06:30) (96/57 - 119/64)  BP(mean): --  RR: 18 (01 Apr 2021 06:30) (17 - 18)  SpO2: 99% (01 Apr 2021 06:30) (97% - 99%)    I&O's Summary    31 Mar 2021 07:01  -  01 Apr 2021 07:00  --------------------------------------------------------  IN: 0 mL / OUT: 25 mL / NET: -25 mL          LABS:                        11.4   10.08 )-----------( 758      ( 31 Mar 2021 05:38 )             36.2     03-31    141  |  104  |  7   ----------------------------<  83  4.2   |  23  |  0.69    Ca    9.2      31 Mar 2021 05:38      Lactate:                  
Plastic Surgery Progress Note    S: Patient seen and examined at bedside. Overnight patient transferred from [facility]. 250 cc of serous fluid were aspirated from the abdominal fluid collection overnight. Currently in ED waiting for room.    Physical Exam:  General: No acute distress  Respiratory: Nonlabored, no use of accessory muscles  Abdominal: Mildly TTP about abdominal incision. Erythema around umbilicus and incision noted, margins marked. Incision clean, dry with erythema around incision. No evidence of dehiscence. No palpable fluid collection  Extremities: WWP, moving all extremities spontaneously      PAST MEDICAL HISTORY:  Breast cancer    Language barrier    Anxiety about mastectomy    Chronic bilateral low back pain, with sciatica presence unspecified    Smoker    Other chronic gastritis          MEDICATIONS:  acetaminophen   Tablet .. 650 milliGRAM(s) Oral every 6 hours PRN  heparin   Injectable 5000 Unit(s) SubCutaneous every 8 hours  lactated ringers. 1000 milliLiter(s) IV Continuous <Continuous>  levothyroxine 25 MICROGram(s) Oral daily  oxyCODONE    IR 5 milliGRAM(s) Oral every 6 hours PRN  pantoprazole    Tablet 40 milliGRAM(s) Oral before breakfast  piperacillin/tazobactam IVPB.. 3.375 Gram(s) IV Intermittent every 8 hours  vancomycin  IVPB 1000 milliGRAM(s) IV Intermittent every 12 hours  vancomycin  IVPB          ALLERGIES:  Cipro (Unknown)      VITALS & I/Os:  Vital Signs Last 24 Hrs  T(C): 36.8 (25 Mar 2021 05:55), Max: 39.7 (24 Mar 2021 20:22)  T(F): 98.2 (25 Mar 2021 05:55), Max: 103.4 (24 Mar 2021 20:22)  HR: 68 (25 Mar 2021 05:55) (66 - 103)  BP: 95/57 (25 Mar 2021 05:55) (84/53 - 118/62)  BP(mean): 79 (24 Mar 2021 23:00) (66 - 79)  RR: 15 (25 Mar 2021 05:55) (15 - 32)  SpO2: 100% (25 Mar 2021 05:55) (95% - 100%)    I&O's Summary        LABS:                        10.4   17.61 )-----------( 519      ( 25 Mar 2021 06:29 )             32.1     03-25    135  |  104  |  8   ----------------------------<  100<H>  4.1   |  23  |  0.67    Ca    8.7      25 Mar 2021 06:29  Phos  2.7       Mg     2.0         TPro  7.5  /  Alb  2.8<L>  /  TBili  0.4  /  DBili  x   /  AST  15  /  ALT  16  /  AlkPhos  99      Lactate: Lactate, Blood: 1.3 mmol/L ( @ 20:15)     PT/INR - ( 24 Mar 2021 20:15 )   PT: 14.1 sec;   INR: 1.17 ratio         PTT - ( 24 Mar 2021 20:15 )  PTT:31.4 sec          Urinalysis Basic - ( 24 Mar 2021 22:15 )    Color: Yellow / Appearance: Clear / S.005 / pH: x  Gluc: x / Ketone: Negative  / Bili: Negative / Urobili: Negative   Blood: x / Protein: Negative / Nitrite: Negative   Leuk Esterase: Trace / RBC: 0-4 /HPF / WBC 0-2 /HPF   Sq Epi: x / Non Sq Epi: Neg.-Few / Bacteria: Trace /HPF        
Plastic Surgery Progress Note    Subjective:     Patient seen and examined at bedside. Patient without complaints this AM. Denies N/V/F/C. reports pain is reduced and feeling better overall.     OBJECTIVE:     Vitals reviewed and as follows:   T(C): 36.4 (03-26-21 @ 05:47), Max: 37.6 (03-25-21 @ 23:00)  HR: 61 (03-26-21 @ 05:47) (61 - 88)  BP: 106/58 (03-26-21 @ 05:47) (100/59 - 119/78)  RR: 20 (03-26-21 @ 05:47) (11 - 23)  SpO2: 96% (03-26-21 @ 05:47) (95% - 100%)  Wt(kg): --    I&O's Detail    25 Mar 2021 07:01  -  26 Mar 2021 07:00  --------------------------------------------------------  IN:    IV PiggyBack: 450 mL    Lactated Ringers: 1500 mL  Total IN: 1950 mL    OUT:    Bulb (mL): 45 mL    Bulb (mL): 50 mL    Voided (mL): 1000 mL  Total OUT: 1095 mL    Total NET: 855 mL          PHYSICAL EXAM:    GENERAL: NAD, lying in bed comfortably  HEAD:  Atraumatic, Normocephalic  ENT: Moist mucous membranes  CHEST/LUNG: Unlabored respirations  ABDOMEN: Soft, tender over erythematous areas, erythema reduced, swelling reduced, drains holding suction with serous output, Nondistended.   NERVOUS SYSTEM:  Alert & Oriented X3, speech clear.   SKIN: No rashes or lesions    MEDICATIONS  (STANDING):  acetaminophen   Tablet .. 650 milliGRAM(s) Oral every 6 hours  heparin   Injectable 5000 Unit(s) SubCutaneous every 8 hours  lactated ringers. 1000 milliLiter(s) (125 mL/Hr) IV Continuous <Continuous>  levothyroxine 25 MICROGram(s) Oral daily  melatonin 3 milliGRAM(s) Oral at bedtime  pantoprazole    Tablet 40 milliGRAM(s) Oral before breakfast  piperacillin/tazobactam IVPB.. 3.375 Gram(s) IV Intermittent every 8 hours  senna 2 Tablet(s) Oral at bedtime  vancomycin  IVPB 1000 milliGRAM(s) IV Intermittent every 12 hours  vancomycin  IVPB        MEDICATIONS  (PRN):  benzocaine 15 mG/menthol 3.6 mG (Sugar-Free) Lozenge 1 Lozenge Oral every 3 hours PRN Sore Throat  calcium carbonate    500 mG (Tums) Chewable 1 Tablet(s) Chew every 4 hours PRN Heartburn  diphenhydrAMINE   Injectable 25 milliGRAM(s) IntraMuscular every 4 hours PRN Rash and/or Itching  metoclopramide Injectable 10 milliGRAM(s) IV Push every 4 hours PRN breakthrough nausea/vomiting  ondansetron Injectable 4 milliGRAM(s) IV Push every 4 hours PRN Nausea and/or Vomiting  oxyCODONE    IR 10 milliGRAM(s) Oral every 4 hours PRN Severe Pain (7 - 10)  oxyCODONE    IR 5 milliGRAM(s) Oral every 6 hours PRN Moderate Pain (4 - 6)  simethicone 80 milliGRAM(s) Chew every 4 hours PRN Gas      LABS:                          10.2   14.61 )-----------( 536      ( 26 Mar 2021 06:34 )             32.9     03-26    135  |  101  |  7   ----------------------------<  138<H>  4.2   |  24  |  0.72    Ca    8.7      26 Mar 2021 06:34  Phos  2.7     03-25  Mg     2.0     03-25    TPro  6.5  /  Alb  2.6<L>  /  TBili  0.3  /  DBili  x   /  AST  9   /  ALT  <5  /  AlkPhos  84  03-26    PT/INR - ( 24 Mar 2021 20:15 )   PT: 14.1 sec;   INR: 1.17 ratio         PTT - ( 24 Mar 2021 20:15 )  PTT:31.4 sec          
Plastic Surgery Progress Note (pg [facility]: [id number], [facility]: [phone])    SUBJECTIVE  The patient was seen and examined. Continues to endorse feeling better. No pain at rest. Walked without issue.     OBJECTIVE  ___________________________________________________  VITAL SIGNS / I&O's   Vital Signs Last 24 Hrs  T(C): 36.4 (28 Mar 2021 06:23), Max: 36.9 (27 Mar 2021 10:37)  T(F): 97.6 (28 Mar 2021 06:23), Max: 98.4 (27 Mar 2021 10:37)  HR: 59 (28 Mar 2021 06:23) (59 - 62)  BP: 103/62 (28 Mar 2021 06:23) (94/62 - 106/54)  BP(mean): --  RR: 18 (28 Mar 2021 06:23) (16 - 20)  SpO2: 98% (28 Mar 2021 06:23) (98% - 100%)      27 Mar 2021 07:01  -  28 Mar 2021 07:00  --------------------------------------------------------  IN:    IV PiggyBack: 800 mL    Lactated Ringers: 2875 mL  Total IN: 3675 mL    OUT:    Bulb (mL): 17.5 mL    Bulb (mL): 30 mL    Voided (mL): 1200 mL  Total OUT: 1247.5 mL    Total NET: 2427.5 mL        ___________________________________________________  PHYSICAL EXAM    NAD  Breast: Bilateral with well healed REMI flaps in place. No erythema or s/s infection.   Abdomen: Improved erythema receding back from marked borders, though still present and not completely resolved. There is expected mild TTP across erythematous areas. Drains with appropriate SS output.     ___________________________________________________  LABS            CAPILLARY BLOOD GLUCOSE              ___________________________________________________  MICRO  Recent Cultures:  Specimen Source: .Abscess ABDOMINAL WALL ABSCESS #2, 03-26 @ 03:33; Results   Few Staphylococcus aureus  See previous culture [id number]; Gram Stain: --; Organism: --  Specimen Source: .Abscess ABDOMINAL WALL ABSCESS, 03-26 @ 03:31; Results   Few Staphylococcus aureus; Gram Stain: --; Organism: --  Specimen Source: .Tissue CUNTANEOUS ABSCESS OF ABDOMIANL WALL, 03-26 @ 03:28; Results   Culture is being performed.; Gram Stain: --; Organism: --  Specimen Source: .Tissue CUNTANEOUS ABSCESS OF ABDOMINAL WALL, 03-26 @ 03:26; Results   Testing in progress; Gram Stain: --; Organism: --  Specimen Source: .Tissue CUNTANEOUS ABSCESS OF ABDOMINAL WALL, 03-26 @ 03:23; Results   Moderate Staphylococcus aureus; Gram Stain:   No polymorphonuclear cells seen per low power field  No organisms seen per oil power field; Organism: --  Specimen Source: .Other Abdominal Wall Wound, 03-25 @ 06:53; Results   Few Methicillin resistant Staphylococcus aureus; Gram Stain: --; Organism: Methicillin resistant Staphylococcus aureus  Specimen Source: .Urine Clean Catch (Midstream), 03-24 @ 22:15; Results   <10,000 CFU/mL Normal U[facility]; Gram Stain: --; Organism: --  Specimen Source: .Blood Blood-Peripheral, 03-24 @ 20:15; Results   No growth to date.; Gram Stain: --; Organism: --    ___________________________________________________  MEDICATIONS  (STANDING):  acetaminophen   Tablet .. 650 milliGRAM(s) Oral every 6 hours  heparin   Injectable 5000 Unit(s) SubCutaneous every 8 hours  lactated ringers. 1000 milliLiter(s) (125 mL/Hr) IV Continuous <Continuous>  levothyroxine 25 MICROGram(s) Oral daily  melatonin 3 milliGRAM(s) Oral at bedtime  pantoprazole    Tablet 40 milliGRAM(s) Oral before breakfast  piperacillin/tazobactam IVPB.. 3.375 Gram(s) IV Intermittent every 8 hours  senna 2 Tablet(s) Oral at bedtime  vancomycin  IVPB 1250 milliGRAM(s) IV Intermittent every 12 hours    MEDICATIONS  (PRN):  benzocaine 15 mG/menthol 3.6 mG (Sugar-Free) Lozenge 1 Lozenge Oral every 3 hours PRN Sore Throat  calcium carbonate    500 mG (Tums) Chewable 1 Tablet(s) Chew every 4 hours PRN Heartburn  diphenhydrAMINE   Injectable 25 milliGRAM(s) IntraMuscular every 4 hours PRN Rash and/or Itching  metoclopramide Injectable 10 milliGRAM(s) IV Push every 4 hours PRN breakthrough nausea/vomiting  ondansetron Injectable 4 milliGRAM(s) IV Push every 4 hours PRN Nausea and/or Vomiting  oxyCODONE    IR 5 milliGRAM(s) Oral every 6 hours PRN Moderate Pain (4 - 6)  oxyCODONE    IR 10 milliGRAM(s) Oral every 4 hours PRN Severe Pain (7 - 10)  simethicone 80 milliGRAM(s) Chew every 4 hours PRN Gas  
Plastic Surgery Progress Note    S: Patient seen and examined at bedside. Patient complaining of new onset epigastric pain with 4 episodes of diarrhea in the past day. CT last night showed interval decrease in size of collection    General: NAD  Breast: Bilateral with well healed REMI flaps in place. No erythema or s/s infection.   Abdomen: Improved erythema receding back from marked borders, though still present and not completely resolved. There is expected mild TTP across erythematous areas. Drains intact with serosanguinous output.   Extremities: R hip area with erythema, appears to be interval improvement relative to previously drawn margins     PAST MEDICAL HISTORY:  Other chronic gastritis    Smoker    Chronic bilateral low back pain, with sciatica presence unspecified    Anxiety about mastectomy    Language barrier    Breast cancer          MEDICATIONS:  acetaminophen   Tablet .. 650 milliGRAM(s) Oral every 6 hours  benzocaine 15 mG/menthol 3.6 mG (Sugar-Free) Lozenge 1 Lozenge Oral every 3 hours PRN  calcium carbonate    500 mG (Tums) Chewable 1 Tablet(s) Chew every 4 hours PRN  diphenhydrAMINE   Injectable 25 milliGRAM(s) IntraMuscular every 4 hours PRN  heparin   Injectable 5000 Unit(s) SubCutaneous every 8 hours  lactated ringers. 1000 milliLiter(s) IV Continuous <Continuous>  levothyroxine 25 MICROGram(s) Oral daily  melatonin 3 milliGRAM(s) Oral at bedtime  metoclopramide Injectable 10 milliGRAM(s) IV Push every 4 hours PRN  ondansetron Injectable 4 milliGRAM(s) IV Push every 4 hours PRN  oxyCODONE    IR 10 milliGRAM(s) Oral every 4 hours PRN  oxyCODONE    IR 5 milliGRAM(s) Oral every 6 hours PRN  pantoprazole    Tablet 40 milliGRAM(s) Oral before breakfast  simethicone 80 milliGRAM(s) Chew every 4 hours PRN  vancomycin  IVPB 1000 milliGRAM(s) IV Intermittent every 12 hours      ALLERGIES:  Cipro (Unknown)      VITALS & I/Os:  Vital Signs Last 24 Hrs  T(C): 36.8 (31 Mar 2021 05:45), Max: 36.9 (30 Mar 2021 13:36)  T(F): 98.2 (31 Mar 2021 05:45), Max: 98.4 (30 Mar 2021 13:36)  HR: 63 (31 Mar 2021 05:45) (62 - 67)  BP: 121/71 (31 Mar 2021 05:45) (111/69 - 126/73)  BP(mean): --  RR: 18 (31 Mar 2021 05:45) (18 - 18)  SpO2: 100% (31 Mar 2021 05:45) (97% - 100%)    I&O's Summary    30 Mar 2021 07:01  -  31 Mar 2021 07:00  --------------------------------------------------------  IN: 1250 mL / OUT: 2192.5 mL / NET: -942.5 mL          LABS:                        11.4   10.08 )-----------( 758      ( 31 Mar 2021 05:38 )             36.2     03-31    141  |  104  |  7   ----------------------------<  83  4.2   |  23  |  0.69    Ca    9.2      31 Mar 2021 05:38  Phos  3.2     03-30  Mg     1.9     03-30      Lactate:                  
Plastic Surgery Progress Note    S: Patient seen and examined at bedside. No events overnight. Pain well controlled. Denies chest pain, shortness of breath, nausea/vomiting    Physical Exam:    General: NAD  Breast: Bilateral with well healed REMI flaps in place. No erythema or s/s infection.   Abdomen: Improved erythema receding back from marked borders, though still present and not completely resolved. There is expected mild TTP across erythematous areas. Drains with appropriate SS output.   Extremities: R hip area with erythema and itching. Itching improved w/ benadryl. erythema appears to be retracting from previous marking.     PAST MEDICAL HISTORY:  Breast cancer    Language barrier    Anxiety about mastectomy    Chronic bilateral low back pain, with sciatica presence unspecified    Smoker    Other chronic gastritis          MEDICATIONS:  acetaminophen   Tablet .. 650 milliGRAM(s) Oral every 6 hours  benzocaine 15 mG/menthol 3.6 mG (Sugar-Free) Lozenge 1 Lozenge Oral every 3 hours PRN  calcium carbonate    500 mG (Tums) Chewable 1 Tablet(s) Chew every 4 hours PRN  diphenhydrAMINE   Injectable 25 milliGRAM(s) IntraMuscular every 4 hours PRN  heparin   Injectable 5000 Unit(s) SubCutaneous every 8 hours  lactated ringers. 1000 milliLiter(s) IV Continuous <Continuous>  levothyroxine 25 MICROGram(s) Oral daily  melatonin 3 milliGRAM(s) Oral at bedtime  metoclopramide Injectable 10 milliGRAM(s) IV Push every 4 hours PRN  ondansetron Injectable 4 milliGRAM(s) IV Push every 4 hours PRN  oxyCODONE    IR 10 milliGRAM(s) Oral every 4 hours PRN  oxyCODONE    IR 5 milliGRAM(s) Oral every 6 hours PRN  pantoprazole    Tablet 40 milliGRAM(s) Oral before breakfast  piperacillin/tazobactam IVPB.. 3.375 Gram(s) IV Intermittent every 8 hours  senna 2 Tablet(s) Oral at bedtime  simethicone 80 milliGRAM(s) Chew every 4 hours PRN  vancomycin  IVPB 1250 milliGRAM(s) IV Intermittent every 12 hours      ALLERGIES:  Cipro (Unknown)      VITALS & I/Os:      T(C): 36.3 (03-30-21 @ 06:19), Max: 36.9 (03-29-21 @ 13:41)  T(F): 97.4 (03-30-21 @ 06:19), Max: 98.5 (03-29-21 @ 13:41)  HR: 62 (03-30-21 @ 06:19) (60 - 68)  BP: 109/68 (03-30-21 @ 06:19) (104/67 - 125/69)  RR: 17 (03-30-21 @ 06:19) (17 - 20)  SpO2: 100% (03-30-21 @ 06:19) (100% - 100%)      29 Mar 2021 07:01  -  30 Mar 2021 07:00  --------------------------------------------------------  IN:    Lactated Ringers: 500 mL    Lactated Ringers: 1250 mL    Oral Fluid: 540 mL  Total IN: 2290 mL    OUT:    Bulb (mL): 28.5 mL    Bulb (mL): 13 mL    Voided (mL): 2600 mL  Total OUT: 2641.5 mL    Total NET: -351.5 mL              Lactate:                     10.8   8.67   )----------(  723       ( 30 Mar 2021 06:29 )               34.1      138    |  105    |  7      ----------------------------<  88         ( 30 Mar 2021 06:29 )  3.9     |  23     |  0.58     Ca    9.0        ( 30 Mar 2021 06:29 )  Phos  3.2       ( 30 Mar 2021 06:29 )  Mg     1.9       ( 30 Mar 2021 06:29 )          CAPILLARY BLOOD GLUCOSE

## 2021-04-01 NOTE — DISCHARGE NOTE PROVIDER - NSDCCPTREATMENT_GEN_ALL_CORE_FT
PRINCIPAL PROCEDURE  Procedure: Irrigation and debridement, wound, abdomen  Findings and Treatment:

## 2021-04-01 NOTE — PROGRESS NOTE ADULT - ASSESSMENT
ASSESSMENT/PLAN:   MABLE QUICK is a 61yFemale s/p b/l REMI flap reconstruction (2/15) now s/p washout of abdominal wound 3/25. Cultures from washout with few S. aureus growing    - F/u AM labs  - Vanc trough last 15, will continue 1g q12 while in house  - No further loose BMs, hold off on sending C diff  - continue to monitor right hip/thigh erythema, no intervention at this time.   - continue BELGICA drains  - SQH and SCDs for DVT PPx  - continue abdominal binder  - ambulate as tolerated  - encourage IS  - discharge pending AM labs- will change to Bactrim PO for rest of antibiotic course     Van Anderson MD PGY1  Plastic Surgery   [facility]: [id number]  [facility]: [phone]

## 2021-04-01 NOTE — DISCHARGE NOTE PROVIDER - NSDCMRMEDTOKEN_GEN_ALL_CORE_FT
Bactrim 400 mg-80 mg oral tablet: 2 tab(s) orally 2 times a day MDD:4 tabs Last doses should be taken on 4/8  levothyroxine 25 mcg (0.025 mg) oral tablet: 1 tab(s) orally once a day, am  pantoprazole 40 mg oral delayed release tablet: 1 tab(s) orally once a day, am

## 2021-04-01 NOTE — DISCHARGE NOTE PROVIDER - CARE PROVIDER_API CALL
Zbigniew Dejesus (MD)  ColonRectal Surgery; Plastic Surgery; Surgery; Surgery of the Hand  [address] [facility], [facility] [address]  [place] [address]  Phone: [phone]  Fax: [phone]  Follow Up Time:

## 2021-04-01 NOTE — DISCHARGE NOTE PROVIDER - NSDCCPCAREPLAN_GEN_ALL_CORE_FT
PRINCIPAL DISCHARGE DIAGNOSIS  Diagnosis: Abdominal wall abscess  Assessment and Plan of Treatment:

## 2021-04-01 NOTE — DIETITIAN INITIAL EVALUATION ADULT. - PERTINENT LABORATORY DATA
04-01 Na138 mmol/L Glu 75 mg/dL K+ 4.3 mmol/L Cr  0.66 mg/dL BUN 8 mg/dL 03-30 Phos 3.2 mg/dL 03-26 Alb 2.6 g/dL<L>

## 2021-04-01 NOTE — DIETITIAN INITIAL EVALUATION ADULT. - OTHER INFO
Initial Dietitian Evaluation 2/2 to extended length of stay. Pt reports good PO intake and appetite at this time as well as prior to admission. Patient denies any nausea/vomiting/diarrhea/constipation or difficulty chewing and swallowing. No Wt loss reported.  Reports plans for d/c home today. Admit Wt. 75.3kg 3/25, bed scale weight obtained during encounter 4/1 81.0 kg.

## 2021-04-01 NOTE — DISCHARGE NOTE PROVIDER - NSDCFUADDINST_GEN_ALL_CORE_FT
- Resume normal diet. Avoid straining, exercise, or heavy lifting.  - Take medications as instructed by prescriptions.  - 24-48 hrs after surgery, it's OK to shower/rinse with warm/soapy water, pat dry (NO scrubbing or rubbing).  - Do not raise arms above head.  - You will be discharged with BELGICA drains. You will need to empty them and record outputs accurately. This will be taught to you by the nursing staff. Please do not remove the BELGICA drains. They will be removed in the office. Please bring to the office accurate records of output.   - Abdomen: Keep dressings clean, dry, and intact. Do not remove them until you're seen in the office  - Avoid heavy activities and (sudden) lifting.  - Call 911 and return to the ED for chest pain, shortness of breath, significant increase in pain, or significant change in color of surgical sites  - Resume normal diet. Avoid straining, exercise, or heavy lifting.  - Take medications as instructed by prescriptions.  - 24-48 hrs after surgery, it's OK to shower/rinse with warm/soapy water, pat dry (NO scrubbing or rubbing).  - Do not raise arms above head.  - You will be discharged with BELGICA drains. You will need to empty them and record outputs accurately. This will be taught to you by the nursing staff. Please do not remove the BELGICA drains. They will be removed in the office. Please bring to the office accurate records of output.   - Abdomen: Keep dressings clean, dry, and intact. Keep abdominal binder on except to shower. Do not remove dressings until you're seen in the office  - Avoid heavy activities and (sudden) lifting.  - Call 911 and return to the ED for chest pain, shortness of breath, significant increase in pain, or significant change in color of surgical sites

## 2021-04-01 NOTE — DISCHARGE NOTE PROVIDER - NSDCFUSCHEDAPPT_GEN_ALL_CORE_FT
MABLE QUICK ; 04/01/2021 ; [facility]  MABLE QUICK ; 04/08/2021 ; NPP [facility]  MABLE QUICK ; 06/07/2021 ; NPP [address] MABLE QUICK ; 06/11/2021 ; [address]  MABLE QUICK ; 07/08/2021 ; [address]

## 2021-04-01 NOTE — PROGRESS NOTE ADULT - ASSESSMENT
61 f with breast ca s/p delayed breast reconstruction with REMI based flaps on 2/15/2021, p/w abdominal pain, erythema and fever to 103.5  WBC 17  abd/pelvis CT: large peripherally enhancing fluid collection involving both sides of the ventral abdominal wall and communicating in the lower aspect with stranding in the adjacent subcutaneous fat and overlying skin thickening  s/p needle aspiration by Plastic Surg on 3/25 and then went to OR, R side of incision was opened and large amount of cloudy liquid drained    fever, leukocytosis, sepsis due to post-op abd fluid abscess and cellulitis after breast reconstruction with REMI   s/p needle drainage/aspiration in ED then OR  by plastics on 3/25, aspiration cx and OR cx showed MRSA but only one cx showed MSSA  an erythematous warm area on the R thigh with itching and no tenderness, seems to be the extension of abd wall cellulitis but not tender, now improving  repeat CT 3/30 showed improvement in the abscess, with a residual 4 cm collection  * c/w vanco  1 q 12  * monitor the vanco trough and renal function to prevent nephrotoxicity  * will do a 14 day course post op in view of large abscess and residual collection, now day 8  * switch to bactrim DS on discharge to complete the course  * f/u with plastics and ID as outpatient       The above assessment and plan was discussed with plastics    Yael Ruby MD  Pager [phone]  After 5pm and on weekends call [phone]

## 2021-04-01 NOTE — DIETITIAN INITIAL EVALUATION ADULT. - PERTINENT MEDS FT
MEDICATIONS  (STANDING):  acetaminophen   Tablet .. 650 milliGRAM(s) Oral every 6 hours  heparin   Injectable 5000 Unit(s) SubCutaneous every 8 hours  levothyroxine 25 MICROGram(s) Oral daily  melatonin 3 milliGRAM(s) Oral at bedtime  pantoprazole    Tablet 40 milliGRAM(s) Oral before breakfast  vancomycin  IVPB 1000 milliGRAM(s) IV Intermittent every 12 hours

## 2021-04-01 NOTE — PROGRESS NOTE ADULT - PROVIDER SPECIALTY LIST ADULT
Infectious Disease
Plastic Surgery
Infectious Disease
Plastic Surgery
Infectious Disease
Plastic Surgery

## 2021-04-01 NOTE — DISCHARGE NOTE NURSING/CASE MANAGEMENT/SOCIAL WORK - NSDCPNINST_GEN_ALL_CORE
Call MD with any problems or questions. Notify MD for fever, increased drainage from incision or drains

## 2021-04-01 NOTE — DISCHARGE NOTE PROVIDER - HOSPITAL COURSE
Patient who underwent REMI on 2/15 returned to ED w/ fevers and erythema of her abdomen. Was found to have large fluid collection. She debridement and washout of abdominal incision in the OR. The patient tolerated the procedure well. Postoperatively the patient was hemodynamically stable; was transferred to a surgical floor; was advanced to a regular diet; was placed on her home medications; and was out of bed to a chair. Cultures of her wound were positive for staph aureus. Appropriate antibiotic therapy was initiated.     At the time of discharge, the patient was hemodynamically stable, was tolerating PO diet, was voiding urine, was ambulating, and was comfortable with adequate pain control. The patient was instructed to follow up with Dr. Dejesus within x1 week after discharge from the hospital. The patient/family felt comfortable with discharge. The patient received prescriptions for antibiotics. The patient had no other issues. Patient who underwent REMI on 2/15 returned to ED w/ fevers and erythema of her abdomen and sepsis overall likely secondary to abdominal wall collection. Was found to have large fluid collection. She debridement and washout of abdominal incision in the OR. The patient tolerated the procedure well. Postoperatively the patient was hemodynamically stable; was transferred to a surgical floor; was advanced to a regular diet; was placed on her home medications; and was out of bed to a chair. Cultures of her wound were positive for staph aureus. Appropriate antibiotic therapy was initiated.     At the time of discharge, the patient was hemodynamically stable, was tolerating PO diet, was voiding urine, was ambulating, and was comfortable with adequate pain control. The patient was instructed to follow up with Dr. Dejesus within x1 week after discharge from the hospital. The patient/family felt comfortable with discharge. The patient received prescriptions for antibiotics. The patient had no other issues.

## 2021-04-01 NOTE — DISCHARGE NOTE NURSING/CASE MANAGEMENT/SOCIAL WORK - PATIENT PORTAL LINK FT
You can access the FollowMyHealth Patient Portal offered by [facility] by registering at the following website: [email or web address]. By joining [employer]’s FollowMyHealth portal, you will also be able to view your health information using other applications (apps) compatible with our system.

## 2021-04-02 ENCOUNTER — NON-APPOINTMENT (OUTPATIENT)
Age: 61
End: 2021-04-02

## 2021-04-06 ENCOUNTER — APPOINTMENT (OUTPATIENT)
Dept: PLASTIC SURGERY | Facility: CLINIC | Age: 61
End: 2021-04-06
Payer: MEDICAID

## 2021-04-06 PROCEDURE — 99024 POSTOP FOLLOW-UP VISIT: CPT

## 2021-04-07 ENCOUNTER — NON-APPOINTMENT (OUTPATIENT)
Age: 61
End: 2021-04-07

## 2021-04-08 ENCOUNTER — RESULT REVIEW (OUTPATIENT)
Age: 61
End: 2021-04-08

## 2021-04-08 ENCOUNTER — APPOINTMENT (OUTPATIENT)
Dept: HEMATOLOGY ONCOLOGY | Facility: CLINIC | Age: 61
End: 2021-04-08
Payer: MEDICAID

## 2021-04-08 VITALS
TEMPERATURE: 97.1 F | SYSTOLIC BLOOD PRESSURE: 108 MMHG | DIASTOLIC BLOOD PRESSURE: 72 MMHG | RESPIRATION RATE: 17 BRPM | HEIGHT: 63.03 IN | WEIGHT: 166.45 LBS | BODY MASS INDEX: 29.49 KG/M2 | OXYGEN SATURATION: 99 % | HEART RATE: 70 BPM

## 2021-04-08 LAB
ALBUMIN SERPL ELPH-MCNC: 3.6 G/DL
ALP BLD-CCNC: 106 U/L
ALT SERPL-CCNC: 13 U/L
ANION GAP SERPL CALC-SCNC: 10 MMOL/L
AST SERPL-CCNC: 16 U/L
BASOPHILS # BLD AUTO: 0.04 K/UL — SIGNIFICANT CHANGE UP (ref 0–0.2)
BASOPHILS NFR BLD AUTO: 0.6 % — SIGNIFICANT CHANGE UP (ref 0–2)
BILIRUB SERPL-MCNC: <0.2 MG/DL
BUN SERPL-MCNC: 12 MG/DL
CALCIUM SERPL-MCNC: 8.8 MG/DL
CHLORIDE SERPL-SCNC: 106 MMOL/L
CO2 SERPL-SCNC: 22 MMOL/L
CREAT SERPL-MCNC: 0.77 MG/DL
EOSINOPHIL # BLD AUTO: 0.31 K/UL — SIGNIFICANT CHANGE UP (ref 0–0.5)
EOSINOPHIL NFR BLD AUTO: 4.8 % — SIGNIFICANT CHANGE UP (ref 0–6)
GLUCOSE SERPL-MCNC: 76 MG/DL
HCT VFR BLD CALC: 36.4 % — SIGNIFICANT CHANGE UP (ref 34.5–45)
HGB BLD-MCNC: 11.3 G/DL — LOW (ref 11.5–15.5)
IMM GRANULOCYTES NFR BLD AUTO: 0.3 % — SIGNIFICANT CHANGE UP (ref 0–1.5)
LYMPHOCYTES # BLD AUTO: 2 K/UL — SIGNIFICANT CHANGE UP (ref 1–3.3)
LYMPHOCYTES # BLD AUTO: 31.1 % — SIGNIFICANT CHANGE UP (ref 13–44)
MCHC RBC-ENTMCNC: 30 PG — SIGNIFICANT CHANGE UP (ref 27–34)
MCHC RBC-ENTMCNC: 31 G/DL — LOW (ref 32–36)
MCV RBC AUTO: 96.6 FL — SIGNIFICANT CHANGE UP (ref 80–100)
MONOCYTES # BLD AUTO: 0.61 K/UL — SIGNIFICANT CHANGE UP (ref 0–0.9)
MONOCYTES NFR BLD AUTO: 9.5 % — SIGNIFICANT CHANGE UP (ref 2–14)
NEUTROPHILS # BLD AUTO: 3.46 K/UL — SIGNIFICANT CHANGE UP (ref 1.8–7.4)
NEUTROPHILS NFR BLD AUTO: 53.7 % — SIGNIFICANT CHANGE UP (ref 43–77)
NRBC # BLD: 0 /100 WBCS — SIGNIFICANT CHANGE UP (ref 0–0)
PLATELET # BLD AUTO: 391 K/UL — SIGNIFICANT CHANGE UP (ref 150–400)
POTASSIUM SERPL-SCNC: 4.4 MMOL/L
PROT SERPL-MCNC: 6.6 G/DL
RBC # BLD: 3.77 M/UL — LOW (ref 3.8–5.2)
RBC # FLD: 13.6 % — SIGNIFICANT CHANGE UP (ref 10.3–14.5)
SODIUM SERPL-SCNC: 138 MMOL/L
WBC # BLD: 6.44 K/UL — SIGNIFICANT CHANGE UP (ref 3.8–10.5)
WBC # FLD AUTO: 6.44 K/UL — SIGNIFICANT CHANGE UP (ref 3.8–10.5)

## 2021-04-08 PROCEDURE — 99214 OFFICE O/P EST MOD 30 MIN: CPT

## 2021-04-08 PROCEDURE — 99072 ADDL SUPL MATRL&STAF TM PHE: CPT

## 2021-04-08 RX ORDER — CEPHALEXIN 500 MG/1
500 CAPSULE ORAL
Qty: 20 | Refills: 0 | Status: DISCONTINUED | COMMUNITY
Start: 2021-02-18

## 2021-04-08 NOTE — REASON FOR VISIT
[Follow-Up Visit] : a follow-up [[employer] ] : provided by [employer]   [FreeTextEntry2] : Right Breast Cancer [TWNoteComboBox1] : Estonian

## 2021-04-08 NOTE — PHYSICAL EXAM
[Fully active, able to carry on all pre-disease performance without restriction] : Status 0 - Fully active, able to carry on all pre-disease performance without restriction [Normal] : grossly intact [de-identified] : appears well [de-identified] : no icterus or injection [de-identified] : normal respiratory pattern [de-identified] : REMI reconstruction b/l well healed, no masses or adenopathy palpable b/l [de-identified] : abdominal scar well healing with dressing, BELGICA drain in place without drainage noted

## 2021-04-08 NOTE — HISTORY OF PRESENT ILLNESS
[de-identified] : Uma is presenting for breast medical oncology follow up.  \par \par Last mammogram ~ prior to this presentation\par \par Screening mammo 18: new 1cm mass in the upper medial Right breast. \par 18 diagnostic mammo and US: R breast 12:00 3-4cm FN 1cm slightly bi-lobed hypoechoic lesion corresponding with mammographic findings. BIRADS 4\par \par US guided biopsy Right 12:00 3-4FN 18: revealed a final pathology of invasive carcinoma with neuroendocrine features (Er-Pr-Her2-), Sarah grade 3, 8mm in specimen.\par \par Pathology Report (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 IHC stains for synaptophysin and chromogranin demonstrate nonspecific extremely scant, barely perceptible staining. Definitive characterization of the lesion is best deferred to the resection specimen. ER 0% MA 0% HER2 IHC 0 negative\par \par 10/17/18 Addendum:\par IHC negative ALPESH-3, TTF-1, CDX-2\par Although in this limited sample the tumor demonstrated morphologic features similar to small cell neuroendocrine carcinoma, the overall findings are nonspecific regarding definitive histologic subtype and site of origin. Clinical correlation is necessary.\par \par 10/19/18 pathology review  [facility]: Poorly differentiated carcinoma, at least 6mm, LVI not seen. Immunostains done for ER/MA/HER2/GATA3/CDX2/TTF-1 are negative in tumor cells (done at [facility]). Tumor histology shows small cell features (molding, apoptotic bodies, and extensive mitotic activity). Cannot determine histologic subtype and/or site of origin. Clinicopathologic and radiologic correlation strongly recommended.\par \par She saw Dr. Carter (plastic surgery) and then Dr. Raymundo (breast surgery) on 10/9 and noted her desire for breast conserving surgery. She was referred for breast MRI and genetic testing given strong family history. 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 At 12-1:00 axis retroareolar there is 0.4cm focus of enhancement, 12:00 axis posterior third linear non mass enhancement. L breast no suspicious enhancement. Axilla unremarkable. BIRADS4 If breast conservation is a consideration, 2-site MRI guided biopsy of non-mass enhancement within the anterior and posterior upper breast is recommended.\par \par She saw me initially 10/24 - diagnostic CT C/A/P and PETCT performed - no masses other than small breast mass with clip; PET with SUV 8 avidity in sigmoid colon (patient noted eating dairy products that week and some loose BMs in retrospect). Dr. Horn her gastroenterologist performed Colonoscopy  with random biopsies which were benign.\par \par  she underwent b/l mastectomy with Dr. Raymundo. 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 Hypermetabolic subcutaneous nodule right upper arm lateral aspect (not included on prior PET/CT) - SUV 2.5 (patient notes recent flu shot with hematoma). Interval resolution of rectosigmoid FDG avidity.\par \par She started adjuvant chemotherapy (cisplatin/etoposide x 4 cyles) on 19, completed 2019. Grade 1 neuropathy, otherwise tolerated treatment well. Surveillance imaging per NCCN guidelines for high grade SCCs without evidence of recurrence since.\par \par PET/CT, 20- 1. Further decreased extent and FDG avidity of nodular thickening left mastectomy bed. 2. Resolved left internal mammary lymph node. 3. Resolved hypermetabolic brown adipose tissue in the cervical region and thorax. 4. No new FDG avid disease.\par \par PET/CT 2020 showed 3.1cm R adnexal mass non avid, possible Calcified leiomyomata\par \par Pertinent Medical History:\par She lives in [place] with her 3 children. She works as a  in a restaurant full time prior, now retired.\par 2018 hand burn at work\par GERD: GI Dr. Gilbert Horn. Normal C-scope with internal hemorrhoids 18. Abd US and CT 17 fatty liver, 7mm R hepatic focus calcifications, unchanged. Colonoscopy, 2018 ok\par Headaches: MRI without acute findings years ago\par Microscopic hematuria: Cystoscopy 17 without significant findings (Dr. Nadya Hu), takes oxybutinin for overactive bladder\par Seasonal Allergies\par Hypothyroid\par Family history of breast cancer in her maternal aunts x 3 (ages unknown). Genetic testing negative with Dr. Raymundo\par Menarche age 11, menopause age 48. , no prior OCPs or hormonal therapies\par Active Smoker for many years, quit with this diagnosis\par PMD: Dr. Dom Coello\par GYN: Dr. Ibarra\par \par  [de-identified] : Patient is here today for follow up. Used  phone to speak with patient, ID #[id number] Pt has been seeing Dr. Dejesus after undergoing bilateral REMI flap reconstruction on 2/15/21. She reports she got an infection 6 weeks after surgery, she stayed in [facility] for 7 days during which she received abx.. She now has a BELGICA drain in place, which she states will likely be removed next week. She has a bandage in place, which she changes every 2-3 days. The incision wound is not draining any fluid. \par Otherwise, feeling well. No recent fevers, chills, nausea, vomiting, diarrhea. States that in 2-3 months time will go for surgery once again to remove excess skin from both breasts. \par Pt is up to date with her gynecologist and colonoscopy. \par She has received her first COVID vaccine shot, will receive 2nd dose on the 21st.

## 2021-04-08 NOTE — ASSESSMENT
[FreeTextEntry1] : 62 y/o woman with GERD, microscopic hematuria, significant smoking history (quit recently), arthritis/osteoporosis presenting for medical oncology follow up after b/l mastectomy for R breast cancer; final pathology findings on second opinion pathology review (Dr. 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 Interval PET/CT with no other evidence of disease. S/p adjuvant chemotherapy with cisplatin/etoposide x 4 cycles in early 2019, doing well now.\par \par Breast Cancer, small cell carcinoma poorly differentiated Ki67 99%, DAVIAN\par -labs in hospital reviewed, OK\par -PET/CT 6/11/2020 showed 3.1cm R adnexal mass non avid, possible Calcified leiomyomata, follow up with pelvic sonogram, 11/19/20- Stable myomatous uterus.  Stable 3.1 cm mixed hyperechoic and hypechoic right adnexal mass. gynecology follow up on ongoing basis discussed - CT A/P from hospital reviewed\par -will check CT chest now with contrast to complete staging imaging\par -discussed monitoring for any new signs or symptoms, follow up with me in 6 months, sooner if issues arise\par -continue close monitoring, follow up with her surgeon Dr. Raymundo, PT/PM&R all addressed \par -follow up PMD - Dr. Coello.  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

## 2021-04-09 ENCOUNTER — APPOINTMENT (OUTPATIENT)
Dept: CT IMAGING | Facility: HOSPITAL | Age: 61
End: 2021-04-09
Payer: MEDICAID

## 2021-04-09 ENCOUNTER — OUTPATIENT (OUTPATIENT)
Dept: OUTPATIENT SERVICES | Facility: HOSPITAL | Age: 61
LOS: 1 days | End: 2021-04-09
Payer: MEDICAID

## 2021-04-09 DIAGNOSIS — C50.911 MALIGNANT NEOPLASM OF UNSPECIFIED SITE OF RIGHT FEMALE BREAST: ICD-10-CM

## 2021-04-09 DIAGNOSIS — Z98.890 OTHER SPECIFIED POSTPROCEDURAL STATES: Chronic | ICD-10-CM

## 2021-04-09 DIAGNOSIS — Z90.10 ACQUIRED ABSENCE OF UNSPECIFIED BREAST AND NIPPLE: Chronic | ICD-10-CM

## 2021-04-09 PROCEDURE — 71260 CT THORAX DX C+: CPT

## 2021-04-09 PROCEDURE — 71260 CT THORAX DX C+: CPT | Mod: 26

## 2021-04-13 ENCOUNTER — APPOINTMENT (OUTPATIENT)
Dept: PLASTIC SURGERY | Facility: CLINIC | Age: 61
End: 2021-04-13
Payer: MEDICAID

## 2021-04-13 VITALS
BODY MASS INDEX: 29.41 KG/M2 | HEIGHT: 63 IN | OXYGEN SATURATION: 96 % | DIASTOLIC BLOOD PRESSURE: 68 MMHG | WEIGHT: 166 LBS | HEART RATE: 80 BPM | TEMPERATURE: 97.7 F | SYSTOLIC BLOOD PRESSURE: 104 MMHG

## 2021-04-13 PROCEDURE — 99024 POSTOP FOLLOW-UP VISIT: CPT

## 2021-04-15 ENCOUNTER — APPOINTMENT (OUTPATIENT)
Dept: FAMILY MEDICINE | Facility: CLINIC | Age: 61
End: 2021-04-15
Payer: MEDICAID

## 2021-04-15 VITALS
TEMPERATURE: 97.3 F | DIASTOLIC BLOOD PRESSURE: 64 MMHG | WEIGHT: 165 LBS | OXYGEN SATURATION: 96 % | HEIGHT: 63 IN | HEART RATE: 73 BPM | BODY MASS INDEX: 29.23 KG/M2 | SYSTOLIC BLOOD PRESSURE: 108 MMHG | RESPIRATION RATE: 15 BRPM

## 2021-04-15 DIAGNOSIS — G44.201 TENSION-TYPE HEADACHE, UNSPECIFIED, INTRACTABLE: ICD-10-CM

## 2021-04-15 PROCEDURE — 99072 ADDL SUPL MATRL&STAF TM PHE: CPT

## 2021-04-15 PROCEDURE — 99214 OFFICE O/P EST MOD 30 MIN: CPT | Mod: 25

## 2021-04-15 NOTE — PHYSICAL EXAM
[No Acute Distress] : no acute distress [Well Nourished] : well nourished [Well Developed] : well developed [Well-Appearing] : well-appearing [Normal Sclera/Conjunctiva] : normal sclera/conjunctiva [PERRL] : pupils equal round and reactive to light [EOMI] : extraocular movements intact [Normal Outer Ear/Nose] : the outer ears and nose were normal in appearance [Normal Oropharynx] : the oropharynx was normal [Normal TMs] : both tympanic membranes were normal [Normal Nasal Mucosa] : the nasal mucosa was normal [No JVD] : no jugular venous distention [No Lymphadenopathy] : no lymphadenopathy [Supple] : supple [Thyroid Normal, No Nodules] : the thyroid was normal and there were no nodules present [No Respiratory Distress] : no respiratory distress  [No Accessory Muscle Use] : no accessory muscle use [Clear to Auscultation] : lungs were clear to auscultation bilaterally [Normal Rate] : normal rate  [Regular Rhythm] : with a regular rhythm [Normal S1, S2] : normal S1 and S2 [No Murmur] : no murmur heard [No Carotid Bruits] : no carotid bruits [No Abdominal Bruit] : a ~M bruit was not heard ~T in the abdomen [No Varicosities] : no varicosities [Pedal Pulses Present] : the pedal pulses are present [No Edema] : there was no peripheral edema [No Palpable Aorta] : no palpable aorta [No Extremity Clubbing/Cyanosis] : no extremity clubbing/cyanosis [Soft] : abdomen soft [Non Tender] : non-tender [Non-distended] : non-distended [No Masses] : no abdominal mass palpated [No HSM] : no HSM [Normal Bowel Sounds] : normal bowel sounds [Normal Posterior Cervical Nodes] : no posterior cervical lymphadenopathy [Normal Anterior Cervical Nodes] : no anterior cervical lymphadenopathy [No CVA Tenderness] : no CVA  tenderness [No Spinal Tenderness] : no spinal tenderness [No Joint Swelling] : no joint swelling [Grossly Normal Strength/Tone] : grossly normal strength/tone [No Rash] : no rash [Coordination Grossly Intact] : coordination grossly intact [No Focal Deficits] : no focal deficits [Normal Gait] : normal gait [Deep Tendon Reflexes (DTR)] : deep tendon reflexes were 2+ and symmetric [Normal] : the deep tendon reflexes were normal [Normal Affect] : the affect was normal [Normal Insight/Judgement] : insight and judgment were intact [de-identified] : Injected nasal turbinates and posterior pharynx

## 2021-04-15 NOTE — REVIEW OF SYSTEMS
[Earache] : earache [Sore Throat] : sore throat [Cough] : cough [Abdominal Pain] : abdominal pain [Headache] : headache [Negative] : Heme/Lymph [Postnasal Drip] : no postnasal drip

## 2021-04-15 NOTE — HISTORY OF PRESENT ILLNESS
[FreeTextEntry1] : [employer] was utilized to communicate in Maltese with patient  [de-identified] : Patient complaining of a constant generalized headache that has been persistent for 4 days. Pain is described as moderate to sever aching pain that is mildly relieved with Tylenol or Excedrin. RIght now in the office she reports her headache is currently 4 out of 10 after taking Tylenol prior. She denies any vision changes, altered mental status, dizziness or weakness. \par During this time she has noted a sensation of scalp seating at night, and a sensation of nasal burning. She has been having a dry cough that patient believes is from her dry throat. \par \par She has a history of breast ca with recent breast reconstruction with  bilateral REMI breast flaps. She was hospitalized for an abdominal wall infection for about one week. She has followed up with surgery two days ago. She is completing course of Bactrim- she has three days left.\par \par Was covid swabbed two days ago and reports result was negative.  She received first dose of the Pfizer COVID vaccine on 3/12. 2nd dose is pending. \par \par \par

## 2021-04-16 LAB
ALBUMIN SERPL ELPH-MCNC: 4.1 G/DL
ALP BLD-CCNC: 109 U/L
ALT SERPL-CCNC: 19 U/L
ANION GAP SERPL CALC-SCNC: 10 MMOL/L
AST SERPL-CCNC: 23 U/L
BASOPHILS # BLD AUTO: 0.03 K/UL
BASOPHILS NFR BLD AUTO: 0.6 %
BILIRUB SERPL-MCNC: 0.2 MG/DL
BUN SERPL-MCNC: 11 MG/DL
CALCIUM SERPL-MCNC: 8.8 MG/DL
CHLORIDE SERPL-SCNC: 101 MMOL/L
CO2 SERPL-SCNC: 24 MMOL/L
CREAT SERPL-MCNC: 0.89 MG/DL
EOSINOPHIL # BLD AUTO: 0.33 K/UL
EOSINOPHIL NFR BLD AUTO: 6.2 %
GLUCOSE SERPL-MCNC: 90 MG/DL
HCT VFR BLD CALC: 37.9 %
HGB BLD-MCNC: 12.1 G/DL
IMM GRANULOCYTES NFR BLD AUTO: 0.2 %
LYMPHOCYTES # BLD AUTO: 1.77 K/UL
LYMPHOCYTES NFR BLD AUTO: 33.5 %
MAN DIFF?: NORMAL
MCHC RBC-ENTMCNC: 29.9 PG
MCHC RBC-ENTMCNC: 31.9 GM/DL
MCV RBC AUTO: 93.6 FL
MONOCYTES # BLD AUTO: 0.47 K/UL
MONOCYTES NFR BLD AUTO: 8.9 %
NEUTROPHILS # BLD AUTO: 2.68 K/UL
NEUTROPHILS NFR BLD AUTO: 50.6 %
PLATELET # BLD AUTO: 223 K/UL
POTASSIUM SERPL-SCNC: 4 MMOL/L
PROT SERPL-MCNC: 7.2 G/DL
RBC # BLD: 4.05 M/UL
RBC # FLD: 13.6 %
SODIUM SERPL-SCNC: 135 MMOL/L
WBC # FLD AUTO: 5.29 K/UL

## 2021-04-19 NOTE — ED ADULT NURSE NOTE - PRIMARY CARE PROVIDER
"Rehab Progress Note     Date of Service: 4/19/2021  Chief Complaint: follow up stroke    Interval Events (Subjective)    Patient seen and examined today in his room.  His wife is present.  He is currently denying any visual symptoms.  No new interval events.    ROS: No changes to bowel, bladder, pain, mood, or sleep.            Objective:  VITAL SIGNS: /61   Pulse 61   Temp 36.6 °C (97.8 °F) (Oral)   Resp 18   Ht 1.768 m (5' 9.6\")   Wt 74.4 kg (164 lb 0.4 oz)   SpO2 94%   BMI 23.81 kg/m²   Gen: alert, no apparent distress  Neuro: notable for hard of hearing    Recent Results (from the past 72 hour(s))   Basic Metabolic Panel    Collection Time: 04/18/21  5:40 AM   Result Value Ref Range    Sodium 134 (L) 135 - 145 mmol/L    Potassium 4.3 3.6 - 5.5 mmol/L    Chloride 104 96 - 112 mmol/L    Co2 27 20 - 33 mmol/L    Glucose 85 65 - 99 mg/dL    Bun 33 (H) 8 - 22 mg/dL    Creatinine 1.12 0.50 - 1.40 mg/dL    Calcium 8.6 8.5 - 10.5 mg/dL    Anion Gap 3.0 (L) 7.0 - 16.0   TROPONIN    Collection Time: 04/18/21  5:40 AM   Result Value Ref Range    Troponin T 23 (H) 6 - 19 ng/L   MAGNESIUM    Collection Time: 04/18/21  5:40 AM   Result Value Ref Range    Magnesium 2.6 (H) 1.5 - 2.5 mg/dL   ESTIMATED GFR    Collection Time: 04/18/21  5:40 AM   Result Value Ref Range    GFR If African American >60 >60 mL/min/1.73 m 2    GFR If Non African American >60 >60 mL/min/1.73 m 2   Basic Metabolic Panel    Collection Time: 04/19/21  5:31 AM   Result Value Ref Range    Sodium 135 135 - 145 mmol/L    Potassium 4.4 3.6 - 5.5 mmol/L    Chloride 102 96 - 112 mmol/L    Co2 29 20 - 33 mmol/L    Glucose 79 65 - 99 mg/dL    Bun 29 (H) 8 - 22 mg/dL    Creatinine 1.17 0.50 - 1.40 mg/dL    Calcium 8.8 8.5 - 10.5 mg/dL    Anion Gap 4.0 (L) 7.0 - 16.0   MAGNESIUM    Collection Time: 04/19/21  5:31 AM   Result Value Ref Range    Magnesium 2.5 1.5 - 2.5 mg/dL   ESTIMATED GFR    Collection Time: 04/19/21  5:31 AM   Result Value Ref Range    " Dr. Bedoya GFR If African American >60 >60 mL/min/1.73 m 2    GFR If Non African American 59 (A) >60 mL/min/1.73 m 2       Current Facility-Administered Medications   Medication Frequency   • polyethylene glycol/lytes (MIRALAX) PACKET 1 Packet DAILY    And   • senna-docusate (PERICOLACE or SENOKOT S) 8.6-50 MG per tablet 2 tablet BID    And   • magnesium hydroxide (MILK OF MAGNESIA) suspension 30 mL QDAY PRN    And   • bisacodyl (DULCOLAX) suppository 10 mg QDAY PRN   • atorvastatin (LIPITOR) tablet 40 mg Q EVENING   • lisinopril (PRINIVIL) tablet 10 mg Q DAY   • QUEtiapine (Seroquel) tablet 25 mg Nightly   • melatonin tablet 3 mg QHS   • ziprasidone (Geodon) injection 20 mg Q2HRS PRN   • hydrOXYzine HCl (ATARAX) tablet 50 mg Q6HRS PRN   • Respiratory Therapy Consult Continuous RT   • Pharmacy Consult Request ...Pain Management Review 1 Each PHARMACY TO DOSE   • hydrALAZINE (APRESOLINE) tablet 10 mg Q8HRS PRN   • acetaminophen (Tylenol) tablet 650 mg Q4HRS PRN   • lactulose 20 GM/30ML solution 30 mL QDAY PRN   • docusate sodium (ENEMEEZ) enema 283 mg QDAY PRN   • fleet enema 133 mL QDAY PRN   • artificial tears ophthalmic solution 1 Drop PRN   • benzocaine-menthol (CEPACOL) lozenge 1 Lozenge Q2HRS PRN   • mag hydrox-al hydrox-simeth (MAALOX PLUS ES or MYLANTA DS) suspension 20 mL Q2HRS PRN   • ondansetron (ZOFRAN ODT) dispertab 4 mg 4X/DAY PRN    Or   • ondansetron (ZOFRAN) syringe/vial injection 4 mg 4X/DAY PRN   • traZODone (DESYREL) tablet 50 mg QHS PRN   • sodium chloride (OCEAN) 0.65 % nasal spray 2 Spray PRN   • midazolam (VERSED) 5 mg/mL (1 mL vial) PRN   • aspirin (ASA) chewable tab 81 mg DAILY   • QUEtiapine (Seroquel) tablet 25 mg TID PRN       Orders Placed This Encounter   Procedures   • Diet Order Diet: Level 7 - Easy to Chew; Liquid level: Level 0 - Thin     Standing Status:   Standing     Number of Occurrences:   1     Order Specific Question:   Diet:     Answer:   Level 7 - Easy to Chew [22]     Order Specific  Question:   Liquid level     Answer:   Level 0 - Thin       Assessment:  Active Hospital Problems    Diagnosis    • *Acute CVA (cerebrovascular accident) (HCC)    • Carotid stenosis, bilateral    • Altered mental status, unspecified    • Mixed hyperlipidemia    • BPH (benign prostatic hyperplasia)    • Sundowning    • Hard of hearing    • Sinus bradycardia    • Hypokalemia    • Anemia    • S/P carotid endarterectomy    • Agitation      This patient is a 84 y.o. male admitted for acute inpatient rehabilitation with Acute CVA (cerebrovascular accident) ([facility]).    I led and attended the weekly conference today, and agree with the IDT conference documentation and plan of care as noted below.    Date of conference: 4/19/2021    Goals and barriers: See IDT note.    Biggest barriers: hard of hearing, constipation, impaired balance, impaired safety awareness and insight, impaired attention    CM/social support: wife supportive    Anticipated DC date: 4/24    Home health: PT/OT/SLP/RN    Equip: FWW    Follow up: PCP, Dr. Sales, stroke bridge clinic, cardiology (at 6 weeks), Dr. Beavers        Medical Decision Making and Plan:    Small punctate infarctions  Right frontal lobe, right cerebellum, right occipital lobe  Non-focal  Visual complaints, improved  Cognitive impairment (suspect some is baseline)  Continue full rehab program  PT/OT/SLP, 1 hr each discipline, 5 days per week    Aspirin   Statin    Outpatient follow up with stroke bridge clinic, Dr Sales, referrals made    Zio patch cardiac monitor placed by neurology 4/9, follow up with cardiology at 6 weeks    Returned to acute on 4/13 due to increased visual symptoms, work up negative for new stroke.     Right carotid stenosis  S/p CEA 4/2  Statin  Aspirin  Outpatient follow up with Dr. Beavers, referrals made    Sundowning, resolved  Scheduled Seroquel at night  Scheduled melatonin at night  PRN seroquel, not requiring  Qtc OK     Hypertension,  improved  Amlodipine  Lisinopril, increased dose  PRN hydralazine     Anemia, resolved     Hyperlipidemia  Statin     Azotemia, improved  Increase oral fluids    Bowel program  Continue bowel medications  Last BM 4/18    Bladder program  BPH  With intermittent incontinence  Check PVRs  Not retaining  Bladder scan for no voids  ICP for over 400 cc  Scheduled toileting    DVT prophylaxis  Lovenox discontinued as patient is ambulating long distances, 480 ft    Total time:  40 minutes.  I spent greater than 50% of the time for patient care, counseling, and coordination on this date, including patient face-to face time, unit/floor time with review of records/pertinent lab data and studies, as well as discussing diagnostic evaluation/work up, planned therapeutic interventions, and future disposition of care, as per the interval events/subjective and the assessment and plan as noted above.        Sugey Barkley M.D.   Physical Medicine and Rehabilitation

## 2021-04-21 ENCOUNTER — APPOINTMENT (OUTPATIENT)
Dept: DISASTER EMERGENCY | Facility: OTHER | Age: 61
End: 2021-04-21
Payer: MEDICAID

## 2021-04-21 PROCEDURE — 0002A: CPT

## 2021-04-22 ENCOUNTER — RESULT REVIEW (OUTPATIENT)
Age: 61
End: 2021-04-22

## 2021-04-22 ENCOUNTER — APPOINTMENT (OUTPATIENT)
Dept: ULTRASOUND IMAGING | Facility: HOSPITAL | Age: 61
End: 2021-04-22
Payer: MEDICAID

## 2021-04-22 ENCOUNTER — APPOINTMENT (OUTPATIENT)
Dept: CT IMAGING | Facility: HOSPITAL | Age: 61
End: 2021-04-22
Payer: MEDICAID

## 2021-04-22 ENCOUNTER — OUTPATIENT (OUTPATIENT)
Dept: OUTPATIENT SERVICES | Facility: HOSPITAL | Age: 61
LOS: 1 days | End: 2021-04-22
Payer: MEDICAID

## 2021-04-22 DIAGNOSIS — Z90.10 ACQUIRED ABSENCE OF UNSPECIFIED BREAST AND NIPPLE: Chronic | ICD-10-CM

## 2021-04-22 DIAGNOSIS — Z98.890 OTHER SPECIFIED POSTPROCEDURAL STATES: Chronic | ICD-10-CM

## 2021-04-22 DIAGNOSIS — G44.201 TENSION-TYPE HEADACHE, UNSPECIFIED, INTRACTABLE: ICD-10-CM

## 2021-04-22 PROCEDURE — 70450 CT HEAD/BRAIN W/O DYE: CPT

## 2021-04-22 PROCEDURE — 76830 TRANSVAGINAL US NON-OB: CPT

## 2021-04-22 PROCEDURE — 70450 CT HEAD/BRAIN W/O DYE: CPT | Mod: 26

## 2021-04-22 PROCEDURE — 76830 TRANSVAGINAL US NON-OB: CPT | Mod: 26

## 2021-04-22 PROCEDURE — 76856 US EXAM PELVIC COMPLETE: CPT

## 2021-04-22 PROCEDURE — 76856 US EXAM PELVIC COMPLETE: CPT | Mod: 26

## 2021-04-22 PROCEDURE — 76856 US EXAM PELVIC COMPLETE: CPT | Mod: 26,59

## 2021-04-24 LAB
CULTURE RESULTS: SIGNIFICANT CHANGE UP
SPECIMEN SOURCE: SIGNIFICANT CHANGE UP

## 2021-04-27 ENCOUNTER — APPOINTMENT (OUTPATIENT)
Dept: PLASTIC SURGERY | Facility: CLINIC | Age: 61
End: 2021-04-27
Payer: MEDICAID

## 2021-04-27 VITALS
OXYGEN SATURATION: 97 % | TEMPERATURE: 97.5 F | HEIGHT: 63 IN | HEART RATE: 76 BPM | SYSTOLIC BLOOD PRESSURE: 139 MMHG | DIASTOLIC BLOOD PRESSURE: 86 MMHG | BODY MASS INDEX: 29.41 KG/M2 | WEIGHT: 166 LBS

## 2021-04-27 PROCEDURE — XXXXX: CPT

## 2021-04-27 NOTE — HISTORY OF PRESENT ILLNESS
[FreeTextEntry1] : 61 year old female who presents for a post op visit s/p exploration and washout of seroma of right abdomen DOS: 03/25/21. Pt is s/p bilateral delayed breast reconstruction w/ REMI flaps 02/15/21.

## 2021-05-01 ENCOUNTER — EMERGENCY (EMERGENCY)
Facility: HOSPITAL | Age: 61
LOS: 1 days | Discharge: HOME | End: 2021-05-01
Attending: EMERGENCY MEDICINE | Admitting: EMERGENCY MEDICINE
Payer: MEDICAID

## 2021-05-01 VITALS
OXYGEN SATURATION: 100 % | TEMPERATURE: 97 F | RESPIRATION RATE: 16 BRPM | DIASTOLIC BLOOD PRESSURE: 89 MMHG | SYSTOLIC BLOOD PRESSURE: 139 MMHG | HEART RATE: 80 BPM | HEIGHT: 62 IN

## 2021-05-01 DIAGNOSIS — Z98.890 OTHER SPECIFIED POSTPROCEDURAL STATES: Chronic | ICD-10-CM

## 2021-05-01 DIAGNOSIS — Z90.10 ACQUIRED ABSENCE OF UNSPECIFIED BREAST AND NIPPLE: Chronic | ICD-10-CM

## 2021-05-01 LAB
ALBUMIN SERPL ELPH-MCNC: 4.1 G/DL — SIGNIFICANT CHANGE UP (ref 3.3–5)
ALP SERPL-CCNC: 116 U/L — SIGNIFICANT CHANGE UP (ref 40–120)
ALT FLD-CCNC: 16 U/L — SIGNIFICANT CHANGE UP (ref 4–33)
ANION GAP SERPL CALC-SCNC: 10 MMOL/L — SIGNIFICANT CHANGE UP (ref 7–14)
APPEARANCE UR: CLEAR — SIGNIFICANT CHANGE UP
APTT BLD: 29.7 SEC — SIGNIFICANT CHANGE UP (ref 27–36.3)
AST SERPL-CCNC: 17 U/L — SIGNIFICANT CHANGE UP (ref 4–32)
BASOPHILS # BLD AUTO: 0.08 K/UL — SIGNIFICANT CHANGE UP (ref 0–0.2)
BASOPHILS NFR BLD AUTO: 0.8 % — SIGNIFICANT CHANGE UP (ref 0–2)
BILIRUB SERPL-MCNC: 0.2 MG/DL — SIGNIFICANT CHANGE UP (ref 0.2–1.2)
BILIRUB UR-MCNC: NEGATIVE — SIGNIFICANT CHANGE UP
BLD GP AB SCN SERPL QL: NEGATIVE — SIGNIFICANT CHANGE UP
BLOOD GAS VENOUS COMPREHENSIVE RESULT: SIGNIFICANT CHANGE UP
BUN SERPL-MCNC: 16 MG/DL — SIGNIFICANT CHANGE UP (ref 7–23)
CALCIUM SERPL-MCNC: 9.1 MG/DL — SIGNIFICANT CHANGE UP (ref 8.4–10.5)
CHLORIDE SERPL-SCNC: 102 MMOL/L — SIGNIFICANT CHANGE UP (ref 98–107)
CO2 SERPL-SCNC: 26 MMOL/L — SIGNIFICANT CHANGE UP (ref 22–31)
COLOR SPEC: SIGNIFICANT CHANGE UP
CREAT SERPL-MCNC: 0.69 MG/DL — SIGNIFICANT CHANGE UP (ref 0.5–1.3)
DIFF PNL FLD: NEGATIVE — SIGNIFICANT CHANGE UP
EOSINOPHIL # BLD AUTO: 0.2 K/UL — SIGNIFICANT CHANGE UP (ref 0–0.5)
EOSINOPHIL NFR BLD AUTO: 2 % — SIGNIFICANT CHANGE UP (ref 0–6)
GLUCOSE SERPL-MCNC: 102 MG/DL — HIGH (ref 70–99)
GLUCOSE UR QL: NEGATIVE — SIGNIFICANT CHANGE UP
HCT VFR BLD CALC: 37.5 % — SIGNIFICANT CHANGE UP (ref 34.5–45)
HGB BLD-MCNC: 11.8 G/DL — SIGNIFICANT CHANGE UP (ref 11.5–15.5)
IANC: 5.47 K/UL — SIGNIFICANT CHANGE UP (ref 1.5–8.5)
IMM GRANULOCYTES NFR BLD AUTO: 0.3 % — SIGNIFICANT CHANGE UP (ref 0–1.5)
INR BLD: 0.97 RATIO — SIGNIFICANT CHANGE UP (ref 0.88–1.16)
KETONES UR-MCNC: NEGATIVE — SIGNIFICANT CHANGE UP
LEUKOCYTE ESTERASE UR-ACNC: NEGATIVE — SIGNIFICANT CHANGE UP
LYMPHOCYTES # BLD AUTO: 2.98 K/UL — SIGNIFICANT CHANGE UP (ref 1–3.3)
LYMPHOCYTES # BLD AUTO: 30.5 % — SIGNIFICANT CHANGE UP (ref 13–44)
MCHC RBC-ENTMCNC: 28.9 PG — SIGNIFICANT CHANGE UP (ref 27–34)
MCHC RBC-ENTMCNC: 31.5 GM/DL — LOW (ref 32–36)
MCV RBC AUTO: 91.7 FL — SIGNIFICANT CHANGE UP (ref 80–100)
MONOCYTES # BLD AUTO: 1.01 K/UL — HIGH (ref 0–0.9)
MONOCYTES NFR BLD AUTO: 10.3 % — SIGNIFICANT CHANGE UP (ref 2–14)
NEUTROPHILS # BLD AUTO: 5.47 K/UL — SIGNIFICANT CHANGE UP (ref 1.8–7.4)
NEUTROPHILS NFR BLD AUTO: 56.1 % — SIGNIFICANT CHANGE UP (ref 43–77)
NITRITE UR-MCNC: NEGATIVE — SIGNIFICANT CHANGE UP
NRBC # BLD: 0 /100 WBCS — SIGNIFICANT CHANGE UP
NRBC # FLD: 0 K/UL — SIGNIFICANT CHANGE UP
PH UR: 7 — SIGNIFICANT CHANGE UP (ref 5–8)
PLATELET # BLD AUTO: 413 K/UL — HIGH (ref 150–400)
POTASSIUM SERPL-MCNC: 4 MMOL/L — SIGNIFICANT CHANGE UP (ref 3.5–5.3)
POTASSIUM SERPL-SCNC: 4 MMOL/L — SIGNIFICANT CHANGE UP (ref 3.5–5.3)
PROT SERPL-MCNC: 7.2 G/DL — SIGNIFICANT CHANGE UP (ref 6–8.3)
PROT UR-MCNC: ABNORMAL
PROTHROM AB SERPL-ACNC: 11.2 SEC — SIGNIFICANT CHANGE UP (ref 10.6–13.6)
RBC # BLD: 4.09 M/UL — SIGNIFICANT CHANGE UP (ref 3.8–5.2)
RBC # FLD: 14 % — SIGNIFICANT CHANGE UP (ref 10.3–14.5)
RH IG SCN BLD-IMP: POSITIVE — SIGNIFICANT CHANGE UP
SODIUM SERPL-SCNC: 138 MMOL/L — SIGNIFICANT CHANGE UP (ref 135–145)
SP GR SPEC: 1.02 — SIGNIFICANT CHANGE UP (ref 1.01–1.02)
UROBILINOGEN FLD QL: SIGNIFICANT CHANGE UP
WBC # BLD: 9.77 K/UL — SIGNIFICANT CHANGE UP (ref 3.8–10.5)
WBC # FLD AUTO: 9.77 K/UL — SIGNIFICANT CHANGE UP (ref 3.8–10.5)

## 2021-05-01 PROCEDURE — 74177 CT ABD & PELVIS W/CONTRAST: CPT | Mod: 26

## 2021-05-01 PROCEDURE — 99285 EMERGENCY DEPT VISIT HI MDM: CPT

## 2021-05-01 RX ORDER — ACETAMINOPHEN 500 MG
650 TABLET ORAL ONCE
Refills: 0 | Status: COMPLETED | OUTPATIENT
Start: 2021-05-01 | End: 2021-05-01

## 2021-05-01 RX ORDER — SODIUM CHLORIDE 9 MG/ML
1000 INJECTION INTRAMUSCULAR; INTRAVENOUS; SUBCUTANEOUS ONCE
Refills: 0 | Status: COMPLETED | OUTPATIENT
Start: 2021-05-01 | End: 2021-05-01

## 2021-05-01 RX ADMIN — SODIUM CHLORIDE 1000 MILLILITER(S): 9 INJECTION INTRAMUSCULAR; INTRAVENOUS; SUBCUTANEOUS at 20:52

## 2021-05-01 RX ADMIN — Medication 650 MILLIGRAM(S): at 20:52

## 2021-05-01 NOTE — CONSULT NOTE ADULT - SUBJECTIVE AND OBJECTIVE BOX
HPI:      Kyle [id number] used.    61F well known to plastic surgery service s/p delayed breast reconstruction with REMI based flaps on 2/15/2021, c/b abdominal wall abscess and cellulitis admitted to [facility] 3/25/2021 (abscess growing MRSA and MSSA), OR washout with b/l BELGICA drains placed during that admission. During the interval has followed up in Dr. Dejesus's office. Both drains have been removed, last one removed 2-3 weeks ago. Seen in the office this past week and was doing well. Yesterday she noticed a small area of separation where the drain was previously located and clear yellow fluid drained from this location. She denies that the drainage ever had a foul odor, she denies any recent fevers/chills/nausea/feeling sick. After noticing that the drainage saturated a piece of gauze used to dress the area a couple of times, she asked her daughter to bring her to the [facility] ED.    T(C): 36.7 (05-01-21 @ 20:55), Max: 36.7 (05-01-21 @ 20:55)  HR: 84 (05-01-21 @ 20:55) (80 - 84)  BP: 151/95 (05-01-21 @ 20:55) (139/89 - 151/95)  RR: 20 (05-01-21 @ 20:55) (16 - 20)  SpO2: 100% (05-01-21 @ 20:55) (100% - 100%)  Wt(kg): --    Gen NAD  Chest: breathing comfortably on room air  Breasts: reconstructed breasts with REMI flaps, flaps appear viable, warm soft, incisions cdi, no collections  Abd: soft, ttp mostly in the left lower abdomen, two 1-2 cm hard nodules felt above incision these were tender, no fluctuance, no fluid wave or palpable fluid collection, minimal clear yellow serous fluid expressible from subcentimeter area of breakdown, no malodor.      .  LABS:                         11.8   9.77  )-----------( 413      ( 01 May 2021 20:58 )             37.5     05-01    138  |  102  |  16  ----------------------------<  102<H>  4.0   |  26  |  0.69    Ca    9.1      01 May 2021 20:58    TPro  7.2  /  Alb  4.1  /  TBili  0.2  /  DBili  x   /  AST  17  /  ALT  16  /  AlkPhos  116  05-01    PT/INR - ( 01 May 2021 20:58 )   PT: 11.2 sec;   INR: 0.97 ratio         PTT - ( 01 May 2021 20:58 )  PTT:29.7 sec          RADIOLOGY, EKG & ADDITIONAL TESTS: Reviewed.   CT abdomen: *pending* HPI:      Kyle [id number] used.    61F well known to plastic surgery service s/p delayed breast reconstruction with REMI based flaps on 2/15/2021, c/b abdominal wall abscess and cellulitis admitted to [facility] 3/25/2021 (abscess growing MRSA and MSSA), OR washout with b/l BELGICA drains placed during that admission. During the interval has followed up in Dr. Dejesus's office. Both drains have been removed, last one removed 2-3 weeks ago. Seen in the office this past week and was doing well. Yesterday she noticed a small area of separation where the drain was previously located and clear yellow fluid drained from this location. She denies that the drainage ever had a foul odor, she denies any recent fevers/chills/nausea/feeling sick. After noticing that the drainage saturated a piece of gauze used to dress the area a couple of times, she asked her daughter to bring her to the [facility] ED.    T(C): 36.7 (05-01-21 @ 20:55), Max: 36.7 (05-01-21 @ 20:55)  HR: 84 (05-01-21 @ 20:55) (80 - 84)  BP: 151/95 (05-01-21 @ 20:55) (139/89 - 151/95)  RR: 20 (05-01-21 @ 20:55) (16 - 20)  SpO2: 100% (05-01-21 @ 20:55) (100% - 100%)  Wt(kg): --    Gen NAD  Chest: breathing comfortably on room air  Breasts: reconstructed breasts with REMI flaps, flaps appear viable, warm soft, incisions cdi, no collections  Abd: soft, ttp mostly in the left lower abdomen, two 1-2 cm hard nodules felt above incision these were tender, no fluctuance, no fluid wave or palpable fluid collection, minimal clear yellow serous fluid expressible from subcentimeter area of breakdown, no malodor.      .  LABS:                         11.8   9.77  )-----------( 413      ( 01 May 2021 20:58 )             37.5     05-01    138  |  102  |  16  ----------------------------<  102<H>  4.0   |  26  |  0.69    Ca    9.1      01 May 2021 20:58    TPro  7.2  /  Alb  4.1  /  TBili  0.2  /  DBili  x   /  AST  17  /  ALT  16  /  AlkPhos  116  05-01    PT/INR - ( 01 May 2021 20:58 )   PT: 11.2 sec;   INR: 0.97 ratio         PTT - ( 01 May 2021 20:58 )  PTT:29.7 sec          RADIOLOGY, EKG & ADDITIONAL TESTS: Reviewed.   CT abdomen: reviewed, no large fluid collection seen

## 2021-05-01 NOTE — ED PROVIDER NOTE - PATIENT PORTAL LINK FT
You can access the FollowMyHealth Patient Portal offered by [facility] by registering at the following website: [email or web address]. By joining [facility]’s FollowMyHealth portal, you will also be able to view your health information using other applications (apps) compatible with our system.

## 2021-05-01 NOTE — ED ADULT NURSE NOTE - CHIEF COMPLAINT QUOTE
Syrian speaking daughter radha- [phone]. recent bilateral breast reconstruction 2/2021. drain removed from abd area 3 wks ago. states noted hole in area today with yellow drainage. denies fever,chills. c/o pain to area.  pmh- breast ca 2018-  no chemo,radiation presently

## 2021-05-01 NOTE — ED PROVIDER NOTE - PMH
Anxiety about mastectomy    Breast cancer  bilateral  Chronic bilateral low back pain, with sciatica presence unspecified    Language barrier  Moldovan speaking  Other chronic gastritis    Smoker

## 2021-05-01 NOTE — ED PROVIDER NOTE - ATTENDING CONTRIBUTION TO CARE
ED Attending (Dr Cordova): I have personally performed a face to face bedside history and physical examination of this patient.  I have discussed the case with the PA and  I have personally authored the following components: HPI, ROS, Physical Exam and MDM in addition to reviewing and revising the rest of the Provider Note. 62 y/o F with PMHX of breast CA 2019 s/p delayed breast reconstruction with REMI based flaps on 2/15/2021, c/b abdominal wall abscess and cellulitis admitted to [facility] 3/25/2021 (abscess growing MRSA and MSSA), OR washout with b/l BELGICA drains presents today for noting left side post drain site with small opening and draining yellow discharge today. also endorsing abdominal pain but notes no worse from post-op and improved with Tylenol. no malaise or gen sx. On ROS patient also notes + urinary frequency, and urgency x 1 week.  Suspect draining seroma, denies any infectious sx.  Will send labs, obtain CT, and d/w Plastics.

## 2021-05-01 NOTE — ED ADULT TRIAGE NOTE - CHIEF COMPLAINT QUOTE
Sri Lankan speaking daughter radha- [phone]. recent bilateral breast reconstruction 2/2021. drain removed from abd area 3 wks ago. states noted hole in area today with yellow drainage. denies fever,chills. c/o pain to area.  pmh- breast ca 2018-  no chemo,radiation presently

## 2021-05-01 NOTE — ED ADULT NURSE NOTE - OBJECTIVE STATEMENT
Pt received to intake room 2. Pt A&Ox4, ambulatory. Pt states that she had surgery on her abdomen approximately 3 weeks ago where there was an infection. Pt states that today she noticed a small hole with yellow drainage on the left side of the abdomen. No active drainage noted at this time, hole covered with gauze. Pt endorsing increasing pain to the area since yesterday. Respirations equal and unlabored, no acute distress noted. Abdomen soft, nondistended, nontender. Denies chest pain, palpitations, SOB, fever, cough, chills, N/V/D, recent sick contacts. 20g IV placed in right forearm, labs drawn and sent as ordered. Vital signs as noted, comfort measures provided, call bell within reach. Assessment ongoing.

## 2021-05-01 NOTE — CONSULT NOTE ADULT - ASSESSMENT
61F well known to plastic surgery service s/p delayed breast reconstruction with REMI based flaps on 2/15/2021, c/b abdominal wall abscess and cellulitis admitted to [facility] 3/25/2021 (abscess growing MRSA and MSSA), OR washout with b/l BELGICA drains placed, now s/p drain removal with serous fluid draining from subcentimeter area of breakdown at abdominal donor site incision. No signs or symptoms of infection.    -plan to follow pending results of CT  -discussed with Dr. Anjelica Arce MD PGY4  Plastic Surgery [id number] 61F well known to plastic surgery service s/p delayed breast reconstruction with REMI based flaps on 2/15/2021, c/b abdominal wall abscess and cellulitis admitted to [facility] 3/25/2021 (abscess growing MRSA and MSSA), OR washout with b/l BELGICA drains placed, now s/p drain removal with serous fluid draining from subcentimeter area of breakdown at abdominal donor site incision. No signs or symptoms of infection.    - no surgical intervention at this time  - continue gauze and tape dressing over area of breakdown  - ok for discharge from plastic surgery perspective  - follow up in the office this week with Dr. Anjelica Arce MD PGY4  Plastic Surgery [id number]

## 2021-05-01 NOTE — ED PROVIDER NOTE - NSFOLLOWUPINSTRUCTIONS_ED_ALL_ED_FT
Advance activity as tolerated.  Continue all previously prescribed medications as directed unless otherwise instructed.  Take Tylenol 650mg (Two 325 mg pills) every 4-6 hours as needed for pain or fevers. Follow up with your plastic surgeon, Dr. Dejesus in 48-72 hours- bring copies of your results.  Return to the ER for worsening or persistent symptoms, including but not limited to worsening/persistent pain, fevers, redness, pus, vomiting, and/or ANY NEW OR CONCERNING SYMPTOMS. If you have issues obtaining follow up, please call: [phone] to obtain a doctor or specialist who takes your insurance in your area.  You may call [phone] to make an appointment with the internal medicine clinic.

## 2021-05-01 NOTE — ED CLERICAL - NS ED CLERK NOTE PRE-ARRIVAL INFORMATION; ADDITIONAL PRE-ARRIVAL INFORMATION
This patient is enrolled in the readmission program and has active care navigation. This patient can be followed up by the care navigation team within 24 hours. To arrange close follow-up or to obtain additional clinical information about this patient, please call the contact number above. [facility]

## 2021-05-01 NOTE — ED PROVIDER NOTE - CONSTITUTIONAL, MLM
Well appearing, awake, alert, oriented to person, place, time/situation and in no apparent distress. non toxic normal...

## 2021-05-01 NOTE — ED PROVIDER NOTE - ABDOMINAL TENDER
LLQ with small 0.2cm opening with no apparent tracking, no surrounding erythema, draining serous fluid./left lower quadrant LLQ with small 0.5 cm opening with no apparent tracking, no surrounding erythema, draining serous fluid./left lower quadrant

## 2021-05-01 NOTE — ED PROVIDER NOTE - OBJECTIVE STATEMENT
Patient is Thai speaking: [employer], [place] [id number].  60 y/o F with PMHX of breast CA 2019 s/p delayed breast reconstruction with REMI based flaps on 2/15/2021, c/b abdominal wall abscess and cellulitis admitted to [facility] 3/25/2021 (abscess growing MRSA and MSSA), OR washout with b/l BELGICA drains placed during that admission, patient notes the drains were removed 3 weeks ago, followed with Dr. Dejesus in office last week, told normal followup, presents today for noting left side post drain site with small opening and draining yellow discharge today. also endorsing abdominal pain but notes no worse from post-op and improved with tylenol. on ROS patient also notes + urinary frequency, and urgency x 1 week. Denies dysuria, hematuria. Denies fever, chills, nausea, vomiting, diarrhea, cp, breast pain, sob, cough. Having normal BM. Patient is Marshallese speaking: [place], [place] [id number].  60 y/o F with PMHX of breast CA 2019 s/p delayed breast reconstruction with REMI based flaps on 2/15/2021, c/b abdominal wall abscess and cellulitis admitted to [facility] 3/25/2021 (abscess growing MRSA and MSSA), OR washout ( Dr. Dejesus) with b/l BELGICA drains placed during that admission, patient notes the drains were removed 3 weeks ago, followed with Dr. Dejesus in office last week, told normal followup, presents today for noting left side post drain site with small opening and draining yellow discharge today. also endorsing abdominal pain but notes no worse from post-op and improved with Tylenol at home. on ROS patient also notes + urinary frequency, and urgency x 1 week. Denies dysuria, hematuria. Denies fever, chills, nausea, vomiting, diarrhea, cp, breast pain, sob, cough. Having normal BM. Patient is Tunisian speaking: [employer] [place] [id number].    60 y/o F with PMHX of breast CA 2019 s/p delayed breast reconstruction with REMI based flaps on 2/15/2021, c/b abdominal wall abscess and cellulitis admitted to [facility] 3/25/2021 (abscess growing MRSA and MSSA), OR washout ( Dr. Dejesus) with b/l BELGICA drains placed during that admission, patient notes the drains were removed 3 weeks ago, followed with Dr. Dejesus in office last week, told normal followup, presents today for noting left side post drain site with small opening and draining yellow discharge today. also endorsing abdominal pain but notes no worse from post-op and improved with Tylenol at home. on ROS patient also notes + urinary frequency, and urgency x 1 week. Denies dysuria, hematuria. Denies fever, chills, nausea, vomiting, diarrhea, cp, breast pain, sob, cough. no malaise.  Having normal BM.

## 2021-05-01 NOTE — ED PROVIDER NOTE - CLINICAL SUMMARY MEDICAL DECISION MAKING FREE TEXT BOX
62 y/o F with PMHX of breast CA 2019 s/p delayed breast reconstruction with REMI based flaps on 2/15/2021, c/b abdominal wall abscess and cellulitis admitted to [facility] 3/25/2021 (abscess growing MRSA and MSSA), OR washout with b/l BELGICA drains placed during that admission, patient notes the drains were removed 3 weeks ago, followed with Dr. Dejesus in office last week, told normal followup, presents today for noting left side post drain site with small opening and draining yellow discharge today. also endorsing abdominal pain but notes no worse from post-op and improved with Tylenol. on ROS patient also notes + urinary frequency, and urgency x 1 week.   labs, CT a/p IV abx r/o abscess.   check ua eval for uti.   plastics consulted.   Tylenol for pain. 60 y/o F with PMHX of breast CA 2019 s/p delayed breast reconstruction with REMI based flaps on 2/15/2021, c/b abdominal wall abscess and cellulitis admitted to [facility] 3/25/2021 (abscess growing MRSA and MSSA), OR washout with b/l BELGICA drains presents today for noting left side post drain site with small opening and draining yellow discharge today. also endorsing abdominal pain but notes no worse from post-op and improved with Tylenol. no malaise or gen sx. On ROS patient also notes + urinary frequency, and urgency x 1 week.  Suspect draining seroma, denies any infectious sx.  labs, CT a/p IV abx r/o abscess.   check ua eval for uti.   plastics consulted.   Tylenol for pain.

## 2021-05-02 VITALS
TEMPERATURE: 98 F | SYSTOLIC BLOOD PRESSURE: 130 MMHG | RESPIRATION RATE: 18 BRPM | DIASTOLIC BLOOD PRESSURE: 83 MMHG | OXYGEN SATURATION: 100 % | HEART RATE: 70 BPM

## 2021-05-02 LAB
CULTURE RESULTS: SIGNIFICANT CHANGE UP
SPECIMEN SOURCE: SIGNIFICANT CHANGE UP

## 2021-05-04 ENCOUNTER — APPOINTMENT (OUTPATIENT)
Dept: PLASTIC SURGERY | Facility: CLINIC | Age: 61
End: 2021-05-04
Payer: MEDICAID

## 2021-05-04 VITALS
HEART RATE: 70 BPM | HEIGHT: 63 IN | DIASTOLIC BLOOD PRESSURE: 77 MMHG | OXYGEN SATURATION: 99 % | BODY MASS INDEX: 29.41 KG/M2 | WEIGHT: 166 LBS | TEMPERATURE: 97.3 F | SYSTOLIC BLOOD PRESSURE: 124 MMHG

## 2021-05-04 PROCEDURE — XXXXX: CPT

## 2021-05-05 NOTE — HISTORY OF PRESENT ILLNESS
[FreeTextEntry1] : 61 year old female who presents for a post op visit s/p exploration and washout of seroma of right abdomen DOS: 03/25/21. Pt is s/p bilateral delayed breast reconstruction w/ REMI flaps 02/15/21.  pt is doing well.

## 2021-05-11 ENCOUNTER — APPOINTMENT (OUTPATIENT)
Dept: PLASTIC SURGERY | Facility: CLINIC | Age: 61
End: 2021-05-11
Payer: MEDICAID

## 2021-05-11 ENCOUNTER — RESULT REVIEW (OUTPATIENT)
Age: 61
End: 2021-05-11

## 2021-05-11 ENCOUNTER — OUTPATIENT (OUTPATIENT)
Dept: OUTPATIENT SERVICES | Facility: HOSPITAL | Age: 61
LOS: 1 days | End: 2021-05-11
Payer: MEDICAID

## 2021-05-11 ENCOUNTER — LABORATORY RESULT (OUTPATIENT)
Age: 61
End: 2021-05-11

## 2021-05-11 ENCOUNTER — APPOINTMENT (OUTPATIENT)
Dept: OBGYN | Facility: CLINIC | Age: 61
End: 2021-05-11
Payer: MEDICAID

## 2021-05-11 VITALS
DIASTOLIC BLOOD PRESSURE: 100 MMHG | TEMPERATURE: 96.9 F | SYSTOLIC BLOOD PRESSURE: 138 MMHG | BODY MASS INDEX: 29.41 KG/M2 | WEIGHT: 166 LBS

## 2021-05-11 DIAGNOSIS — N93.9 ABNORMAL UTERINE AND VAGINAL BLEEDING, UNSPECIFIED: ICD-10-CM

## 2021-05-11 DIAGNOSIS — N76.0 ACUTE VAGINITIS: ICD-10-CM

## 2021-05-11 DIAGNOSIS — Z98.890 OTHER SPECIFIED POSTPROCEDURAL STATES: Chronic | ICD-10-CM

## 2021-05-11 DIAGNOSIS — R93.89 ABNORMAL FINDINGS ON DIAGNOSTIC IMAGING OF OTHER SPECIFIED BODY STRUCTURES: ICD-10-CM

## 2021-05-11 DIAGNOSIS — Z90.10 ACQUIRED ABSENCE OF UNSPECIFIED BREAST AND NIPPLE: Chronic | ICD-10-CM

## 2021-05-11 PROCEDURE — 99024 POSTOP FOLLOW-UP VISIT: CPT

## 2021-05-11 PROCEDURE — 58100 BIOPSY OF UTERUS LINING: CPT

## 2021-05-11 NOTE — DISCUSSION/SUMMARY
[FreeTextEntry1] : 62yo P3 with h/o breast cancer being followed for incidental ROV complex cyst found to have heterogenous silid/cystic structure in endometrium on follow up sono .  NO PMB\par - [ ]EMB performed today\par - [ ]Pelvic MRI to better evaluate - task sent to have authorized. \par \par Pt to call if she does not get authorization for ANTONIO in 1 wk.\par RTC after MRI to review pathology and MRI result\par Nancy Joyner, PAC

## 2021-05-11 NOTE — HISTORY OF PRESENT ILLNESS
[FreeTextEntry1] : Int# [id number]\par \par 60yo P3 with h/o Breast cancer (s/p mastectomy and chemo) who was being followed for incidental PM OV cyst seen on PET/sono - s/p gyn surg consult 7/2020.  No intervention required at 7/2020 visit, recommended short term follow up.  \par \par 4/22/21 follow up pelvic sono : \par Rt adnexal mass likely representing serosal / exophitc fibroid.  Identified on prior exams. \par Endometrium: 1.3x0.8.1cm heterogenous structure with cystic and solid components. Differential includes polyp as well as malignancy, \par Consider correlation with MRI.\par \par Pt without complaint today- denies PMB or pain.

## 2021-05-11 NOTE — PROCEDURE
[Endometrial Biopsy] : Endometrial biopsy [Time out performed] : Pre-procedure time out performed.  Patient's name, date of birth and procedure confirmed. [Thickened Endometrium] : thickened endometrium [Risks] : risks [Patient] : patient [Infection] : infection [Uterine Perforation] : uterine perforation [Pain] : pain [Betadine] : Betadine [None] : none [Tenaculum] : Tenaculum [Easy Passage] : Easy passage [Sounded to ___ cm] : sounded to [unfilled] ~Ucm [Mid Position] : mid position [Scant] : scant [Sent to Pathology] : placed in buffered formalin and sent for pathology [Tolerated Well] : Patient tolerated the procedure well [LMPDate] : POST MENOPAUSAL

## 2021-05-15 LAB
CULTURE RESULTS: SIGNIFICANT CHANGE UP
SPECIMEN SOURCE: SIGNIFICANT CHANGE UP

## 2021-05-25 ENCOUNTER — APPOINTMENT (OUTPATIENT)
Dept: PLASTIC SURGERY | Facility: CLINIC | Age: 61
End: 2021-05-25
Payer: MEDICAID

## 2021-05-25 PROCEDURE — XXXXX: CPT

## 2021-05-25 NOTE — REASON FOR VISIT
[Post Op: _________] : a [unfilled] post op visit [FreeTextEntry1] : s/p exploration and washout of seroma of right abdomen DOS: 03/25/21.

## 2021-05-29 ENCOUNTER — APPOINTMENT (OUTPATIENT)
Dept: MRI IMAGING | Facility: HOSPITAL | Age: 61
End: 2021-05-29
Payer: MEDICAID

## 2021-05-29 ENCOUNTER — OUTPATIENT (OUTPATIENT)
Dept: OUTPATIENT SERVICES | Facility: HOSPITAL | Age: 61
LOS: 1 days | End: 2021-05-29
Payer: MEDICAID

## 2021-05-29 DIAGNOSIS — R93.89 ABNORMAL FINDINGS ON DIAGNOSTIC IMAGING OF OTHER SPECIFIED BODY STRUCTURES: ICD-10-CM

## 2021-05-29 DIAGNOSIS — Z90.10 ACQUIRED ABSENCE OF UNSPECIFIED BREAST AND NIPPLE: Chronic | ICD-10-CM

## 2021-05-29 DIAGNOSIS — Z98.890 OTHER SPECIFIED POSTPROCEDURAL STATES: Chronic | ICD-10-CM

## 2021-05-29 PROCEDURE — 72197 MRI PELVIS W/O & W/DYE: CPT

## 2021-05-29 PROCEDURE — 72197 MRI PELVIS W/O & W/DYE: CPT | Mod: 26

## 2021-05-29 PROCEDURE — A9579: CPT

## 2021-06-01 ENCOUNTER — NON-APPOINTMENT (OUTPATIENT)
Age: 61
End: 2021-06-01

## 2021-06-03 ENCOUNTER — RESULT REVIEW (OUTPATIENT)
Age: 61
End: 2021-06-03

## 2021-06-11 ENCOUNTER — APPOINTMENT (OUTPATIENT)
Dept: FAMILY MEDICINE | Facility: CLINIC | Age: 61
End: 2021-06-11
Payer: MEDICAID

## 2021-06-11 VITALS
OXYGEN SATURATION: 98 % | SYSTOLIC BLOOD PRESSURE: 120 MMHG | WEIGHT: 169 LBS | HEIGHT: 63 IN | RESPIRATION RATE: 15 BRPM | DIASTOLIC BLOOD PRESSURE: 88 MMHG | TEMPERATURE: 97.3 F | HEART RATE: 69 BPM | BODY MASS INDEX: 29.95 KG/M2

## 2021-06-11 PROCEDURE — 99214 OFFICE O/P EST MOD 30 MIN: CPT

## 2021-06-11 NOTE — PHYSICAL EXAM
[Well Nourished] : well nourished [Well Developed] : well developed [Well-Appearing] : well-appearing [Normal Sclera/Conjunctiva] : normal sclera/conjunctiva [PERRL] : pupils equal round and reactive to light [EOMI] : extraocular movements intact [Normal Outer Ear/Nose] : the outer ears and nose were normal in appearance [Normal Oropharynx] : the oropharynx was normal [No JVD] : no jugular venous distention [No Lymphadenopathy] : no lymphadenopathy [Supple] : supple [Thyroid Normal, No Nodules] : the thyroid was normal and there were no nodules present [No Respiratory Distress] : no respiratory distress  [No Accessory Muscle Use] : no accessory muscle use [Clear to Auscultation] : lungs were clear to auscultation bilaterally [Normal Rate] : normal rate  [Regular Rhythm] : with a regular rhythm [Normal S1, S2] : normal S1 and S2 [No Murmur] : no murmur heard [No Carotid Bruits] : no carotid bruits [No Abdominal Bruit] : a ~M bruit was not heard ~T in the abdomen [No Varicosities] : no varicosities [Pedal Pulses Present] : the pedal pulses are present [No Edema] : there was no peripheral edema [No Palpable Aorta] : no palpable aorta [No Extremity Clubbing/Cyanosis] : no extremity clubbing/cyanosis [Soft] : abdomen soft [Non Tender] : non-tender [Non-distended] : non-distended [No Masses] : no abdominal mass palpated [No HSM] : no HSM [Normal Bowel Sounds] : normal bowel sounds [Normal Posterior Cervical Nodes] : no posterior cervical lymphadenopathy [Normal Anterior Cervical Nodes] : no anterior cervical lymphadenopathy [No CVA Tenderness] : no CVA  tenderness [No Spinal Tenderness] : no spinal tenderness [No Joint Swelling] : no joint swelling [Grossly Normal Strength/Tone] : grossly normal strength/tone [No Rash] : no rash [Coordination Grossly Intact] : coordination grossly intact [No Focal Deficits] : no focal deficits [Normal Gait] : normal gait [Speech Grossly Normal] : speech grossly normal [Memory Grossly Normal] : memory grossly normal [Normal Affect] : the affect was normal [Alert and Oriented x3] : oriented to person, place, and time [Normal Mood] : the mood was normal [Normal Insight/Judgement] : insight and judgment were intact [de-identified] : Status post reconstruction status post mastectomy patient following closely with plastic surgery.

## 2021-06-11 NOTE — REVIEW OF SYSTEMS
[Abdominal Pain] : abdominal pain [Negative] : Heme/Lymph [FreeTextEntry7] : Minimal abdominal discomfort elicited by deep palpation.  Postop.

## 2021-06-11 NOTE — HEALTH RISK ASSESSMENT
[No] : In the past 12 months have you used drugs other than those required for medical reasons? No [No falls in past year] : Patient reported no falls in the past year [0] : 2) Feeling down, depressed, or hopeless: Not at all (0) [] : No [YearQuit] : 2018 [Audit-CScore] : 0 [[id number]] : 0

## 2021-06-11 NOTE — HISTORY OF PRESENT ILLNESS
[de-identified] : Patient is 61-year-old female who presents today for follow-up and disease management patient with history of breast cancer status post mastectomy, chemotherapy and status post reconstructive breast surgery.  Patient recently also had an abdominal abscess which was excised subsequently being followed by surgery patient recently was diagnosed with thickened endometrium status post biopsy and patient does have history of fibroids which appear to be stable.  Medical history is significant for hypothyroidism, gastroesophageal reflux disease, hyperlipidemia presently patient states that she is in her usual state of health she has some residual abdominal discomfort without fevers, nausea or vomiting.

## 2021-06-11 NOTE — ASSESSMENT
Refill authorized per protocol.     [FreeTextEntry1] : Assessment and plan:\par \par 1.  Breast cancer status post bilateral mastectomy status post bilateral delayed D IEP flap reconstruction, patient follows up with plastic surgery she has healed well.\par \par 2.  Hypothyroidism I reviewed with patient comprehensive blood work that was done last month in February thyroid function tests are excellent therefore my recommendations are to continue levothyroxine 25 mcg p.o. daily no change in medical management.\par \par 3.  Endometrial thickening status post biopsy patient scheduled for follow-up ultrasound she also has underlying fibroids followed closely by gynecology.\par \par 4.  Gastroesophageal reflux disease discussed diet and patient will continue proton pump inhibitor pantoprazole 40 mg p.o. daily she does get great relief with PPI.\par \par 5.  Hyperlipidemia discussed with patient low-fat low-cholesterol diet increase physical activity as tolerated.\par \par

## 2021-06-23 ENCOUNTER — APPOINTMENT (OUTPATIENT)
Dept: ULTRASOUND IMAGING | Facility: CLINIC | Age: 61
End: 2021-06-23
Payer: MEDICAID

## 2021-06-23 ENCOUNTER — OUTPATIENT (OUTPATIENT)
Dept: OUTPATIENT SERVICES | Facility: HOSPITAL | Age: 61
LOS: 1 days | End: 2021-06-23
Payer: MEDICAID

## 2021-06-23 DIAGNOSIS — Z98.890 OTHER SPECIFIED POSTPROCEDURAL STATES: Chronic | ICD-10-CM

## 2021-06-23 DIAGNOSIS — Z90.10 ACQUIRED ABSENCE OF UNSPECIFIED BREAST AND NIPPLE: Chronic | ICD-10-CM

## 2021-06-23 DIAGNOSIS — R93.89 ABNORMAL FINDINGS ON DIAGNOSTIC IMAGING OF OTHER SPECIFIED BODY STRUCTURES: ICD-10-CM

## 2021-06-23 PROCEDURE — 76831 ECHO EXAM UTERUS: CPT | Mod: 26

## 2021-06-23 PROCEDURE — 76831 ECHO EXAM UTERUS: CPT

## 2021-06-23 PROCEDURE — 58340 CATHETER FOR HYSTEROGRAPHY: CPT

## 2021-06-25 ENCOUNTER — NON-APPOINTMENT (OUTPATIENT)
Age: 61
End: 2021-06-25

## 2021-06-28 ENCOUNTER — NON-APPOINTMENT (OUTPATIENT)
Age: 61
End: 2021-06-28

## 2021-07-08 ENCOUNTER — APPOINTMENT (OUTPATIENT)
Dept: PLASTIC SURGERY | Facility: CLINIC | Age: 61
End: 2021-07-08
Payer: MEDICAID

## 2021-07-08 PROCEDURE — XXXXX: CPT

## 2021-07-08 NOTE — REASON FOR VISIT
[Follow-Up: _____] : a [unfilled] follow-up visit [FreeTextEntry1] : s/p exploration and washout of seroma of right abdomen DOS: 03/25/21.

## 2021-07-15 ENCOUNTER — OUTPATIENT (OUTPATIENT)
Dept: OUTPATIENT SERVICES | Facility: HOSPITAL | Age: 61
LOS: 1 days | End: 2021-07-15
Payer: MEDICAID

## 2021-07-15 VITALS
WEIGHT: 164.91 LBS | HEART RATE: 61 BPM | OXYGEN SATURATION: 97 % | RESPIRATION RATE: 18 BRPM | HEIGHT: 62 IN | TEMPERATURE: 97 F | SYSTOLIC BLOOD PRESSURE: 128 MMHG | DIASTOLIC BLOOD PRESSURE: 78 MMHG

## 2021-07-15 DIAGNOSIS — Z90.10 ACQUIRED ABSENCE OF UNSPECIFIED BREAST AND NIPPLE: Chronic | ICD-10-CM

## 2021-07-15 DIAGNOSIS — T79.2XXA TRAUMATIC SECONDARY AND RECURRENT HEMORRHAGE AND SEROMA, INITIAL ENCOUNTER: Chronic | ICD-10-CM

## 2021-07-15 DIAGNOSIS — Z98.890 OTHER SPECIFIED POSTPROCEDURAL STATES: Chronic | ICD-10-CM

## 2021-07-15 DIAGNOSIS — S30.1XXA CONTUSION OF ABDOMINAL WALL, INITIAL ENCOUNTER: Chronic | ICD-10-CM

## 2021-07-15 DIAGNOSIS — R93.89 ABNORMAL FINDINGS ON DIAGNOSTIC IMAGING OF OTHER SPECIFIED BODY STRUCTURES: ICD-10-CM

## 2021-07-15 DIAGNOSIS — Z01.818 ENCOUNTER FOR OTHER PREPROCEDURAL EXAMINATION: ICD-10-CM

## 2021-07-15 LAB
ANION GAP SERPL CALC-SCNC: 10 MMOL/L — SIGNIFICANT CHANGE UP (ref 5–17)
BUN SERPL-MCNC: 14 MG/DL — SIGNIFICANT CHANGE UP (ref 7–23)
CALCIUM SERPL-MCNC: 9.8 MG/DL — SIGNIFICANT CHANGE UP (ref 8.4–10.5)
CHLORIDE SERPL-SCNC: 103 MMOL/L — SIGNIFICANT CHANGE UP (ref 96–108)
CO2 SERPL-SCNC: 24 MMOL/L — SIGNIFICANT CHANGE UP (ref 22–31)
CREAT SERPL-MCNC: 0.75 MG/DL — SIGNIFICANT CHANGE UP (ref 0.5–1.3)
GLUCOSE SERPL-MCNC: 78 MG/DL — SIGNIFICANT CHANGE UP (ref 70–99)
HCT VFR BLD CALC: 43.5 % — SIGNIFICANT CHANGE UP (ref 34.5–45)
HGB BLD-MCNC: 13.7 G/DL — SIGNIFICANT CHANGE UP (ref 11.5–15.5)
MCHC RBC-ENTMCNC: 28.6 PG — SIGNIFICANT CHANGE UP (ref 27–34)
MCHC RBC-ENTMCNC: 31.5 GM/DL — LOW (ref 32–36)
MCV RBC AUTO: 90.8 FL — SIGNIFICANT CHANGE UP (ref 80–100)
NRBC # BLD: 0 /100 WBCS — SIGNIFICANT CHANGE UP (ref 0–0)
PLATELET # BLD AUTO: 367 K/UL — SIGNIFICANT CHANGE UP (ref 150–400)
POTASSIUM SERPL-MCNC: 4.4 MMOL/L — SIGNIFICANT CHANGE UP (ref 3.5–5.3)
POTASSIUM SERPL-SCNC: 4.4 MMOL/L — SIGNIFICANT CHANGE UP (ref 3.5–5.3)
RBC # BLD: 4.79 M/UL — SIGNIFICANT CHANGE UP (ref 3.8–5.2)
RBC # FLD: 14.7 % — HIGH (ref 10.3–14.5)
SODIUM SERPL-SCNC: 137 MMOL/L — SIGNIFICANT CHANGE UP (ref 135–145)
WBC # BLD: 6.84 K/UL — SIGNIFICANT CHANGE UP (ref 3.8–10.5)
WBC # FLD AUTO: 6.84 K/UL — SIGNIFICANT CHANGE UP (ref 3.8–10.5)

## 2021-07-15 PROCEDURE — 80048 BASIC METABOLIC PNL TOTAL CA: CPT

## 2021-07-15 PROCEDURE — 85027 COMPLETE CBC AUTOMATED: CPT

## 2021-07-15 PROCEDURE — G0463: CPT

## 2021-07-15 RX ORDER — OXYBUTYNIN CHLORIDE 5 MG
1 TABLET ORAL
Qty: 0 | Refills: 0 | DISCHARGE

## 2021-07-15 RX ORDER — SODIUM CHLORIDE 9 MG/ML
3 INJECTION INTRAMUSCULAR; INTRAVENOUS; SUBCUTANEOUS EVERY 8 HOURS
Refills: 0 | Status: DISCONTINUED | OUTPATIENT
Start: 2021-07-20 | End: 2021-08-03

## 2021-07-15 RX ORDER — FAMOTIDINE 10 MG/ML
1 INJECTION INTRAVENOUS
Qty: 0 | Refills: 0 | DISCHARGE

## 2021-07-15 RX ORDER — LIDOCAINE HCL 20 MG/ML
0.2 VIAL (ML) INJECTION ONCE
Refills: 0 | Status: DISCONTINUED | OUTPATIENT
Start: 2021-07-20 | End: 2021-08-03

## 2021-07-15 NOTE — H&P PST ADULT - GASTROINTESTINAL COMMENTS
lower abdomen with transverse scar; band aid over small portion of left side of scar, small amount of serous drainage noted

## 2021-07-15 NOTE — H&P PST ADULT - NSICDXFAMILYHX_GEN_ALL_CORE_FT
FAMILY HISTORY:  No pertinent family history in first degree relatives     FAMILY HISTORY:  Sibling  Still living? Unknown  FH: HTN (hypertension), Age at diagnosis: Age Unknown  FH: kidney cancer, Age at diagnosis: Age Unknown  FH: type 2 diabetes, Age at diagnosis: Age Unknown    Aunt  Still living? Unknown  FH: breast cancer, Age at diagnosis: Age Unknown

## 2021-07-15 NOTE — H&P PST ADULT - NSICDXPROBLEM_GEN_ALL_CORE_FT
PROBLEM DIAGNOSES  Problem: Nonspecific abnormal findings on diagnostic imaging  Assessment and Plan: D&C, Operative hysteroscopy with olympus bipolar resection on 7/20/21 with Dr. Eliazar Bates.  Pre-op instructions given. Questions answered.  COVID19 vaccination series completed; patient to email vaccination card.  Medical evaluation prior to surgery.

## 2021-07-15 NOTE — H&P PST ADULT - NSICDXPASTMEDICALHX_GEN_ALL_CORE_FT
PAST MEDICAL HISTORY:  Anxiety about mastectomy     Breast cancer bilateral    Chronic bilateral low back pain, with sciatica presence unspecified     Language barrier [place]serratian speaking    Other chronic gastritis     Smoker      PAST MEDICAL HISTORY:  Arthritis     Breast cancer right, dx 2018 s/p chemotherapy    Chronic bilateral low back pain, with sciatica presence unspecified     History of chemotherapy     Hypothyroidism     Language barrier Cook Islander speaking    Other chronic gastritis     [facility]    Smoker

## 2021-07-15 NOTE — H&P PST ADULT - ATTENDING COMMENTS
Agree with above. Patient with endometrial mass, for hysteroscopic resection today for diagnostic purposes to r/o malignancy.    Eliazar Bates MD
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
## 2021-07-15 NOTE — H&P PST ADULT - HISTORY OF PRESENT ILLNESS
Ms. 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 Dejesus.     Patient reports 4 day hospitalization and cardiac workup December 2020 for SOB & chest pain which was due to pneumonia.     Patient denies previous Covid19 infection/exposure,recent travel,fevers,chills,cough,SOB,loss of sense of smell/taste.    Ms. Page is a 58 year old Moroccan speaking  female  with PMH PNA (1/2021), GERD, hypothyroidism, lower back pain, 20 pack-year smoker (quit 9/2018), invasive carcinoma of rightbreast s/p  bilateral mastectomy (2018), s/p REMI flap (1/2021) complicated by abdominal wall seroma s/p surgical intervention (3/2021l followed by Dr. Carter). Patient presents to [facility] prior to scheduled D&C, Operative Hysteroscopy with Olympus Bipolar Resectoscope on 7/20/21 with Dr. Eliazar Bates. Patient reports she had follow up imaging which revealed "something in my uterus". She endorses occasional abdominal cramping for several months. She denies vaginal bleeding, CP, CANTRELL, palpitations, light headedness, syncope. She was recently evaluated by Dr. Bates and the above procedure was recommended.    Patient reports 4 day hospitalization and cardiac workup (including angiogram and echo which were negative) in 12/2020 for SOB & chest pain which was due to pneumonia.     Patient denies previous Covid19 infection/exposure,recent travel,fevers,chills,cough,SOB,loss of sense of smell/taste.   Vaccinated for COVID19; will email proof of vaccination.

## 2021-07-16 ENCOUNTER — APPOINTMENT (OUTPATIENT)
Dept: FAMILY MEDICINE | Facility: CLINIC | Age: 61
End: 2021-07-16
Payer: MEDICAID

## 2021-07-16 VITALS
BODY MASS INDEX: 29.59 KG/M2 | OXYGEN SATURATION: 98 % | WEIGHT: 167 LBS | SYSTOLIC BLOOD PRESSURE: 120 MMHG | HEART RATE: 65 BPM | DIASTOLIC BLOOD PRESSURE: 88 MMHG | HEIGHT: 63 IN | RESPIRATION RATE: 16 BRPM | TEMPERATURE: 97 F

## 2021-07-16 DIAGNOSIS — Z01.818 ENCOUNTER FOR OTHER PREPROCEDURAL EXAMINATION: ICD-10-CM

## 2021-07-16 PROCEDURE — 99214 OFFICE O/P EST MOD 30 MIN: CPT

## 2021-07-16 NOTE — RESULTS/DATA
Yes [] : results reviewed [de-identified] : Acceptable  [de-identified] : Acceptable  [de-identified] : Acceptable

## 2021-07-16 NOTE — HISTORY OF PRESENT ILLNESS
[No Pertinent Cardiac History] : no history of aortic stenosis, atrial fibrillation, coronary artery disease, recent myocardial infarction, or implantable device/pacemaker [No Pertinent Pulmonary History] : no history of asthma, COPD, sleep apnea, or smoking [No Adverse Anesthesia Reaction] : no adverse anesthesia reaction in self or family member [(Patient denies any chest pain, claudication, dyspnea on exertion, orthopnea, palpitations or syncope)] : Patient denies any chest pain, claudication, dyspnea on exertion, orthopnea, palpitations or syncope [Chronic Anticoagulation] : no chronic anticoagulation [Chronic Kidney Disease] : no chronic kidney disease [Diabetes] : no diabetes [FreeTextEntry1] : D &C - hysterectomy  w/ olymbus bipolar rectoscope  [FreeTextEntry2] : 7/202021 [FreeTextEntry3] : Dr. Quiñonez [FreeTextEntry4] : Patient presents with preop clearance. \par \par Pending D and C and hysterectomy for mass noted on previous MRI in 5/29.  She was suffering from pelvic pain prior to study. Overall notes pain has resolved.\par \par Overall feels well at visit. Denies any acute complaints \par \par  [FreeTextEntry7] : echo- 2/10/2021- normal LV\par angiocath - 12/11/2020- only mild luminal irregularities. \par ecg- 03/2021- NSR no acute changes noted from previous.

## 2021-07-16 NOTE — ASSESSMENT
[High Risk Surgery - Intraperitoneal, Intrathoracic or Supringuinal Vascular Procedures] : High Risk Surgery - Intraperitoneal, Intrathoracic or Supringuinal Vascular Procedures - No (0) [Ischemic Heart Disease] : Ischemic Heart Disease - No (0) [Congestive Heart Failure] : Congestive Heart Failure - No (0) [Prior Cerebrovascular Accident or TIA] : Prior Cerebrovascular Accident or TIA - No (0) [Creatinine >= 2mg/dL (1 Point)] : Creatinine >= 2mg/dL - No (0) [Insulin-dependent Diabetic (1 Point)] : Insulin-dependent Diabetic - No (0) [0] : 0 , RCRI Class: I, Risk of Post-Op Cardiac Complications: 3.9%, 95% CI for Risk Estimate: 2.8% - 5.4% [Patient Optimized for Surgery] : Patient optimized for surgery [No Further Testing Recommended] : no further testing recommended [Modify medications prior to procedure] : Modify medications prior to procedure [FreeTextEntry7] : As per PST patient reports she is not taking aspirin

## 2021-07-19 ENCOUNTER — TRANSCRIPTION ENCOUNTER (OUTPATIENT)
Age: 61
End: 2021-07-19

## 2021-07-20 ENCOUNTER — OUTPATIENT (OUTPATIENT)
Dept: OUTPATIENT SERVICES | Facility: HOSPITAL | Age: 61
LOS: 1 days | End: 2021-07-20
Payer: MEDICAID

## 2021-07-20 ENCOUNTER — RESULT REVIEW (OUTPATIENT)
Age: 61
End: 2021-07-20

## 2021-07-20 ENCOUNTER — APPOINTMENT (OUTPATIENT)
Dept: OBGYN | Facility: HOSPITAL | Age: 61
End: 2021-07-20

## 2021-07-20 VITALS
RESPIRATION RATE: 17 BRPM | HEART RATE: 66 BPM | TEMPERATURE: 97 F | OXYGEN SATURATION: 98 % | SYSTOLIC BLOOD PRESSURE: 149 MMHG | DIASTOLIC BLOOD PRESSURE: 74 MMHG

## 2021-07-20 VITALS
HEIGHT: 62 IN | OXYGEN SATURATION: 100 % | TEMPERATURE: 97 F | DIASTOLIC BLOOD PRESSURE: 83 MMHG | WEIGHT: 164.91 LBS | RESPIRATION RATE: 16 BRPM | HEART RATE: 53 BPM | SYSTOLIC BLOOD PRESSURE: 133 MMHG

## 2021-07-20 DIAGNOSIS — R93.89 ABNORMAL FINDINGS ON DIAGNOSTIC IMAGING OF OTHER SPECIFIED BODY STRUCTURES: ICD-10-CM

## 2021-07-20 DIAGNOSIS — S30.1XXA CONTUSION OF ABDOMINAL WALL, INITIAL ENCOUNTER: Chronic | ICD-10-CM

## 2021-07-20 DIAGNOSIS — Z98.890 OTHER SPECIFIED POSTPROCEDURAL STATES: Chronic | ICD-10-CM

## 2021-07-20 DIAGNOSIS — Z90.10 ACQUIRED ABSENCE OF UNSPECIFIED BREAST AND NIPPLE: Chronic | ICD-10-CM

## 2021-07-20 DIAGNOSIS — Z01.818 ENCOUNTER FOR OTHER PREPROCEDURAL EXAMINATION: ICD-10-CM

## 2021-07-20 PROCEDURE — 86850 RBC ANTIBODY SCREEN: CPT

## 2021-07-20 PROCEDURE — 86901 BLOOD TYPING SEROLOGIC RH(D): CPT

## 2021-07-20 PROCEDURE — 58561 HYSTEROSCOPY REMOVE MYOMA: CPT

## 2021-07-20 PROCEDURE — 88305 TISSUE EXAM BY PATHOLOGIST: CPT

## 2021-07-20 PROCEDURE — 86900 BLOOD TYPING SEROLOGIC ABO: CPT

## 2021-07-20 PROCEDURE — 88305 TISSUE EXAM BY PATHOLOGIST: CPT | Mod: 26

## 2021-07-20 RX ORDER — ACETAMINOPHEN 500 MG
2 TABLET ORAL
Qty: 0 | Refills: 0 | DISCHARGE

## 2021-07-20 RX ORDER — ONDANSETRON 8 MG/1
4 TABLET, FILM COATED ORAL ONCE
Refills: 0 | Status: DISCONTINUED | OUTPATIENT
Start: 2021-07-20 | End: 2021-08-03

## 2021-07-20 RX ORDER — PANTOPRAZOLE SODIUM 20 MG/1
1 TABLET, DELAYED RELEASE ORAL
Qty: 0 | Refills: 0 | DISCHARGE

## 2021-07-20 RX ORDER — SODIUM CHLORIDE 9 MG/ML
1000 INJECTION, SOLUTION INTRAVENOUS
Refills: 0 | Status: DISCONTINUED | OUTPATIENT
Start: 2021-07-20 | End: 2021-08-03

## 2021-07-20 RX ORDER — FENTANYL CITRATE 50 UG/ML
25 INJECTION INTRAVENOUS
Refills: 0 | Status: DISCONTINUED | OUTPATIENT
Start: 2021-07-20 | End: 2021-07-20

## 2021-07-20 RX ORDER — LEVOTHYROXINE SODIUM 125 MCG
1 TABLET ORAL
Qty: 0 | Refills: 0 | DISCHARGE
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
## 2021-07-20 NOTE — ASU DISCHARGE PLAN (ADULT/PEDIATRIC) - CARE PROVIDER_API CALL
Eliazar Bates)  OBSGYN  [phone] [id number]  Phone: [phone]  Fax: [phone]  Follow Up Time: 2 weeks

## 2021-07-20 NOTE — BRIEF OPERATIVE NOTE - OPERATION/FINDINGS
Small mid positioned uterus on bimanual exam. No adnexal masses. 1 cm right fundal endometrial polyp. 1 cm anterior type II myoma. Otherwise normal endometrial cavity.

## 2021-07-20 NOTE — ASU PATIENT PROFILE, ADULT - PMH
Arthritis    Breast cancer  right, dx 2018 s/p chemotherapy  Chronic bilateral low back pain, with sciatica presence unspecified    History of chemotherapy    Hypothyroidism    Language barrier  Slovenian speaking  Other chronic gastritis    [facility]  [facility]  Smoker

## 2021-07-20 NOTE — BRIEF OPERATIVE NOTE - NSICDXBRIEFPOSTOP_GEN_ALL_CORE_FT
POST-OP DIAGNOSIS:  Uterine polyp 20-Jul-2021 17:23:28  Eliazar Bates  Fibroids, submucosal 20-Jul-2021 17:23:32  Eliazar Bates

## 2021-07-20 NOTE — PRE-ANESTHESIA EVALUATION ADULT - NSANTHOSAYNRD_GEN_A_CORE
No. ELIZABETH screening performed.  STOP BANG Legend: 0-2 = LOW Risk; 3-4 = INTERMEDIATE Risk; 5-8 = HIGH Risk Left Deviation

## 2021-07-20 NOTE — BRIEF OPERATIVE NOTE - NSICDXBRIEFPROCEDURE_GEN_ALL_CORE_FT
PROCEDURES:  Excision, polyp, endometrial 20-Jul-2021 17:22:57  Eliazar Bates  Hysteroscopic myomectomy 20-Jul-2021 17:23:06  Eliazar Bates

## 2021-07-26 LAB — SURGICAL PATHOLOGY STUDY: SIGNIFICANT CHANGE UP

## 2021-07-27 PROBLEM — C50.919 MALIGNANT NEOPLASM OF UNSPECIFIED SITE OF UNSPECIFIED FEMALE BREAST: Chronic | Status: ACTIVE | Noted: 2021-02-05

## 2021-07-27 PROBLEM — M19.90 UNSPECIFIED OSTEOARTHRITIS, UNSPECIFIED SITE: Chronic | Status: ACTIVE | Noted: 2021-07-15

## 2021-07-27 PROBLEM — Z92.21 PERSONAL HISTORY OF ANTINEOPLASTIC CHEMOTHERAPY: Chronic | Status: ACTIVE | Noted: 2021-07-15

## 2021-07-27 PROBLEM — E03.9 HYPOTHYROIDISM, UNSPECIFIED: Chronic | Status: ACTIVE | Noted: 2021-07-15

## 2021-07-28 ENCOUNTER — NON-APPOINTMENT (OUTPATIENT)
Age: 61
End: 2021-07-28

## 2021-08-05 ENCOUNTER — OUTPATIENT (OUTPATIENT)
Dept: OUTPATIENT SERVICES | Facility: HOSPITAL | Age: 61
LOS: 1 days | End: 2021-08-05
Payer: MEDICAID

## 2021-08-05 ENCOUNTER — APPOINTMENT (OUTPATIENT)
Dept: OBGYN | Facility: CLINIC | Age: 61
End: 2021-08-05
Payer: MEDICAID

## 2021-08-05 VITALS
HEIGHT: 63 IN | SYSTOLIC BLOOD PRESSURE: 142 MMHG | WEIGHT: 168.5 LBS | DIASTOLIC BLOOD PRESSURE: 88 MMHG | BODY MASS INDEX: 29.86 KG/M2

## 2021-08-05 DIAGNOSIS — Z98.890 OTHER SPECIFIED POSTPROCEDURAL STATES: Chronic | ICD-10-CM

## 2021-08-05 DIAGNOSIS — S30.1XXA CONTUSION OF ABDOMINAL WALL, INITIAL ENCOUNTER: Chronic | ICD-10-CM

## 2021-08-05 DIAGNOSIS — N76.0 ACUTE VAGINITIS: ICD-10-CM

## 2021-08-05 DIAGNOSIS — Z90.10 ACQUIRED ABSENCE OF UNSPECIFIED BREAST AND NIPPLE: Chronic | ICD-10-CM

## 2021-08-05 PROCEDURE — 99213 OFFICE O/P EST LOW 20 MIN: CPT

## 2021-08-05 PROCEDURE — G0463: CPT

## 2021-08-05 NOTE — HISTORY OF PRESENT ILLNESS
[Pain is well-controlled] : pain is well-controlled [Fever] : no fever [Chills] : no chills [Nausea] : no nausea [Vomiting] : no vomiting [Normal] : normal [Pathology reviewed] : pathology reviewed [de-identified] : s/p hysteroscopic polypectomy and myomectomy with D&C 7/20. Pt reports occasional spotting still, but otherwise no complaints. [de-identified] : Abd soft, non-tender, non-distended

## 2021-08-11 DIAGNOSIS — R93.89 ABNORMAL FINDINGS ON DIAGNOSTIC IMAGING OF OTHER SPECIFIED BODY STRUCTURES: ICD-10-CM

## 2021-10-07 ENCOUNTER — RX RENEWAL (OUTPATIENT)
Age: 61
End: 2021-10-07

## 2021-10-07 ENCOUNTER — APPOINTMENT (OUTPATIENT)
Dept: HEMATOLOGY ONCOLOGY | Facility: CLINIC | Age: 61
End: 2021-10-07

## 2021-10-11 ENCOUNTER — APPOINTMENT (OUTPATIENT)
Dept: FAMILY MEDICINE | Facility: CLINIC | Age: 61
End: 2021-10-11
Payer: MEDICAID

## 2021-10-11 VITALS
BODY MASS INDEX: 30.12 KG/M2 | OXYGEN SATURATION: 99 % | WEIGHT: 170 LBS | HEART RATE: 88 BPM | TEMPERATURE: 97.2 F | RESPIRATION RATE: 14 BRPM | HEIGHT: 63 IN | DIASTOLIC BLOOD PRESSURE: 98 MMHG | SYSTOLIC BLOOD PRESSURE: 140 MMHG

## 2021-10-11 DIAGNOSIS — D22.9 MELANOCYTIC NEVI, UNSPECIFIED: ICD-10-CM

## 2021-10-11 DIAGNOSIS — Z23 ENCOUNTER FOR IMMUNIZATION: ICD-10-CM

## 2021-10-11 DIAGNOSIS — L98.9 DISORDER OF THE SKIN AND SUBCUTANEOUS TISSUE, UNSPECIFIED: ICD-10-CM

## 2021-10-11 DIAGNOSIS — M79.671 PAIN IN RIGHT FOOT: ICD-10-CM

## 2021-10-11 DIAGNOSIS — M79.672 PAIN IN RIGHT FOOT: ICD-10-CM

## 2021-10-11 PROCEDURE — 99214 OFFICE O/P EST MOD 30 MIN: CPT | Mod: 25

## 2021-10-11 PROCEDURE — 90686 IIV4 VACC NO PRSV 0.5 ML IM: CPT

## 2021-10-11 PROCEDURE — G0008: CPT

## 2021-10-11 NOTE — HEALTH RISK ASSESSMENT
[20 or more] : 20 or more [No] : No [No falls in past year] : Patient reported no falls in the past year [0] : 2) Feeling down, depressed, or hopeless: Not at all (0) [PHQ-2 Negative - No further assessment needed] : PHQ-2 Negative - No further assessment needed [] : No [YearQuit] : 2018 [Audit-CScore] : 0 [[id number]] : 0

## 2021-10-11 NOTE — HISTORY OF PRESENT ILLNESS
[FreeTextEntry1] : Please see HPI [de-identified] : Patient is a 61-year-old female who presents today for follow-up and also for disease management patient's medical history is significant for breast cancer status post bilateral mastectomy with reconstruction and most recently patient was diagnosed with thickened endometrial tissue with abnormal uterine bleeding status post hysteroscopic polypectomy biopsy proved to be benign.\par \par Presently the patient is awake alert and oriented x3 she is in no acute distress we will also be evaluating hypothyroidism, gastroesophageal reflux disease and hyperlipidemia.  At this visit I will be administering influenza vaccine.  Patient complaining of bilateral foot pain especially in the morning upon awakening and she is also complaining of a skin lesion of her forehead which is extremely itchy.

## 2021-10-11 NOTE — PHYSICAL EXAM
[No Acute Distress] : no acute distress [Well Nourished] : well nourished [Well Developed] : well developed [Well-Appearing] : well-appearing [Normal Sclera/Conjunctiva] : normal sclera/conjunctiva [PERRL] : pupils equal round and reactive to light [EOMI] : extraocular movements intact [Normal Outer Ear/Nose] : the outer ears and nose were normal in appearance [Normal Oropharynx] : the oropharynx was normal [No JVD] : no jugular venous distention [No Lymphadenopathy] : no lymphadenopathy [Supple] : supple [Thyroid Normal, No Nodules] : the thyroid was normal and there were no nodules present [No Respiratory Distress] : no respiratory distress  [No Accessory Muscle Use] : no accessory muscle use [Clear to Auscultation] : lungs were clear to auscultation bilaterally [Normal Rate] : normal rate  [Regular Rhythm] : with a regular rhythm [Normal S1, S2] : normal S1 and S2 [No Murmur] : no murmur heard [No Carotid Bruits] : no carotid bruits [No Abdominal Bruit] : a ~M bruit was not heard ~T in the abdomen [No Varicosities] : no varicosities [Pedal Pulses Present] : the pedal pulses are present [No Edema] : there was no peripheral edema [No Palpable Aorta] : no palpable aorta [No Extremity Clubbing/Cyanosis] : no extremity clubbing/cyanosis [Soft] : abdomen soft [Non Tender] : non-tender [Non-distended] : non-distended [No Masses] : no abdominal mass palpated [No HSM] : no HSM [Normal Bowel Sounds] : normal bowel sounds [Normal Posterior Cervical Nodes] : no posterior cervical lymphadenopathy [Normal Anterior Cervical Nodes] : no anterior cervical lymphadenopathy [No CVA Tenderness] : no CVA  tenderness [No Spinal Tenderness] : no spinal tenderness [No Joint Swelling] : no joint swelling [Grossly Normal Strength/Tone] : grossly normal strength/tone [No Rash] : no rash [Coordination Grossly Intact] : coordination grossly intact [No Focal Deficits] : no focal deficits [Normal Gait] : normal gait [Speech Grossly Normal] : speech grossly normal [Memory Grossly Normal] : memory grossly normal [Normal Affect] : the affect was normal [Alert and Oriented x3] : oriented to person, place, and time [Normal Mood] : the mood was normal [Normal Insight/Judgement] : insight and judgment were intact

## 2021-10-11 NOTE — ASSESSMENT
[FreeTextEntry1] : Assessment and plan:\par \par 1.  Breast cancer status post bilateral mastectomy status post bilateral delayed D IEP flap reconstruction, patient follows up with plastic surgery she has healed well.\par \par 2.  Status post hysteroscopic polypectomy and myectomy biopsy was benign patient healed well and is doing well.\par \par 3.  Hypothyroidism I reviewed with patient comprehensive blood work that was done last month in February thyroid function tests are excellent therefore my recommendations are to continue levothyroxine 25 mcg p.o. daily no change in medical management.\par \par 4.  Multiple skin moles I recommend full-body surveillance referral to dermatology.\par \par 5.  Gastroesophageal reflux disease discussed diet and patient will continue proton pump inhibitor pantoprazole 40 mg p.o. daily she does get great relief with PPI.\par \par 6.  Bilateral foot pain physical exam is unremarkable I prefer conservative management Tylenol as needed but for completeness sake I referred patient to podiatry.\par \par 7.  Hypertension I recommend starting a low-dose amlodipine 5 mg p.o. daily I discussed with patient diet and weight loss program I also recommend home blood pressure monitoring.\par \par 8.  Hyperlipidemia discussed with patient low-fat low-cholesterol diet increase physical activity as tolerated.\par \par 9.  At this visit Fluzone influenza vaccine administered.\par \par

## 2021-11-01 ENCOUNTER — OUTPATIENT (OUTPATIENT)
Dept: OUTPATIENT SERVICES | Facility: HOSPITAL | Age: 61
LOS: 1 days | Discharge: HOME | End: 2021-11-01

## 2021-11-01 DIAGNOSIS — Z90.10 ACQUIRED ABSENCE OF UNSPECIFIED BREAST AND NIPPLE: Chronic | ICD-10-CM

## 2021-11-01 DIAGNOSIS — S30.1XXA CONTUSION OF ABDOMINAL WALL, INITIAL ENCOUNTER: Chronic | ICD-10-CM

## 2021-11-01 DIAGNOSIS — C50.919 MALIGNANT NEOPLASM OF UNSPECIFIED SITE OF UNSPECIFIED FEMALE BREAST: ICD-10-CM

## 2021-11-01 DIAGNOSIS — Z98.890 OTHER SPECIFIED POSTPROCEDURAL STATES: Chronic | ICD-10-CM

## 2021-11-02 ENCOUNTER — RESULT REVIEW (OUTPATIENT)
Age: 61
End: 2021-11-02

## 2021-11-02 ENCOUNTER — APPOINTMENT (OUTPATIENT)
Dept: HEMATOLOGY ONCOLOGY | Facility: CLINIC | Age: 61
End: 2021-11-02
Payer: MEDICAID

## 2021-11-02 VITALS
TEMPERATURE: 97 F | OXYGEN SATURATION: 98 % | RESPIRATION RATE: 16 BRPM | SYSTOLIC BLOOD PRESSURE: 141 MMHG | BODY MASS INDEX: 30.47 KG/M2 | HEART RATE: 73 BPM | HEIGHT: 62.99 IN | DIASTOLIC BLOOD PRESSURE: 85 MMHG | WEIGHT: 171.96 LBS

## 2021-11-02 LAB
BASOPHILS # BLD AUTO: 0.05 K/UL — SIGNIFICANT CHANGE UP (ref 0–0.2)
BASOPHILS NFR BLD AUTO: 0.6 % — SIGNIFICANT CHANGE UP (ref 0–2)
EOSINOPHIL # BLD AUTO: 0.13 K/UL — SIGNIFICANT CHANGE UP (ref 0–0.5)
EOSINOPHIL NFR BLD AUTO: 1.5 % — SIGNIFICANT CHANGE UP (ref 0–6)
HCT VFR BLD CALC: 44.3 % — SIGNIFICANT CHANGE UP (ref 34.5–45)
HGB BLD-MCNC: 14.2 G/DL — SIGNIFICANT CHANGE UP (ref 11.5–15.5)
IMM GRANULOCYTES NFR BLD AUTO: 0.3 % — SIGNIFICANT CHANGE UP (ref 0–1.5)
LYMPHOCYTES # BLD AUTO: 2.77 K/UL — SIGNIFICANT CHANGE UP (ref 1–3.3)
LYMPHOCYTES # BLD AUTO: 31.9 % — SIGNIFICANT CHANGE UP (ref 13–44)
MCHC RBC-ENTMCNC: 30.9 PG — SIGNIFICANT CHANGE UP (ref 27–34)
MCHC RBC-ENTMCNC: 32.1 G/DL — SIGNIFICANT CHANGE UP (ref 32–36)
MCV RBC AUTO: 96.3 FL — SIGNIFICANT CHANGE UP (ref 80–100)
MONOCYTES # BLD AUTO: 0.64 K/UL — SIGNIFICANT CHANGE UP (ref 0–0.9)
MONOCYTES NFR BLD AUTO: 7.4 % — SIGNIFICANT CHANGE UP (ref 2–14)
NEUTROPHILS # BLD AUTO: 5.07 K/UL — SIGNIFICANT CHANGE UP (ref 1.8–7.4)
NEUTROPHILS NFR BLD AUTO: 58.3 % — SIGNIFICANT CHANGE UP (ref 43–77)
NRBC # BLD: 0 /100 WBCS — SIGNIFICANT CHANGE UP (ref 0–0)
PLATELET # BLD AUTO: 348 K/UL — SIGNIFICANT CHANGE UP (ref 150–400)
RBC # BLD: 4.6 M/UL — SIGNIFICANT CHANGE UP (ref 3.8–5.2)
RBC # FLD: 13.3 % — SIGNIFICANT CHANGE UP (ref 10.3–14.5)
WBC # BLD: 8.69 K/UL — SIGNIFICANT CHANGE UP (ref 3.8–10.5)
WBC # FLD AUTO: 8.69 K/UL — SIGNIFICANT CHANGE UP (ref 3.8–10.5)

## 2021-11-02 PROCEDURE — 99214 OFFICE O/P EST MOD 30 MIN: CPT

## 2021-11-03 ENCOUNTER — APPOINTMENT (OUTPATIENT)
Dept: RADIOLOGY | Facility: HOSPITAL | Age: 61
End: 2021-11-03
Payer: MEDICAID

## 2021-11-03 ENCOUNTER — OUTPATIENT (OUTPATIENT)
Dept: OUTPATIENT SERVICES | Facility: HOSPITAL | Age: 61
LOS: 1 days | End: 2021-11-03
Payer: MEDICAID

## 2021-11-03 DIAGNOSIS — Z98.890 OTHER SPECIFIED POSTPROCEDURAL STATES: Chronic | ICD-10-CM

## 2021-11-03 DIAGNOSIS — Z90.10 ACQUIRED ABSENCE OF UNSPECIFIED BREAST AND NIPPLE: Chronic | ICD-10-CM

## 2021-11-03 DIAGNOSIS — S30.1XXA CONTUSION OF ABDOMINAL WALL, INITIAL ENCOUNTER: Chronic | ICD-10-CM

## 2021-11-03 DIAGNOSIS — Z00.8 ENCOUNTER FOR OTHER GENERAL EXAMINATION: ICD-10-CM

## 2021-11-03 PROCEDURE — 73630 X-RAY EXAM OF FOOT: CPT

## 2021-11-03 PROCEDURE — 73630 X-RAY EXAM OF FOOT: CPT | Mod: 26,LT,RT

## 2021-11-04 ENCOUNTER — APPOINTMENT (OUTPATIENT)
Dept: PLASTIC SURGERY | Facility: CLINIC | Age: 61
End: 2021-11-04
Payer: MEDICAID

## 2021-11-04 VITALS
HEIGHT: 62 IN | TEMPERATURE: 96.8 F | HEART RATE: 76 BPM | OXYGEN SATURATION: 98 % | WEIGHT: 167 LBS | SYSTOLIC BLOOD PRESSURE: 133 MMHG | DIASTOLIC BLOOD PRESSURE: 82 MMHG | BODY MASS INDEX: 30.73 KG/M2

## 2021-11-04 PROCEDURE — 99213 OFFICE O/P EST LOW 20 MIN: CPT

## 2021-11-08 LAB
25(OH)D3 SERPL-MCNC: 21.6 NG/ML
ALBUMIN SERPL ELPH-MCNC: 4.7 G/DL
ALP BLD-CCNC: 114 U/L
ALT SERPL-CCNC: 21 U/L
ANION GAP SERPL CALC-SCNC: 14 MMOL/L
AST SERPL-CCNC: 20 U/L
BILIRUB SERPL-MCNC: 0.6 MG/DL
BUN SERPL-MCNC: 11 MG/DL
CALCIUM SERPL-MCNC: 10 MG/DL
CHLORIDE SERPL-SCNC: 103 MMOL/L
CHOLEST SERPL-MCNC: 224 MG/DL
CO2 SERPL-SCNC: 24 MMOL/L
CREAT SERPL-MCNC: 0.73 MG/DL
GLUCOSE SERPL-MCNC: 92 MG/DL
HDLC SERPL-MCNC: 52 MG/DL
LDLC SERPL CALC-MCNC: 126 MG/DL
NONHDLC SERPL-MCNC: 172 MG/DL
POTASSIUM SERPL-SCNC: 4.8 MMOL/L
PROT SERPL-MCNC: 7.5 G/DL
SODIUM SERPL-SCNC: 140 MMOL/L
TRIGL SERPL-MCNC: 230 MG/DL
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
## 2021-11-09 NOTE — PHYSICAL EXAM
[Fully active, able to carry on all pre-disease performance without restriction] : Status 0 - Fully active, able to carry on all pre-disease performance without restriction [Normal] : affect appropriate [de-identified] : appears well [de-identified] : normal respiratory pattern [de-identified] : no icterus or injection [de-identified] : REMI reconstruction b/l well healed, no masses or adenopathy palpable b/l [de-identified] : abdominal scar well healing with dressing, BELGICA drain in place without drainage noted

## 2021-11-09 NOTE — ASSESSMENT
[FreeTextEntry1] : 60 y/o woman with GERD, microscopic hematuria, significant smoking history (quit recently), arthritis/osteoporosis presenting for medical oncology follow up after b/l mastectomy for R breast cancer; final pathology findings on second opinion pathology review (Dr. 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 Interval PET/CT with no other evidence of disease. S/p adjuvant chemotherapy with cisplatin/etoposide x 4 cycles in early 2019, doing well now.\par \par -CT C/A/P reviewed from 4/9/21; DAVIAN\par -will get PET scan now to monitor previous FDG changes in mastectomy bed and rule out recurrence.\par -clinically DAVIAN\par -discussed monitoring for any new signs or symptoms\par -continue follow up with her surgeon Dr. Raymundo\par -she will fu with plastic surgeon regarding abdominal wound\par -FU in 6mos or sooner\par \par \par

## 2021-11-09 NOTE — REASON FOR VISIT
[Follow-Up Visit] : a follow-up [Other: ______] : provided by [employer] [FreeTextEntry2] : Right Breast Cancer [Interpreters_IDNumber] : [id number] [Interpreters_FullName] : Tobin

## 2021-12-02 ENCOUNTER — OUTPATIENT (OUTPATIENT)
Dept: OUTPATIENT SERVICES | Facility: HOSPITAL | Age: 61
LOS: 1 days | End: 2021-12-02
Payer: MEDICAID

## 2021-12-02 VITALS
RESPIRATION RATE: 16 BRPM | TEMPERATURE: 98 F | DIASTOLIC BLOOD PRESSURE: 85 MMHG | OXYGEN SATURATION: 98 % | HEART RATE: 64 BPM | SYSTOLIC BLOOD PRESSURE: 144 MMHG | WEIGHT: 167.11 LBS | HEIGHT: 61 IN

## 2021-12-02 DIAGNOSIS — Z85.3 PERSONAL HISTORY OF MALIGNANT NEOPLASM OF BREAST: ICD-10-CM

## 2021-12-02 DIAGNOSIS — Z98.890 OTHER SPECIFIED POSTPROCEDURAL STATES: ICD-10-CM

## 2021-12-02 DIAGNOSIS — Z98.890 OTHER SPECIFIED POSTPROCEDURAL STATES: Chronic | ICD-10-CM

## 2021-12-02 DIAGNOSIS — I10 ESSENTIAL (PRIMARY) HYPERTENSION: ICD-10-CM

## 2021-12-02 DIAGNOSIS — N65.0 DEFORMITY OF RECONSTRUCTED BREAST: ICD-10-CM

## 2021-12-02 DIAGNOSIS — K29.50 UNSPECIFIED CHRONIC GASTRITIS WITHOUT BLEEDING: ICD-10-CM

## 2021-12-02 DIAGNOSIS — S30.1XXA CONTUSION OF ABDOMINAL WALL, INITIAL ENCOUNTER: Chronic | ICD-10-CM

## 2021-12-02 DIAGNOSIS — Z90.10 ACQUIRED ABSENCE OF UNSPECIFIED BREAST AND NIPPLE: Chronic | ICD-10-CM

## 2021-12-02 DIAGNOSIS — E03.9 HYPOTHYROIDISM, UNSPECIFIED: ICD-10-CM

## 2021-12-02 PROCEDURE — G0463: CPT

## 2021-12-02 PROCEDURE — 93005 ELECTROCARDIOGRAM TRACING: CPT

## 2021-12-02 PROCEDURE — 93010 ELECTROCARDIOGRAM REPORT: CPT | Mod: NC

## 2021-12-02 RX ORDER — PANTOPRAZOLE SODIUM 20 MG/1
0 TABLET, DELAYED RELEASE ORAL
Qty: 0 | Refills: 0 | DISCHARGE

## 2021-12-02 RX ORDER — AMLODIPINE BESYLATE 2.5 MG/1
0 TABLET ORAL
Qty: 0 | Refills: 0 | DISCHARGE

## 2021-12-02 RX ORDER — ACETAMINOPHEN 500 MG
2 TABLET ORAL
Qty: 0 | Refills: 0 | DISCHARGE

## 2021-12-02 RX ORDER — IBUPROFEN 200 MG
3 TABLET ORAL
Qty: 0 | Refills: 0 | DISCHARGE

## 2021-12-02 RX ORDER — LEVOTHYROXINE SODIUM 125 MCG
0 TABLET ORAL
Qty: 0 | Refills: 0 | DISCHARGE

## 2021-12-02 NOTE — H&P PST ADULT - HISTORY OF PRESENT ILLNESS
Ms. Page is a 58 year old Mozambican speaking  female  with PMH GERD, hypothyroidism, lower back pain, 20 pack-year smoker (quit 9/2018), invasive carcinoma of right breast s/p  bilateral mastectomy (2018), s/p REMI flap (1/2021) complicated by abdominal wall seroma s/p surgical intervention (3/2021l followed by Dr. Carter).  Pt reports constant discomfort over the medial aspect of her left breast incision as well as fullness over her lateral breasts.  She is also complaining of small bumps over her abdominal incision.  Otherwise patient feels well today and denies any issues.

## 2021-12-02 NOTE — H&P PST ADULT - FALL HARM RISK - UNIVERSAL INTERVENTIONS
Bed in lowest position, wheels locked, appropriate side rails in place/Call bell, personal items and telephone in reach/Instruct patient to call for assistance before getting out of bed or chair/Non-slip footwear when patient is out of bed/[place] to call system/Physically safe environment - no spills, clutter or unnecessary equipment/Purposeful Proactive Rounding/Room/bathroom lighting operational, light cord in reach

## 2021-12-02 NOTE — H&P PST ADULT - NEGATIVE SKIN SYMPTOMS
Paged on call hospitalist @ [phone] re: new pain with inhalation on L side. Spoke w/Dr. Zheng, let him know it was at and below pt's rib cage with inhalation, but lung sounds were clear and it felt like a cramp. No new orders as pt already being treated for DVT, monitor for worsening, then repage.     no rash

## 2021-12-02 NOTE — H&P PST ADULT - NSICDXFAMILYHX_GEN_ALL_CORE_FT
FAMILY HISTORY:  Sibling  Still living? Unknown  FH: HTN (hypertension), Age at diagnosis: Age Unknown  FH: kidney cancer, Age at diagnosis: Age Unknown  FH: type 2 diabetes, Age at diagnosis: Age Unknown    Aunt  Still living? Unknown  FH: breast cancer, Age at diagnosis: Age Unknown
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
## 2021-12-02 NOTE — H&P PST ADULT - PROBLEM SELECTOR PLAN 1
Patient provided with pre-operative instructions and verbalized understanding.  Patient can take Pantoprazole, Amlodipine, and Levothyroxine but otherwise will be NPO on day of surgery. Patient will stop NSAIDs, aspirin, herbal supplements or vitamins 1 week prior to surgery.  Chlorohexadine wash provided with instructions.  Pending medical clearance as per surgeon.  COVID PCR pending per policy.

## 2021-12-02 NOTE — H&P PST ADULT - NSICDXPASTMEDICALHX_GEN_ALL_CORE_FT
PAST MEDICAL HISTORY:  Arthritis     Breast cancer right, dx 2018 s/p chemotherapy    Chronic bilateral low back pain, with sciatica presence unspecified     Deformity of reconstructed breast     History of chemotherapy     Hypothyroidism     Language barrier Greek speaking    Other chronic gastritis     [facility]    Smoker

## 2021-12-06 ENCOUNTER — LABORATORY RESULT (OUTPATIENT)
Age: 61
End: 2021-12-06

## 2021-12-06 ENCOUNTER — APPOINTMENT (OUTPATIENT)
Dept: FAMILY MEDICINE | Facility: CLINIC | Age: 61
End: 2021-12-06
Payer: MEDICAID

## 2021-12-06 VITALS
TEMPERATURE: 96.6 F | BODY MASS INDEX: 31.1 KG/M2 | HEART RATE: 62 BPM | SYSTOLIC BLOOD PRESSURE: 128 MMHG | HEIGHT: 62 IN | DIASTOLIC BLOOD PRESSURE: 84 MMHG | WEIGHT: 169 LBS | RESPIRATION RATE: 16 BRPM | OXYGEN SATURATION: 98 %

## 2021-12-06 DIAGNOSIS — R73.03 PREDIABETES.: ICD-10-CM

## 2021-12-06 DIAGNOSIS — R79.89 OTHER SPECIFIED ABNORMAL FINDINGS OF BLOOD CHEMISTRY: ICD-10-CM

## 2021-12-06 DIAGNOSIS — F17.200 NICOTINE DEPENDENCE, UNSPECIFIED, UNCOMPLICATED: ICD-10-CM

## 2021-12-06 PROCEDURE — 99214 OFFICE O/P EST MOD 30 MIN: CPT | Mod: 25

## 2021-12-06 RX ORDER — ASPIRIN ENTERIC COATED TABLETS 81 MG 81 MG/1
81 TABLET, DELAYED RELEASE ORAL
Qty: 30 | Refills: 0 | Status: DISCONTINUED | COMMUNITY
Start: 2021-02-18 | End: 2021-12-06

## 2021-12-06 RX ORDER — SULFAMETHOXAZOLE AND TRIMETHOPRIM 400; 80 MG/1; MG/1
400-80 TABLET ORAL
Qty: 30 | Refills: 0 | Status: DISCONTINUED | COMMUNITY
Start: 2021-04-01 | End: 2021-12-06

## 2021-12-06 RX ORDER — ACETAMINOPHEN 325 MG
TABLET ORAL
Refills: 0 | Status: ACTIVE | COMMUNITY

## 2021-12-06 RX ORDER — MELOXICAM 15 MG/1
15 TABLET ORAL
Qty: 30 | Refills: 3 | Status: DISCONTINUED | COMMUNITY
Start: 2021-06-11 | End: 2021-12-06

## 2021-12-06 RX ORDER — SULFAMETHOXAZOLE AND TRIMETHOPRIM 800; 160 MG/1; MG/1
800-160 TABLET ORAL
Qty: 10 | Refills: 0 | Status: DISCONTINUED | COMMUNITY
Start: 2021-05-04 | End: 2021-12-06

## 2021-12-06 NOTE — RESULTS/DATA
[] : results reviewed [de-identified] : 11/2021 [de-identified] : wnl 11/2021 [de-identified] : EKG 11/2021 NSR, nonspecific ST-T wave abnormality

## 2021-12-06 NOTE — PLAN
[FreeTextEntry1] : defer to cardiology for cardiac clearance. labs obtained.  No medical contraindication to surgery pending acceptable labs.\par  fax medical clearance, labs  to  [phone].

## 2021-12-06 NOTE — HISTORY OF PRESENT ILLNESS
[No Pertinent Cardiac History] : no history of aortic stenosis, atrial fibrillation, coronary artery disease, recent myocardial infarction, or implantable device/pacemaker [No Pertinent Pulmonary History] : no history of asthma, COPD, sleep apnea, or smoking [No Adverse Anesthesia Reaction] : no adverse anesthesia reaction in self or family member [(Patient denies any chest pain, claudication, dyspnea on exertion, orthopnea, palpitations or syncope)] : Patient denies any chest pain, claudication, dyspnea on exertion, orthopnea, palpitations or syncope [Aortic Stenosis] : no aortic stenosis [Atrial Fibrillation] : no atrial fibrillation [Coronary Artery Disease] : no coronary artery disease [Recent Myocardial Infarction] : no recent myocardial infarction [Implantable Device/Pacemaker] : no implantable device/pacemaker [Chronic Anticoagulation] : no chronic anticoagulation [Chronic Kidney Disease] : no chronic kidney disease [Diabetes] : no diabetes [FreeTextEntry1] : revision of reconstructed Breasts, revision of abdominal scar and possible local flap, excision of abd. mass. [FreeTextEntry2] : 12/22/2021 [FreeTextEntry3] : dr. Zbigniew Dejesus [FreeTextEntry4] : Here for preoperative clearance. She denies chest pain, fever, cough, SOB.  [FreeTextEntry5] : LV DYSFUNCTION based on echo  [FreeTextEntry7] :  abnormal stress test and angiogram in 12/2020. pt.  had NST and was seen by   in 12/2020 and 02/2021

## 2021-12-06 NOTE — ASSESSMENT
[High Risk Surgery - Intraperitoneal, Intrathoracic or Supringuinal Vascular Procedures] : High Risk Surgery - Intraperitoneal, Intrathoracic or Supringuinal Vascular Procedures - No (0) [Ischemic Heart Disease] : Ischemic Heart Disease - No (0) [Congestive Heart Failure] : Congestive Heart Failure - No (0) [Prior Cerebrovascular Accident or TIA] : Prior Cerebrovascular Accident or TIA - No (0) [Creatinine >= 2mg/dL (1 Point)] : Creatinine >= 2mg/dL - No (0) [Insulin-dependent Diabetic (1 Point)] : Insulin-dependent Diabetic - No (0) [0] : 0 , RCRI Class: I, Risk of Post-Op Cardiac Complications: 3.9%, 95% CI for Risk Estimate: 2.8% - 5.4% [Patient Requires Further Testing] : Patient requires further testing [Cardiology consultation] : Cardiology consultation

## 2021-12-06 NOTE — PHYSICAL EXAM
[No Acute Distress] : no acute distress [Well Nourished] : well nourished [Well Developed] : well developed [Well-Appearing] : well-appearing [Normal Sclera/Conjunctiva] : normal sclera/conjunctiva [PERRL] : pupils equal round and reactive to light [EOMI] : extraocular movements intact [Normal Outer Ear/Nose] : the outer ears and nose were normal in appearance [Normal Oropharynx] : the oropharynx was normal [No JVD] : no jugular venous distention [No Lymphadenopathy] : no lymphadenopathy [Supple] : supple [Thyroid Normal, No Nodules] : the thyroid was normal and there were no nodules present [No Respiratory Distress] : no respiratory distress  [No Accessory Muscle Use] : no accessory muscle use [Clear to Auscultation] : lungs were clear to auscultation bilaterally [Normal Rate] : normal rate  [Regular Rhythm] : with a regular rhythm [Normal S1, S2] : normal S1 and S2 [No Murmur] : no murmur heard [No Edema] : there was no peripheral edema [No Palpable Aorta] : no palpable aorta [No Extremity Clubbing/Cyanosis] : no extremity clubbing/cyanosis [Soft] : abdomen soft [Non Tender] : non-tender [Non-distended] : non-distended [No Masses] : no abdominal mass palpated [No HSM] : no HSM [Normal Bowel Sounds] : normal bowel sounds [Normal Posterior Cervical Nodes] : no posterior cervical lymphadenopathy [Normal Anterior Cervical Nodes] : no anterior cervical lymphadenopathy [No CVA Tenderness] : no CVA  tenderness [No Spinal Tenderness] : no spinal tenderness [No Joint Swelling] : no joint swelling [Grossly Normal Strength/Tone] : grossly normal strength/tone [No Rash] : no rash [Coordination Grossly Intact] : coordination grossly intact [No Focal Deficits] : no focal deficits [Normal Gait] : normal gait [Deep Tendon Reflexes (DTR)] : deep tendon reflexes were 2+ and symmetric [Normal Affect] : the affect was normal [Normal Insight/Judgement] : insight and judgment were intact

## 2021-12-07 PROBLEM — J18.9 PNEUMONIA, UNSPECIFIED ORGANISM: Chronic | Status: ACTIVE | Noted: 2021-07-15

## 2021-12-07 PROBLEM — I10 ESSENTIAL (PRIMARY) HYPERTENSION: Chronic | Status: ACTIVE | Noted: 2021-12-02

## 2021-12-07 PROBLEM — N65.0 DEFORMITY OF RECONSTRUCTED BREAST: Chronic | Status: ACTIVE | Noted: 2021-12-02

## 2021-12-08 ENCOUNTER — RESULT REVIEW (OUTPATIENT)
Age: 61
End: 2021-12-08

## 2021-12-08 LAB
ALBUMIN SERPL ELPH-MCNC: 4.8 G/DL
ALP BLD-CCNC: 112 U/L
ALT SERPL-CCNC: 15 U/L
ANION GAP SERPL CALC-SCNC: 13 MMOL/L
APPEARANCE: CLEAR
APTT BLD: 33.3 SEC
AST SERPL-CCNC: 15 U/L
BASOPHILS # BLD AUTO: 0.06 K/UL
BASOPHILS NFR BLD AUTO: 0.9 %
BILIRUB SERPL-MCNC: 0.4 MG/DL
BILIRUBIN URINE: NEGATIVE
BLOOD URINE: NEGATIVE
BUN SERPL-MCNC: 12 MG/DL
CALCIUM SERPL-MCNC: 9.7 MG/DL
CHLORIDE SERPL-SCNC: 104 MMOL/L
CHOLEST SERPL-MCNC: 222 MG/DL
CO2 SERPL-SCNC: 20 MMOL/L
COLOR: NORMAL
CREAT SERPL-MCNC: 0.79 MG/DL
EOSINOPHIL # BLD AUTO: 0.13 K/UL
EOSINOPHIL NFR BLD AUTO: 1.9 %
ESTIMATED AVERAGE GLUCOSE: 108 MG/DL
GLUCOSE QUALITATIVE U: NEGATIVE
GLUCOSE SERPL-MCNC: 83 MG/DL
HBA1C MFR BLD HPLC: 5.4 %
HCT VFR BLD CALC: 45.4 %
HDLC SERPL-MCNC: 54 MG/DL
HGB BLD-MCNC: 14.7 G/DL
IMM GRANULOCYTES NFR BLD AUTO: 0.4 %
INR PPP: 0.97 RATIO
KETONES URINE: NEGATIVE
LDLC SERPL CALC-MCNC: 140 MG/DL
LEUKOCYTE ESTERASE URINE: ABNORMAL
LYMPHOCYTES # BLD AUTO: 2.91 K/UL
LYMPHOCYTES NFR BLD AUTO: 42.4 %
MAN DIFF?: NORMAL
MCHC RBC-ENTMCNC: 30.9 PG
MCHC RBC-ENTMCNC: 32.4 GM/DL
MCV RBC AUTO: 95.6 FL
MONOCYTES # BLD AUTO: 0.53 K/UL
MONOCYTES NFR BLD AUTO: 7.7 %
NEUTROPHILS # BLD AUTO: 3.21 K/UL
NEUTROPHILS NFR BLD AUTO: 46.7 %
NITRITE URINE: NEGATIVE
NONHDLC SERPL-MCNC: 168 MG/DL
PH URINE: 5
PLATELET # BLD AUTO: 351 K/UL
POTASSIUM SERPL-SCNC: 4.4 MMOL/L
PROT SERPL-MCNC: 7.4 G/DL
PROTEIN URINE: NEGATIVE
PT BLD: 11.5 SEC
RBC # BLD: 4.75 M/UL
RBC # FLD: 12.7 %
SODIUM SERPL-SCNC: 137 MMOL/L
SPECIFIC GRAVITY URINE: 1.01
T3FREE SERPL-MCNC: 2.84 PG/ML
T4 FREE SERPL-MCNC: 1.2 NG/DL
TRIGL SERPL-MCNC: 137 MG/DL
TSH SERPL-ACNC: 8.67 UIU/ML
UROBILINOGEN URINE: NORMAL
WBC # FLD AUTO: 6.87 K/UL

## 2021-12-09 ENCOUNTER — RESULT REVIEW (OUTPATIENT)
Age: 61
End: 2021-12-09

## 2021-12-09 ENCOUNTER — OUTPATIENT (OUTPATIENT)
Dept: OUTPATIENT SERVICES | Facility: HOSPITAL | Age: 61
LOS: 1 days | End: 2021-12-09
Payer: MEDICAID

## 2021-12-09 ENCOUNTER — APPOINTMENT (OUTPATIENT)
Dept: CT IMAGING | Facility: HOSPITAL | Age: 61
End: 2021-12-09
Payer: MEDICAID

## 2021-12-09 DIAGNOSIS — Z98.890 OTHER SPECIFIED POSTPROCEDURAL STATES: Chronic | ICD-10-CM

## 2021-12-09 DIAGNOSIS — Z90.10 ACQUIRED ABSENCE OF UNSPECIFIED BREAST AND NIPPLE: Chronic | ICD-10-CM

## 2021-12-09 DIAGNOSIS — S30.1XXA CONTUSION OF ABDOMINAL WALL, INITIAL ENCOUNTER: Chronic | ICD-10-CM

## 2021-12-09 DIAGNOSIS — C50.911 MALIGNANT NEOPLASM OF UNSPECIFIED SITE OF RIGHT FEMALE BREAST: ICD-10-CM

## 2021-12-09 PROCEDURE — 71260 CT THORAX DX C+: CPT | Mod: 26

## 2021-12-09 PROCEDURE — 74160 CT ABDOMEN W/CONTRAST: CPT | Mod: 26

## 2021-12-09 PROCEDURE — 71260 CT THORAX DX C+: CPT

## 2021-12-09 PROCEDURE — 74160 CT ABDOMEN W/CONTRAST: CPT

## 2021-12-14 ENCOUNTER — NON-APPOINTMENT (OUTPATIENT)
Age: 61
End: 2021-12-14

## 2021-12-14 ENCOUNTER — APPOINTMENT (OUTPATIENT)
Dept: CARDIOLOGY | Facility: CLINIC | Age: 61
End: 2021-12-14
Payer: MEDICAID

## 2021-12-14 VITALS
SYSTOLIC BLOOD PRESSURE: 132 MMHG | RESPIRATION RATE: 16 BRPM | TEMPERATURE: 97.4 F | HEART RATE: 68 BPM | WEIGHT: 169 LBS | BODY MASS INDEX: 31.1 KG/M2 | HEIGHT: 62 IN | DIASTOLIC BLOOD PRESSURE: 87 MMHG | OXYGEN SATURATION: 98 %

## 2021-12-14 VITALS — HEART RATE: 72 BPM | DIASTOLIC BLOOD PRESSURE: 84 MMHG | SYSTOLIC BLOOD PRESSURE: 110 MMHG

## 2021-12-14 DIAGNOSIS — R94.31 ABNORMAL ELECTROCARDIOGRAM [ECG] [EKG]: ICD-10-CM

## 2021-12-14 DIAGNOSIS — I70.90 UNSPECIFIED ATHEROSCLEROSIS: ICD-10-CM

## 2021-12-14 PROCEDURE — 93000 ELECTROCARDIOGRAM COMPLETE: CPT

## 2021-12-14 PROCEDURE — 99214 OFFICE O/P EST MOD 30 MIN: CPT

## 2021-12-14 NOTE — PHYSICAL EXAM
[General Appearance - Well Developed] : well developed [Normal Appearance] : normal appearance [Well Groomed] : well groomed [General Appearance - Well Nourished] : well nourished [No Deformities] : no deformities [General Appearance - In No Acute Distress] : no acute distress [Normal Oral Mucosa] : normal oral mucosa [No Oral Pallor] : no oral pallor [No Oral Cyanosis] : no oral cyanosis [Normal Jugular Venous A Waves Present] : normal jugular venous A waves present [Normal Jugular Venous V Waves Present] : normal jugular venous V waves present [No Jugular Venous Espinoza A Waves] : no jugular venous espinoza A waves [Respiration, Rhythm And Depth] : normal respiratory rhythm and effort [Exaggerated Use Of Accessory Muscles For Inspiration] : no accessory muscle use [Auscultation Breath Sounds / Voice Sounds] : lungs were clear to auscultation bilaterally [Abdomen Soft] : soft [Abdomen Tenderness] : non-tender [Abdomen Mass (___ Cm)] : no abdominal mass palpated [Gait - Sufficient For Exercise Testing] : the gait was sufficient for exercise testing [Abnormal Walk] : normal gait [Skin Color & Pigmentation] : normal skin color and pigmentation [] : no rash [No Venous Stasis] : no venous stasis [Skin Lesions] : no skin lesions [No Skin Ulcers] : no skin ulcer [No Xanthoma] : no  xanthoma was observed [Oriented To Time, Place, And Person] : oriented to person, place, and time [Affect] : the affect was normal [Mood] : the mood was normal [No Anxiety] : not feeling anxious [Normal Rate] : normal [Normal S1] : normal S1 [Normal S2] : normal S2 [No Murmur] : no murmurs heard [2+] : left 2+ [No Abnormalities] : the abdominal aorta was not enlarged and no bruit was heard [No Pitting Edema] : no pitting edema present [FreeTextEntry1] : no bruising/no pulsatility/no pain to palpation [S3] : no S3 [S4] : no S4 [Right Carotid Bruit] : no bruit heard over the right carotid [Left Carotid Bruit] : no bruit heard over the left carotid [Right Femoral Bruit] : no bruit heard over the right femoral artery [Left Femoral Bruit] : no bruit heard over the left femoral artery

## 2021-12-14 NOTE — ASSESSMENT
[FreeTextEntry1] : In summary, the patient is a 61-year-old woman with hypertension well-controlled, minimal coronary disease, normal left ventricular systolic function. The patient has had no recent coronary events.\par \par There is no cardiac contraindication to the proposed procedure

## 2021-12-14 NOTE — REASON FOR VISIT
[Other: ____] : [unfilled] [FreeTextEntry1] : She presents for cardiac clearance for breast surgery. This is a revision of prior reconstruction. She offers no complaints of chest discomfort shortness of breath palpitations dizziness or syncope. She underwent coronary angiography in December of 2020 which revealed minimal coronary disease. She underwent echocardiography in February of 2021 which revealed normal left ventricular systolic function and no evidence of significant valvular stenosis or insufficiency

## 2021-12-16 ENCOUNTER — NON-APPOINTMENT (OUTPATIENT)
Age: 61
End: 2021-12-16

## 2021-12-20 LAB — SARS-COV-2 N GENE NPH QL NAA+PROBE: NOT DETECTED

## 2021-12-21 ENCOUNTER — APPOINTMENT (OUTPATIENT)
Dept: ULTRASOUND IMAGING | Facility: IMAGING CENTER | Age: 61
End: 2021-12-21
Payer: MEDICAID

## 2021-12-21 ENCOUNTER — OUTPATIENT (OUTPATIENT)
Dept: OUTPATIENT SERVICES | Facility: HOSPITAL | Age: 61
LOS: 1 days | End: 2021-12-21
Payer: MEDICAID

## 2021-12-21 ENCOUNTER — TRANSCRIPTION ENCOUNTER (OUTPATIENT)
Age: 61
End: 2021-12-21

## 2021-12-21 ENCOUNTER — RESULT REVIEW (OUTPATIENT)
Age: 61
End: 2021-12-21

## 2021-12-21 DIAGNOSIS — Z90.10 ACQUIRED ABSENCE OF UNSPECIFIED BREAST AND NIPPLE: Chronic | ICD-10-CM

## 2021-12-21 DIAGNOSIS — C50.911 MALIGNANT NEOPLASM OF UNSPECIFIED SITE OF RIGHT FEMALE BREAST: ICD-10-CM

## 2021-12-21 DIAGNOSIS — Z98.890 OTHER SPECIFIED POSTPROCEDURAL STATES: Chronic | ICD-10-CM

## 2021-12-21 DIAGNOSIS — S30.1XXA CONTUSION OF ABDOMINAL WALL, INITIAL ENCOUNTER: Chronic | ICD-10-CM

## 2021-12-21 PROCEDURE — 76641 ULTRASOUND BREAST COMPLETE: CPT | Mod: 26,50

## 2021-12-21 PROCEDURE — 76641 ULTRASOUND BREAST COMPLETE: CPT

## 2021-12-22 ENCOUNTER — OUTPATIENT (OUTPATIENT)
Dept: OUTPATIENT SERVICES | Facility: HOSPITAL | Age: 61
LOS: 1 days | End: 2021-12-22
Payer: MEDICAID

## 2021-12-22 ENCOUNTER — APPOINTMENT (OUTPATIENT)
Dept: PLASTIC SURGERY | Facility: HOSPITAL | Age: 61
End: 2021-12-22
Payer: MEDICAID

## 2021-12-22 VITALS
HEART RATE: 66 BPM | TEMPERATURE: 98 F | SYSTOLIC BLOOD PRESSURE: 118 MMHG | RESPIRATION RATE: 16 BRPM | OXYGEN SATURATION: 98 % | DIASTOLIC BLOOD PRESSURE: 66 MMHG

## 2021-12-22 VITALS
WEIGHT: 167.11 LBS | HEIGHT: 61 IN | OXYGEN SATURATION: 98 % | RESPIRATION RATE: 20 BRPM | HEART RATE: 58 BPM | TEMPERATURE: 98 F | SYSTOLIC BLOOD PRESSURE: 137 MMHG | DIASTOLIC BLOOD PRESSURE: 81 MMHG

## 2021-12-22 DIAGNOSIS — N65.0 DEFORMITY OF RECONSTRUCTED BREAST: ICD-10-CM

## 2021-12-22 DIAGNOSIS — Z98.890 OTHER SPECIFIED POSTPROCEDURAL STATES: Chronic | ICD-10-CM

## 2021-12-22 DIAGNOSIS — S30.1XXA CONTUSION OF ABDOMINAL WALL, INITIAL ENCOUNTER: Chronic | ICD-10-CM

## 2021-12-22 DIAGNOSIS — Z90.10 ACQUIRED ABSENCE OF UNSPECIFIED BREAST AND NIPPLE: Chronic | ICD-10-CM

## 2021-12-22 DIAGNOSIS — Z98.890 OTHER SPECIFIED POSTPROCEDURAL STATES: ICD-10-CM

## 2021-12-22 DIAGNOSIS — Z85.3 PERSONAL HISTORY OF MALIGNANT NEOPLASM OF BREAST: ICD-10-CM

## 2021-12-22 PROCEDURE — 19380 REVJ RECONSTRUCTED BREAST: CPT | Mod: 50

## 2021-12-22 PROCEDURE — 14301 TIS TRNFR ANY 30.1-60 SQ CM: CPT | Mod: AS

## 2021-12-22 PROCEDURE — 15771 GRFG AUTOL FAT LIPO 50 CC/<: CPT

## 2021-12-22 PROCEDURE — 15772 GRFG AUTOL FAT LIPO EA ADDL: CPT

## 2021-12-22 PROCEDURE — C9399: CPT

## 2021-12-22 PROCEDURE — C1889: CPT

## 2021-12-22 PROCEDURE — 14301 TIS TRNFR ANY 30.1-60 SQ CM: CPT

## 2021-12-22 PROCEDURE — 14001 TIS TRNFR TRUNK 10.1-30SQCM: CPT

## 2021-12-22 RX ORDER — AMLODIPINE BESYLATE 2.5 MG/1
1 TABLET ORAL
Qty: 0 | Refills: 0 | DISCHARGE

## 2021-12-22 RX ORDER — ACETAMINOPHEN 500 MG
2 TABLET ORAL
Qty: 0 | Refills: 0 | DISCHARGE

## 2021-12-22 RX ORDER — PANTOPRAZOLE SODIUM 20 MG/1
1 TABLET, DELAYED RELEASE ORAL
Qty: 0 | Refills: 0 | DISCHARGE

## 2021-12-22 RX ORDER — OXYCODONE AND ACETAMINOPHEN 5; 325 MG/1; MG/1
2 TABLET ORAL EVERY 4 HOURS
Refills: 0 | Status: DISCONTINUED | OUTPATIENT
Start: 2021-12-22 | End: 2021-12-22

## 2021-12-22 RX ORDER — HYDROMORPHONE HYDROCHLORIDE 2 MG/ML
0.5 INJECTION INTRAMUSCULAR; INTRAVENOUS; SUBCUTANEOUS
Refills: 0 | Status: DISCONTINUED | OUTPATIENT
Start: 2021-12-22 | End: 2021-12-22

## 2021-12-22 RX ORDER — OXYCODONE AND ACETAMINOPHEN 5; 325 MG/1; MG/1
1 TABLET ORAL EVERY 4 HOURS
Refills: 0 | Status: DISCONTINUED | OUTPATIENT
Start: 2021-12-22 | End: 2021-12-22

## 2021-12-22 RX ORDER — LEVOTHYROXINE SODIUM 125 MCG
1 TABLET ORAL
Qty: 0 | Refills: 0 | DISCHARGE

## 2021-12-22 RX ORDER — ONDANSETRON 8 MG/1
4 TABLET, FILM COATED ORAL ONCE
Refills: 0 | Status: DISCONTINUED | OUTPATIENT
Start: 2021-12-22 | End: 2021-12-22

## 2021-12-22 RX ORDER — SODIUM CHLORIDE 9 MG/ML
1000 INJECTION, SOLUTION INTRAVENOUS
Refills: 0 | Status: DISCONTINUED | OUTPATIENT
Start: 2021-12-22 | End: 2022-01-05

## 2021-12-22 RX ORDER — SODIUM CHLORIDE 9 MG/ML
1000 INJECTION, SOLUTION INTRAVENOUS
Refills: 0 | Status: DISCONTINUED | OUTPATIENT
Start: 2021-12-22 | End: 2021-12-22

## 2021-12-22 RX ADMIN — SODIUM CHLORIDE 50 MILLILITER(S): 9 INJECTION, SOLUTION INTRAVENOUS at 08:02

## 2021-12-22 RX ADMIN — HYDROMORPHONE HYDROCHLORIDE 0.5 MILLIGRAM(S): 2 INJECTION INTRAMUSCULAR; INTRAVENOUS; SUBCUTANEOUS at 12:59

## 2021-12-22 RX ADMIN — HYDROMORPHONE HYDROCHLORIDE 0.5 MILLIGRAM(S): 2 INJECTION INTRAMUSCULAR; INTRAVENOUS; SUBCUTANEOUS at 13:10

## 2021-12-22 NOTE — ASU DISCHARGE PLAN (ADULT/PEDIATRIC) - PROVIDER TOKENS
FREE:[LAST:[Nadia],FIRST:[Alpa POSADA)],PHONE:[phone]],FAX:[phone]],ADDRESS:[[address] [place] [address]],SCHEDULEDAPPT:[12/28/2021],SCHEDULEDAPPTTIME:[05:00 PM]]

## 2021-12-22 NOTE — ASU PATIENT PROFILE, ADULT - FALL HARM RISK - UNIVERSAL INTERVENTIONS
Bed in lowest position, wheels locked, appropriate side rails in place/Call bell, personal items and telephone in reach/Instruct patient to call for assistance before getting out of bed or chair/Non-slip footwear when patient is out of bed/[place] to call system/Physically safe environment - no spills, clutter or unnecessary equipment/Purposeful Proactive Rounding/Room/bathroom lighting operational, light cord in reach

## 2021-12-22 NOTE — ASU DISCHARGE PLAN (ADULT/PEDIATRIC) - NS MD DC FALL RISK RISK
For information on Fall & Injury Prevention, visit: [email or web address] OR  [email or web address] OR  https://www.cdc.gov/steadi/patient.html

## 2021-12-22 NOTE — ASU DISCHARGE PLAN (ADULT/PEDIATRIC) - ACTIVITY LEVEL
No excercise/No heavy lifting/No sports/gym/No weight bearing/No tub baths/No intercourse DO NOT SLEEP ON YOUR SIDES OR ON YOUR STOMACH. ONLY ON YOUR BACK./No excercise/No heavy lifting/No sports/gym/No weight bearing/No tub baths/No intercourse

## 2021-12-22 NOTE — ASU DISCHARGE PLAN (ADULT/PEDIATRIC) - COMMENTS
Dr. Dejesus's  direct phone number is [phone]. If after office hours (9-5PM M-F), then please wait until normal business hours to make an appt.  You may leave a detailed VM as well.  You will see Dr. Dejesus's OSIRIS Yuen, (Cassandra NEWELL) for your dressing change and first post operative visit.  You may also contact her via email: [email or web address] for any concerns or questions.

## 2021-12-22 NOTE — ASU DISCHARGE PLAN (ADULT/PEDIATRIC) - PROCEDURE
REVISION OF BILATERAL REMI RECONSTRUCTED BREAST, LIPOSUCTION & FAT GRAFTING, REVISION OF ABDOMINAL SCARS

## 2021-12-22 NOTE — ASU PREOP CHECKLIST - NS PREOP CHK HIBICLENS NA
"  [facility]   Acute Rehabilitation Unit  Daily progress note    INTERVAL HISTORY  Jose was seen and examined at bedside this morning.  Weekend therapy and progress notes reviewed.  No acute events reported; however, patient was noted to have onset of upper respiratory symptoms.  Today, he reports that he is feeling slightly better than yesterday.  He is most bothered by \"very\" sore throat.  He notes painful swallowing, though not difficult.  He also endorses headache, nasal congestion, intermittent productive cough, decreased appetite.  He was feeling chilled last night, but no fever/chills at the time of my visit.  He denies myalgias, shortness of breath, chest pain/palpitations, nausea, bowel or bladder concerns.  He was too fatigued to participate in AM therapies due to disrupted sleep because of his symptoms.  However, he feels like he'll likely be able to resume this afternoon.    Functionally, he is independent with bed mobility, ambulating >250' with standby assist and Ustep walker, performing stand pivot transfers with contact guard assist without device.  He needs standby assist for grooming/hygiene, dressing, showering.    MEDICATIONS    aspirin  325 mg Oral Daily     atorvastatin  40 mg Oral QPM     clopidogrel  75 mg Oral Daily     enalapril  20 mg Oral Daily     influenza vac high-dose quad  0.7 mL Intramuscular Prior to discharge     insulin aspart  1-7 Units Subcutaneous TID AC     insulin aspart  1-5 Units Subcutaneous At Bedtime     metFORMIN  1,000 mg Oral BID w/meals     mirtazapine  15 mg Oral At Bedtime     pantoprazole  40 mg Oral QAM AC     polyethylene glycol  17 g Oral Daily     senna-docusate  1 tablet Oral BID        acetaminophen, sore throat lozenge, bisacodyl, glucose **OR** dextrose **OR** glucagon, guaiFENesin, melatonin, oxymetazoline, phenol, senna-docusate     PHYSICAL EXAM  BP (!) 172/89 (BP Location: Right arm)   Pulse 108   Temp 99.5  F " "(37.5  C) (Oral)   Resp 20   Ht 1.778 m (5' 10\")   Wt 85 kg (187 lb 8 oz)   SpO2 94%   BMI 26.90 kg/m     Gen: NAD but appears to not feel well, lying in bed, feels slightly warm to touch  HEENT: NC/AT, oropharynx erythematous but without exudate  Cardio: RRR, +ectopic beats, no murmurs  Pulm: non-labored on room air, lungs CTA bilaterally  Abd: soft, non-tender, non-distended, bowel sounds present  Ext: no edema in bilateral lower extremities  Neuro/MSK: AAOx4, follows commands, mild facial droop on L    LABS  CBC RESULTS:   Recent Labs   Lab Test 11/15/21  0646 11/11/21  0645 11/08/21  0522   WBC 11.6* 6.3 6.4   RBC 4.72 4.62 4.44   HGB 14.3 13.8 13.6   HCT 42.3 41.7 39.9*   MCV 90 90 90   MCH 30.3 29.9 30.6   MCHC 33.8 33.1 34.1   RDW 11.7 11.9 11.9   * 152 156     Last Basic Metabolic Panel:  Recent Labs   Lab Test 11/15/21  0805 11/15/21  0646 11/14/21 2102 11/11/21  0725 11/11/21  0645 11/08/21  0743 11/08/21  0522   NA  --  136  --   --  135  --  136   POTASSIUM  --  4.3  --   --  4.7  --  4.3   CHLORIDE  --  102  --   --  106  --  105   CO2  --  26  --   --  28  --  26   ANIONGAP  --  8  --   --  1*  --  5   * 150* 167*   < > 113*   < > 125*   BUN  --  9  --   --  14  --  21   CR  --  1.02  --   --  1.02  --  0.94   GFRESTIMATED  --  70  --   --  70  --  77   LEON  --  8.9  --   --  8.7  --  8.6    < > = values in this interval not displayed.       Rehabilitation - continue comprehensive acute inpatient rehabilitation program with multidisciplinary approach including therapies, rehab nursing, and physiatry following. See interval history for updates.      ASSESSMENT AND PLAN  Jose Mallory is a 76 year old right hand dominant male with a past medical history of DM2, HTN, HLD, GERD, posterior fossa arachnoid cyst, peripheral neuropathy, prior R pontine stroke, gait instability, and bilateral knee replacement who was admitted on 10/26/21 with progressive lower extremity weakness and gait " difficulties, found to have subacute left pontine stroke with course complicated by neuropathy.  He is now admitted to ARU on 10/29 for multidisciplinary rehabilitation and ongoing medical management.     #Subacute L pontine stroke  #Subacute to chronic R pontine stroke  #Gait instability  #Arachnoid cyst  #Moderate cervical canal stenosis with disc herniation most pronounced at C3-C4, C4-C5   MRI with subacute cerebral infarction in left ventral cookie.  TTE EF 60-65%, no significant valvular disease or cardiac source for embolus.  Telemetry with NSR and no evidence of atrial fibrillation.  IV tPA was not initiated due to unclear or unfavorable risk-benefit profile for extended window thrombolysis beyond the conventional 4.5 hour time window.  Etiology of stroke thought to be atherosclerotic. Gait instability suspected to be multifactorial including peripheral neuropathy, cerebellar disease, and recent bilateral pontine strokes.  - Continue DAPT with Aspirin 325 mg daily and Plavix 75 mg daily x90 days, then stop Plavix and continue Aspirin 325 mg daily only  - High intensity statin (atorvastatin 40 mg daily) to target LDL <70  - HbA1c at goal (6.3%)  - Long-term BP goal to 120/80  - PT noting festinating gait, very slow gait, intermittent freezing, cogwheeling, difficulty initiating motor tasks.  Plan to follow-up with neurology  - Continue PT/OT  - Outpatient sleep evaluation for SELIN  - Follow up with neurology in 4-6 weeks  - Per hospital discharge summary, if gait has not improved after the pt's stay at acute rehab and/or by the time there is neurology follow up in the outpatient setting, please consider additional neurosurgery consultation.     #Peripheral neuropathy  #Decreased vibration sensation bilaterally in lower extremities  Per neurology, suspect that large fiber sensory dysfunction secondary to diabetes.  Vitamin B12/folate WNL, NEHA negative, HIV non reactive, RPR negative.  Copper, zinc WNL.  Protein  electrophoresis with slightly elevated alpha 2, per pathologist, essentially normal, no obvious monoclonal proteins.  Homocysteine WNL, MMA WNL.  - Follow up with neurology for EMG     #HTN  [PTA enalapril 5mg PO daily]  Enalapril increased to 10 mg daily on 11/2 due to generally elevated BP, increased again to 20 mg daily on 11/13.  - BP improved  - Continue enalapril 20 mg daily  - Continue to monitor BP, adjust meds as indicated     #DMII  [PTA meds: metformin 1000 mg BID, glipizide 10 mg daily]  A1c 6.3%.  Patient reports compliance with medications, but that he was not taking his blood glucose at home as directed.    - Monitor blood glucose TID AC and HS.  BG currently 135-181.  - Continue PTA metformin 1g BID  - PTA glipizide 10 mg held throughout inpatient admission.  BG slightly elevated but also with variable intake.  Resumed at low dose 2.5 mg daily 11/2 but with mild hypoglycemia (66), discontinued until intake improved.  - Hypoglycemia protocol  - Will need new glucometer and supplies, diabetes educator consulted    Cr uptrending  0.86 -> 1.03 -> 1.13 on 11/3.  Poor oral fluid intake, also recently increased dose of enalapril. Patient agreeable to increasing oral fluids.  Improved.  - Encourage fluids  - BMP today with Cr stable at 1.02.    Constipation  No BM for several days on admission, then with episode of emesis 10/31 and loose stools x2, now with recurrent constipation.   Receiving Miralax and Senokot-S.  Refused suppository 11/3.  Added prune juice.  Has refused suppository x2 days.  Feeling distended/full.  No recurrent nausea/vomiting.  Bowel sounds present, passing gas.  AXR on 11/5 with no significant colonic stool burden, non-obstructive bowel gas pattern.  Did have small BM on 11/5.  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  He notes decreased interest in food over the past year or so.  He has been sleeping poorly this admission.  Struggling with being away from family as well.  - Psych consult completed 11/5, appreciate assistance  - Remeron 7.5 mg started on 11/5 for mood, appetite, sleep.  Patient states that he is sleeping better.  Ongoing issues with depressed mood, but also with significant social stressors due to wife's illness.  Appetite remains impaired.  Increased to 15 mg daily on 11/9.  RD noting weight stabilized, intake improved.    Sore throat  Cough  Headache  Nasal congestion    Reported onset of symptoms over the weekend with \"very\" sore throat, headache, nasal congestion, mild productive cough.  VSS, temp mildly elevated at 99.5F today, mildly tachycardic. Oxygen sats mid 90s on room air. States to be feeling slightly better this morning.  However, slightly elevated WBC, CRP mildly elevated at 19 (prior 12), procal 0.09.  COVID (-) on 11/14.  - Continue comfort measures with PRN Tylenol, throat lozenge/spray, Afrin nasal spray, Mucinex  - Check for influenza  - CXR    - Monitor fever curve, repeat labs tomorrow      1. Adjustment to disability:  Psych consulted completed, Remeron trial started 11/5, dose increased 11/9, continue to monitor.  2. FEN: regular diet, thin liquids  3. Bowel: continent, constipation as above, continue to monitor  4. Bladder: continent, PVRs at admission x3 <100, ok to monitor PRN only.    5. DVT Prophylaxis: mechanical  6. GI Prophylaxis: PPI given DAPT + age  7. Code: full, confirmed on admission  8. Disposition: goal for home.   9. ELOS: 3 weeks  10. Follow up Appointments on Discharge: PCP, stroke and general neurology         Patient discussed with Dr. Fredis Ordonez, PM&R staff physician       Bozena Bower PA-C  Physical Medicine & Rehabilitation  " N/A

## 2021-12-22 NOTE — ASU DISCHARGE PLAN (ADULT/PEDIATRIC) - BATHING
Sponge only/Do not submerge in water KEEP ALL INCISIONS DRY FOR 3 DAYS. AFTER 3 DAYS YOU MAY SHOWER./Sponge only/Do not submerge in water

## 2021-12-22 NOTE — ASU DISCHARGE PLAN (ADULT/PEDIATRIC) - ASU DC SPECIAL INSTRUCTIONSFT
1. Please follow up with Dr. Dejesus's PA, Alpa, next week TUESDAY for your first post operative visit. Your appointment has already been made. Please call the office if you need to make any changes to your appointment at [phone] (during normal business hours M-F 9-5pm).     2. Activity:   Please avoid any strenuous activities, AVOID bending, stretching, reaching overhead, or any heavy lifting.    3. Dressings:   Some OOZING and blood on the dressing is normal and to be expected. If it becomes saturated w/ BRIGHT red blood that does not stop, please call [phone] for email ALPA: [email or web address]    4. Medications:  Your medications were sent to your pharmacy.  Please take the prescribed medications as directed.   For MILD pain please take regular over the counter pain medications such as: Advil, Tylenol, Motrin.   Only take the narcotic prescribed pain medication for SEVERE PAIN.   If constipation occurs after taking narcotic pain medications, please take an over the counter stool softener. 1. Please follow up with Dr. Dejesus's PA, Alpa, next week TUESDAY for your first post operative visit. Your appointment has already been made. Please call the office if you need to make any changes to your appointment at [phone] (during normal business hours M-F 9-5pm).     2. Activity:   Please avoid any strenuous activities, AVOID bending, stretching, reaching overhead, or any heavy lifting.  PLEASE WEAR THE BRA AND BINDER 24/7. YOU CAN SHOWER AFTER 3 DAYS - YOU CAN REMOVE THE BRA AND BINDER AFTER 3 DAYS TO SHOWER ONLY.     3. Dressings:   Some OOZING and blood on the dressing is normal and to be expected. If it becomes saturated w/ BRIGHT red blood that does not stop, please call [phone] for email ALPA: [email or web address]    4. Medications:  Your medications were sent to your pharmacy.  Please take the prescribed medications as directed.   For MILD pain please take regular over the counter pain medications such as: Advil, Tylenol, Motrin.   Only take the narcotic prescribed pain medication for SEVERE PAIN.   If constipation occurs after taking narcotic pain medications, please take an over the counter stool softener.

## 2021-12-22 NOTE — ASU PATIENT PROFILE, ADULT - NSICDXPASTMEDICALHX_GEN_ALL_CORE_FT
PAST MEDICAL HISTORY:  Arthritis     Breast cancer right, dx 2018 s/p chemotherapy    Chronic bilateral low back pain, with sciatica presence unspecified     Deformity of reconstructed breast     History of chemotherapy     Hypothyroidism     Language barrier Swedish speaking    Other chronic gastritis     [facility]    Smoker

## 2021-12-22 NOTE — BRIEF OPERATIVE NOTE - OPERATION/FINDINGS
I was present for the entire length of the case, there was no other qualified resident to assist  I assisted with hemostasis, exposure, creating of flaps, closure of wound, and placement of dressings.

## 2021-12-22 NOTE — ASU DISCHARGE PLAN (ADULT/PEDIATRIC) - NURSING INSTRUCTIONS
Dr. Dejesus's  direct phone number is [phone]. If after office hours (9-5PM M-F), then . All of discharge, safety, pain management, follow up with surgeon. Verbalized understanding of all instructions.

## 2021-12-22 NOTE — ASU DISCHARGE PLAN (ADULT/PEDIATRIC) - CARE PROVIDER_API CALL
Alpa Zuniga)  [address] [facility] [address] [place] [id number]  Phone: [phone]  Fax: [phone]  Scheduled Appointment: 12/28/2021 05:00 PM

## 2021-12-28 ENCOUNTER — APPOINTMENT (OUTPATIENT)
Dept: PLASTIC SURGERY | Facility: CLINIC | Age: 61
End: 2021-12-28
Payer: MEDICAID

## 2021-12-28 PROCEDURE — 99024 POSTOP FOLLOW-UP VISIT: CPT

## 2022-01-04 ENCOUNTER — TRANSCRIPTION ENCOUNTER (OUTPATIENT)
Age: 62
End: 2022-01-04

## 2022-01-05 ENCOUNTER — RESULT REVIEW (OUTPATIENT)
Age: 62
End: 2022-01-05

## 2022-01-05 ENCOUNTER — OUTPATIENT (OUTPATIENT)
Dept: OUTPATIENT SERVICES | Facility: HOSPITAL | Age: 62
LOS: 1 days | End: 2022-01-05
Payer: MEDICAID

## 2022-01-05 ENCOUNTER — APPOINTMENT (OUTPATIENT)
Dept: ULTRASOUND IMAGING | Facility: IMAGING CENTER | Age: 62
End: 2022-01-05
Payer: MEDICAID

## 2022-01-05 DIAGNOSIS — Z98.890 OTHER SPECIFIED POSTPROCEDURAL STATES: Chronic | ICD-10-CM

## 2022-01-05 DIAGNOSIS — S30.1XXA CONTUSION OF ABDOMINAL WALL, INITIAL ENCOUNTER: Chronic | ICD-10-CM

## 2022-01-05 DIAGNOSIS — Z90.10 ACQUIRED ABSENCE OF UNSPECIFIED BREAST AND NIPPLE: Chronic | ICD-10-CM

## 2022-01-05 DIAGNOSIS — Z00.8 ENCOUNTER FOR OTHER GENERAL EXAMINATION: ICD-10-CM

## 2022-01-05 PROCEDURE — 77065 DX MAMMO INCL CAD UNI: CPT

## 2022-01-05 PROCEDURE — 88305 TISSUE EXAM BY PATHOLOGIST: CPT | Mod: 26

## 2022-01-05 PROCEDURE — 88305 TISSUE EXAM BY PATHOLOGIST: CPT

## 2022-01-05 PROCEDURE — 19083 BX BREAST 1ST LESION US IMAG: CPT

## 2022-01-05 PROCEDURE — 77065 DX MAMMO INCL CAD UNI: CPT | Mod: 26,LT

## 2022-01-05 PROCEDURE — A4648: CPT

## 2022-01-05 PROCEDURE — 19083 BX BREAST 1ST LESION US IMAG: CPT | Mod: LT,78

## 2022-01-05 NOTE — BRIEF OPERATIVE NOTE - NSEVIDNCEINFORABSCESSFT_GEN_ALL_CORE
Ms Soto returns to clinic today.  She a history of right elbow and wrist sprain.  She states that she did improve from the elbow sprain but she is now having soreness stiffness and pain about the shoulder as well.  She did go to see another physician who suggested therapy.  She is here today for further evaluation and treatment    Physical exam:  Examination the right upper extremity reveals that there is no edema.  There are no skin changes.  Palpation produces tenderness overlying the rhomboid as well as over the trapezius.  There is mild tenderness over the scapula as well.  She does have tenderness over the anterior shoulder at the proximal biceps tendon.  It she has minimal tenderness over the lateral elbow there is no medial-sided tenderness.  She has no tenderness over the wrist.  Range of motion of the shoulder is elevation to 150° external rotation 80° and internal rotation is to T10.  She does have 5/5 strength throughout the rotator cuff musculature but this does produce pain overlying the area of the rhomboid as well as the trapezius.  Range of motion of the elbow is not painful and is full.  She does have a 2+ radial pulse and sensation is grossly intact in the median radial ulnar distribution    Assessment:  Right elbow sprain, right shoulder stiffness/deconditioning    Plan:    1. Will start her on Mobic 15 mg per day    2. Will set her up with physical therapy which she will most likely do in [place]    3. Follow-up with me in 4-6 weeks for repeat evaluation    
cloudy fluid, likely purulent

## 2022-01-09 ENCOUNTER — EMERGENCY (EMERGENCY)
Facility: HOSPITAL | Age: 62
LOS: 1 days | Discharge: HOME | End: 2022-01-09
Attending: EMERGENCY MEDICINE | Admitting: EMERGENCY MEDICINE
Payer: MEDICAID

## 2022-01-09 VITALS
RESPIRATION RATE: 20 BRPM | OXYGEN SATURATION: 98 % | DIASTOLIC BLOOD PRESSURE: 81 MMHG | SYSTOLIC BLOOD PRESSURE: 125 MMHG | HEART RATE: 93 BPM | TEMPERATURE: 98 F

## 2022-01-09 VITALS
OXYGEN SATURATION: 97 % | SYSTOLIC BLOOD PRESSURE: 108 MMHG | WEIGHT: 166.01 LBS | RESPIRATION RATE: 21 BRPM | HEIGHT: 61 IN | TEMPERATURE: 99 F | HEART RATE: 138 BPM | DIASTOLIC BLOOD PRESSURE: 78 MMHG

## 2022-01-09 DIAGNOSIS — S30.1XXA CONTUSION OF ABDOMINAL WALL, INITIAL ENCOUNTER: Chronic | ICD-10-CM

## 2022-01-09 DIAGNOSIS — Z98.890 OTHER SPECIFIED POSTPROCEDURAL STATES: Chronic | ICD-10-CM

## 2022-01-09 DIAGNOSIS — Z90.10 ACQUIRED ABSENCE OF UNSPECIFIED BREAST AND NIPPLE: Chronic | ICD-10-CM

## 2022-01-09 LAB
ALBUMIN SERPL ELPH-MCNC: 4.1 G/DL — SIGNIFICANT CHANGE UP (ref 3.3–5)
ALP SERPL-CCNC: 111 U/L — SIGNIFICANT CHANGE UP (ref 40–120)
ALT FLD-CCNC: 25 U/L — SIGNIFICANT CHANGE UP (ref 10–45)
ANION GAP SERPL CALC-SCNC: 8 MMOL/L — SIGNIFICANT CHANGE UP (ref 5–17)
AST SERPL-CCNC: 17 U/L — SIGNIFICANT CHANGE UP (ref 10–40)
BASOPHILS # BLD AUTO: 0.04 K/UL — SIGNIFICANT CHANGE UP (ref 0–0.2)
BASOPHILS NFR BLD AUTO: 0.3 % — SIGNIFICANT CHANGE UP (ref 0–2)
BILIRUB SERPL-MCNC: 0.6 MG/DL — SIGNIFICANT CHANGE UP (ref 0.2–1.2)
BUN SERPL-MCNC: 19 MG/DL — SIGNIFICANT CHANGE UP (ref 7–23)
CALCIUM SERPL-MCNC: 9.3 MG/DL — SIGNIFICANT CHANGE UP (ref 8.4–10.5)
CHLORIDE SERPL-SCNC: 103 MMOL/L — SIGNIFICANT CHANGE UP (ref 96–108)
CO2 SERPL-SCNC: 27 MMOL/L — SIGNIFICANT CHANGE UP (ref 22–31)
CREAT SERPL-MCNC: 0.97 MG/DL — SIGNIFICANT CHANGE UP (ref 0.5–1.3)
EOSINOPHIL # BLD AUTO: 0.04 K/UL — SIGNIFICANT CHANGE UP (ref 0–0.5)
EOSINOPHIL NFR BLD AUTO: 0.3 % — SIGNIFICANT CHANGE UP (ref 0–6)
GLUCOSE SERPL-MCNC: 156 MG/DL — HIGH (ref 70–99)
HCT VFR BLD CALC: 48.7 % — HIGH (ref 34.5–45)
HGB BLD-MCNC: 16.3 G/DL — HIGH (ref 11.5–15.5)
IMM GRANULOCYTES NFR BLD AUTO: 0.4 % — SIGNIFICANT CHANGE UP (ref 0–1.5)
LIDOCAIN IGE QN: 66 U/L — LOW (ref 73–393)
LYMPHOCYTES # BLD AUTO: 0.71 K/UL — LOW (ref 1–3.3)
LYMPHOCYTES # BLD AUTO: 6 % — LOW (ref 13–44)
MCHC RBC-ENTMCNC: 31.4 PG — SIGNIFICANT CHANGE UP (ref 27–34)
MCHC RBC-ENTMCNC: 33.5 GM/DL — SIGNIFICANT CHANGE UP (ref 32–36)
MCV RBC AUTO: 93.8 FL — SIGNIFICANT CHANGE UP (ref 80–100)
MONOCYTES # BLD AUTO: 0.44 K/UL — SIGNIFICANT CHANGE UP (ref 0–0.9)
MONOCYTES NFR BLD AUTO: 3.7 % — SIGNIFICANT CHANGE UP (ref 2–14)
NEUTROPHILS # BLD AUTO: 10.61 K/UL — HIGH (ref 1.8–7.4)
NEUTROPHILS NFR BLD AUTO: 89.3 % — HIGH (ref 43–77)
NRBC # BLD: 0 /100 WBCS — SIGNIFICANT CHANGE UP (ref 0–0)
PLATELET # BLD AUTO: 363 K/UL — SIGNIFICANT CHANGE UP (ref 150–400)
POTASSIUM SERPL-MCNC: 3.8 MMOL/L — SIGNIFICANT CHANGE UP (ref 3.5–5.3)
POTASSIUM SERPL-SCNC: 3.8 MMOL/L — SIGNIFICANT CHANGE UP (ref 3.5–5.3)
PROT SERPL-MCNC: 8.8 G/DL — HIGH (ref 6–8.3)
RBC # BLD: 5.19 M/UL — SIGNIFICANT CHANGE UP (ref 3.8–5.2)
RBC # FLD: 12.5 % — SIGNIFICANT CHANGE UP (ref 10.3–14.5)
SARS-COV-2 RNA SPEC QL NAA+PROBE: SIGNIFICANT CHANGE UP
SODIUM SERPL-SCNC: 138 MMOL/L — SIGNIFICANT CHANGE UP (ref 135–145)
WBC # BLD: 11.89 K/UL — HIGH (ref 3.8–10.5)
WBC # FLD AUTO: 11.89 K/UL — HIGH (ref 3.8–10.5)

## 2022-01-09 PROCEDURE — 87635 SARS-COV-2 COVID-19 AMP PRB: CPT

## 2022-01-09 PROCEDURE — 99285 EMERGENCY DEPT VISIT HI MDM: CPT

## 2022-01-09 PROCEDURE — 96375 TX/PRO/DX INJ NEW DRUG ADDON: CPT

## 2022-01-09 PROCEDURE — 96374 THER/PROPH/DIAG INJ IV PUSH: CPT | Mod: XU

## 2022-01-09 PROCEDURE — 74177 CT ABD & PELVIS W/CONTRAST: CPT | Mod: 26,MA

## 2022-01-09 PROCEDURE — 85025 COMPLETE CBC W/AUTO DIFF WBC: CPT

## 2022-01-09 PROCEDURE — 74177 CT ABD & PELVIS W/CONTRAST: CPT | Mod: MA

## 2022-01-09 PROCEDURE — 99284 EMERGENCY DEPT VISIT MOD MDM: CPT | Mod: 25

## 2022-01-09 PROCEDURE — 36415 COLL VENOUS BLD VENIPUNCTURE: CPT

## 2022-01-09 PROCEDURE — 83690 ASSAY OF LIPASE: CPT

## 2022-01-09 PROCEDURE — 80053 COMPREHEN METABOLIC PANEL: CPT

## 2022-01-09 RX ORDER — SODIUM CHLORIDE 9 MG/ML
1000 INJECTION INTRAMUSCULAR; INTRAVENOUS; SUBCUTANEOUS ONCE
Refills: 0 | Status: COMPLETED | OUTPATIENT
Start: 2022-01-09 | End: 2022-01-09

## 2022-01-09 RX ORDER — FAMOTIDINE 10 MG/ML
1 INJECTION INTRAVENOUS
Qty: 20 | Refills: 0
Start: 2022-01-09 | End: 2022-01-18

## 2022-01-09 RX ORDER — FAMOTIDINE 10 MG/ML
20 INJECTION INTRAVENOUS ONCE
Refills: 0 | Status: COMPLETED | OUTPATIENT
Start: 2022-01-09 | End: 2022-01-09

## 2022-01-09 RX ORDER — ONDANSETRON 8 MG/1
4 TABLET, FILM COATED ORAL ONCE
Refills: 0 | Status: COMPLETED | OUTPATIENT
Start: 2022-01-09 | End: 2022-01-09

## 2022-01-09 RX ORDER — ONDANSETRON 8 MG/1
1 TABLET, FILM COATED ORAL
Qty: 9 | Refills: 0
Start: 2022-01-09 | End: 2022-01-11

## 2022-01-09 RX ADMIN — SODIUM CHLORIDE 1000 MILLILITER(S): 9 INJECTION INTRAMUSCULAR; INTRAVENOUS; SUBCUTANEOUS at 01:45

## 2022-01-09 RX ADMIN — ONDANSETRON 4 MILLIGRAM(S): 8 TABLET, FILM COATED ORAL at 01:45

## 2022-01-09 RX ADMIN — FAMOTIDINE 100 MILLIGRAM(S): 10 INJECTION INTRAVENOUS at 01:45

## 2022-01-09 NOTE — ED PROVIDER NOTE - NSICDXPASTMEDICALHX_GEN_ALL_CORE_FT
PAST MEDICAL HISTORY:  Arthritis     Breast cancer right, dx 2018 s/p chemotherapy    Chronic bilateral low back pain, with sciatica presence unspecified     Deformity of reconstructed breast     History of chemotherapy     HTN (hypertension)     Hypothyroidism     Language barrier Irish speaking    Other chronic gastritis     [facility]    Smoker

## 2022-01-09 NOTE — ED ADULT NURSE REASSESSMENT NOTE - NS ED NURSE REASSESS COMMENT FT1
Patient reports more comfortable at this time. No longer has any nausea and reports that epigastric pain has decreased. CT imaging complete. Awaiting results.

## 2022-01-09 NOTE — ED ADULT TRIAGE NOTE - CHIEF COMPLAINT QUOTE
ptr  states she has had   n/v/d since yesterday afternoon   she states she was covid neg last week but has since been exposed to her covid pos  son    hx right breast ca  2 years ago   had biopsy of left breast 3 days ago

## 2022-01-09 NOTE — ED ADULT NURSE NOTE - OBJECTIVE STATEMENT
Patient comes to ER for nausea, vomiting, diarrhea, and abdominal pain. Patient reports that she recently tested negative for covid but now son reports being positive. Reports abdominal pain, epigastric that is burn-like. +nausea. Generalized weakness.

## 2022-01-09 NOTE — ED PROVIDER NOTE - PATIENT PORTAL LINK FT
You can access the FollowMyHealth Patient Portal offered by [facility] by registering at the following website: [email or web address]. By joining [facility]’s FollowMyHealth portal, you will also be able to view your health information using other applications (apps) compatible with our system.

## 2022-01-09 NOTE — ED PROVIDER NOTE - CLINICAL SUMMARY MEDICAL DECISION MAKING FREE TEXT BOX
61F presents to the ED c/o n/v/d x 1 day. She states her son is COVID+ and found out on Thursday. She has been staying away and cleaning common areas often. +fever.   She also notes h/o breast cancer. She did have recent revision surgery done b/l. No complaint of pain to the breast.   No cough. No headache. No body aches.   Exam with epigastric tenderness. No rebound. Pt well appearing. Will test for covid given + sick contact.    Findings : Enteritis.   Pt well appearing and stable.   Advised supportive care. Worsening, continued or ANY new concerning symptoms return to the emergency department.

## 2022-01-09 NOTE — ED PROVIDER NOTE - OBJECTIVE STATEMENT
61F presents to the ED c/o n/v/d x 1 day. She states her son is COVID+ and found out on Thursday. She has been staying away and cleaning common areas often. +fever.   She also notes h/o breast cancer. She did have recent revision surgery done b/l. No complaint of pain to the breast.   No cough. No headache. No body aches.

## 2022-01-11 ENCOUNTER — APPOINTMENT (OUTPATIENT)
Dept: PLASTIC SURGERY | Facility: CLINIC | Age: 62
End: 2022-01-11

## 2022-01-12 ENCOUNTER — APPOINTMENT (OUTPATIENT)
Dept: FAMILY MEDICINE | Facility: CLINIC | Age: 62
End: 2022-01-12
Payer: MEDICAID

## 2022-01-12 VITALS
BODY MASS INDEX: 29.81 KG/M2 | RESPIRATION RATE: 16 BRPM | OXYGEN SATURATION: 99 % | SYSTOLIC BLOOD PRESSURE: 118 MMHG | TEMPERATURE: 97.1 F | HEART RATE: 70 BPM | HEIGHT: 62 IN | WEIGHT: 162 LBS | DIASTOLIC BLOOD PRESSURE: 82 MMHG

## 2022-01-12 PROCEDURE — 99214 OFFICE O/P EST MOD 30 MIN: CPT

## 2022-01-12 NOTE — HEALTH RISK ASSESSMENT
[Former] : Former [20 or more] : 20 or more [Yes] : Yes [Monthly or less (1 pt)] : Monthly or less (1 point) [1 or 2 (0 pts)] : 1 or 2 (0 points) [Never (0 pts)] : Never (0 points) [No] : In the past 12 months have you used drugs other than those required for medical reasons? No [No falls in past year] : Patient reported no falls in the past year [0] : 2) Feeling down, depressed, or hopeless: Not at all (0) [PHQ-2 Negative - No further assessment needed] : PHQ-2 Negative - No further assessment needed [YearQuit] : 2018 [Audit-CScore] : 1 [VHP4Nacpe] : 0

## 2022-01-12 NOTE — HISTORY OF PRESENT ILLNESS
[FreeTextEntry1] : Please see HPI. [de-identified] : Patient is a 61-year-old female who presents today for follow-up and disease management patient recently was seen in the emergency department for what appeared to be a gastro enteritis patient was treated with antiemetics and it was recommended that the patient continue proton pump inhibitor presently the patient is feeling much better.\par \par Patient's past medical history is significant for breast cancer status post bilateral mastectomy and status post reconstruction incidental finding left breast most recently required biopsy.  Patient has had revision of bilateral breast tolerated procedures well and is healing well.  Patient also has history of endometrial thickening which is followed closely by gynecology.\par \par At today's visit I will be addressing gastroenteritis which basically has resolved, hypothyroidism, gastroesophageal reflux disease, hypertension and hyperlipidemia.\par \par Presently the patient is awake alert and oriented x3 she is in no acute distress calm and cooperative.  I will review with patient her most recent blood work which was done in December.

## 2022-01-12 NOTE — COUNSELING
[Fall prevention counseling provided] : Fall prevention counseling provided [Adequate lighting] : Adequate lighting [No throw rugs] : No throw rugs [Use proper foot wear] : Use proper foot wear [Behavioral health counseling provided] : Behavioral health counseling provided [Sleep ___ hours/day] : Sleep [unfilled] hours/day [Engage in a relaxing activity] : Engage in a relaxing activity [Plan in advance] : Plan in advance [AUDIT-C Screening administered and reviewed] : AUDIT-C Screening administered and reviewed [Potential consequences of obesity discussed] : Potential consequences of obesity discussed [Benefits of weight loss discussed] : Benefits of weight loss discussed [Structured Weight Management Program suggested:] : Structured weight management program suggested [Encouraged to maintain food diary] : Encouraged to maintain food diary [Encouraged to increase physical activity] : Encouraged to increase physical activity [Encouraged to use exercise tracking device] : Encouraged to use exercise tracking device [Target Wt Loss Goal ___] : Weight Loss Goals: Target weight loss goal [unfilled] lbs [Weigh Self Weekly] : weigh self weekly [Decrease Portions] : decrease portions [____ min/wk Activity] : [unfilled] min/wk activity [Keep Food Diary] : keep food diary [FreeTextEntry1] : BRYON [None] : None [Good understanding] : Patient has a good understanding of lifestyle changes and steps needed to achieve self management goal

## 2022-01-14 ENCOUNTER — NON-APPOINTMENT (OUTPATIENT)
Age: 62
End: 2022-01-14

## 2022-01-21 ENCOUNTER — APPOINTMENT (OUTPATIENT)
Dept: PLASTIC SURGERY | Facility: CLINIC | Age: 62
End: 2022-01-21
Payer: MEDICAID

## 2022-01-21 VITALS
SYSTOLIC BLOOD PRESSURE: 117 MMHG | OXYGEN SATURATION: 97 % | BODY MASS INDEX: 29.81 KG/M2 | HEART RATE: 62 BPM | DIASTOLIC BLOOD PRESSURE: 79 MMHG | TEMPERATURE: 97.3 F | HEIGHT: 62 IN | WEIGHT: 162 LBS

## 2022-01-21 DIAGNOSIS — T81.89XS OTHER COMPLICATIONS OF PROCEDURES, NOT ELSEWHERE CLASSIFIED, SEQUELA: ICD-10-CM

## 2022-01-21 DIAGNOSIS — Z01.818 ENCOUNTER FOR OTHER PREPROCEDURAL EXAMINATION: ICD-10-CM

## 2022-01-21 DIAGNOSIS — Z85.3 PERSONAL HISTORY OF MALIGNANT NEOPLASM OF BREAST: ICD-10-CM

## 2022-01-21 DIAGNOSIS — Z87.898 PERSONAL HISTORY OF OTHER SPECIFIED CONDITIONS: ICD-10-CM

## 2022-01-21 PROCEDURE — 99024 POSTOP FOLLOW-UP VISIT: CPT

## 2022-01-31 ENCOUNTER — RX RENEWAL (OUTPATIENT)
Age: 62
End: 2022-01-31

## 2022-02-08 ENCOUNTER — RX RENEWAL (OUTPATIENT)
Age: 62
End: 2022-02-08

## 2022-02-08 RX ORDER — HYDROCORTISONE 25 MG/G
2.5 CREAM TOPICAL
Qty: 30 | Refills: 2 | Status: ACTIVE | COMMUNITY
Start: 2020-06-08 | End: 1900-01-01

## 2022-02-22 ENCOUNTER — APPOINTMENT (OUTPATIENT)
Dept: PLASTIC SURGERY | Facility: CLINIC | Age: 62
End: 2022-02-22
Payer: MEDICAID

## 2022-02-22 VITALS
HEIGHT: 62 IN | HEART RATE: 70 BPM | TEMPERATURE: 97.4 F | BODY MASS INDEX: 30.55 KG/M2 | WEIGHT: 166 LBS | OXYGEN SATURATION: 97 %

## 2022-02-22 PROCEDURE — 99024 POSTOP FOLLOW-UP VISIT: CPT

## 2022-02-22 PROCEDURE — 11900 INJECT SKIN LESIONS </W 7: CPT

## 2022-02-23 LAB
T4 FREE SERPL-MCNC: 1.3 NG/DL
TSH SERPL-ACNC: 2.81 UIU/ML

## 2022-03-24 ENCOUNTER — APPOINTMENT (OUTPATIENT)
Dept: PLASTIC SURGERY | Facility: CLINIC | Age: 62
End: 2022-03-24
Payer: MEDICAID

## 2022-03-24 VITALS
HEART RATE: 77 BPM | SYSTOLIC BLOOD PRESSURE: 117 MMHG | DIASTOLIC BLOOD PRESSURE: 80 MMHG | OXYGEN SATURATION: 97 % | WEIGHT: 166 LBS | BODY MASS INDEX: 30.55 KG/M2 | HEIGHT: 62 IN | TEMPERATURE: 97.6 F

## 2022-03-24 DIAGNOSIS — L91.0 HYPERTROPHIC SCAR: ICD-10-CM

## 2022-03-24 PROCEDURE — 11900 INJECT SKIN LESIONS </W 7: CPT

## 2022-03-24 PROCEDURE — 99212 OFFICE O/P EST SF 10 MIN: CPT | Mod: 25

## 2022-04-01 PROBLEM — L91.0 HYPERTROPHIC SCAR: Status: ACTIVE | Noted: 2022-02-22

## 2022-04-13 ENCOUNTER — APPOINTMENT (OUTPATIENT)
Dept: FAMILY MEDICINE | Facility: CLINIC | Age: 62
End: 2022-04-13
Payer: MEDICAID

## 2022-04-13 VITALS
SYSTOLIC BLOOD PRESSURE: 131 MMHG | HEIGHT: 62 IN | TEMPERATURE: 97.7 F | RESPIRATION RATE: 14 BRPM | OXYGEN SATURATION: 98 % | BODY MASS INDEX: 30.73 KG/M2 | DIASTOLIC BLOOD PRESSURE: 81 MMHG | HEART RATE: 64 BPM | WEIGHT: 167 LBS

## 2022-04-13 DIAGNOSIS — R93.89 ABNORMAL FINDINGS ON DIAGNOSTIC IMAGING OF OTHER SPECIFIED BODY STRUCTURES: ICD-10-CM

## 2022-04-13 DIAGNOSIS — M54.9 DORSALGIA, UNSPECIFIED: ICD-10-CM

## 2022-04-13 PROCEDURE — 99214 OFFICE O/P EST MOD 30 MIN: CPT | Mod: 25

## 2022-04-13 NOTE — HISTORY OF PRESENT ILLNESS
[FreeTextEntry1] : Please see HPI.  Chief complaint back pain worsening now continuous. [de-identified] : Patient is a 62-year-old female who presents today for follow-up and disease management history of breast cancer status post bilateral mastectomy and status post reconstruction bilaterally patient's chief complaint today back pain described as upper back pain with muscle spasms.  Medical history is also significant for hypothyroidism, gastroesophageal reflux disease, hypertension and hyperlipidemia.  Patient is an ex smoker she stopped smoking in 2018 other than the above complaints patient is feeling well.

## 2022-04-13 NOTE — ASSESSMENT
[FreeTextEntry1] : Assessment and plan:\par \par 1.  Upper back pain musculoskeletal in origin we will attempt diclofenac sodium 75 mg twice a day with food and for muscle spasm Flexeril 10 mg at bedtime I do not recommend taking Flexeril during the day due to fatigue and sleepiness.\par \par 2.  Breast cancer status post bilateral mastectomy status post bilateral delayed D IEP flap reconstruction, patient follows up with plastic surgery she has healed well.  There was an incidental finding in the left breast status post biopsy reviewed results.\par \par 3.  Status post hysteroscopic polypectomy and myectomy biopsy was benign patient healed well and is doing well.  Recommend follow-up with gynecology.\par \par 4.  Hypothyroidism TSH was elevated levothyroxine increased to 50 mcg follow-up blood work in 6 weeks.\par \par 5. patient did go for skin surveillance all findings were benign.\par \par 6.  Gastroesophageal reflux disease discussed diet and patient will continue proton pump inhibitor pantoprazole 40 mg p.o. daily she does get great relief with PPI.\par \par 7.  Hypertension good blood pressure control patient will continue present medical management without change.\par \par 8.  Hyperlipidemia discussed with patient low-fat low-cholesterol diet increase physical activity as tolerated.\par \par 9.  Patient is up-to-date with COVID-19 vaccination she has received all 3 doses of Pfizer.  Discussed the fact that patient is candidate for second booster.\par \par 10.  Total time spent with patient face-to-face and nonface-to-face time 35 minutes reviewed chart, reviewed consultations discussed with patient most recent blood work, comprehensive blood work drawn in office at this visit by examiner and also discussed with patient pain management regarding back pain.\par \par

## 2022-04-13 NOTE — PHYSICAL EXAM
[No Acute Distress] : no acute distress [Well Nourished] : well nourished [Well Developed] : well developed [Well-Appearing] : well-appearing [Normal Sclera/Conjunctiva] : normal sclera/conjunctiva [PERRL] : pupils equal round and reactive to light [EOMI] : extraocular movements intact [Normal Outer Ear/Nose] : the outer ears and nose were normal in appearance [Normal Oropharynx] : the oropharynx was normal [No JVD] : no jugular venous distention [No Lymphadenopathy] : no lymphadenopathy [Supple] : supple [Thyroid Normal, No Nodules] : the thyroid was normal and there were no nodules present [No Respiratory Distress] : no respiratory distress  [No Accessory Muscle Use] : no accessory muscle use [Clear to Auscultation] : lungs were clear to auscultation bilaterally [Normal Rate] : normal rate  [Regular Rhythm] : with a regular rhythm [Normal S1, S2] : normal S1 and S2 [No Murmur] : no murmur heard [No Carotid Bruits] : no carotid bruits [No Abdominal Bruit] : a ~M bruit was not heard ~T in the abdomen [No Varicosities] : no varicosities [Pedal Pulses Present] : the pedal pulses are present [No Edema] : there was no peripheral edema [No Palpable Aorta] : no palpable aorta [No Extremity Clubbing/Cyanosis] : no extremity clubbing/cyanosis [Soft] : abdomen soft [Non Tender] : non-tender [Non-distended] : non-distended [No Masses] : no abdominal mass palpated [No HSM] : no HSM [Normal Bowel Sounds] : normal bowel sounds [Normal Posterior Cervical Nodes] : no posterior cervical lymphadenopathy [Normal Anterior Cervical Nodes] : no anterior cervical lymphadenopathy [No CVA Tenderness] : no CVA  tenderness [No Spinal Tenderness] : no spinal tenderness [No Joint Swelling] : no joint swelling [Grossly Normal Strength/Tone] : grossly normal strength/tone [No Rash] : no rash [Coordination Grossly Intact] : coordination grossly intact [No Focal Deficits] : no focal deficits [Normal Gait] : normal gait [Deep Tendon Reflexes (DTR)] : deep tendon reflexes were 2+ and symmetric [Normal Affect] : the affect was normal [Normal Insight/Judgement] : insight and judgment were intact [de-identified] : Pain is localized left side of mid back which is worsened with palpation it appears to be muscular in origin.

## 2022-04-13 NOTE — HEALTH RISK ASSESSMENT
[Former] : Former [20 or more] : 20 or more [Yes] : Yes [Monthly or less (1 pt)] : Monthly or less (1 point) [1 or 2 (0 pts)] : 1 or 2 (0 points) [Never (0 pts)] : Never (0 points) [No] : In the past 12 months have you used drugs other than those required for medical reasons? No [No falls in past year] : Patient reported no falls in the past year [0] : 2) Feeling down, depressed, or hopeless: Not at all (0) [PHQ-2 Negative - No further assessment needed] : PHQ-2 Negative - No further assessment needed [YearQuit] : 2018 [Audit-CScore] : 1 [[id number]] : 0

## 2022-04-14 LAB
ALBUMIN SERPL ELPH-MCNC: 4.7 G/DL
ALP BLD-CCNC: 110 U/L
ALT SERPL-CCNC: 12 U/L
ANION GAP SERPL CALC-SCNC: 12 MMOL/L
AST SERPL-CCNC: 16 U/L
BASOPHILS # BLD AUTO: 0.07 K/UL
BASOPHILS NFR BLD AUTO: 0.9 %
BILIRUB SERPL-MCNC: 0.2 MG/DL
BUN SERPL-MCNC: 18 MG/DL
CALCIUM SERPL-MCNC: 9.6 MG/DL
CHLORIDE SERPL-SCNC: 105 MMOL/L
CHOLEST SERPL-MCNC: 200 MG/DL
CO2 SERPL-SCNC: 22 MMOL/L
CREAT SERPL-MCNC: 0.79 MG/DL
EGFR: 83 ML/MIN/1.73M2
EOSINOPHIL # BLD AUTO: 0.1 K/UL
EOSINOPHIL NFR BLD AUTO: 1.3 %
GLUCOSE SERPL-MCNC: 92 MG/DL
HCT VFR BLD CALC: 43.3 %
HDLC SERPL-MCNC: 48 MG/DL
HGB BLD-MCNC: 14 G/DL
IMM GRANULOCYTES NFR BLD AUTO: 0.3 %
LDLC SERPL CALC-MCNC: 109 MG/DL
LYMPHOCYTES # BLD AUTO: 3.4 K/UL
LYMPHOCYTES NFR BLD AUTO: 43.4 %
MAN DIFF?: NORMAL
MCHC RBC-ENTMCNC: 30.5 PG
MCHC RBC-ENTMCNC: 32.3 GM/DL
MCV RBC AUTO: 94.3 FL
MONOCYTES # BLD AUTO: 0.67 K/UL
MONOCYTES NFR BLD AUTO: 8.6 %
NEUTROPHILS # BLD AUTO: 3.57 K/UL
NEUTROPHILS NFR BLD AUTO: 45.5 %
NONHDLC SERPL-MCNC: 152 MG/DL
PLATELET # BLD AUTO: 321 K/UL
POTASSIUM SERPL-SCNC: 4.7 MMOL/L
PROT SERPL-MCNC: 6.9 G/DL
RBC # BLD: 4.59 M/UL
RBC # FLD: 13.3 %
SODIUM SERPL-SCNC: 139 MMOL/L
TRIGL SERPL-MCNC: 215 MG/DL
TSH SERPL-ACNC: 2.67 UIU/ML
WBC # FLD AUTO: 7.83 K/UL

## 2022-04-23 ENCOUNTER — APPOINTMENT (OUTPATIENT)
Dept: PLASTIC SURGERY | Facility: CLINIC | Age: 62
End: 2022-04-23
Payer: MEDICAID

## 2022-04-23 VITALS
OXYGEN SATURATION: 100 % | HEART RATE: 57 BPM | DIASTOLIC BLOOD PRESSURE: 76 MMHG | WEIGHT: 167 LBS | BODY MASS INDEX: 30.73 KG/M2 | SYSTOLIC BLOOD PRESSURE: 119 MMHG | TEMPERATURE: 97.4 F | HEIGHT: 62 IN

## 2022-04-23 PROCEDURE — 19350 NIPPLE/AREOLA RECONSTRUCTION: CPT | Mod: 50

## 2022-04-26 ENCOUNTER — APPOINTMENT (OUTPATIENT)
Dept: SURGICAL ONCOLOGY | Facility: CLINIC | Age: 62
End: 2022-04-26
Payer: MEDICAID

## 2022-04-26 VITALS
TEMPERATURE: 97.7 F | DIASTOLIC BLOOD PRESSURE: 84 MMHG | WEIGHT: 165 LBS | SYSTOLIC BLOOD PRESSURE: 125 MMHG | OXYGEN SATURATION: 98 % | BODY MASS INDEX: 30.36 KG/M2 | HEART RATE: 62 BPM | HEIGHT: 62 IN | RESPIRATION RATE: 17 BRPM

## 2022-04-26 PROCEDURE — 99214 OFFICE O/P EST MOD 30 MIN: CPT

## 2022-04-26 NOTE — ASSESSMENT
[FreeTextEntry1] : IMP:\par -S/p bilateral mastectomies for right breast cancer 11/2018\par -Left breast benign\par -Pt. is s/p revision of her bilateral reconstructed breasts with liposuction and fat grafting as well as revision of her abdominal scar on 12/22/2021 under the care of Dr. Dejesus. \par -US b/l (12/21/2021): no sonographic evidence of malignancy in the reconstructed right breast/chest wall. Left reconstructed breast/chest wall 11:00 axis, 6cm from scar site, indeterminate irregular somewhat circumscribed 9mm mass correlating to area of concern seen on recent CT 12/9/2021. BIRADS-4A. \par -S/p benign left breast biopsy 1/5/22- benign and concordant \par \par \par PLAN: \par Bilateral breast ultrasound 12/2022 (ordered)\par Continue f/u with Dr. Houston\par RTO 1 year

## 2022-04-26 NOTE — HISTORY OF PRESENT ILLNESS
[de-identified] : Ms. Page is a 63 y/o female presenting for a follow up visit. Last seen June 2019. \par \par She is s/p bilateral mastectomy November 2018. \par Pathology 11/29/18: Right axillary sentinel lymph node, excision: One negative lymph node. 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 Right axillary sentinel lymph node 2, excision: One negative lymph node. Left axillary sentinel lymph node, excision: One negative lymph node. Left breast, mastectomy: Fibrocystic changes minimal.\par \par Received chemo under the care of Dr. Qureshi. Now continues f/u with Dr. Houston.  No RT. \par \par Pt. is s/p revision of her bilateral reconstructed breasts with liposuction and fat grafting as well as revision of her abdominal scar on 12/22/2021 under the care of Dr. Dejesus. \par \par US b/l (12/21/2021): no sonographic evidence of malignancy in the reconstructed right breast/chest wall. Left reconstructed breast/chest wall 11:00 axis, 6cm from scar site, indeterminate irregular somewhat circumscribed 9mm mass correlating to area of concern seen on recent CT 12/9/2021. BIRADS-4A. \par \par US biopsy left (1/5/2022): Pathology report revealed fat necrosis in repair with chronic inflammation, histiocytes and giant cells reaction, and fibrosis. Results are benign and concordant. \par \par Pt. is s/p bilateral nipple reconstruction this past Saturday. Feeling well. Just some itching around the tegaderm. \par \par Denies palpable chest wall masses. \par \par PMH: Hypothyroid, GERD, HTN, HLD. \par \par

## 2022-04-26 NOTE — REASON FOR VISIT
[Initial Consultation] : an initial consultation for [[employer] ] : provided by [employer]   [FreeTextEntry2] : hx breast cancer s/p bilateral mastectomy 11/2018  [Interpreters_IDNumber] : [id number] [TWNoteComboBox1] : [place]i

## 2022-04-26 NOTE — PHYSICAL EXAM
[Normal] : supple, no neck mass and thyroid not enlarged [Normal Supraclavicular Lymph Nodes] : normal supraclavicular lymph nodes [Normal Axillary Lymph Nodes] : normal axillary lymph nodes [Normal] : oriented to person, place and time, with appropriate affect [FreeTextEntry1] : [place] present during exam  [de-identified] : Normal S1, S2.  Regular rate and rhythm. [de-identified] : Complete breast exam performed in supine and upright positions. S/p bilateral mastectomy with reconstruction S/p bilateral nipple reconstruction; incisions C/D/I [de-identified] : Clear breath sounds bilaterally, normal respiratory effort.

## 2022-05-02 ENCOUNTER — APPOINTMENT (OUTPATIENT)
Dept: PLASTIC SURGERY | Facility: CLINIC | Age: 62
End: 2022-05-02
Payer: MEDICAID

## 2022-05-02 VITALS
SYSTOLIC BLOOD PRESSURE: 146 MMHG | OXYGEN SATURATION: 99 % | DIASTOLIC BLOOD PRESSURE: 84 MMHG | TEMPERATURE: 97.3 F | WEIGHT: 165 LBS | BODY MASS INDEX: 30.36 KG/M2 | HEART RATE: 56 BPM | HEIGHT: 62 IN

## 2022-05-02 PROCEDURE — 99024 POSTOP FOLLOW-UP VISIT: CPT

## 2022-05-03 NOTE — REASON FOR VISIT
[Procedure: _________] : a [unfilled] procedure visit [FreeTextEntry1] : here today for bilateral nipple reconstruction

## 2022-05-03 NOTE — PROCEDURE
[Nl] : None [FreeTextEntry1] : Absence of nipple, history of breast cancer [FreeTextEntry2] : Bilateral nipple reconstruction CV flaps [FreeTextEntry6] : New nipple positions were marked with patient in front of mirror. After being satisfied with position of the new nipples, patient was positioned supine and prepped and draped in usual sterile fashion. A CV flap was then designed and executed on the breast mounds in the areas marked. A 4-0 vicryl and 5-0 plain were used to elevate the nipple flaps and secure it in place. The donor sites were closed with 4-0 monocryl. The flaps were pink with no congestion after inset. Patient tolerated well the procedure, there were no complications.\par

## 2022-05-06 ENCOUNTER — EMERGENCY (EMERGENCY)
Facility: HOSPITAL | Age: 62
LOS: 1 days | Discharge: HOME | End: 2022-05-06
Attending: EMERGENCY MEDICINE | Admitting: EMERGENCY MEDICINE
Payer: MEDICAID

## 2022-05-06 VITALS
SYSTOLIC BLOOD PRESSURE: 148 MMHG | DIASTOLIC BLOOD PRESSURE: 82 MMHG | OXYGEN SATURATION: 98 % | HEART RATE: 69 BPM | HEIGHT: 61 IN | RESPIRATION RATE: 18 BRPM | WEIGHT: 164.91 LBS | TEMPERATURE: 98 F

## 2022-05-06 DIAGNOSIS — Z98.890 OTHER SPECIFIED POSTPROCEDURAL STATES: Chronic | ICD-10-CM

## 2022-05-06 DIAGNOSIS — Z90.10 ACQUIRED ABSENCE OF UNSPECIFIED BREAST AND NIPPLE: Chronic | ICD-10-CM

## 2022-05-06 DIAGNOSIS — S30.1XXA CONTUSION OF ABDOMINAL WALL, INITIAL ENCOUNTER: Chronic | ICD-10-CM

## 2022-05-06 LAB
ALBUMIN SERPL ELPH-MCNC: 3.8 G/DL — SIGNIFICANT CHANGE UP (ref 3.3–5)
ALP SERPL-CCNC: 129 U/L — HIGH (ref 40–120)
ALT FLD-CCNC: 28 U/L — SIGNIFICANT CHANGE UP (ref 10–45)
ANION GAP SERPL CALC-SCNC: 3 MMOL/L — LOW (ref 5–17)
AST SERPL-CCNC: 20 U/L — SIGNIFICANT CHANGE UP (ref 10–40)
BASOPHILS # BLD AUTO: 0.06 K/UL — SIGNIFICANT CHANGE UP (ref 0–0.2)
BASOPHILS NFR BLD AUTO: 0.7 % — SIGNIFICANT CHANGE UP (ref 0–2)
BILIRUB SERPL-MCNC: 0.3 MG/DL — SIGNIFICANT CHANGE UP (ref 0.2–1.2)
BUN SERPL-MCNC: 12 MG/DL — SIGNIFICANT CHANGE UP (ref 7–23)
CALCIUM SERPL-MCNC: 9.4 MG/DL — SIGNIFICANT CHANGE UP (ref 8.4–10.5)
CHLORIDE SERPL-SCNC: 105 MMOL/L — SIGNIFICANT CHANGE UP (ref 96–108)
CO2 SERPL-SCNC: 33 MMOL/L — HIGH (ref 22–31)
CREAT SERPL-MCNC: 0.93 MG/DL — SIGNIFICANT CHANGE UP (ref 0.5–1.3)
EGFR: 69 ML/MIN/1.73M2 — SIGNIFICANT CHANGE UP
EOSINOPHIL # BLD AUTO: 0.14 K/UL — SIGNIFICANT CHANGE UP (ref 0–0.5)
EOSINOPHIL NFR BLD AUTO: 1.7 % — SIGNIFICANT CHANGE UP (ref 0–6)
GLUCOSE SERPL-MCNC: 93 MG/DL — SIGNIFICANT CHANGE UP (ref 70–99)
HCT VFR BLD CALC: 41 % — SIGNIFICANT CHANGE UP (ref 34.5–45)
HGB BLD-MCNC: 13.8 G/DL — SIGNIFICANT CHANGE UP (ref 11.5–15.5)
IMM GRANULOCYTES NFR BLD AUTO: 0.4 % — SIGNIFICANT CHANGE UP (ref 0–1.5)
LYMPHOCYTES # BLD AUTO: 3.44 K/UL — HIGH (ref 1–3.3)
LYMPHOCYTES # BLD AUTO: 41 % — SIGNIFICANT CHANGE UP (ref 13–44)
MCHC RBC-ENTMCNC: 31.7 PG — SIGNIFICANT CHANGE UP (ref 27–34)
MCHC RBC-ENTMCNC: 33.7 GM/DL — SIGNIFICANT CHANGE UP (ref 32–36)
MCV RBC AUTO: 94.3 FL — SIGNIFICANT CHANGE UP (ref 80–100)
MONOCYTES # BLD AUTO: 0.56 K/UL — SIGNIFICANT CHANGE UP (ref 0–0.9)
MONOCYTES NFR BLD AUTO: 6.7 % — SIGNIFICANT CHANGE UP (ref 2–14)
NEUTROPHILS # BLD AUTO: 4.16 K/UL — SIGNIFICANT CHANGE UP (ref 1.8–7.4)
NEUTROPHILS NFR BLD AUTO: 49.5 % — SIGNIFICANT CHANGE UP (ref 43–77)
NRBC # BLD: 0 /100 WBCS — SIGNIFICANT CHANGE UP (ref 0–0)
PLATELET # BLD AUTO: 327 K/UL — SIGNIFICANT CHANGE UP (ref 150–400)
POTASSIUM SERPL-MCNC: 4.6 MMOL/L — SIGNIFICANT CHANGE UP (ref 3.5–5.3)
POTASSIUM SERPL-SCNC: 4.6 MMOL/L — SIGNIFICANT CHANGE UP (ref 3.5–5.3)
PROT SERPL-MCNC: 7.6 G/DL — SIGNIFICANT CHANGE UP (ref 6–8.3)
RBC # BLD: 4.35 M/UL — SIGNIFICANT CHANGE UP (ref 3.8–5.2)
RBC # FLD: 13.1 % — SIGNIFICANT CHANGE UP (ref 10.3–14.5)
SODIUM SERPL-SCNC: 141 MMOL/L — SIGNIFICANT CHANGE UP (ref 135–145)
WBC # BLD: 8.39 K/UL — SIGNIFICANT CHANGE UP (ref 3.8–10.5)
WBC # FLD AUTO: 8.39 K/UL — SIGNIFICANT CHANGE UP (ref 3.8–10.5)

## 2022-05-06 PROCEDURE — 36415 COLL VENOUS BLD VENIPUNCTURE: CPT

## 2022-05-06 PROCEDURE — 99285 EMERGENCY DEPT VISIT HI MDM: CPT

## 2022-05-06 PROCEDURE — 80053 COMPREHEN METABOLIC PANEL: CPT

## 2022-05-06 PROCEDURE — 96374 THER/PROPH/DIAG INJ IV PUSH: CPT

## 2022-05-06 PROCEDURE — 99284 EMERGENCY DEPT VISIT MOD MDM: CPT | Mod: 25

## 2022-05-06 PROCEDURE — 70450 CT HEAD/BRAIN W/O DYE: CPT | Mod: 26,MA

## 2022-05-06 PROCEDURE — 70450 CT HEAD/BRAIN W/O DYE: CPT | Mod: MA

## 2022-05-06 PROCEDURE — 85025 COMPLETE CBC W/AUTO DIFF WBC: CPT

## 2022-05-06 RX ORDER — METOCLOPRAMIDE HCL 10 MG
10 TABLET ORAL ONCE
Refills: 0 | Status: COMPLETED | OUTPATIENT
Start: 2022-05-06 | End: 2022-05-06

## 2022-05-06 RX ORDER — ACETAMINOPHEN 500 MG
650 TABLET ORAL ONCE
Refills: 0 | Status: COMPLETED | OUTPATIENT
Start: 2022-05-06 | End: 2022-05-06

## 2022-05-06 RX ADMIN — Medication 10 MILLIGRAM(S): at 14:20

## 2022-05-06 RX ADMIN — Medication 650 MILLIGRAM(S): at 14:19

## 2022-05-06 NOTE — ED ADULT TRIAGE NOTE - CHIEF COMPLAINT QUOTE
c/o left sided headache that she woke up with this morning. No facial droop, no unilateral weakness, no changes in vision.

## 2022-05-06 NOTE — ED PROVIDER NOTE - OBJECTIVE STATEMENT
63 y/o F with h/o Breast CA, HTN, Hypothyroid pw left sided headache upon awakening accompanied with left cheek numbness and slight dizziness. States recent headaches alleviated with Tylenol but has not had the dizzy or numb feeling prior. Denies fever, chills, visual changes, chest pain, shortness of breath, abdominal pain, n/v/d, dysuria, weakness, difficulty ambulating. Appears well, no distress. Took Tylenol at 8am with slight relief.  PMD- Oumou

## 2022-05-06 NOTE — ED ADULT NURSE NOTE - OBJECTIVE STATEMENT
Pt came from home with c/o left sided headache and dizziness that started at 5am this morning. Pt reports that at the time she had a tingling sensation on the left side of her face, but currently denies numbness or tingling. Pt reports taking tylenol to help with the pain but had no relief. Pt denies taking any blood thinners. PMH HTN, hypothyroidism, and breast ca.

## 2022-05-06 NOTE — ED PROVIDER NOTE - NS ED ATTENDING STATEMENT MOD
This was a shared visit with the CHEO. I reviewed and verified the documentation and independently performed the documented:

## 2022-05-06 NOTE — ED PROVIDER NOTE - NSICDXPASTMEDICALHX_GEN_ALL_CORE_FT
PAST MEDICAL HISTORY:  Arthritis     Breast cancer right, dx 2018 s/p chemotherapy    Chronic bilateral low back pain, with sciatica presence unspecified     Deformity of reconstructed breast     History of chemotherapy     HTN (hypertension)     Hypothyroidism     Language barrier Syriac speaking    Other chronic gastritis     [facility]    Smoker

## 2022-05-06 NOTE — ED PROVIDER NOTE - ATTENDING APP SHARED VISIT CONTRIBUTION OF CARE
Rashel with OSIRIS Guan. 63 y/o F with h/o Breast CA, HTN, Hypothyroid pw left sided headache upon awakening accompanied with left cheek numbness and slight dizziness. States recent headaches alleviated with Tylenol but has not had the dizzy or numb feeling prior. Denies fever, chills, visual changes, chest pain, shortness of breath, abdominal pain, n/v/d, dysuria, weakness, difficulty ambulating. Appears well, no distress. Took Tylenol at 8am with slight relief.   Plan: Will check basic labs, give Reglan, Tylenol, check CT head and reassess  No acute findings on CT or lab. Pt feeling improved.     I performed a face to face bedside interview with patient regarding history of present illness, review of symptoms and past medical history. I completed an independent physical exam.  I have discussed the patient's plan of care with Physician Assistant (PA). I agree with note as stated above, having amended the EMR as needed to reflect my findings.   This includes History of Present Illness, HIV, Past Medical/Surgical/Family/Social History, Allergies and Home Medications, Review of Systems, Physical Exam, and any Progress Notes during the time I functioned as the attending physician for this patient.

## 2022-05-06 NOTE — ED PROVIDER NOTE - PATIENT PORTAL LINK FT
You can access the FollowMyHealth Patient Portal offered by [facility] by registering at the following website: [email or web address]. By joining [employer]’s FollowMyHealth portal, you will also be able to view your health information using other applications (apps) compatible with our system.

## 2022-05-06 NOTE — ED PROVIDER NOTE - CLINICAL SUMMARY MEDICAL DECISION MAKING FREE TEXT BOX
61 y/o F with h/o Breast CA, HTN, Hypothyroid pw left sided headache upon awakening accompanied with left cheek numbness and slight dizziness. States recent headaches alleviated with Tylenol but has not had the dizzy or numb feeling prior. Denies fever, chills, visual changes, chest pain, shortness of breath, abdominal pain, n/v/d, dysuria, weakness, difficulty ambulating. Appears well, no distress. Took Tylenol at 8am with slight relief.   Plan: Will check basic labs, give Reglan, Tylenol, check CT head and reassess 61 y/o F with h/o Breast CA, HTN, Hypothyroid pw left sided headache upon awakening accompanied with left cheek numbness and slight dizziness. States recent headaches alleviated with Tylenol but has not had the dizzy or numb feeling prior. Denies fever, chills, visual changes, chest pain, shortness of breath, abdominal pain, n/v/d, dysuria, weakness, difficulty ambulating. Appears well, no distress. Took Tylenol at 8am with slight relief.   Plan: Will check basic labs, give Reglan, Tylenol, check CT head and reassess  No acute findings on CT or lab. Pt feeling improved.

## 2022-05-06 NOTE — ED ADULT NURSE NOTE - NSICDXPASTMEDICALHX_GEN_ALL_CORE_FT
PAST MEDICAL HISTORY:  Arthritis     Breast cancer right, dx 2018 s/p chemotherapy    Chronic bilateral low back pain, with sciatica presence unspecified     Deformity of reconstructed breast     History of chemotherapy     HTN (hypertension)     Hypothyroidism     Language barrier Telugu speaking    Other chronic gastritis     [facility]    Smoker

## 2022-05-09 ENCOUNTER — OUTPATIENT (OUTPATIENT)
Dept: OUTPATIENT SERVICES | Facility: HOSPITAL | Age: 62
LOS: 1 days | Discharge: HOME | End: 2022-05-09

## 2022-05-09 DIAGNOSIS — S30.1XXA CONTUSION OF ABDOMINAL WALL, INITIAL ENCOUNTER: Chronic | ICD-10-CM

## 2022-05-09 DIAGNOSIS — Z90.10 ACQUIRED ABSENCE OF UNSPECIFIED BREAST AND NIPPLE: Chronic | ICD-10-CM

## 2022-05-09 DIAGNOSIS — Z98.890 OTHER SPECIFIED POSTPROCEDURAL STATES: Chronic | ICD-10-CM

## 2022-05-09 DIAGNOSIS — C50.919 MALIGNANT NEOPLASM OF UNSPECIFIED SITE OF UNSPECIFIED FEMALE BREAST: ICD-10-CM

## 2022-05-10 ENCOUNTER — APPOINTMENT (OUTPATIENT)
Dept: HEMATOLOGY ONCOLOGY | Facility: CLINIC | Age: 62
End: 2022-05-10
Payer: MEDICAID

## 2022-05-10 VITALS
BODY MASS INDEX: 31.36 KG/M2 | SYSTOLIC BLOOD PRESSURE: 147 MMHG | HEART RATE: 85 BPM | TEMPERATURE: 97.1 F | HEIGHT: 61.97 IN | WEIGHT: 170.4 LBS | DIASTOLIC BLOOD PRESSURE: 85 MMHG | RESPIRATION RATE: 16 BRPM | OXYGEN SATURATION: 97 %

## 2022-05-10 PROCEDURE — 99214 OFFICE O/P EST MOD 30 MIN: CPT

## 2022-05-16 NOTE — ASSESSMENT
[FreeTextEntry1] : 62 y/o woman with GERD, microscopic hematuria, significant smoking history (quit recently), arthritis/osteoporosis presenting for medical oncology follow up after b/l mastectomy for R breast cancer; final pathology findings on second opinion pathology review (Dr. 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 Interval PET/CT with no other evidence of disease. S/p adjuvant chemotherapy with cisplatin/etoposide x 4 cycles in early 2019, doing well now.\par \par -CT C/A/P 12/16/2021 showed a new 6mm left upper inner breast nodule indeterminate \par patient is s/p US biopsy 1/5/22 of left breast 11:00 which showed fat necrosis in repair with chronic inflammation, giant cell reaction, histiocytes. \par -next CT C/A/P due 6/2022, ordered.\par -Last saw Dr. Raymundo 4/26/22, rec fu yearly; rec bilateral breast US in 12/2022\par -Labs reviewed from hospital visit in 5/6/22\par -clinically DAVIAN\par -discussed monitoring for any new signs or symptoms\par -FU in 6mos or sooner\par \par \par

## 2022-05-16 NOTE — REASON FOR VISIT
[Follow-Up Visit] : a follow-up [Other: ______] : provided by [employer] [FreeTextEntry2] : Right small cell neuroendocrine carcinoma [Interpreters_IDNumber] : [id number] [Interpreters_FullName] : Roxana

## 2022-05-16 NOTE — PHYSICAL EXAM
[Fully active, able to carry on all pre-disease performance without restriction] : Status 0 - Fully active, able to carry on all pre-disease performance without restriction [Normal] : no peripheral adenopathy appreciated [de-identified] : appears well [de-identified] : no icterus or injection [de-identified] : REMI reconstruction b/l well healed, no masses or adenopathy palpable b/l [de-identified] : abdominal scar
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
## 2022-05-24 ENCOUNTER — APPOINTMENT (OUTPATIENT)
Dept: PLASTIC SURGERY | Facility: CLINIC | Age: 62
End: 2022-05-24
Payer: MEDICAID

## 2022-05-24 VITALS
SYSTOLIC BLOOD PRESSURE: 125 MMHG | HEART RATE: 73 BPM | BODY MASS INDEX: 30.18 KG/M2 | DIASTOLIC BLOOD PRESSURE: 79 MMHG | OXYGEN SATURATION: 97 % | HEIGHT: 62 IN | WEIGHT: 164 LBS | TEMPERATURE: 97.3 F

## 2022-05-24 DIAGNOSIS — Z98.890 OTHER SPECIFIED POSTPROCEDURAL STATES: ICD-10-CM

## 2022-05-24 DIAGNOSIS — Z90.10 ACQUIRED ABSENCE OF UNSPECIFIED BREAST AND NIPPLE: ICD-10-CM

## 2022-05-24 PROCEDURE — 99024 POSTOP FOLLOW-UP VISIT: CPT

## 2022-06-07 ENCOUNTER — OUTPATIENT (OUTPATIENT)
Dept: OUTPATIENT SERVICES | Facility: HOSPITAL | Age: 62
LOS: 1 days | End: 2022-06-07
Payer: MEDICAID

## 2022-06-07 ENCOUNTER — RESULT REVIEW (OUTPATIENT)
Age: 62
End: 2022-06-07

## 2022-06-07 ENCOUNTER — APPOINTMENT (OUTPATIENT)
Dept: CT IMAGING | Facility: HOSPITAL | Age: 62
End: 2022-06-07
Payer: MEDICAID

## 2022-06-07 DIAGNOSIS — S30.1XXA CONTUSION OF ABDOMINAL WALL, INITIAL ENCOUNTER: Chronic | ICD-10-CM

## 2022-06-07 DIAGNOSIS — Z98.890 OTHER SPECIFIED POSTPROCEDURAL STATES: Chronic | ICD-10-CM

## 2022-06-07 DIAGNOSIS — Z90.10 ACQUIRED ABSENCE OF UNSPECIFIED BREAST AND NIPPLE: Chronic | ICD-10-CM

## 2022-06-07 DIAGNOSIS — C80.1 MALIGNANT (PRIMARY) NEOPLASM, UNSPECIFIED: ICD-10-CM

## 2022-06-07 PROCEDURE — 74177 CT ABD & PELVIS W/CONTRAST: CPT

## 2022-06-07 PROCEDURE — 71260 CT THORAX DX C+: CPT | Mod: 26

## 2022-06-07 PROCEDURE — 71260 CT THORAX DX C+: CPT

## 2022-06-07 PROCEDURE — 74177 CT ABD & PELVIS W/CONTRAST: CPT | Mod: 26

## 2022-06-07 NOTE — PATIENT PROFILE ADULT - NSPROPOAURINARYCATHETER_GEN_A_NUR
"-- DO NOT REPLY / DO NOT REPLY ALL --  -- Message is from the [facility]--      Patient is requesting a medication refill - medication is on active list    Was Medication Pended? Yes. Rx Name and Dose:  metformin (GLUCOPHAGE) 1000 MG tablet    Duration: 90 days    Pharmacy  [id number] - [place] - [address]    Patient confirmed the above pharmacy as correct? Yes    Does this request need an existing or new prescription at a pharmacy to be sent to a new pharmacy location? No    Caller Information       Type Contact Phone    06/07/2022 12:53 PM CDT Phone (Incoming) Eliazar Dumont (Self) [phone] (D)          Alternative phone number: [phone]    Turnaround time given to caller: ""This message will be sent to [facility] Provider's name]. The clinical team will fulfill your request as soon as they review your message. \""  "
no

## 2022-06-20 ENCOUNTER — RX RENEWAL (OUTPATIENT)
Age: 62
End: 2022-06-20

## 2022-06-20 RX ORDER — DICLOFENAC SODIUM 75 MG/1
75 TABLET, DELAYED RELEASE ORAL
Qty: 60 | Refills: 1 | Status: ACTIVE | COMMUNITY
Start: 2022-04-13 | End: 1900-01-01

## 2022-06-21 NOTE — PRE-OP CHECKLIST - ALLERGIES REVIEWED
done 5-Fu Counseling: 5-Fluorouracil Counseling:  I discussed with the patient the risks of 5-fluorouracil including but not limited to erythema, scaling, itching, weeping, crusting, and pain.

## 2022-06-30 ENCOUNTER — APPOINTMENT (OUTPATIENT)
Dept: ORTHOPEDIC SURGERY | Facility: CLINIC | Age: 62
End: 2022-06-30

## 2022-06-30 DIAGNOSIS — M17.11 UNILATERAL PRIMARY OSTEOARTHRITIS, RIGHT KNEE: ICD-10-CM

## 2022-06-30 PROCEDURE — 73564 X-RAY EXAM KNEE 4 OR MORE: CPT | Mod: RT

## 2022-06-30 PROCEDURE — 99203 OFFICE O/P NEW LOW 30 MIN: CPT

## 2022-06-30 RX ORDER — HYALURONATE SODIUM 20 MG/2 ML
20 SYRINGE (ML) INTRAARTICULAR
Qty: 1 | Refills: 0 | Status: ACTIVE | COMMUNITY
Start: 2022-06-30 | End: 1900-01-01

## 2022-07-13 ENCOUNTER — APPOINTMENT (OUTPATIENT)
Dept: FAMILY MEDICINE | Facility: CLINIC | Age: 62
End: 2022-07-13

## 2022-07-13 ENCOUNTER — LABORATORY RESULT (OUTPATIENT)
Age: 62
End: 2022-07-13

## 2022-07-13 VITALS
OXYGEN SATURATION: 98 % | RESPIRATION RATE: 14 BRPM | HEART RATE: 80 BPM | SYSTOLIC BLOOD PRESSURE: 141 MMHG | WEIGHT: 167 LBS | BODY MASS INDEX: 30.73 KG/M2 | HEIGHT: 62 IN | TEMPERATURE: 98 F | DIASTOLIC BLOOD PRESSURE: 89 MMHG

## 2022-07-13 VITALS — SYSTOLIC BLOOD PRESSURE: 140 MMHG | DIASTOLIC BLOOD PRESSURE: 77 MMHG

## 2022-07-13 DIAGNOSIS — L30.8 OTHER SPECIFIED DERMATITIS: ICD-10-CM

## 2022-07-13 DIAGNOSIS — Z98.890 OTHER SPECIFIED POSTPROCEDURAL STATES: ICD-10-CM

## 2022-07-13 DIAGNOSIS — C80.1 MALIGNANT (PRIMARY) NEOPLASM, UNSPECIFIED: ICD-10-CM

## 2022-07-13 PROCEDURE — 99214 OFFICE O/P EST MOD 30 MIN: CPT | Mod: 25

## 2022-07-13 PROCEDURE — 36415 COLL VENOUS BLD VENIPUNCTURE: CPT

## 2022-07-13 RX ORDER — TRIAMCINOLONE ACETONIDE 1 MG/G
0.1 CREAM TOPICAL 3 TIMES DAILY
Qty: 1 | Refills: 3 | Status: ACTIVE | COMMUNITY
Start: 2022-07-13 | End: 1900-01-01

## 2022-07-13 RX ORDER — ZOSTER VACCINE RECOMBINANT, ADJUVANTED 50 MCG/0.5
50 KIT INTRAMUSCULAR
Qty: 2 | Refills: 0 | Status: ACTIVE | COMMUNITY
Start: 2022-07-13 | End: 1900-01-01
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
## 2022-07-13 NOTE — HISTORY OF PRESENT ILLNESS
[FreeTextEntry1] : See HPI. [de-identified] : Patient is a 62-year-old female who presents today for follow-up and disease management patient is status post diagnosis of breast cancer small cell carcinoma status post bilateral mastectomy status post reconstruction patient presently doing well, history of hypothyroidism, gastroesophageal reflux disease, hypertension, hyperlipidemia and severe right knee pain secondary to advanced osteoarthritis.\par \par I reviewed with patient most recent hematology evaluation, plastic surgery and orthopedics.

## 2022-07-13 NOTE — REVIEW OF SYSTEMS
[Joint Pain] : joint pain [Muscle Pain] : muscle pain [Back Pain] : back pain [Itching] : Itching [Skin Rash] : skin rash [Negative] : Heme/Lymph

## 2022-07-13 NOTE — HEALTH RISK ASSESSMENT
[Former] : Former [20 or more] : 20 or more [Yes] : Yes [Monthly or less (1 pt)] : Monthly or less (1 point) [1 or 2 (0 pts)] : 1 or 2 (0 points) [Never (0 pts)] : Never (0 points) [No] : In the past 12 months have you used drugs other than those required for medical reasons? No [No falls in past year] : Patient reported no falls in the past year [0] : 2) Feeling down, depressed, or hopeless: Not at all (0) [PHQ-2 Negative - No further assessment needed] : PHQ-2 Negative - No further assessment needed [YearQuit] : 2018 [Audit-CScore] : 0 [TFF2Keyyz] : 0

## 2022-07-13 NOTE — PHYSICAL EXAM
[No Acute Distress] : no acute distress [Well Nourished] : well nourished [Well Developed] : well developed [Well-Appearing] : well-appearing [Normal Sclera/Conjunctiva] : normal sclera/conjunctiva [PERRL] : pupils equal round and reactive to light [EOMI] : extraocular movements intact [Normal Outer Ear/Nose] : the outer ears and nose were normal in appearance [Normal Oropharynx] : the oropharynx was normal [No JVD] : no jugular venous distention [No Lymphadenopathy] : no lymphadenopathy [Supple] : supple [Thyroid Normal, No Nodules] : the thyroid was normal and there were no nodules present [No Respiratory Distress] : no respiratory distress  [No Accessory Muscle Use] : no accessory muscle use [Clear to Auscultation] : lungs were clear to auscultation bilaterally [Normal Rate] : normal rate  [Regular Rhythm] : with a regular rhythm [Normal S1, S2] : normal S1 and S2 [No Murmur] : no murmur heard [No Carotid Bruits] : no carotid bruits [No Abdominal Bruit] : a ~M bruit was not heard ~T in the abdomen [No Varicosities] : no varicosities [Pedal Pulses Present] : the pedal pulses are present [No Edema] : there was no peripheral edema [No Palpable Aorta] : no palpable aorta [No Extremity Clubbing/Cyanosis] : no extremity clubbing/cyanosis [Soft] : abdomen soft [Non Tender] : non-tender [Non-distended] : non-distended [No Masses] : no abdominal mass palpated [No HSM] : no HSM [Normal Bowel Sounds] : normal bowel sounds [Normal Posterior Cervical Nodes] : no posterior cervical lymphadenopathy [Normal Anterior Cervical Nodes] : no anterior cervical lymphadenopathy [No CVA Tenderness] : no CVA  tenderness [No Spinal Tenderness] : no spinal tenderness [No Joint Swelling] : no joint swelling [Grossly Normal Strength/Tone] : grossly normal strength/tone [Coordination Grossly Intact] : coordination grossly intact [No Focal Deficits] : no focal deficits [Normal Gait] : normal gait [Deep Tendon Reflexes (DTR)] : deep tendon reflexes were 2+ and symmetric [Normal Affect] : the affect was normal [Normal Insight/Judgement] : insight and judgment were intact [de-identified] : Pain is localized left side of mid back which is worsened with palpation it appears to be muscular in origin. [de-identified] : Pruritic skin lesion right arm present for the past several weeks appears to be a form of contact dermatitis.

## 2022-07-14 LAB
ALBUMIN SERPL ELPH-MCNC: 4.7 G/DL
ALP BLD-CCNC: 100 U/L
ALT SERPL-CCNC: 11 U/L
ANION GAP SERPL CALC-SCNC: 13 MMOL/L
AST SERPL-CCNC: 17 U/L
BASOPHILS # BLD AUTO: 0.06 K/UL
BASOPHILS NFR BLD AUTO: 0.9 %
BILIRUB SERPL-MCNC: 0.5 MG/DL
BUN SERPL-MCNC: 11 MG/DL
CALCIUM SERPL-MCNC: 9.4 MG/DL
CHLORIDE SERPL-SCNC: 104 MMOL/L
CHOLEST SERPL-MCNC: 223 MG/DL
CO2 SERPL-SCNC: 21 MMOL/L
CREAT SERPL-MCNC: 0.76 MG/DL
EGFR: 87 ML/MIN/1.73M2
EOSINOPHIL # BLD AUTO: 0.16 K/UL
EOSINOPHIL NFR BLD AUTO: 2.3 %
GLUCOSE SERPL-MCNC: 90 MG/DL
HCT VFR BLD CALC: 43.6 %
HDLC SERPL-MCNC: 54 MG/DL
HGB BLD-MCNC: 14.4 G/DL
IMM GRANULOCYTES NFR BLD AUTO: 0.3 %
LDLC SERPL CALC-MCNC: 139 MG/DL
LYMPHOCYTES # BLD AUTO: 3.04 K/UL
LYMPHOCYTES NFR BLD AUTO: 44.3 %
MAN DIFF?: NORMAL
MCHC RBC-ENTMCNC: 30.9 PG
MCHC RBC-ENTMCNC: 33 GM/DL
MCV RBC AUTO: 93.6 FL
MONOCYTES # BLD AUTO: 0.49 K/UL
MONOCYTES NFR BLD AUTO: 7.1 %
NEUTROPHILS # BLD AUTO: 3.1 K/UL
NEUTROPHILS NFR BLD AUTO: 45.1 %
NONHDLC SERPL-MCNC: 169 MG/DL
PLATELET # BLD AUTO: 306 K/UL
POTASSIUM SERPL-SCNC: 4.2 MMOL/L
PROT SERPL-MCNC: 7.2 G/DL
RBC # BLD: 4.66 M/UL
RBC # FLD: 13.2 %
SODIUM SERPL-SCNC: 138 MMOL/L
TRIGL SERPL-MCNC: 149 MG/DL
TSH SERPL-ACNC: 8.17 UIU/ML
WBC # FLD AUTO: 6.87 K/UL

## 2022-07-25 PROBLEM — M17.11 PRIMARY OSTEOARTHRITIS OF RIGHT KNEE: Status: ACTIVE | Noted: 2019-11-05

## 2022-07-25 NOTE — DISCUSSION/SUMMARY
[de-identified] : 63 y/o female with right knee osteoarthritis. \par \par I discussed the treatment of degenerative arthritis with the patient at length today, as well as the chronic degenerative process and likely future progression of the disease. Pain may be related to the bony degenerative changes seen on XR imaging, or the associated degeneration to the soft tissues within the knee joint, including cartilage, meniscus, or ligamentous structures.  I described the spectrum of treatment options available including nonoperative modalities to total joint arthroplasty. Noninvasive and nonoperative treatment modalities include weight reduction, activity modification with low impact exercise, PRN use of acetaminophen or anti-inflammatory medication, natural anti-inflammatory supplements, glucosamine/chondroitin, and physical therapy (for strengthening and conditioning). More invasive treatments can include injection therapies; including cortisone, hyaluronic acid (visco-supplementation), or platelet-rich-plasma (PRP) injections. Definitive treatment could also include future total joint arthroplasty if symptoms persist and cause prolonged disability or interference with activities of daily living. \par \par The risks and benefits of each treatment option was discussed and all questions were answered. At this time recommendations are for conservative and symptomatic care as detailed above with impact loading activity restriction, low impact exercise and avoidance of strenuous activity. \par \par Other recommendations include OTC NSAIDs or acetaminophen (if tolerated/able), with application of ice to the knee 2-3x daily for 20 minutes or after periods of activity. \par \par Recommend trial of Visco supplementation injection therapy. We'll obtain preauthorization prior to injection. Patient is not a candidate for intra-articular steroid injection or arthroplasty at this time. \par \par Followup: Once approved for HA injection therapy.

## 2022-07-25 NOTE — PHYSICAL EXAM
[de-identified] : Oriented to time, place, person\par Mood: Normal\par Affect: Normal\par Appearance: Healthy, well appearing, no acute distress.\par Gait: Normal\par Assistive Devices: None\par \par Right Knee Exam:\par \par Skin: Clean, dry, intact\par Inspection: No obvious malalignment, no masses, no swelling, no effusion\par Pulses: 2+ DP/PT pulses \par ROM: 0-100 degrees of flexion. + pain with deep knee flexion.\par Tenderness: +MJLT. No LJLT. No pain over the patella facets. No pain to the quadriceps tendon. No pain to the patella tendon. + posterior knee tenderness.\par Stability: Stable to varus, valgus. Negative Lachman testing. Negative anterior drawer, negative posterior drawer.\par Strength: 5/5 Q/H/TA/GS/EHL, without atrophy\par Neuro: Intact to light touch throughout, DTRs normal\par Additional Tests: Negative Hugo's test, Negative patellar grind test  [de-identified] : The following radiographs were ordered and read by me during this patients visit. I reviewed each radiograph in detail with the patient and discussed the findings as highlighted below. \par \par 4 views of the right knee were obtained today, 06/30/2022, that show no acute fracture or dislocation. There is moderate medial, mild lateral and mild patellofemoral degenerative changes seen. There is no significant malalignment. No significant other obvious osseous abnormality, otherwise unremarkable.

## 2022-07-25 NOTE — HISTORY OF PRESENT ILLNESS
[de-identified] : 62 year old female presents today with right knee pain x 2 years. No injury reported. She was seen by outside physician 2 years ago dx with OA. She received an injection 2 years ago which was helpful but does not recall what it was. The pain is constant worse with sitting and worse with walking. She is taking Tylenol for pain. C/O buckling and used brace for stability.  Denies catching, locking, numbness or tingling. \par \par The patient's past medical history, past surgical history, medications and allergies were reviewed by me today with the patient and documented accordingly. In addition, the patient's family and social history, which were noncontributory to this visit, were reviewed also.

## 2022-08-26 ENCOUNTER — APPOINTMENT (OUTPATIENT)
Dept: FAMILY MEDICINE | Facility: CLINIC | Age: 62
End: 2022-08-26

## 2022-08-26 DIAGNOSIS — H60.90 UNSPECIFIED OTITIS EXTERNA, UNSPECIFIED EAR: ICD-10-CM

## 2022-08-26 DIAGNOSIS — H92.02 OTALGIA, LEFT EAR: ICD-10-CM

## 2022-08-26 PROCEDURE — 99213 OFFICE O/P EST LOW 20 MIN: CPT

## 2022-08-26 RX ORDER — LORATADINE 10 MG/1
10 TABLET ORAL
Qty: 24 | Refills: 4 | Status: ACTIVE | COMMUNITY
Start: 2022-08-26 | End: 1900-01-01

## 2022-08-26 RX ORDER — NEOMYCIN SULFATE, POLYMYXIN B SULFATE AND HYDROCORTISONE 3.5; 10000; 1 MG/ML; [IU]/ML; MG/ML
3.5-10000-1 SOLUTION AURICULAR (OTIC) 4 TIMES DAILY
Qty: 1 | Refills: 0 | Status: ACTIVE | COMMUNITY
Start: 2022-08-26 | End: 1900-01-01

## 2022-08-29 PROBLEM — H92.02 LEFT EAR PAIN: Status: ACTIVE | Noted: 2022-08-26

## 2022-08-29 PROBLEM — H60.90 OTITIS EXTERNA: Status: ACTIVE | Noted: 2022-08-26

## 2022-08-29 NOTE — HISTORY OF PRESENT ILLNESS
[FreeTextEntry8] : Left ear pain for  one week that runs down lateral aspect neck. \par Has also note itching in the ear canal of right eat. Denies any fever, chills, nausea, runny nose, or sore throat.\par

## 2022-08-29 NOTE — REVIEW OF SYSTEMS
[Negative] : Heme/Lymph [FreeTextEntry3] : Itching [FreeTextEntry4] : Urticaria of right ear canal, dull ache in left ear

## 2022-09-08 ENCOUNTER — LABORATORY RESULT (OUTPATIENT)
Age: 62
End: 2022-09-08

## 2022-10-02 ENCOUNTER — NON-APPOINTMENT (OUTPATIENT)
Age: 62
End: 2022-10-02

## 2022-10-20 ENCOUNTER — OUTPATIENT (OUTPATIENT)
Dept: OUTPATIENT SERVICES | Facility: HOSPITAL | Age: 62
LOS: 1 days | End: 2022-10-20
Payer: MEDICAID

## 2022-10-20 ENCOUNTER — LABORATORY RESULT (OUTPATIENT)
Age: 62
End: 2022-10-20

## 2022-10-20 ENCOUNTER — APPOINTMENT (OUTPATIENT)
Dept: OBGYN | Facility: CLINIC | Age: 62
End: 2022-10-20

## 2022-10-20 ENCOUNTER — RESULT REVIEW (OUTPATIENT)
Age: 62
End: 2022-10-20

## 2022-10-20 VITALS — BODY MASS INDEX: 29.63 KG/M2 | WEIGHT: 162 LBS

## 2022-10-20 DIAGNOSIS — Z11.3 ENCOUNTER FOR SCREENING FOR INFECTIONS WITH A PREDOMINANTLY SEXUAL MODE OF TRANSMISSION: ICD-10-CM

## 2022-10-20 DIAGNOSIS — S30.1XXA CONTUSION OF ABDOMINAL WALL, INITIAL ENCOUNTER: Chronic | ICD-10-CM

## 2022-10-20 DIAGNOSIS — Z98.890 OTHER SPECIFIED POSTPROCEDURAL STATES: Chronic | ICD-10-CM

## 2022-10-20 DIAGNOSIS — M81.0 AGE-RELATED OSTEOPOROSIS W/OUT CURRENT PATHOLOGICAL FRACTURE: ICD-10-CM

## 2022-10-20 DIAGNOSIS — Z01.419 ENCOUNTER FOR GYNECOLOGICAL EXAMINATION (GENERAL) (ROUTINE) W/OUT ABNORMAL FINDINGS: ICD-10-CM

## 2022-10-20 DIAGNOSIS — Z01.419 ENCOUNTER FOR GYNECOLOGICAL EXAMINATION (GENERAL) (ROUTINE) WITHOUT ABNORMAL FINDINGS: ICD-10-CM

## 2022-10-20 DIAGNOSIS — N76.0 ACUTE VAGINITIS: ICD-10-CM

## 2022-10-20 DIAGNOSIS — Z90.10 ACQUIRED ABSENCE OF UNSPECIFIED BREAST AND NIPPLE: Chronic | ICD-10-CM

## 2022-10-20 PROCEDURE — G0463: CPT

## 2022-10-20 PROCEDURE — 99213 OFFICE O/P EST LOW 20 MIN: CPT | Mod: 25

## 2022-10-20 PROCEDURE — 87591 N.GONORRHOEAE DNA AMP PROB: CPT

## 2022-10-20 PROCEDURE — 81003 URINALYSIS AUTO W/O SCOPE: CPT

## 2022-10-20 PROCEDURE — 87491 CHLMYD TRACH DNA AMP PROBE: CPT

## 2022-10-20 PROCEDURE — 87624 HPV HI-RISK TYP POOLED RSLT: CPT

## 2022-10-20 NOTE — HISTORY OF PRESENT ILLNESS
[6 Months Ago] : 6 months ago [Good] : being in good health [Postmenopausal] : is postmenopausal [de-identified] : 7/13/22 [Post-Menopause, No Sxs] : post-menopausal, currently without symptoms [Menopause Age: ____] : age at menopause was [unfilled] [Sexually Active] : is sexually active [Monogamous] : is monogamous

## 2022-10-21 LAB
C TRACH RRNA SPEC QL NAA+PROBE: SIGNIFICANT CHANGE UP
HPV HIGH+LOW RISK DNA PNL CVX: SIGNIFICANT CHANGE UP
N GONORRHOEA RRNA SPEC QL NAA+PROBE: SIGNIFICANT CHANGE UP
SPECIMEN SOURCE: SIGNIFICANT CHANGE UP

## 2022-10-28 LAB — CYTOLOGY SPEC DOC CYTO: SIGNIFICANT CHANGE UP

## 2022-10-29 ENCOUNTER — OUTPATIENT (OUTPATIENT)
Dept: OUTPATIENT SERVICES | Facility: HOSPITAL | Age: 62
LOS: 1 days | Discharge: HOME | End: 2022-10-29

## 2022-10-29 DIAGNOSIS — Z90.10 ACQUIRED ABSENCE OF UNSPECIFIED BREAST AND NIPPLE: Chronic | ICD-10-CM

## 2022-10-29 DIAGNOSIS — C50.919 MALIGNANT NEOPLASM OF UNSPECIFIED SITE OF UNSPECIFIED FEMALE BREAST: ICD-10-CM

## 2022-10-29 DIAGNOSIS — Z98.890 OTHER SPECIFIED POSTPROCEDURAL STATES: Chronic | ICD-10-CM

## 2022-10-29 DIAGNOSIS — S30.1XXA CONTUSION OF ABDOMINAL WALL, INITIAL ENCOUNTER: Chronic | ICD-10-CM

## 2022-10-31 ENCOUNTER — APPOINTMENT (OUTPATIENT)
Dept: RADIOLOGY | Facility: HOSPITAL | Age: 62
End: 2022-10-31

## 2022-10-31 ENCOUNTER — OUTPATIENT (OUTPATIENT)
Dept: OUTPATIENT SERVICES | Facility: HOSPITAL | Age: 62
LOS: 1 days | End: 2022-10-31
Payer: MEDICAID

## 2022-10-31 DIAGNOSIS — S30.1XXA CONTUSION OF ABDOMINAL WALL, INITIAL ENCOUNTER: Chronic | ICD-10-CM

## 2022-10-31 DIAGNOSIS — Z90.10 ACQUIRED ABSENCE OF UNSPECIFIED BREAST AND NIPPLE: Chronic | ICD-10-CM

## 2022-10-31 DIAGNOSIS — Z98.890 OTHER SPECIFIED POSTPROCEDURAL STATES: Chronic | ICD-10-CM

## 2022-10-31 DIAGNOSIS — Z85.3 PERSONAL HISTORY OF MALIGNANT NEOPLASM OF BREAST: ICD-10-CM

## 2022-10-31 PROBLEM — M81.0 OSTEOPOROSIS: Status: ACTIVE | Noted: 2022-10-31

## 2022-10-31 PROCEDURE — 77085 DXA BONE DENSITY AXL VRT FX: CPT

## 2022-10-31 PROCEDURE — 77085 DXA BONE DENSITY AXL VRT FX: CPT | Mod: 26

## 2022-11-04 ENCOUNTER — APPOINTMENT (OUTPATIENT)
Dept: HEMATOLOGY ONCOLOGY | Facility: CLINIC | Age: 62
End: 2022-11-04

## 2022-11-04 VITALS
BODY MASS INDEX: 29.78 KG/M2 | OXYGEN SATURATION: 98 % | SYSTOLIC BLOOD PRESSURE: 138 MMHG | WEIGHT: 161.82 LBS | DIASTOLIC BLOOD PRESSURE: 84 MMHG | TEMPERATURE: 97 F | RESPIRATION RATE: 16 BRPM | HEIGHT: 61.97 IN | HEART RATE: 74 BPM

## 2022-11-04 DIAGNOSIS — Z85.3 PERSONAL HISTORY OF MALIGNANT NEOPLASM OF BREAST: ICD-10-CM

## 2022-11-04 PROCEDURE — 99213 OFFICE O/P EST LOW 20 MIN: CPT

## 2022-11-04 NOTE — REASON FOR VISIT
[Follow-Up Visit] : a follow-up [Other: ______] : provided by [facility] [FreeTextEntry2] : Right small cell neuroendocrine carcinoma [Interpreters_IDNumber] : [id number] [Interpreters_FullName] : Roxana

## 2022-11-04 NOTE — HISTORY OF PRESENT ILLNESS
[de-identified] : Uma is presenting for breast medical oncology follow up.  \par \par Last mammogram ~2014 prior to this presentation\par \par Screening mammo 8/28/18: new 1cm mass in the upper medial Right breast.  8/29/18 diagnostic mammo and US: R breast 12:00 3-4cm FN 1cm slightly bi-lobed hypoechoic lesion corresponding with mammographic findings. BIRADS 4\par \par US guided biopsy Right 12:00 3-4FN 9/12/18: revealed a final pathology of invasive carcinoma with neuroendocrine features (Er-Pr-Her2-), Sarah grade 3, 8mm in specimen.\par \par Pathology Report ([facility]):\par Invasive carcinoma with neuroendocrine features, G3, 0.8cm in sample\par Addendum: the tumor is triple negative with histologic morphology similar to small cell neuroendocrine carcinoma. IHC stains for synaptophysin and chromogranin demonstrate nonspecific extremely scant, barely perceptible staining. Definitive characterization of the lesion is best deferred to the resection specimen. ER 0% NJ 0% HER2 IHC 0 negative\par \par 10/17/18 Addendum:\par IHC negative ALPESH-3, TTF-1, CDX-2\par Although in this limited sample the tumor demonstrated morphologic features similar to small cell neuroendocrine carcinoma, the overall findings are nonspecific regarding definitive histologic subtype and site of origin. Clinical correlation is necessary.\par \par 10/19/18 pathology review  [facility]: Poorly differentiated carcinoma, at least 6mm, LVI not seen. Immunostains done for ER/NJ/HER2/GATA3/CDX2/TTF-1 are negative in tumor cells (done at [facility]). Tumor histology shows small cell features (molding, apoptotic bodies, and extensive mitotic activity). Cannot determine histologic subtype and/or site of origin. Clinicopathologic and radiologic correlation strongly recommended.\par \par She saw Dr. Carter (plastic surgery) and then Dr. Raymundo (breast surgery) on 10/9 and noted her desire for breast conserving surgery. She was referred for breast MRI and genetic testing given strong family history. 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 At 12-1:00 axis retroareolar there is 0.4cm focus of enhancement, 12:00 axis posterior third linear non mass enhancement. L breast no suspicious enhancement. Axilla unremarkable. BIRADS4 If breast conservation is a consideration, 2-site MRI guided biopsy of non-mass enhancement within the anterior and posterior upper breast is recommended.\par \par She saw Dr. Qureshi 10/24 - diagnostic CT C/A/P and PETCT performed - no masses other than small breast mass with clip; PET with SUV 8 avidity in sigmoid colon (patient noted eating dairy products that week and some loose BMs in retrospect). Dr. Horn her gastroenterologist performed Colonoscopy 11/2 with random biopsies which were benign.\par \par 11/21/18- she underwent bilateral mastectomy with Dr. Raymundo. Pathology with 7mm R small cell carcinoma of the breast, 0/1 sentinel nodes involved.\par \par 12/23/18 - PET/CT: s/p mastectomy b/l with mildly hypermetabolic post surgical changes b/l. Hypermetabolic subcutaneous nodule right upper arm lateral aspect (not included on prior PET/CT) - SUV 2.5 (patient notes recent flu shot with hematoma). Interval resolution of rectosigmoid FDG avidity.\par \par 1/9/19- She started adjuvant chemotherapy (cisplatin/etoposide x 4 cyles), completed 4/2019. Grade 1 neuropathy, otherwise tolerated treatment well. Surveillance imaging per NCCN guidelines for high grade SCCs without evidence of recurrence since.\par \par Family history of breast cancer in her maternal aunts x 3 (ages unknown). Genetic testing negative with Dr. Raymundo [de-identified] : 11/4/2022\par Patient presents today to rule out metastatic breast cancer \par Patient denies any SOB, CP, abdominal pain, bone pain, headache, or unexplained weight loss\par Pt feels well.  No complaints. Denies any chest wall masses or skin changes.\par \par Last saw Dr. Raymundo 4/26/22, rec fu yearly; rec bilateral breast US in 12/2022\par \par HEALTH MAINTENANCE\par PMD: Dr. Dom Coello, self discontinued BP meds due to side effects, rec to fu\par PULM Pneumonia/decreased EF: unclear etiology, was COVID negative, LHC clear, asymptomatic now, 12/2020 noted\par GERD: GI Dr. Gilbert Horn. Normal C-scope with internal hemorrhoids 7/2/18. Abd US and CT 9/12/17 fatty liver, 7mm R hepatic focus calcifications, unchanged. Colonoscopy, 2018 ok\par URO Microscopic hematuria: Cystoscopy 6/13/17 without significant findings (Dr. Nadya Hu), takes oxybutinin for overactive bladder\par ENDO Hypothyroid\par GYN Dr. 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 She works as a  in a restaurant full time prior, now retired.\par She has received her first COVID vaccine shot, will receive 2nd dose on the 21st.

## 2022-11-04 NOTE — ASSESSMENT
[FreeTextEntry1] : 60 y/o woman with GERD, microscopic hematuria, significant smoking history (quit recently), arthritis/osteoporosis presenting for medical oncology follow up after b/l mastectomy for R breast cancer; final pathology findings on second opinion pathology review (Dr. Carla Napier at [facility]) with 7mm poorly differentiated carcinoma, small cell neuroendocrine type of breast with necrosis associated, ER-WV-HER2- Ki67 99%. Interval PET/CT with no other evidence of disease. S/p adjuvant chemotherapy with cisplatin/etoposide x 4 cycles in early 2019, doing well now.\par  \par -reviewed CT C/A/P 6/2022: no evidence of metastatic disease \par -Last saw Dr. Raymundo 4/26/22, rec fu yearly; rec bilateral breast US in 12/2022\par -Labs reviewed from hospital visit in 5/6/22\par -clinically DAVIAN\par -discussed monitoring for any new signs or symptoms\par -FU in 6mos or sooner\par \par \par

## 2022-11-04 NOTE — PHYSICAL EXAM
[Fully active, able to carry on all pre-disease performance without restriction] : Status 0 - Fully active, able to carry on all pre-disease performance without restriction [Normal] : affect appropriate [de-identified] : appears well [de-identified] : no icterus or injection [de-identified] : REMI reconstruction b/l well healed, no masses or adenopathy palpable b/l [de-identified] : abdominal scar

## 2022-11-14 ENCOUNTER — APPOINTMENT (OUTPATIENT)
Dept: FAMILY MEDICINE | Facility: CLINIC | Age: 62
End: 2022-11-14

## 2022-11-14 VITALS
HEIGHT: 67 IN | SYSTOLIC BLOOD PRESSURE: 136 MMHG | HEART RATE: 76 BPM | DIASTOLIC BLOOD PRESSURE: 86 MMHG | BODY MASS INDEX: 25.27 KG/M2 | TEMPERATURE: 97.3 F | RESPIRATION RATE: 16 BRPM | OXYGEN SATURATION: 98 % | WEIGHT: 161 LBS

## 2022-11-14 DIAGNOSIS — K21.9 GASTRO-ESOPHAGEAL REFLUX DISEASE W/OUT ESOPHAGITIS: ICD-10-CM

## 2022-11-14 DIAGNOSIS — I10 ESSENTIAL (PRIMARY) HYPERTENSION: ICD-10-CM

## 2022-11-14 DIAGNOSIS — E03.9 HYPOTHYROIDISM, UNSPECIFIED: ICD-10-CM

## 2022-11-14 DIAGNOSIS — Z90.13 ACQUIRED ABSENCE OF BILATERAL BREASTS AND NIPPLES: ICD-10-CM

## 2022-11-14 DIAGNOSIS — E78.5 HYPERLIPIDEMIA, UNSPECIFIED: ICD-10-CM

## 2022-11-14 PROCEDURE — 36415 COLL VENOUS BLD VENIPUNCTURE: CPT

## 2022-11-14 PROCEDURE — 90686 IIV4 VACC NO PRSV 0.5 ML IM: CPT

## 2022-11-14 PROCEDURE — 99214 OFFICE O/P EST MOD 30 MIN: CPT | Mod: 25

## 2022-11-14 PROCEDURE — G0008: CPT

## 2022-11-14 RX ORDER — ALENDRONATE SODIUM 70 MG/1
70 TABLET ORAL
Qty: 12 | Refills: 3 | Status: ACTIVE | COMMUNITY
Start: 2022-11-14 | End: 1900-01-01

## 2022-11-14 NOTE — HISTORY OF PRESENT ILLNESS
[FreeTextEntry1] : See HPI [de-identified] : Patient is a 62-year-old female who presents today for follow-up and disease management medical history significant for breast CA status post bilateral mastectomy and reconstruction, hypothyroidism, GERD, hypertension and hyperlipidemia.  Patient was also diagnosed with osteoporosis.

## 2022-11-14 NOTE — HEALTH RISK ASSESSMENT
[Former] : Former [20 or more] : 20 or more [2 - 4 times a month (2 pts)] : 2-4 times a month (2 points) [1 or 2 (0 pts)] : 1 or 2 (0 points) [Never (0 pts)] : Never (0 points) [No falls in past year] : Patient reported no falls in the past year [0] : 2) Feeling down, depressed, or hopeless: Not at all (0) [PHQ-2 Negative - No further assessment needed] : PHQ-2 Negative - No further assessment needed [YearQuit] : 2018 [Audit-CScore] : 2 [[id number]] : 0

## 2022-11-14 NOTE — ASSESSMENT
[FreeTextEntry1] : Assessment and plan:\par \par 1.  History of breast CA status post bilateral mastectomy and reconstruction patient doing well follows up regularly with hematology oncology most recent note in chart and reviewed with patient.\par \par 2.  Hypothyroidism continue levothyroxine 75 mcg p.o. daily check free T4 and TSH.\par \par 3.  Gastroesophageal reflux disease well-controlled with pantoprazole 40 mg p.o. daily.\par \par 4.  Hypertension blood pressure is under excellent control continue Norvasc 5 mg p.o. daily.\par \par 5.  Hyperlipidemia discussed low-fat low-cholesterol diet, weight loss program, increase physical activity continue rosuvastatin 10 mg p.o. at bedtime.\par \par 6.  Fluzone quadrivalent influenza vaccine administered left deltoid\par \par 7.  Comprehensive blood work drawn in office by examiner.

## 2022-11-15 LAB
ALBUMIN SERPL ELPH-MCNC: 4.5 G/DL
ALP BLD-CCNC: 113 U/L
ALT SERPL-CCNC: 16 U/L
ANION GAP SERPL CALC-SCNC: 11 MMOL/L
AST SERPL-CCNC: 15 U/L
BASOPHILS # BLD AUTO: 0.06 K/UL
BASOPHILS NFR BLD AUTO: 1 %
BILIRUB SERPL-MCNC: 0.3 MG/DL
BUN SERPL-MCNC: 16 MG/DL
CALCIUM SERPL-MCNC: 10 MG/DL
CHLORIDE SERPL-SCNC: 104 MMOL/L
CHOLEST SERPL-MCNC: 127 MG/DL
CO2 SERPL-SCNC: 26 MMOL/L
CREAT SERPL-MCNC: 0.89 MG/DL
EGFR: 72 ML/MIN/1.73M2
EOSINOPHIL # BLD AUTO: 0.2 K/UL
EOSINOPHIL NFR BLD AUTO: 3.3 %
GLUCOSE SERPL-MCNC: 99 MG/DL
HCT VFR BLD CALC: 44.5 %
HDLC SERPL-MCNC: 61 MG/DL
HGB BLD-MCNC: 14.2 G/DL
IMM GRANULOCYTES NFR BLD AUTO: 0.3 %
LDLC SERPL CALC-MCNC: 47 MG/DL
LYMPHOCYTES # BLD AUTO: 2.21 K/UL
LYMPHOCYTES NFR BLD AUTO: 36.4 %
MAN DIFF?: NORMAL
MCHC RBC-ENTMCNC: 30.8 PG
MCHC RBC-ENTMCNC: 31.9 GM/DL
MCV RBC AUTO: 96.5 FL
MONOCYTES # BLD AUTO: 0.53 K/UL
MONOCYTES NFR BLD AUTO: 8.7 %
NEUTROPHILS # BLD AUTO: 3.05 K/UL
NEUTROPHILS NFR BLD AUTO: 50.3 %
NONHDLC SERPL-MCNC: 66 MG/DL
PLATELET # BLD AUTO: 331 K/UL
POTASSIUM SERPL-SCNC: 4.8 MMOL/L
PROT SERPL-MCNC: 7.1 G/DL
RBC # BLD: 4.61 M/UL
RBC # FLD: 12.3 %
SODIUM SERPL-SCNC: 142 MMOL/L
T4 FREE SERPL-MCNC: 1.5 NG/DL
TRIGL SERPL-MCNC: 98 MG/DL
TSH SERPL-ACNC: 0.38 UIU/ML
WBC # FLD AUTO: 6.07 K/UL

## 2022-11-29 ENCOUNTER — RESULT REVIEW (OUTPATIENT)
Age: 62
End: 2022-11-29

## 2022-12-05 ENCOUNTER — RX RENEWAL (OUTPATIENT)
Age: 62
End: 2022-12-05

## 2022-12-05 RX ORDER — CYCLOBENZAPRINE HYDROCHLORIDE 10 MG/1
10 TABLET, FILM COATED ORAL
Qty: 30 | Refills: 1 | Status: ACTIVE | COMMUNITY
Start: 2022-04-13 | End: 1900-01-01

## 2022-12-05 RX ORDER — AMLODIPINE BESYLATE 5 MG/1
5 TABLET ORAL
Qty: 30 | Refills: 3 | Status: ACTIVE | COMMUNITY
Start: 2021-10-11 | End: 1900-01-01

## 2022-12-06 ENCOUNTER — RX RENEWAL (OUTPATIENT)
Age: 62
End: 2022-12-06

## 2022-12-27 ENCOUNTER — APPOINTMENT (OUTPATIENT)
Dept: ULTRASOUND IMAGING | Facility: HOSPITAL | Age: 62
End: 2022-12-27
Payer: MEDICAID

## 2022-12-27 ENCOUNTER — RESULT REVIEW (OUTPATIENT)
Age: 62
End: 2022-12-27

## 2022-12-27 ENCOUNTER — OUTPATIENT (OUTPATIENT)
Dept: OUTPATIENT SERVICES | Facility: HOSPITAL | Age: 62
LOS: 1 days | End: 2022-12-27
Payer: MEDICAID

## 2022-12-27 DIAGNOSIS — S30.1XXA CONTUSION OF ABDOMINAL WALL, INITIAL ENCOUNTER: Chronic | ICD-10-CM

## 2022-12-27 DIAGNOSIS — Z98.890 OTHER SPECIFIED POSTPROCEDURAL STATES: Chronic | ICD-10-CM

## 2022-12-27 DIAGNOSIS — Z90.10 ACQUIRED ABSENCE OF UNSPECIFIED BREAST AND NIPPLE: Chronic | ICD-10-CM

## 2022-12-27 DIAGNOSIS — Z85.3 PERSONAL HISTORY OF MALIGNANT NEOPLASM OF BREAST: ICD-10-CM

## 2022-12-27 PROCEDURE — 76641 ULTRASOUND BREAST COMPLETE: CPT

## 2022-12-27 PROCEDURE — 76641 ULTRASOUND BREAST COMPLETE: CPT | Mod: 26

## 2023-01-03 DIAGNOSIS — N63.0 UNSPECIFIED LUMP IN UNSPECIFIED BREAST: ICD-10-CM

## 2023-01-05 ENCOUNTER — OUTPATIENT (OUTPATIENT)
Dept: OUTPATIENT SERVICES | Facility: HOSPITAL | Age: 63
LOS: 1 days | End: 2023-01-05
Payer: MEDICAID

## 2023-01-05 ENCOUNTER — RESULT REVIEW (OUTPATIENT)
Age: 63
End: 2023-01-05

## 2023-01-05 ENCOUNTER — APPOINTMENT (OUTPATIENT)
Dept: ULTRASOUND IMAGING | Facility: IMAGING CENTER | Age: 63
End: 2023-01-05
Payer: MEDICAID

## 2023-01-05 DIAGNOSIS — N63.0 UNSPECIFIED LUMP IN UNSPECIFIED BREAST: ICD-10-CM

## 2023-01-05 DIAGNOSIS — Z98.890 OTHER SPECIFIED POSTPROCEDURAL STATES: Chronic | ICD-10-CM

## 2023-01-05 DIAGNOSIS — Z00.8 ENCOUNTER FOR OTHER GENERAL EXAMINATION: ICD-10-CM

## 2023-01-05 DIAGNOSIS — Z90.10 ACQUIRED ABSENCE OF UNSPECIFIED BREAST AND NIPPLE: Chronic | ICD-10-CM

## 2023-01-05 DIAGNOSIS — S30.1XXA CONTUSION OF ABDOMINAL WALL, INITIAL ENCOUNTER: Chronic | ICD-10-CM

## 2023-01-05 PROCEDURE — 88305 TISSUE EXAM BY PATHOLOGIST: CPT

## 2023-01-05 PROCEDURE — G0279: CPT

## 2023-01-05 PROCEDURE — 77065 DX MAMMO INCL CAD UNI: CPT

## 2023-01-05 PROCEDURE — 19083 BX BREAST 1ST LESION US IMAG: CPT

## 2023-01-05 PROCEDURE — A4648: CPT

## 2023-01-05 PROCEDURE — 77065 DX MAMMO INCL CAD UNI: CPT | Mod: 26,RT

## 2023-01-05 PROCEDURE — 19083 BX BREAST 1ST LESION US IMAG: CPT | Mod: RT

## 2023-01-05 PROCEDURE — G0279: CPT | Mod: 26

## 2023-01-05 PROCEDURE — 88305 TISSUE EXAM BY PATHOLOGIST: CPT | Mod: 26

## 2023-01-12 NOTE — H&P PST ADULT - BACK
detailed exam

## 2023-01-16 ENCOUNTER — RX RENEWAL (OUTPATIENT)
Age: 63
End: 2023-01-16

## 2023-01-16 RX ORDER — PANTOPRAZOLE 40 MG/1
40 TABLET, DELAYED RELEASE ORAL
Qty: 30 | Refills: 5 | Status: ACTIVE | COMMUNITY
Start: 2017-03-06 | End: 1900-01-01

## 2023-01-16 RX ORDER — ROSUVASTATIN CALCIUM 10 MG/1
10 TABLET, FILM COATED ORAL AT BEDTIME
Qty: 30 | Refills: 5 | Status: ACTIVE | COMMUNITY
Start: 2022-07-20 | End: 1900-01-01

## 2023-01-16 RX ORDER — LEVOTHYROXINE SODIUM 0.07 MG/1
75 TABLET ORAL
Qty: 30 | Refills: 5 | Status: ACTIVE | COMMUNITY
Start: 2020-05-21 | End: 1900-01-01

## 2023-01-31 ENCOUNTER — RX RENEWAL (OUTPATIENT)
Age: 63
End: 2023-01-31

## 2023-01-31 RX ORDER — FLUTICASONE PROPIONATE 50 UG/1
50 SPRAY, METERED NASAL DAILY
Qty: 16 | Refills: 1 | Status: ACTIVE | COMMUNITY
Start: 2021-04-15 | End: 1900-01-01

## 2023-02-16 NOTE — HEALTH RISK ASSESSMENT
[Very Good] : ~his/her~  mood as very good [No] : In the past 12 months have you used drugs other than those required for medical reasons? No [No falls in past year] : Patient reported no falls in the past year [0] : 2) Feeling down, depressed, or hopeless: Not at all (0) [] : No [YearQuit] : 2017 [Audit-CScore] : 0 99 [[id number]] : 0 [Change in mental status noted] : No change in mental status noted [Language] : denies difficulty with language [Behavior] : denies difficulty with behavior [Learning/Retaining New Information] : denies difficulty learning/retaining new information [Handling Complex Tasks] : denies difficulty handling complex tasks [Reasoning] : denies difficulty with reasoning [Spatial Ability and Orientation] : difficulty with spatial ability and orientation [None] : None [With Family] : lives with family [# of Members in Household ___] :  household currently consist of [unfilled] member(s) [Employed] : employed [Less Than High School] : less than high school [] :  [Sexually Active] : not sexually active [High Risk Behavior] : no high risk behavior [Fully functional (bathing, dressing, toileting, transferring, walking, feeding)] : Fully functional (bathing, dressing, toileting, transferring, walking, feeding) [Feels Safe at Home] : Feels safe at home [Fully functional (using the telephone, shopping, preparing meals, housekeeping, doing laundry, using] : Fully functional and needs no help or supervision to perform IADLs (using the telephone, shopping, preparing meals, housekeeping, doing laundry, using transportation, managing medications and managing finances) [Reports changes in hearing] : Reports no changes in hearing [Reports changes in vision] : Reports no changes in vision [Reports normal functional visual acuity (ie: able to read med bottle)] : Reports normal functional visual acuity [Reports changes in dental health] : Reports no changes in dental health [Smoke Detector] : smoke detector [Carbon Monoxide Detector] : carbon monoxide detector [Safety elements used in home] : safety elements used in home [Seat Belt] :  uses seat belt [Travel to Developing Areas] : does not  travel to developing areas [Sunscreen] : uses sunscreen [TB Exposure] : is not being exposed to tuberculosis [Caregiver Concerns] : does not have caregiver concerns [MammogramComments] : Bilateral mastectomy [Reviewed no changes] : Reviewed no changes [Designated Healthcare Proxy] : Designated healthcare proxy [Relationship: ___] : Relationship: [unfilled] [Aggressive treatment] : aggressive treatment [AdvancecareDate] : 01/20

## 2023-03-13 ENCOUNTER — APPOINTMENT (OUTPATIENT)
Dept: FAMILY MEDICINE | Facility: CLINIC | Age: 63
End: 2023-03-13

## 2023-04-27 ENCOUNTER — OUTPATIENT (OUTPATIENT)
Dept: OUTPATIENT SERVICES | Facility: HOSPITAL | Age: 63
LOS: 1 days | Discharge: HOME | End: 2023-04-27

## 2023-04-27 DIAGNOSIS — Z98.890 OTHER SPECIFIED POSTPROCEDURAL STATES: Chronic | ICD-10-CM

## 2023-04-27 DIAGNOSIS — S30.1XXA CONTUSION OF ABDOMINAL WALL, INITIAL ENCOUNTER: Chronic | ICD-10-CM

## 2023-04-27 DIAGNOSIS — C50.919 MALIGNANT NEOPLASM OF UNSPECIFIED SITE OF UNSPECIFIED FEMALE BREAST: ICD-10-CM

## 2023-04-27 DIAGNOSIS — Z90.10 ACQUIRED ABSENCE OF UNSPECIFIED BREAST AND NIPPLE: Chronic | ICD-10-CM

## 2023-05-02 ENCOUNTER — APPOINTMENT (OUTPATIENT)
Dept: SURGICAL ONCOLOGY | Facility: CLINIC | Age: 63
End: 2023-05-02

## 2023-05-05 ENCOUNTER — APPOINTMENT (OUTPATIENT)
Dept: HEMATOLOGY ONCOLOGY | Facility: CLINIC | Age: 63
End: 2023-05-05

## 2023-05-18 NOTE — REVIEW OF SYSTEMS
18 [Joint Pain] : joint pain [Muscle Pain] : muscle pain [Back Pain] : back pain [Negative] : Heme/Lymph

## 2023-05-22 NOTE — H&P PST ADULT - [facility]
"Patient walked into clinic this morning.  Had back fusion surgery recently and thinks her incision site is infected and wanted to see a provider and get a prescription for an antibiotic.    Explained to patient that there are no appointments available today.  Patient then yelled \"why did I waste my time waiting to be seen\".  Explained to patient there was a scheduled RN appointment and that patient was seen first.    When asked if she contacted her surgeon's clinic about the possible infection in incision site, patient got angry and said no, she didn't call their office.    This writer attempted to look at incision site but patient pulled her shirt down in anger and walked out of the room after she was told there are no appointments available today.  This RN did recommend either call surgeon's office or go to urgent care for eval.  "
regular rate and rhythm/no rub/no murmur

## 2023-05-25 NOTE — ED ADULT NURSE NOTE - NSIMPLEMENTINTERV_GEN_ALL_ED
Call [phone] to schedule your lung cancer imaging with [facility]. Over the Counter Oral Pain Med Options:  First choice: You can use Tylenol (Acetaminophen) Extra Strength/Arthritis up to 1000mg three times daily as needed for pain. Alternative NSAID for those with GERD (heartburn, reflux), history of ulcers would be Ibuprofen due to lowest documented adverse GI effects. You can take up to 800mg three times daily as needed for pain. Caution with long term regular use of any NSAID (Ex. Advil, Mobic, Aleve, Ibuprofen.) They can put strain on your kidney, be upsetting to the stomach, as well as contribute to increased blood pressure. Tylenol and NSAIDs work in different ways and can be safely used together if needed on occasion.
Implemented All Universal Safety Interventions:  [place] to call system. Call bell, personal items and telephone within reach. Instruct patient to call for assistance. Room bathroom lighting operational. Non-slip footwear when patient is off stretcher. Physically safe environment: no spills, clutter or unnecessary equipment. Stretcher in lowest position, wheels locked, appropriate side rails in place.

## 2023-07-26 NOTE — END OF VISIT
Heart healthy diet   [>50% of Time Spent on Counseling for ____] : Greater than 50% of the encounter time was spent on counseling for [unfilled] [Time Spent: ___ minutes] : I have spent [unfilled] minutes of face to face time with the patient

## 2023-08-18 NOTE — ASSESSMENT
[FreeTextEntry1] : Assessment and plan:\par \par 1.  Gastroenteritis resolved no further treatment.\par \par 2.  Breast cancer status post bilateral mastectomy status post bilateral delayed D IEP flap reconstruction, patient follows up with plastic surgery she has healed well.  There was an incidental finding in the left breast status post biopsy reviewed results.\par \par 3.  Status post hysteroscopic polypectomy and myectomy biopsy was benign patient healed well and is doing well.  Recommend follow-up with gynecology.\par \par 4.  Hypothyroidism TSH was elevated levothyroxine increased to 50 mcg follow-up blood work in 6 weeks.\par \par 5. patient did go for skin surveillance all findings were benign.\par \par 6.  Gastroesophageal reflux disease discussed diet and patient will continue proton pump inhibitor pantoprazole 40 mg p.o. daily she does get great relief with PPI.\par \par 7.  Hypertension patient has been cutting amlodipine 5 mg in half because she said when she took the full pill she became lightheaded therefore 2.5 mg of amlodipine is working well her blood pressure is well controlled continue lifestyle changes.\par \par 8.  Hyperlipidemia discussed with patient low-fat low-cholesterol diet increase physical activity as tolerated.\par \par 9.  Patient is up-to-date with COVID-19 vaccination she has received all 3 doses of Pfizer.\par \par  Qbrexza Counseling:  I discussed with the patient the risks of Qbrexza including but not limited to headache, mydriasis, blurred vision, dry eyes, nasal dryness, dry mouth, dry throat, dry skin, urinary hesitation, and constipation.  Local skin reactions including erythema, burning, stinging, and itching can also occur.

## 2023-10-01 PROBLEM — L30.8 PRURITIC DERMATITIS: Status: ACTIVE | Noted: 2022-07-13

## 2023-10-17 NOTE — H&P PST ADULT - NSCAFFEINEWITH_GEN_ALL_CORE_SD
irritability/headache Oxybutynin Counseling:  I discussed with the patient the risks of oxybutynin including but not limited to skin rash, drowsiness, dry mouth, difficulty urinating, and blurred vision.

## 2023-11-14 NOTE — PATIENT PROFILE ADULT - NSPROPASSIVESMOKEEXPOSURE_GEN_A_NUR
Caller:     Doctor: Tayler Segura    Reason for call: Asked about medication, I told them medication was sent to the pharmacy No

## 2023-12-25 ENCOUNTER — NON-APPOINTMENT (OUTPATIENT)
Age: 63
End: 2023-12-25

## 2024-06-27 NOTE — ASU PREOP CHECKLIST - VIA
Detail Level: Detailed
Form completed and send to [facility] Information to be scanned.    Faxed [facility] paperwork per patient's forms completion authorization request.  Continuous leave per info in chart.  Faxed forms to [facility] @ [phone].  
Form completed by Physician's Office.    
ambulate

## 2024-12-11 NOTE — ED ADULT NURSE NOTE - NS PRO PASSIVE SMOKE EXP
LVM x3 requesting patient to call back regarding rescheduling and physician transition. Mychart msg has been sent and apt has been canceled.     No

## 2025-06-06 NOTE — PRE-OP CHECKLIST - NS PREOP CHK HIBICLENS NA
[de-identified] : The patient is a 14 year  old right hand dominant female who presents today complaining of left wrist pain  Date of Injury/Onset: 5/19/25 Pain:    At Rest: 3/10  With Activity:  6-7/10  Mechanism of injury: While pole vaulting she got stuck, fell, and landed on outstretched hand  This is NOT a Work Related Injury being treated under Worker's Compensation. This IS an athletic injury occurring associated with an interscholastic or organized sports team. 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  Additional Information: None N/A